# Patient Record
Sex: FEMALE | Race: WHITE | NOT HISPANIC OR LATINO | Employment: UNEMPLOYED | URBAN - NONMETROPOLITAN AREA
[De-identification: names, ages, dates, MRNs, and addresses within clinical notes are randomized per-mention and may not be internally consistent; named-entity substitution may affect disease eponyms.]

---

## 2017-02-26 ENCOUNTER — HOSPITAL ENCOUNTER (EMERGENCY)
Facility: HOSPITAL | Age: 37
Discharge: HOME | End: 2017-02-26
Attending: EMERGENCY MEDICINE | Admitting: EMERGENCY MEDICINE

## 2017-02-26 VITALS
HEART RATE: 76 BPM | HEIGHT: 62 IN | BODY MASS INDEX: 46.01 KG/M2 | SYSTOLIC BLOOD PRESSURE: 114 MMHG | RESPIRATION RATE: 18 BRPM | OXYGEN SATURATION: 98 % | WEIGHT: 250 LBS | DIASTOLIC BLOOD PRESSURE: 84 MMHG | TEMPERATURE: 98.2 F

## 2017-02-26 DIAGNOSIS — H10.9 BACTERIAL CONJUNCTIVITIS OF LEFT EYE: Primary | ICD-10-CM

## 2017-02-26 PROCEDURE — 99284 EMERGENCY DEPT VISIT MOD MDM: CPT

## 2017-02-26 RX ORDER — POLYMYXIN B SULFATE AND TRIMETHOPRIM 1; 10000 MG/ML; [USP'U]/ML
1 SOLUTION OPHTHALMIC ONCE
Status: DISCONTINUED | OUTPATIENT
Start: 2017-02-26 | End: 2017-02-26

## 2017-02-26 RX ORDER — PURIFIED WATER 986 MG/ML
SOLUTION OPHTHALMIC ONCE AS NEEDED
Status: DISCONTINUED | OUTPATIENT
Start: 2017-02-26 | End: 2017-02-26

## 2017-02-26 RX ORDER — TETRACAINE HYDROCHLORIDE 5 MG/ML
2 SOLUTION OPHTHALMIC ONCE
Status: DISCONTINUED | OUTPATIENT
Start: 2017-02-26 | End: 2017-02-26

## 2017-02-26 RX ORDER — POLYMYXIN B SULFATE AND TRIMETHOPRIM 1; 10000 MG/ML; [USP'U]/ML
1 SOLUTION OPHTHALMIC EVERY 4 HOURS
Qty: 10 ML | Refills: 0 | Status: SHIPPED | OUTPATIENT
Start: 2017-02-26 | End: 2017-03-30

## 2017-02-26 RX ORDER — POLYMYXIN B SULFATE AND TRIMETHOPRIM 1; 10000 MG/ML; [USP'U]/ML
1 SOLUTION OPHTHALMIC ONCE
Status: COMPLETED | OUTPATIENT
Start: 2017-02-26 | End: 2017-02-26

## 2017-02-26 RX ORDER — LISINOPRIL 2.5 MG/1
2.5 TABLET ORAL DAILY
Status: ON HOLD | COMMUNITY
End: 2018-10-04

## 2017-02-26 RX ADMIN — POLYMYXIN B SULFATE AND TRIMETHOPRIM 1 DROP: 1; 10000 SOLUTION OPHTHALMIC at 12:40

## 2017-03-07 DIAGNOSIS — Z30.2 STERILIZATION: Primary | ICD-10-CM

## 2017-03-07 DIAGNOSIS — L91.8 SKIN TAG: ICD-10-CM

## 2017-03-07 DIAGNOSIS — Z30.432 ENCOUNTER FOR IUD REMOVAL: ICD-10-CM

## 2017-03-07 RX ORDER — SODIUM CHLORIDE 0.9 % (FLUSH) 0.9 %
1-10 SYRINGE (ML) INJECTION AS NEEDED
Status: CANCELLED | OUTPATIENT
Start: 2017-03-07

## 2017-03-07 NOTE — H&P
Chief complaint Undesired Fertility, Skin Tags    History of Present Illness: Patient is a 36 y.o. female who presents for a Laparoscopic BTL, IUD removal, Skin Tag Removal        Past Medical History   Diagnosis Date   • Asthma    • COPD (chronic obstructive pulmonary disease)    • Diabetes mellitus    • Hepatitis C    • Hypertension    • Lupus    • Rheumatoid arthritis        Past Surgical History   Procedure Laterality Date   • Cholecystectomy         No family history on file.    Social History   Substance Use Topics   • Smoking status: Current Every Day Smoker     Packs/day: 1.00     Types: Cigarettes   • Smokeless tobacco: Never Used   • Alcohol use No         (Not in a hospital admission)    Allergies:  Review of patient's allergies indicates no known allergies.      Vital Signs   Height 62 in  Weight 266 lbs  BMI 48  /87  Pulse 84  Pulse Ox 94      Review of Systems    The following systems were reviewed and negative;  ENT, respiratory, cardiovascular, gastrointestinal, genitourinary, breast, endocrine and allergies / immunologic.      Physical Exam:      General Appearance:    Alert, cooperative, in no acute distress   Head:    Normocephalic, without obvious abnormality, atraumatic   Eyes:            Lids and lashes normal, conjunctivae and sclerae normal, no   icterus, no pallor, corneas clear, PERRLA   Ears:    Ears appear intact with no abnormalities noted   Throat:   No oral lesions, no thrush, oral mucosa moist   Neck:   No adenopathy, supple, trachea midline, no thyromegaly, no     carotid bruit, no JVD   Back:     No kyphosis present, no scoliosis present, no skin lesions,       erythema or scars, no tenderness to percussion or                   palpation,   range of motion normal   Lungs:     Clear to auscultation,respirations regular, even and                   unlabored    Heart:    Regular rhythm and normal rate, normal S1 and S2, no            murmur, no gallop, no rub, no click   Breast  Exam:    Deferred   Abdomen:     Normal bowel sounds, no masses, no organomegaly, soft        non-tender, non-distended, no guarding, no rebound                 tenderness   Genitalia:    Deferred   Extremities:   Moves all extremities well, no edema, no cyanosis, no              redness   Pulses:   Pulses palpable and equal bilaterally   Skin:   No bleeding, bruising or rash   Lymph nodes:   No palpable adenopathy   Neurologic:   Cranial nerves 2 - 12 grossly intact, sensation intact, DTR        present and equal bilaterally         Assessment: Patient is a 36 y.o. female who presents with undesired fertility, skin tags.    Plan of Care: Proceed with a Laparoscopic BTL, IUD removal, Skin Tag Removal.       Kee Crooks III, MD  03/07/17  12:41 PM

## 2017-03-30 ENCOUNTER — APPOINTMENT (OUTPATIENT)
Dept: PREADMISSION TESTING | Facility: HOSPITAL | Age: 37
End: 2017-03-30

## 2017-04-03 ENCOUNTER — APPOINTMENT (OUTPATIENT)
Dept: GENERAL RADIOLOGY | Facility: HOSPITAL | Age: 37
End: 2017-04-03
Attending: OBSTETRICS & GYNECOLOGY

## 2017-04-03 ENCOUNTER — ANESTHESIA (OUTPATIENT)
Dept: PERIOP | Facility: HOSPITAL | Age: 37
End: 2017-04-03

## 2017-04-03 ENCOUNTER — ANESTHESIA EVENT (OUTPATIENT)
Dept: PERIOP | Facility: HOSPITAL | Age: 37
End: 2017-04-03

## 2017-04-03 ENCOUNTER — HOSPITAL ENCOUNTER (OUTPATIENT)
Facility: HOSPITAL | Age: 37
Setting detail: HOSPITAL OUTPATIENT SURGERY
Discharge: HOME | End: 2017-04-03
Attending: OBSTETRICS & GYNECOLOGY | Admitting: OBSTETRICS & GYNECOLOGY

## 2017-04-03 VITALS
HEIGHT: 62 IN | TEMPERATURE: 97.9 F | WEIGHT: 265 LBS | DIASTOLIC BLOOD PRESSURE: 69 MMHG | SYSTOLIC BLOOD PRESSURE: 112 MMHG | RESPIRATION RATE: 18 BRPM | OXYGEN SATURATION: 98 % | HEART RATE: 69 BPM | BODY MASS INDEX: 48.76 KG/M2

## 2017-04-03 DIAGNOSIS — Z30.2 STERILIZATION: ICD-10-CM

## 2017-04-03 DIAGNOSIS — Z30.432 ENCOUNTER FOR IUD REMOVAL: ICD-10-CM

## 2017-04-03 DIAGNOSIS — N90.89 SKIN TAG OF LABIA: ICD-10-CM

## 2017-04-03 DIAGNOSIS — L91.8 SKIN TAG: ICD-10-CM

## 2017-04-03 LAB
B-HCG UR QL: NEGATIVE
GLUCOSE BLDC GLUCOMTR-MCNC: 98 MG/DL (ref 70–130)
INTERNAL NEGATIVE CONTROL: NEGATIVE
INTERNAL POSITIVE CONTROL: POSITIVE
Lab: NORMAL

## 2017-04-03 PROCEDURE — 25010000002 FENTANYL CITRATE (PF) 100 MCG/2ML SOLUTION: Performed by: NURSE ANESTHETIST, CERTIFIED REGISTERED

## 2017-04-03 PROCEDURE — 25010000002 KETOROLAC TROMETHAMINE PER 15 MG: Performed by: NURSE ANESTHETIST, CERTIFIED REGISTERED

## 2017-04-03 PROCEDURE — 25010000002 MIDAZOLAM PER 1 MG: Performed by: NURSE ANESTHETIST, CERTIFIED REGISTERED

## 2017-04-03 PROCEDURE — 25010000002 ONDANSETRON PER 1 MG: Performed by: NURSE ANESTHETIST, CERTIFIED REGISTERED

## 2017-04-03 PROCEDURE — 25010000002 NEOSTIGMINE 10 MG/10ML SOLUTION: Performed by: NURSE ANESTHETIST, CERTIFIED REGISTERED

## 2017-04-03 PROCEDURE — 94640 AIRWAY INHALATION TREATMENT: CPT

## 2017-04-03 PROCEDURE — 25010000002 SUCCINYLCHOLINE PER 20 MG: Performed by: NURSE ANESTHETIST, CERTIFIED REGISTERED

## 2017-04-03 PROCEDURE — 82962 GLUCOSE BLOOD TEST: CPT

## 2017-04-03 PROCEDURE — 25010000002 PROPOFOL 10 MG/ML EMULSION: Performed by: NURSE ANESTHETIST, CERTIFIED REGISTERED

## 2017-04-03 DEVICE — CLIP FALLOP FILSHIE TI PR STRL BX/20: Type: IMPLANTABLE DEVICE | Status: FUNCTIONAL

## 2017-04-03 RX ORDER — MIDAZOLAM HYDROCHLORIDE 1 MG/ML
INJECTION INTRAMUSCULAR; INTRAVENOUS AS NEEDED
Status: DISCONTINUED | OUTPATIENT
Start: 2017-04-03 | End: 2017-04-03 | Stop reason: SURG

## 2017-04-03 RX ORDER — SODIUM CHLORIDE 0.9 % (FLUSH) 0.9 %
1-10 SYRINGE (ML) INJECTION AS NEEDED
Status: DISCONTINUED | OUTPATIENT
Start: 2017-04-03 | End: 2017-04-03 | Stop reason: HOSPADM

## 2017-04-03 RX ORDER — MAGNESIUM HYDROXIDE 1200 MG/15ML
LIQUID ORAL AS NEEDED
Status: DISCONTINUED | OUTPATIENT
Start: 2017-04-03 | End: 2017-04-03 | Stop reason: HOSPADM

## 2017-04-03 RX ORDER — ONDANSETRON 2 MG/ML
4 INJECTION INTRAMUSCULAR; INTRAVENOUS ONCE AS NEEDED
Status: DISCONTINUED | OUTPATIENT
Start: 2017-04-03 | End: 2017-04-03 | Stop reason: HOSPADM

## 2017-04-03 RX ORDER — MEPERIDINE HYDROCHLORIDE 25 MG/ML
12.5 INJECTION INTRAMUSCULAR; INTRAVENOUS; SUBCUTANEOUS
Status: DISCONTINUED | OUTPATIENT
Start: 2017-04-03 | End: 2017-04-03 | Stop reason: HOSPADM

## 2017-04-03 RX ORDER — ONDANSETRON 2 MG/ML
INJECTION INTRAMUSCULAR; INTRAVENOUS AS NEEDED
Status: DISCONTINUED | OUTPATIENT
Start: 2017-04-03 | End: 2017-04-03 | Stop reason: SURG

## 2017-04-03 RX ORDER — KETOROLAC TROMETHAMINE 30 MG/ML
INJECTION, SOLUTION INTRAMUSCULAR; INTRAVENOUS AS NEEDED
Status: DISCONTINUED | OUTPATIENT
Start: 2017-04-03 | End: 2017-04-03 | Stop reason: SURG

## 2017-04-03 RX ORDER — PROPOFOL 10 MG/ML
VIAL (ML) INTRAVENOUS AS NEEDED
Status: DISCONTINUED | OUTPATIENT
Start: 2017-04-03 | End: 2017-04-03 | Stop reason: SURG

## 2017-04-03 RX ORDER — SUCCINYLCHOLINE CHLORIDE 20 MG/ML
INJECTION INTRAMUSCULAR; INTRAVENOUS AS NEEDED
Status: DISCONTINUED | OUTPATIENT
Start: 2017-04-03 | End: 2017-04-03 | Stop reason: SURG

## 2017-04-03 RX ORDER — ROCURONIUM BROMIDE 10 MG/ML
INJECTION, SOLUTION INTRAVENOUS AS NEEDED
Status: DISCONTINUED | OUTPATIENT
Start: 2017-04-03 | End: 2017-04-03 | Stop reason: SURG

## 2017-04-03 RX ORDER — SODIUM CHLORIDE, SODIUM LACTATE, POTASSIUM CHLORIDE, CALCIUM CHLORIDE 600; 310; 30; 20 MG/100ML; MG/100ML; MG/100ML; MG/100ML
125 INJECTION, SOLUTION INTRAVENOUS CONTINUOUS
Status: DISCONTINUED | OUTPATIENT
Start: 2017-04-03 | End: 2017-04-03 | Stop reason: HOSPADM

## 2017-04-03 RX ORDER — OXYCODONE HYDROCHLORIDE AND ACETAMINOPHEN 5; 325 MG/1; MG/1
1 TABLET ORAL ONCE AS NEEDED
Status: DISCONTINUED | OUTPATIENT
Start: 2017-04-03 | End: 2017-04-03 | Stop reason: HOSPADM

## 2017-04-03 RX ORDER — FENTANYL CITRATE 50 UG/ML
50 INJECTION, SOLUTION INTRAMUSCULAR; INTRAVENOUS
Status: DISCONTINUED | OUTPATIENT
Start: 2017-04-03 | End: 2017-04-03 | Stop reason: HOSPADM

## 2017-04-03 RX ORDER — IPRATROPIUM BROMIDE AND ALBUTEROL SULFATE 2.5; .5 MG/3ML; MG/3ML
3 SOLUTION RESPIRATORY (INHALATION) ONCE AS NEEDED
Status: DISCONTINUED | OUTPATIENT
Start: 2017-04-03 | End: 2017-04-03 | Stop reason: HOSPADM

## 2017-04-03 RX ORDER — IPRATROPIUM BROMIDE AND ALBUTEROL SULFATE 2.5; .5 MG/3ML; MG/3ML
3 SOLUTION RESPIRATORY (INHALATION) ONCE
Status: COMPLETED | OUTPATIENT
Start: 2017-04-03 | End: 2017-04-03

## 2017-04-03 RX ORDER — NEOSTIGMINE METHYLSULFATE 1 MG/ML
INJECTION, SOLUTION INTRAVENOUS AS NEEDED
Status: DISCONTINUED | OUTPATIENT
Start: 2017-04-03 | End: 2017-04-03 | Stop reason: SURG

## 2017-04-03 RX ORDER — LIDOCAINE HYDROCHLORIDE 20 MG/ML
INJECTION, SOLUTION INFILTRATION; PERINEURAL AS NEEDED
Status: DISCONTINUED | OUTPATIENT
Start: 2017-04-03 | End: 2017-04-03 | Stop reason: SURG

## 2017-04-03 RX ORDER — FENTANYL CITRATE 50 UG/ML
INJECTION, SOLUTION INTRAMUSCULAR; INTRAVENOUS AS NEEDED
Status: DISCONTINUED | OUTPATIENT
Start: 2017-04-03 | End: 2017-04-03 | Stop reason: SURG

## 2017-04-03 RX ORDER — FAMOTIDINE 10 MG/ML
INJECTION, SOLUTION INTRAVENOUS AS NEEDED
Status: DISCONTINUED | OUTPATIENT
Start: 2017-04-03 | End: 2017-04-03 | Stop reason: SURG

## 2017-04-03 RX ORDER — GLYCOPYRROLATE 0.2 MG/ML
INJECTION INTRAMUSCULAR; INTRAVENOUS AS NEEDED
Status: DISCONTINUED | OUTPATIENT
Start: 2017-04-03 | End: 2017-04-03 | Stop reason: SURG

## 2017-04-03 RX ADMIN — PROPOFOL 50 MG: 10 INJECTION, EMULSION INTRAVENOUS at 08:50

## 2017-04-03 RX ADMIN — KETOROLAC TROMETHAMINE 30 MG: 30 INJECTION, SOLUTION INTRAMUSCULAR; INTRAVENOUS at 08:53

## 2017-04-03 RX ADMIN — IPRATROPIUM BROMIDE AND ALBUTEROL SULFATE 3 ML: .5; 3 SOLUTION RESPIRATORY (INHALATION) at 08:08

## 2017-04-03 RX ADMIN — ROCURONIUM BROMIDE 10 MG: 10 INJECTION INTRAVENOUS at 08:48

## 2017-04-03 RX ADMIN — PROPOFOL 200 MG: 10 INJECTION, EMULSION INTRAVENOUS at 08:37

## 2017-04-03 RX ADMIN — GLYCOPYRROLATE 0.6 MG: 0.2 INJECTION INTRAMUSCULAR; INTRAVENOUS at 09:00

## 2017-04-03 RX ADMIN — ROCURONIUM BROMIDE 10 MG: 10 INJECTION INTRAVENOUS at 08:44

## 2017-04-03 RX ADMIN — FENTANYL CITRATE 50 MCG: 50 INJECTION INTRAMUSCULAR; INTRAVENOUS at 09:14

## 2017-04-03 RX ADMIN — FENTANYL CITRATE 50 MCG: 50 INJECTION INTRAMUSCULAR; INTRAVENOUS at 09:21

## 2017-04-03 RX ADMIN — SUCCINYLCHOLINE CHLORIDE 100 MG: 20 INJECTION, SOLUTION INTRAMUSCULAR; INTRAVENOUS at 08:37

## 2017-04-03 RX ADMIN — SODIUM CHLORIDE, POTASSIUM CHLORIDE, SODIUM LACTATE AND CALCIUM CHLORIDE 125 ML/HR: 600; 310; 30; 20 INJECTION, SOLUTION INTRAVENOUS at 08:27

## 2017-04-03 RX ADMIN — FENTANYL CITRATE 100 MCG: 50 INJECTION INTRAMUSCULAR; INTRAVENOUS at 08:37

## 2017-04-03 RX ADMIN — LIDOCAINE HYDROCHLORIDE 100 MG: 20 INJECTION, SOLUTION INFILTRATION; PERINEURAL at 08:37

## 2017-04-03 RX ADMIN — ONDANSETRON 4 MG: 2 INJECTION, SOLUTION INTRAMUSCULAR; INTRAVENOUS at 08:45

## 2017-04-03 RX ADMIN — FAMOTIDINE 20 MG: 10 INJECTION, SOLUTION INTRAVENOUS at 08:45

## 2017-04-03 RX ADMIN — OXYCODONE HYDROCHLORIDE AND ACETAMINOPHEN 1 TABLET: 5; 325 TABLET ORAL at 09:47

## 2017-04-03 RX ADMIN — NEOSTIGMINE METHYLSULFATE 3 MG: 1 INJECTION, SOLUTION INTRAVENOUS at 09:00

## 2017-04-03 RX ADMIN — MIDAZOLAM HYDROCHLORIDE 2 MG: 1 INJECTION, SOLUTION INTRAMUSCULAR; INTRAVENOUS at 08:33

## 2017-04-03 NOTE — DISCHARGE INSTRUCTIONS
You had your IUD removed and skin tags removed during your procedure today.   Your follow-up appointment with Dr. Crooks will be on April 17, 2017 at 11:10 am

## 2017-04-03 NOTE — ANESTHESIA PROCEDURE NOTES
Airway  Urgency: elective    Date/Time: 4/3/2017 8:38 AM  Airway not difficult    General Information and Staff    Patient location during procedure: OR  CRNA: MASHA GALLO    Indications and Patient Condition  Indications for airway management: airway protection    Preoxygenated: yes  MILS maintained throughout  Mask difficulty assessment: 0 - not attempted    Final Airway Details  Final airway type: endotracheal airway      Successful airway: ETT  Cuffed: yes   Successful intubation technique: direct laryngoscopy  Endotracheal tube insertion site: oral  Blade: Drake  Blade size: #2  ETT size: 7.5 mm  Cormack-Lehane Classification: grade I - full view of glottis  Placement verified by: chest auscultation and capnometry   Measured from: lips  ETT to lips (cm): 20  Number of attempts at approach: 1

## 2017-04-03 NOTE — PLAN OF CARE
Problem: Patient Care Overview (Adult)  Goal: Discharge Needs Assessment  Outcome: Ongoing (interventions implemented as appropriate)    04/03/17 0833   Discharge Needs Assessment   Concerns To Be Addressed no discharge needs identified   Readmission Within The Last 30 Days no previous admission in last 30 days   Equipment Needed After Discharge glucometer   Discharge Disposition home or self-care   Self-Care   Equipment Currently Used at Home glucometer   Living Environment   Transportation Available public transportation

## 2017-04-03 NOTE — ANESTHESIA PREPROCEDURE EVALUATION
Anesthesia Evaluation     Patient summary reviewed and Nursing notes reviewed   no history of anesthetic complications:     Airway   Mallampati: III  TM distance: >3 FB  Neck ROM: full  difficult intubation highly probable  Dental - normal exam     Pulmonary - normal exam   (+) COPD, asthma,   Cardiovascular - normal exam  Exercise tolerance: good (4-7 METS)    NYHA Classification: II    (+) hypertension,       Neuro/Psych- negative ROS  GI/Hepatic/Renal/Endo    (+)  hepatitis, liver disease, diabetes mellitus,     Musculoskeletal     Abdominal  - normal exam    Bowel sounds: normal.   Substance History - negative use     OB/GYN negative ob/gyn ROS         Other   (+) arthritis                                 Anesthesia Plan    ASA 3     general     intravenous induction   Anesthetic plan and risks discussed with patient.    Plan discussed with CRNA.

## 2017-04-03 NOTE — OP NOTE
Bilateral Tubal Ligation Operation Note    Mary Monaco  4/3/2017    Pre-op Diagnosis:   Z30.2  Skin Tag    Post-op Diagnosis:     Desires Sterilization  Skin Tag    Procedure(s):  BILATERAL TUBAL FALLOPE FILSHIE CLIPPING LAPAROSCOPIC,IUD REMOVAL  EXCISION OF VAGINAL SKIN TAG     Surgeon(s):  Kee Crooks III, MD    Anesthesia: Choice    Staff:   Circulator: Elsa Sunshine RN  Scrub Person: Jaky Valiente  Assistant: Maria Carrasco CSA    Estimated Blood Loss: * No values recorded between 4/3/2017  8:33 AM and 4/3/2017  9:07 AM *    Specimens:                  Order Name Source Comment Collection Info Order Time   PREGNANCY, URINE    4/3/2017  8:17 AM   TISSUE EXAM Perineum  Collected By: Kee Crooks III, MD 4/3/2017  8:50 AM         Procedure     The patient was prepped and draped in normal sterile fashion. IUD removed without difficulty. Skins Tag grasped with Micki clamp and excised. Umbillical incision was made with a knife and carried down to the underlying facia. The facia was nicked. A nonbladed trocar was placed under direct visualization. A suprapubic port was also placed. Two Filshie clips were placed on each fallopian tube. Both tubes were noted to be totally occluded. The fascia was closed with 0 Dexon. The skin was closed with 4-0 Monocryl. The patient tolerated the procedure and went to recovery room in stable condition.         Kee Crooks III, MD     Date: 4/3/2017  Time: 9:07 AM

## 2017-04-03 NOTE — PLAN OF CARE
Problem: Patient Care Overview (Adult)  Goal: Plan of Care Review  Outcome: Ongoing (interventions implemented as appropriate)    04/03/17 0834   Coping/Psychosocial Response Interventions   Plan Of Care Reviewed With patient   Patient Care Overview   Progress progress toward functional goals as expected

## 2017-04-03 NOTE — ANESTHESIA POSTPROCEDURE EVALUATION
Patient: Mary Monaco    Procedure Summary     Date Anesthesia Start Anesthesia Stop Room / Location    04/03/17 0834 0912  [facility] OR 04 / [facility]       Procedure Diagnosis Surgeon Provider    BILATERAL TUBAL FALLOPE FILSHIE CLIPPING LAPAROSCOPIC,IUD REMOVAL (Bilateral Vagina); EXCISION OF VAGINAL SKIN TAG (N/A Perineum) (Z30.2) Kee Crooks III, MD Quincy Blackwell, DO          Anesthesia Type: general  Last vitals  /80 (04/03/17 0937)    Temp 97.8 °F (36.6 °C) (04/03/17 0908)    Pulse 60 (04/03/17 0937)   Resp 18 (04/03/17 0937)    SpO2 100 % (04/03/17 0937)      Post Anesthesia Care and Evaluation    Patient location during evaluation: PHASE II  Patient participation: complete - patient participated  Level of consciousness: awake and alert  Pain score: 1  Pain management: adequate  Airway patency: patent  Anesthetic complications: No anesthetic complications  PONV Status: controlled  Cardiovascular status: acceptable  Respiratory status: acceptable  Hydration status: acceptable

## 2017-04-03 NOTE — PLAN OF CARE
Problem: Perioperative Period (Adult)  Goal: Signs and Symptoms of Listed Potential Problems Will be Absent or Manageable (Perioperative Period)  Outcome: Ongoing (interventions implemented as appropriate)    04/03/17 0833   Perioperative Period   Problems Assessed (Perioperative Period) pain   Problems Present (Perioperative Period) none

## 2017-04-05 LAB
LAB AP CASE REPORT: NORMAL
Lab: NORMAL
PATH REPORT.FINAL DX SPEC: NORMAL

## 2017-05-09 ENCOUNTER — APPOINTMENT (OUTPATIENT)
Dept: GENERAL RADIOLOGY | Facility: HOSPITAL | Age: 37
End: 2017-05-09

## 2017-05-09 ENCOUNTER — HOSPITAL ENCOUNTER (EMERGENCY)
Facility: HOSPITAL | Age: 37
Discharge: HOME | End: 2017-05-09
Attending: EMERGENCY MEDICINE | Admitting: EMERGENCY MEDICINE

## 2017-05-09 VITALS
HEART RATE: 73 BPM | TEMPERATURE: 97.9 F | BODY MASS INDEX: 44.16 KG/M2 | RESPIRATION RATE: 18 BRPM | SYSTOLIC BLOOD PRESSURE: 123 MMHG | OXYGEN SATURATION: 98 % | DIASTOLIC BLOOD PRESSURE: 86 MMHG | HEIGHT: 62 IN | WEIGHT: 240 LBS

## 2017-05-09 DIAGNOSIS — J44.1 COPD EXACERBATION (HCC): Primary | ICD-10-CM

## 2017-05-09 LAB
A-A DO2: 30.1 MMHG (ref 0–300)
ALBUMIN SERPL-MCNC: 4.3 G/DL (ref 3.5–5)
ALBUMIN/GLOB SERPL: 1.3 G/DL (ref 1.5–2.5)
ALP SERPL-CCNC: 76 U/L (ref 35–104)
ALT SERPL W P-5'-P-CCNC: 16 U/L (ref 10–36)
ANION GAP SERPL CALCULATED.3IONS-SCNC: 5.2 MMOL/L (ref 3.6–11.2)
ARTERIAL PATENCY WRIST A: POSITIVE
AST SERPL-CCNC: 22 U/L (ref 10–30)
ATMOSPHERIC PRESS: 726 MMHG
BASE EXCESS BLDA CALC-SCNC: -0.4 MMOL/L
BASOPHILS # BLD AUTO: 0.02 10*3/MM3 (ref 0–0.3)
BASOPHILS NFR BLD AUTO: 0.2 % (ref 0–2)
BDY SITE: ABNORMAL
BILIRUB SERPL-MCNC: 0.4 MG/DL (ref 0.2–1.8)
BILIRUB UR QL STRIP: NEGATIVE
BNP SERPL-MCNC: 78 PG/ML (ref 0–100)
BODY TEMPERATURE: 98.6 C
BUN BLD-MCNC: 8 MG/DL (ref 7–21)
BUN/CREAT SERPL: 11.4 (ref 7–25)
CALCIUM SPEC-SCNC: 9.7 MG/DL (ref 7.7–10)
CHLORIDE SERPL-SCNC: 105 MMOL/L (ref 99–112)
CLARITY UR: CLEAR
CO2 SERPL-SCNC: 30.8 MMOL/L (ref 24.3–31.9)
COHGB MFR BLD: 1.7 % (ref 0–5)
COLOR UR: YELLOW
CREAT BLD-MCNC: 0.7 MG/DL (ref 0.43–1.29)
DEPRECATED RDW RBC AUTO: 41.2 FL (ref 37–54)
EOSINOPHIL # BLD AUTO: 0.09 10*3/MM3 (ref 0–0.7)
EOSINOPHIL NFR BLD AUTO: 0.9 % (ref 0–5)
ERYTHROCYTE [DISTWIDTH] IN BLOOD BY AUTOMATED COUNT: 13.4 % (ref 11.5–14.5)
GFR SERPL CREATININE-BSD FRML MDRD: 95 ML/MIN/1.73
GLOBULIN UR ELPH-MCNC: 3.3 GM/DL
GLUCOSE BLD-MCNC: 101 MG/DL (ref 70–110)
GLUCOSE UR STRIP-MCNC: ABNORMAL MG/DL
HCO3 BLDA-SCNC: 24 MMOL/L (ref 22–26)
HCT VFR BLD AUTO: 41.2 % (ref 37–47)
HCT VFR BLD CALC: 41 % (ref 37–47)
HGB BLD-MCNC: 13.8 G/DL (ref 12–16)
HGB BLDA-MCNC: 13.9 G/DL (ref 12–16)
HGB UR QL STRIP.AUTO: NEGATIVE
HOROWITZ INDEX BLD+IHG-RTO: 21 %
IMM GRANULOCYTES # BLD: 0.01 10*3/MM3 (ref 0–0.03)
IMM GRANULOCYTES NFR BLD: 0.1 % (ref 0–0.5)
KETONES UR QL STRIP: NEGATIVE
LEUKOCYTE ESTERASE UR QL STRIP.AUTO: NEGATIVE
LYMPHOCYTES # BLD AUTO: 1.04 10*3/MM3 (ref 1–3)
LYMPHOCYTES NFR BLD AUTO: 10.1 % (ref 21–51)
MCH RBC QN AUTO: 28.4 PG (ref 27–33)
MCHC RBC AUTO-ENTMCNC: 33.5 G/DL (ref 33–37)
MCV RBC AUTO: 84.8 FL (ref 80–94)
METHGB BLD QL: 0.3 % (ref 0–3)
MODALITY: ABNORMAL
MONOCYTES # BLD AUTO: 0.49 10*3/MM3 (ref 0.1–0.9)
MONOCYTES NFR BLD AUTO: 4.7 % (ref 0–10)
NEUTROPHILS # BLD AUTO: 8.67 10*3/MM3 (ref 1.4–6.5)
NEUTROPHILS NFR BLD AUTO: 84 % (ref 30–70)
NITRITE UR QL STRIP: NEGATIVE
OSMOLALITY SERPL CALC.SUM OF ELEC: 279.7 MOSM/KG (ref 273–305)
OXYHGB MFR BLDV: 91.5 % (ref 85–100)
PCO2 BLDA: 38.3 MM HG (ref 35–45)
PH BLDA: 7.41 PH UNITS (ref 7.35–7.45)
PH UR STRIP.AUTO: 7 [PH] (ref 5–8)
PLATELET # BLD AUTO: 242 10*3/MM3 (ref 130–400)
PMV BLD AUTO: 10.2 FL (ref 6–10)
PO2 BLDA: 66.6 MM HG (ref 80–100)
POTASSIUM BLD-SCNC: 4.2 MMOL/L (ref 3.5–5.3)
PROT SERPL-MCNC: 7.6 G/DL (ref 6–8)
PROT UR QL STRIP: NEGATIVE
RBC # BLD AUTO: 4.86 10*6/MM3 (ref 4.2–5.4)
SAO2 % BLDCOA: 93.4 % (ref 90–100)
SODIUM BLD-SCNC: 141 MMOL/L (ref 135–153)
SP GR UR STRIP: 1.02 (ref 1–1.03)
TROPONIN I SERPL-MCNC: <0.006 NG/ML
UROBILINOGEN UR QL STRIP: ABNORMAL
WBC NRBC COR # BLD: 10.32 10*3/MM3 (ref 4.5–12.5)

## 2017-05-09 PROCEDURE — 25010000002 METHYLPREDNISOLONE PER 40 MG: Performed by: PHYSICIAN ASSISTANT

## 2017-05-09 PROCEDURE — 80053 COMPREHEN METABOLIC PANEL: CPT | Performed by: PHYSICIAN ASSISTANT

## 2017-05-09 PROCEDURE — 85025 COMPLETE CBC W/AUTO DIFF WBC: CPT | Performed by: PHYSICIAN ASSISTANT

## 2017-05-09 PROCEDURE — 94799 UNLISTED PULMONARY SVC/PX: CPT

## 2017-05-09 PROCEDURE — 84484 ASSAY OF TROPONIN QUANT: CPT | Performed by: PHYSICIAN ASSISTANT

## 2017-05-09 PROCEDURE — 82375 ASSAY CARBOXYHB QUANT: CPT | Performed by: PHYSICIAN ASSISTANT

## 2017-05-09 PROCEDURE — 36600 WITHDRAWAL OF ARTERIAL BLOOD: CPT | Performed by: PHYSICIAN ASSISTANT

## 2017-05-09 PROCEDURE — 36415 COLL VENOUS BLD VENIPUNCTURE: CPT

## 2017-05-09 PROCEDURE — 94640 AIRWAY INHALATION TREATMENT: CPT

## 2017-05-09 PROCEDURE — 93005 ELECTROCARDIOGRAM TRACING: CPT | Performed by: PHYSICIAN ASSISTANT

## 2017-05-09 PROCEDURE — 96375 TX/PRO/DX INJ NEW DRUG ADDON: CPT

## 2017-05-09 PROCEDURE — 93010 ELECTROCARDIOGRAM REPORT: CPT | Performed by: INTERNAL MEDICINE

## 2017-05-09 PROCEDURE — 71020 XR CHEST 2 VW: CPT | Performed by: RADIOLOGY

## 2017-05-09 PROCEDURE — 71020 HC CHEST PA AND LATERAL: CPT

## 2017-05-09 PROCEDURE — 82805 BLOOD GASES W/O2 SATURATION: CPT | Performed by: PHYSICIAN ASSISTANT

## 2017-05-09 PROCEDURE — 83880 ASSAY OF NATRIURETIC PEPTIDE: CPT | Performed by: PHYSICIAN ASSISTANT

## 2017-05-09 PROCEDURE — 81003 URINALYSIS AUTO W/O SCOPE: CPT | Performed by: PHYSICIAN ASSISTANT

## 2017-05-09 PROCEDURE — 99284 EMERGENCY DEPT VISIT MOD MDM: CPT

## 2017-05-09 PROCEDURE — 83050 HGB METHEMOGLOBIN QUAN: CPT | Performed by: PHYSICIAN ASSISTANT

## 2017-05-09 PROCEDURE — 25010000002 KETOROLAC TROMETHAMINE PER 15 MG: Performed by: PHYSICIAN ASSISTANT

## 2017-05-09 PROCEDURE — 96374 THER/PROPH/DIAG INJ IV PUSH: CPT

## 2017-05-09 RX ORDER — SODIUM CHLORIDE 0.9 % (FLUSH) 0.9 %
10 SYRINGE (ML) INJECTION AS NEEDED
Status: DISCONTINUED | OUTPATIENT
Start: 2017-05-09 | End: 2017-05-09 | Stop reason: HOSPADM

## 2017-05-09 RX ORDER — KETOROLAC TROMETHAMINE 30 MG/ML
30 INJECTION, SOLUTION INTRAMUSCULAR; INTRAVENOUS ONCE
Status: COMPLETED | OUTPATIENT
Start: 2017-05-09 | End: 2017-05-09

## 2017-05-09 RX ORDER — METHYLPREDNISOLONE 4 MG/1
TABLET ORAL
Qty: 21 TABLET | Refills: 0 | Status: ON HOLD | OUTPATIENT
Start: 2017-05-09 | End: 2018-10-04

## 2017-05-09 RX ORDER — IPRATROPIUM BROMIDE AND ALBUTEROL SULFATE 2.5; .5 MG/3ML; MG/3ML
3 SOLUTION RESPIRATORY (INHALATION) ONCE
Status: COMPLETED | OUTPATIENT
Start: 2017-05-09 | End: 2017-05-09

## 2017-05-09 RX ORDER — METHYLPREDNISOLONE SODIUM SUCCINATE 40 MG/ML
80 INJECTION, POWDER, LYOPHILIZED, FOR SOLUTION INTRAMUSCULAR; INTRAVENOUS ONCE
Status: COMPLETED | OUTPATIENT
Start: 2017-05-09 | End: 2017-05-09

## 2017-05-09 RX ORDER — DOXYCYCLINE 100 MG/1
100 CAPSULE ORAL 2 TIMES DAILY
Qty: 20 CAPSULE | Refills: 0 | Status: ON HOLD | OUTPATIENT
Start: 2017-05-09 | End: 2018-10-04

## 2017-05-09 RX ADMIN — METHYLPREDNISOLONE SODIUM SUCCINATE 80 MG: 40 INJECTION, POWDER, FOR SOLUTION INTRAMUSCULAR; INTRAVENOUS at 15:29

## 2017-05-09 RX ADMIN — IPRATROPIUM BROMIDE AND ALBUTEROL SULFATE 3 ML: .5; 3 SOLUTION RESPIRATORY (INHALATION) at 15:29

## 2017-05-09 RX ADMIN — KETOROLAC TROMETHAMINE 30 MG: 30 INJECTION, SOLUTION INTRAMUSCULAR; INTRAVENOUS at 16:40

## 2017-05-09 RX ADMIN — IPRATROPIUM BROMIDE AND ALBUTEROL SULFATE 3 ML: .5; 3 SOLUTION RESPIRATORY (INHALATION) at 16:29

## 2017-05-09 RX ADMIN — IPRATROPIUM BROMIDE AND ALBUTEROL SULFATE 3 ML: .5; 3 SOLUTION RESPIRATORY (INHALATION) at 16:42

## 2017-07-31 ENCOUNTER — HOSPITAL ENCOUNTER (EMERGENCY)
Facility: HOSPITAL | Age: 37
Discharge: HOME | End: 2017-07-31
Attending: EMERGENCY MEDICINE | Admitting: EMERGENCY MEDICINE

## 2017-07-31 ENCOUNTER — APPOINTMENT (OUTPATIENT)
Dept: GENERAL RADIOLOGY | Facility: HOSPITAL | Age: 37
End: 2017-07-31

## 2017-07-31 VITALS
TEMPERATURE: 98.4 F | SYSTOLIC BLOOD PRESSURE: 110 MMHG | BODY MASS INDEX: 47.84 KG/M2 | DIASTOLIC BLOOD PRESSURE: 70 MMHG | OXYGEN SATURATION: 100 % | HEIGHT: 62 IN | RESPIRATION RATE: 20 BRPM | HEART RATE: 88 BPM | WEIGHT: 260 LBS

## 2017-07-31 DIAGNOSIS — S90.01XA CONTUSION OF RIGHT ANKLE, INITIAL ENCOUNTER: Primary | ICD-10-CM

## 2017-07-31 PROCEDURE — 73610 X-RAY EXAM OF ANKLE: CPT | Performed by: RADIOLOGY

## 2017-07-31 PROCEDURE — 99283 EMERGENCY DEPT VISIT LOW MDM: CPT

## 2017-07-31 PROCEDURE — 73610 X-RAY EXAM OF ANKLE: CPT

## 2017-07-31 RX ORDER — KETOROLAC TROMETHAMINE 10 MG/1
10 TABLET, FILM COATED ORAL ONCE
Status: COMPLETED | OUTPATIENT
Start: 2017-07-31 | End: 2017-07-31

## 2017-07-31 RX ADMIN — KETOROLAC TROMETHAMINE 10 MG: 10 TABLET, FILM COATED ORAL at 06:38

## 2018-05-25 ENCOUNTER — HOSPITAL ENCOUNTER (EMERGENCY)
Facility: HOSPITAL | Age: 38
Discharge: HOME | End: 2018-05-25
Admitting: EMERGENCY MEDICINE

## 2018-05-25 ENCOUNTER — APPOINTMENT (OUTPATIENT)
Dept: GENERAL RADIOLOGY | Facility: HOSPITAL | Age: 38
End: 2018-05-25

## 2018-05-25 VITALS
BODY MASS INDEX: 40.48 KG/M2 | OXYGEN SATURATION: 95 % | HEIGHT: 62 IN | DIASTOLIC BLOOD PRESSURE: 90 MMHG | WEIGHT: 220 LBS | HEART RATE: 75 BPM | RESPIRATION RATE: 16 BRPM | TEMPERATURE: 98.2 F | SYSTOLIC BLOOD PRESSURE: 145 MMHG

## 2018-05-25 DIAGNOSIS — J20.9 ACUTE BRONCHITIS, UNSPECIFIED ORGANISM: Primary | ICD-10-CM

## 2018-05-25 LAB
6-ACETYL MORPHINE: NEGATIVE
A-A DO2: 30.6 MMHG (ref 0–300)
ALBUMIN SERPL-MCNC: 4.3 G/DL (ref 3.5–5)
ALBUMIN/GLOB SERPL: 1.5 G/DL (ref 1.5–2.5)
ALP SERPL-CCNC: 73 U/L (ref 35–104)
ALT SERPL W P-5'-P-CCNC: 17 U/L (ref 10–36)
AMPHET+METHAMPHET UR QL: NEGATIVE
ANION GAP SERPL CALCULATED.3IONS-SCNC: 4.1 MMOL/L (ref 3.6–11.2)
ARTERIAL PATENCY WRIST A: POSITIVE
AST SERPL-CCNC: 20 U/L (ref 10–30)
ATMOSPHERIC PRESS: 729 MMHG
BARBITURATES UR QL SCN: NEGATIVE
BASE EXCESS BLDA CALC-SCNC: 3.9 MMOL/L
BASOPHILS # BLD AUTO: 0.02 10*3/MM3 (ref 0–0.3)
BASOPHILS NFR BLD AUTO: 0.3 % (ref 0–2)
BDY SITE: ABNORMAL
BENZODIAZ UR QL SCN: NEGATIVE
BILIRUB SERPL-MCNC: 0.3 MG/DL (ref 0.2–1.8)
BILIRUB UR QL STRIP: NEGATIVE
BNP SERPL-MCNC: 78 PG/ML (ref 0–100)
BODY TEMPERATURE: 98.6 C
BUN BLD-MCNC: 10 MG/DL (ref 7–21)
BUN/CREAT SERPL: 13 (ref 7–25)
BUPRENORPHINE SERPL-MCNC: POSITIVE NG/ML
CALCIUM SPEC-SCNC: 9.1 MG/DL (ref 7.7–10)
CANNABINOIDS SERPL QL: NEGATIVE
CHLORIDE SERPL-SCNC: 103 MMOL/L (ref 99–112)
CLARITY UR: CLEAR
CO2 SERPL-SCNC: 32.9 MMOL/L (ref 24.3–31.9)
COCAINE UR QL: NEGATIVE
COHGB MFR BLD: 3.1 % (ref 0–5)
COLOR UR: YELLOW
CREAT BLD-MCNC: 0.77 MG/DL (ref 0.43–1.29)
DEPRECATED RDW RBC AUTO: 42.2 FL (ref 37–54)
EOSINOPHIL # BLD AUTO: 0.27 10*3/MM3 (ref 0–0.7)
EOSINOPHIL NFR BLD AUTO: 4 % (ref 0–5)
ERYTHROCYTE [DISTWIDTH] IN BLOOD BY AUTOMATED COUNT: 13.9 % (ref 11.5–14.5)
GFR SERPL CREATININE-BSD FRML MDRD: 85 ML/MIN/1.73
GLOBULIN UR ELPH-MCNC: 2.8 GM/DL
GLUCOSE BLD-MCNC: 100 MG/DL (ref 70–110)
GLUCOSE UR STRIP-MCNC: NEGATIVE MG/DL
HCO3 BLDA-SCNC: 29.4 MMOL/L (ref 22–26)
HCT VFR BLD AUTO: 38.3 % (ref 37–47)
HCT VFR BLD CALC: 39 % (ref 37–47)
HGB BLD-MCNC: 12.6 G/DL (ref 12–16)
HGB BLDA-MCNC: 13.3 G/DL (ref 12–16)
HGB UR QL STRIP.AUTO: NEGATIVE
HOROWITZ INDEX BLD+IHG-RTO: 21 %
IMM GRANULOCYTES # BLD: 0.01 10*3/MM3 (ref 0–0.03)
IMM GRANULOCYTES NFR BLD: 0.1 % (ref 0–0.5)
KETONES UR QL STRIP: NEGATIVE
LEUKOCYTE ESTERASE UR QL STRIP.AUTO: NEGATIVE
LYMPHOCYTES # BLD AUTO: 1.5 10*3/MM3 (ref 1–3)
LYMPHOCYTES NFR BLD AUTO: 22 % (ref 21–51)
MCH RBC QN AUTO: 27.9 PG (ref 27–33)
MCHC RBC AUTO-ENTMCNC: 32.9 G/DL (ref 33–37)
MCV RBC AUTO: 84.7 FL (ref 80–94)
METHADONE UR QL SCN: NEGATIVE
METHGB BLD QL: 0.3 % (ref 0–3)
MODALITY: ABNORMAL
MONOCYTES # BLD AUTO: 0.49 10*3/MM3 (ref 0.1–0.9)
MONOCYTES NFR BLD AUTO: 7.2 % (ref 0–10)
NEUTROPHILS # BLD AUTO: 4.53 10*3/MM3 (ref 1.4–6.5)
NEUTROPHILS NFR BLD AUTO: 66.4 % (ref 30–70)
NITRITE UR QL STRIP: NEGATIVE
OPIATES UR QL: NEGATIVE
OSMOLALITY SERPL CALC.SUM OF ELEC: 278.5 MOSM/KG (ref 273–305)
OXYCODONE UR QL SCN: NEGATIVE
OXYHGB MFR BLDV: 86.4 % (ref 85–100)
PCO2 BLDA: 47.5 MM HG (ref 35–45)
PCP UR QL SCN: NEGATIVE
PH BLDA: 7.41 PH UNITS (ref 7.35–7.45)
PH UR STRIP.AUTO: 8.5 [PH] (ref 5–8)
PLATELET # BLD AUTO: 277 10*3/MM3 (ref 130–400)
PMV BLD AUTO: 10.5 FL (ref 6–10)
PO2 BLDA: 55.7 MM HG (ref 80–100)
POTASSIUM BLD-SCNC: 3.8 MMOL/L (ref 3.5–5.3)
PROT SERPL-MCNC: 7.1 G/DL (ref 6–8)
PROT UR QL STRIP: NEGATIVE
RBC # BLD AUTO: 4.52 10*6/MM3 (ref 4.2–5.4)
SAO2 % BLDCOA: 89.4 % (ref 90–100)
SODIUM BLD-SCNC: 140 MMOL/L (ref 135–153)
SP GR UR STRIP: 1.01 (ref 1–1.03)
UROBILINOGEN UR QL STRIP: ABNORMAL
WBC NRBC COR # BLD: 6.82 10*3/MM3 (ref 4.5–12.5)

## 2018-05-25 PROCEDURE — 87086 URINE CULTURE/COLONY COUNT: CPT | Performed by: NURSE PRACTITIONER

## 2018-05-25 PROCEDURE — 82375 ASSAY CARBOXYHB QUANT: CPT | Performed by: NURSE PRACTITIONER

## 2018-05-25 PROCEDURE — 25010000002 METHYLPREDNISOLONE PER 125 MG: Performed by: NURSE PRACTITIONER

## 2018-05-25 PROCEDURE — 94799 UNLISTED PULMONARY SVC/PX: CPT

## 2018-05-25 PROCEDURE — 94640 AIRWAY INHALATION TREATMENT: CPT

## 2018-05-25 PROCEDURE — 96365 THER/PROPH/DIAG IV INF INIT: CPT

## 2018-05-25 PROCEDURE — 80307 DRUG TEST PRSMV CHEM ANLYZR: CPT | Performed by: NURSE PRACTITIONER

## 2018-05-25 PROCEDURE — 25010000002 CEFTRIAXONE: Performed by: NURSE PRACTITIONER

## 2018-05-25 PROCEDURE — 36415 COLL VENOUS BLD VENIPUNCTURE: CPT

## 2018-05-25 PROCEDURE — 82805 BLOOD GASES W/O2 SATURATION: CPT | Performed by: NURSE PRACTITIONER

## 2018-05-25 PROCEDURE — 87040 BLOOD CULTURE FOR BACTERIA: CPT | Performed by: NURSE PRACTITIONER

## 2018-05-25 PROCEDURE — 99284 EMERGENCY DEPT VISIT MOD MDM: CPT

## 2018-05-25 PROCEDURE — 85025 COMPLETE CBC W/AUTO DIFF WBC: CPT | Performed by: NURSE PRACTITIONER

## 2018-05-25 PROCEDURE — 83880 ASSAY OF NATRIURETIC PEPTIDE: CPT | Performed by: NURSE PRACTITIONER

## 2018-05-25 PROCEDURE — 80053 COMPREHEN METABOLIC PANEL: CPT | Performed by: NURSE PRACTITIONER

## 2018-05-25 PROCEDURE — 81003 URINALYSIS AUTO W/O SCOPE: CPT | Performed by: NURSE PRACTITIONER

## 2018-05-25 PROCEDURE — 96375 TX/PRO/DX INJ NEW DRUG ADDON: CPT

## 2018-05-25 PROCEDURE — 36600 WITHDRAWAL OF ARTERIAL BLOOD: CPT | Performed by: NURSE PRACTITIONER

## 2018-05-25 PROCEDURE — 71045 X-RAY EXAM CHEST 1 VIEW: CPT

## 2018-05-25 PROCEDURE — 83050 HGB METHEMOGLOBIN QUAN: CPT | Performed by: NURSE PRACTITIONER

## 2018-05-25 PROCEDURE — 71045 X-RAY EXAM CHEST 1 VIEW: CPT | Performed by: RADIOLOGY

## 2018-05-25 RX ORDER — BUPRENORPHINE HYDROCHLORIDE AND NALOXONE HYDROCHLORIDE DIHYDRATE 8; 2 MG/1; MG/1
1 TABLET SUBLINGUAL 2 TIMES DAILY
COMMUNITY

## 2018-05-25 RX ORDER — DOXYCYCLINE 100 MG/1
100 CAPSULE ORAL 2 TIMES DAILY
Qty: 20 CAPSULE | Refills: 0 | Status: ON HOLD | OUTPATIENT
Start: 2018-05-25 | End: 2018-10-04

## 2018-05-25 RX ORDER — IPRATROPIUM BROMIDE AND ALBUTEROL SULFATE 2.5; .5 MG/3ML; MG/3ML
3 SOLUTION RESPIRATORY (INHALATION) ONCE
Status: COMPLETED | OUTPATIENT
Start: 2018-05-25 | End: 2018-05-25

## 2018-05-25 RX ORDER — METHYLPREDNISOLONE SODIUM SUCCINATE 125 MG/2ML
125 INJECTION, POWDER, LYOPHILIZED, FOR SOLUTION INTRAMUSCULAR; INTRAVENOUS ONCE
Status: COMPLETED | OUTPATIENT
Start: 2018-05-25 | End: 2018-05-25

## 2018-05-25 RX ORDER — METHYLPREDNISOLONE 4 MG/1
TABLET ORAL
Qty: 1 EACH | Refills: 0 | Status: ON HOLD | OUTPATIENT
Start: 2018-05-25 | End: 2018-10-04

## 2018-05-25 RX ADMIN — IPRATROPIUM BROMIDE AND ALBUTEROL SULFATE 3 ML: .5; 3 SOLUTION RESPIRATORY (INHALATION) at 12:55

## 2018-05-25 RX ADMIN — METHYLPREDNISOLONE SODIUM SUCCINATE 125 MG: 125 INJECTION, POWDER, FOR SOLUTION INTRAMUSCULAR; INTRAVENOUS at 13:02

## 2018-05-25 RX ADMIN — CEFTRIAXONE 1 G: 1 INJECTION, POWDER, FOR SOLUTION INTRAMUSCULAR; INTRAVENOUS at 16:25

## 2018-05-28 LAB — BACTERIA SPEC AEROBE CULT: NORMAL

## 2018-05-30 LAB
BACTERIA SPEC AEROBE CULT: NORMAL
BACTERIA SPEC AEROBE CULT: NORMAL

## 2018-10-04 ENCOUNTER — APPOINTMENT (OUTPATIENT)
Dept: GENERAL RADIOLOGY | Facility: HOSPITAL | Age: 38
End: 2018-10-04

## 2018-10-04 ENCOUNTER — HOSPITAL ENCOUNTER (INPATIENT)
Facility: HOSPITAL | Age: 38
LOS: 5 days | Discharge: HOME | End: 2018-10-09
Attending: EMERGENCY MEDICINE | Admitting: HOSPITALIST

## 2018-10-04 DIAGNOSIS — J44.1 COPD EXACERBATION (HCC): Primary | ICD-10-CM

## 2018-10-04 LAB
6-ACETYL MORPHINE: NEGATIVE
A-A DO2: 40 MMHG (ref 0–300)
ALBUMIN SERPL-MCNC: 4.3 G/DL (ref 3.5–5)
ALBUMIN/GLOB SERPL: 1.4 G/DL (ref 1.5–2.5)
ALP SERPL-CCNC: 70 U/L (ref 35–104)
ALT SERPL W P-5'-P-CCNC: 14 U/L (ref 10–36)
AMPHET+METHAMPHET UR QL: NEGATIVE
ANION GAP SERPL CALCULATED.3IONS-SCNC: 9.8 MMOL/L (ref 3.6–11.2)
ARTERIAL PATENCY WRIST A: POSITIVE
AST SERPL-CCNC: 19 U/L (ref 10–30)
ATMOSPHERIC PRESS: 729 MMHG
B-HCG UR QL: NEGATIVE
BARBITURATES UR QL SCN: NEGATIVE
BASE EXCESS BLDA CALC-SCNC: 0.7 MMOL/L
BASOPHILS # BLD AUTO: 0.02 10*3/MM3 (ref 0–0.3)
BASOPHILS NFR BLD AUTO: 0.2 % (ref 0–2)
BDY SITE: ABNORMAL
BENZODIAZ UR QL SCN: NEGATIVE
BILIRUB SERPL-MCNC: 0.4 MG/DL (ref 0.2–1.8)
BILIRUB UR QL STRIP: NEGATIVE
BODY TEMPERATURE: 98.6 C
BUN BLD-MCNC: 8 MG/DL (ref 7–21)
BUN/CREAT SERPL: 11.6 (ref 7–25)
BUPRENORPHINE SERPL-MCNC: POSITIVE NG/ML
CALCIUM SPEC-SCNC: 8.9 MG/DL (ref 7.7–10)
CANNABINOIDS SERPL QL: NEGATIVE
CHLORIDE SERPL-SCNC: 105 MMOL/L (ref 99–112)
CLARITY UR: CLEAR
CO2 SERPL-SCNC: 24.2 MMOL/L (ref 24.3–31.9)
COCAINE UR QL: NEGATIVE
COHGB MFR BLD: 1.7 % (ref 0–5)
COLOR UR: YELLOW
CREAT BLD-MCNC: 0.69 MG/DL (ref 0.43–1.29)
DEPRECATED RDW RBC AUTO: 42.6 FL (ref 37–54)
EOSINOPHIL # BLD AUTO: 0.05 10*3/MM3 (ref 0–0.7)
EOSINOPHIL NFR BLD AUTO: 0.4 % (ref 0–5)
ERYTHROCYTE [DISTWIDTH] IN BLOOD BY AUTOMATED COUNT: 14.1 % (ref 11.5–14.5)
FLUAV AG NPH QL: NEGATIVE
FLUBV AG NPH QL IA: NEGATIVE
GFR SERPL CREATININE-BSD FRML MDRD: 96 ML/MIN/1.73
GLOBULIN UR ELPH-MCNC: 3.1 GM/DL
GLUCOSE BLD-MCNC: 113 MG/DL (ref 70–110)
GLUCOSE BLDC GLUCOMTR-MCNC: 168 MG/DL (ref 70–130)
GLUCOSE UR STRIP-MCNC: NEGATIVE MG/DL
HCO3 BLDA-SCNC: 24.8 MMOL/L (ref 22–26)
HCT VFR BLD AUTO: 40.2 % (ref 37–47)
HCT VFR BLD CALC: 40 % (ref 37–47)
HGB BLD-MCNC: 13.1 G/DL (ref 12–16)
HGB BLDA-MCNC: 13.6 G/DL (ref 12–16)
HGB UR QL STRIP.AUTO: NEGATIVE
HOROWITZ INDEX BLD+IHG-RTO: 21 %
IMM GRANULOCYTES # BLD: 0.03 10*3/MM3 (ref 0–0.03)
IMM GRANULOCYTES NFR BLD: 0.2 % (ref 0–0.5)
KETONES UR QL STRIP: NEGATIVE
LEUKOCYTE ESTERASE UR QL STRIP.AUTO: NEGATIVE
LYMPHOCYTES # BLD AUTO: 0.51 10*3/MM3 (ref 1–3)
LYMPHOCYTES NFR BLD AUTO: 4.2 % (ref 21–51)
MCH RBC QN AUTO: 27 PG (ref 27–33)
MCHC RBC AUTO-ENTMCNC: 32.6 G/DL (ref 33–37)
MCV RBC AUTO: 82.7 FL (ref 80–94)
METHADONE UR QL SCN: NEGATIVE
METHGB BLD QL: 0.4 % (ref 0–3)
MODALITY: ABNORMAL
MONOCYTES # BLD AUTO: 0.61 10*3/MM3 (ref 0.1–0.9)
MONOCYTES NFR BLD AUTO: 5 % (ref 0–10)
NEUTROPHILS # BLD AUTO: 11 10*3/MM3 (ref 1.4–6.5)
NEUTROPHILS NFR BLD AUTO: 90 % (ref 30–70)
NITRITE UR QL STRIP: NEGATIVE
OPIATES UR QL: NEGATIVE
OSMOLALITY SERPL CALC.SUM OF ELEC: 276.7 MOSM/KG (ref 273–305)
OXYCODONE UR QL SCN: NEGATIVE
OXYHGB MFR BLDV: 89.4 % (ref 85–100)
PCO2 BLDA: 38 MM HG (ref 35–45)
PCP UR QL SCN: NEGATIVE
PH BLDA: 7.43 PH UNITS (ref 7.35–7.45)
PH UR STRIP.AUTO: 7 [PH] (ref 5–8)
PLATELET # BLD AUTO: 208 10*3/MM3 (ref 130–400)
PMV BLD AUTO: 11.7 FL (ref 6–10)
PO2 BLDA: 57.7 MM HG (ref 80–100)
POTASSIUM BLD-SCNC: 3.9 MMOL/L (ref 3.5–5.3)
PROT SERPL-MCNC: 7.4 G/DL (ref 6–8)
PROT UR QL STRIP: NEGATIVE
RBC # BLD AUTO: 4.86 10*6/MM3 (ref 4.2–5.4)
S PYO AG THROAT QL: NEGATIVE
SAO2 % BLDCOA: 91.3 % (ref 90–100)
SODIUM BLD-SCNC: 139 MMOL/L (ref 135–153)
SP GR UR STRIP: 1.01 (ref 1–1.03)
TROPONIN I SERPL-MCNC: <0.006 NG/ML
UROBILINOGEN UR QL STRIP: NORMAL
WBC NRBC COR # BLD: 12.22 10*3/MM3 (ref 4.5–12.5)

## 2018-10-04 PROCEDURE — 82375 ASSAY CARBOXYHB QUANT: CPT | Performed by: PHYSICIAN ASSISTANT

## 2018-10-04 PROCEDURE — 83050 HGB METHEMOGLOBIN QUAN: CPT | Performed by: PHYSICIAN ASSISTANT

## 2018-10-04 PROCEDURE — 25010000002 METHYLPREDNISOLONE PER 125 MG: Performed by: NURSE PRACTITIONER

## 2018-10-04 PROCEDURE — 94799 UNLISTED PULMONARY SVC/PX: CPT

## 2018-10-04 PROCEDURE — 93005 ELECTROCARDIOGRAM TRACING: CPT | Performed by: PHYSICIAN ASSISTANT

## 2018-10-04 PROCEDURE — 80307 DRUG TEST PRSMV CHEM ANLYZR: CPT | Performed by: PHYSICIAN ASSISTANT

## 2018-10-04 PROCEDURE — 71046 X-RAY EXAM CHEST 2 VIEWS: CPT

## 2018-10-04 PROCEDURE — 36600 WITHDRAWAL OF ARTERIAL BLOOD: CPT | Performed by: PHYSICIAN ASSISTANT

## 2018-10-04 PROCEDURE — 82962 GLUCOSE BLOOD TEST: CPT

## 2018-10-04 PROCEDURE — 93010 ELECTROCARDIOGRAM REPORT: CPT | Performed by: INTERNAL MEDICINE

## 2018-10-04 PROCEDURE — 87804 INFLUENZA ASSAY W/OPTIC: CPT | Performed by: PHYSICIAN ASSISTANT

## 2018-10-04 PROCEDURE — 99285 EMERGENCY DEPT VISIT HI MDM: CPT

## 2018-10-04 PROCEDURE — 36415 COLL VENOUS BLD VENIPUNCTURE: CPT

## 2018-10-04 PROCEDURE — 81003 URINALYSIS AUTO W/O SCOPE: CPT | Performed by: PHYSICIAN ASSISTANT

## 2018-10-04 PROCEDURE — G0378 HOSPITAL OBSERVATION PER HR: HCPCS

## 2018-10-04 PROCEDURE — 85025 COMPLETE CBC W/AUTO DIFF WBC: CPT | Performed by: PHYSICIAN ASSISTANT

## 2018-10-04 PROCEDURE — 25010000002 ENOXAPARIN PER 10 MG: Performed by: PHYSICIAN ASSISTANT

## 2018-10-04 PROCEDURE — 71046 X-RAY EXAM CHEST 2 VIEWS: CPT | Performed by: RADIOLOGY

## 2018-10-04 PROCEDURE — 87040 BLOOD CULTURE FOR BACTERIA: CPT | Performed by: PHYSICIAN ASSISTANT

## 2018-10-04 PROCEDURE — 87880 STREP A ASSAY W/OPTIC: CPT | Performed by: PHYSICIAN ASSISTANT

## 2018-10-04 PROCEDURE — 81025 URINE PREGNANCY TEST: CPT | Performed by: PHYSICIAN ASSISTANT

## 2018-10-04 PROCEDURE — 82805 BLOOD GASES W/O2 SATURATION: CPT | Performed by: PHYSICIAN ASSISTANT

## 2018-10-04 PROCEDURE — 80053 COMPREHEN METABOLIC PANEL: CPT | Performed by: PHYSICIAN ASSISTANT

## 2018-10-04 PROCEDURE — 84484 ASSAY OF TROPONIN QUANT: CPT | Performed by: PHYSICIAN ASSISTANT

## 2018-10-04 PROCEDURE — 87081 CULTURE SCREEN ONLY: CPT | Performed by: PHYSICIAN ASSISTANT

## 2018-10-04 RX ORDER — METHYLPREDNISOLONE SODIUM SUCCINATE 125 MG/2ML
60 INJECTION, POWDER, LYOPHILIZED, FOR SOLUTION INTRAMUSCULAR; INTRAVENOUS EVERY 12 HOURS
Status: DISCONTINUED | OUTPATIENT
Start: 2018-10-04 | End: 2018-10-05

## 2018-10-04 RX ORDER — SODIUM CHLORIDE 0.9 % (FLUSH) 0.9 %
10 SYRINGE (ML) INJECTION AS NEEDED
Status: DISCONTINUED | OUTPATIENT
Start: 2018-10-04 | End: 2018-10-09 | Stop reason: HOSPADM

## 2018-10-04 RX ORDER — IPRATROPIUM BROMIDE AND ALBUTEROL SULFATE 2.5; .5 MG/3ML; MG/3ML
3 SOLUTION RESPIRATORY (INHALATION) ONCE
Status: COMPLETED | OUTPATIENT
Start: 2018-10-04 | End: 2018-10-04

## 2018-10-04 RX ORDER — DOXYCYCLINE 100 MG/1
100 CAPSULE ORAL EVERY 12 HOURS SCHEDULED
Status: DISCONTINUED | OUTPATIENT
Start: 2018-10-04 | End: 2018-10-05

## 2018-10-04 RX ORDER — HYDROCODONE BITARTRATE AND ACETAMINOPHEN 5; 325 MG/1; MG/1
1 TABLET ORAL EVERY 6 HOURS PRN
Status: DISCONTINUED | OUTPATIENT
Start: 2018-10-04 | End: 2018-10-04

## 2018-10-04 RX ORDER — ACETAMINOPHEN 325 MG/1
650 TABLET ORAL EVERY 4 HOURS PRN
Status: DISCONTINUED | OUTPATIENT
Start: 2018-10-04 | End: 2018-10-09 | Stop reason: HOSPADM

## 2018-10-04 RX ORDER — METHYLPREDNISOLONE SODIUM SUCCINATE 125 MG/2ML
125 INJECTION, POWDER, LYOPHILIZED, FOR SOLUTION INTRAMUSCULAR; INTRAVENOUS ONCE
Status: DISCONTINUED | OUTPATIENT
Start: 2018-10-04 | End: 2018-10-04

## 2018-10-04 RX ORDER — SODIUM CHLORIDE 0.9 % (FLUSH) 0.9 %
3 SYRINGE (ML) INJECTION EVERY 12 HOURS SCHEDULED
Status: DISCONTINUED | OUTPATIENT
Start: 2018-10-04 | End: 2018-10-09 | Stop reason: HOSPADM

## 2018-10-04 RX ORDER — IPRATROPIUM BROMIDE AND ALBUTEROL SULFATE 2.5; .5 MG/3ML; MG/3ML
3 SOLUTION RESPIRATORY (INHALATION)
Status: DISCONTINUED | OUTPATIENT
Start: 2018-10-04 | End: 2018-10-04

## 2018-10-04 RX ORDER — DEXTROSE AND SODIUM CHLORIDE 5; .45 G/100ML; G/100ML
100 INJECTION, SOLUTION INTRAVENOUS CONTINUOUS
Status: DISCONTINUED | OUTPATIENT
Start: 2018-10-04 | End: 2018-10-05

## 2018-10-04 RX ORDER — SODIUM CHLORIDE 0.9 % (FLUSH) 0.9 %
3-10 SYRINGE (ML) INJECTION AS NEEDED
Status: DISCONTINUED | OUTPATIENT
Start: 2018-10-04 | End: 2018-10-09 | Stop reason: HOSPADM

## 2018-10-04 RX ORDER — BUPRENORPHINE HYDROCHLORIDE AND NALOXONE HYDROCHLORIDE DIHYDRATE 8; 2 MG/1; MG/1
2 TABLET SUBLINGUAL DAILY
Status: DISCONTINUED | OUTPATIENT
Start: 2018-10-04 | End: 2018-10-09 | Stop reason: HOSPADM

## 2018-10-04 RX ORDER — PROMETHAZINE HYDROCHLORIDE AND CODEINE PHOSPHATE 6.25; 1 MG/5ML; MG/5ML
5 SYRUP ORAL ONCE
Status: COMPLETED | OUTPATIENT
Start: 2018-10-04 | End: 2018-10-04

## 2018-10-04 RX ORDER — NITROGLYCERIN 0.4 MG/1
0.4 TABLET SUBLINGUAL
Status: DISCONTINUED | OUTPATIENT
Start: 2018-10-04 | End: 2018-10-09 | Stop reason: HOSPADM

## 2018-10-04 RX ORDER — IPRATROPIUM BROMIDE AND ALBUTEROL SULFATE 2.5; .5 MG/3ML; MG/3ML
3 SOLUTION RESPIRATORY (INHALATION) EVERY 4 HOURS PRN
Status: DISCONTINUED | OUTPATIENT
Start: 2018-10-04 | End: 2018-10-09 | Stop reason: HOSPADM

## 2018-10-04 RX ORDER — IPRATROPIUM BROMIDE AND ALBUTEROL SULFATE 2.5; .5 MG/3ML; MG/3ML
3 SOLUTION RESPIRATORY (INHALATION)
Status: DISCONTINUED | OUTPATIENT
Start: 2018-10-04 | End: 2018-10-09 | Stop reason: HOSPADM

## 2018-10-04 RX ADMIN — GUAIFENESIN 200 MG: 100 SOLUTION ORAL at 12:49

## 2018-10-04 RX ADMIN — IPRATROPIUM BROMIDE AND ALBUTEROL SULFATE 3 ML: .5; 3 SOLUTION RESPIRATORY (INHALATION) at 22:03

## 2018-10-04 RX ADMIN — IPRATROPIUM BROMIDE AND ALBUTEROL SULFATE 3 ML: .5; 3 SOLUTION RESPIRATORY (INHALATION) at 19:18

## 2018-10-04 RX ADMIN — PROMETHAZINE HYDROCHLORIDE AND CODEINE PHOSPHATE 5 ML: 6.25; 1 SOLUTION ORAL at 06:00

## 2018-10-04 RX ADMIN — IPRATROPIUM BROMIDE AND ALBUTEROL SULFATE 3 ML: .5; 3 SOLUTION RESPIRATORY (INHALATION) at 17:17

## 2018-10-04 RX ADMIN — GUAIFENESIN 200 MG: 100 SOLUTION ORAL at 16:32

## 2018-10-04 RX ADMIN — ACETAMINOPHEN 650 MG: 325 TABLET ORAL at 10:08

## 2018-10-04 RX ADMIN — BUPRENORPHINE HYDROCHLORIDE AND NALOXONE HYDROCHLORIDE 2 TABLET: 8; 2 TABLET SUBLINGUAL at 12:49

## 2018-10-04 RX ADMIN — IPRATROPIUM BROMIDE AND ALBUTEROL SULFATE 3 ML: .5; 3 SOLUTION RESPIRATORY (INHALATION) at 06:54

## 2018-10-04 RX ADMIN — METHYLPREDNISOLONE SODIUM SUCCINATE 60 MG: 125 INJECTION, POWDER, FOR SOLUTION INTRAMUSCULAR; INTRAVENOUS at 18:08

## 2018-10-04 RX ADMIN — DOXYCYCLINE 100 MG: 100 INJECTION, POWDER, LYOPHILIZED, FOR SOLUTION INTRAVENOUS at 08:28

## 2018-10-04 RX ADMIN — DEXTROSE AND SODIUM CHLORIDE 100 ML/HR: 5; 450 INJECTION, SOLUTION INTRAVENOUS at 23:53

## 2018-10-04 RX ADMIN — ENOXAPARIN SODIUM 40 MG: 40 INJECTION SUBCUTANEOUS at 09:51

## 2018-10-04 RX ADMIN — IPRATROPIUM BROMIDE AND ALBUTEROL SULFATE 3 ML: .5; 3 SOLUTION RESPIRATORY (INHALATION) at 06:01

## 2018-10-04 RX ADMIN — DEXTROSE AND SODIUM CHLORIDE 100 ML/HR: 5; 450 INJECTION, SOLUTION INTRAVENOUS at 09:51

## 2018-10-04 RX ADMIN — DOXYCYCLINE 100 MG: 100 CAPSULE ORAL at 20:18

## 2018-10-04 NOTE — ED NOTES
EKG complete @ 07:02 and given to Nataliia Arizmendi. Dr Bragg was not available and Nataliia said she will show it to him.     Geri Hooper  10/04/18 0710

## 2018-10-04 NOTE — ED NOTES
Called the lab spoke with Annette. Double checked that the Troponin is added on to specimen that has already been drawn. She states that they have it and will do as a add on.      Symes, Heather  10/04/18 0727

## 2018-10-04 NOTE — ED NOTES
Pt alert and oriented, pt drowsy, skin pwd, respirations even and unlabored. Pt remains on 2lpm nc, ns noted. 20 gauge in left ac remains patent with doxycycline infusing at 100ml/hr and continued to floor. Pt to obs unit on stretcher with o2 by Shania Lopez, RN  10/04/18 7290

## 2018-10-04 NOTE — NURSING NOTE
Pt. C/O nonproductive cough, feeling hot, and in general, all over pain. I have checked her temperature, 97.8 F, have applied cool rags to the pt's head, provided a box fan, and contacted DENISE Pond. See MAR for new orders.

## 2018-10-04 NOTE — NURSING NOTE
Telemetry has contacted both myself and Lola, the PCA regarding the pt's telemetry monitor. The pt will not keep her leads on. I have spoken to her about the importance of keeping the telemetry leads on and she keeps taking them off. We have replaced the leads several times throughout the day.

## 2018-10-04 NOTE — ED PROVIDER NOTES
Subjective   Patient is an asthmatic, COPD 39 y/o female who presents to the ED by EMS for SOB, wheezing, and productive cough.  Patient stated that these sx started yesterday. Patient afebrile. Patient states that she takes Ventolin and Advair with no improvements.        History provided by:  Patient   used: No    Shortness of Breath   Severity:  Moderate  Onset quality:  Gradual  Duration:  2 days  Timing:  Constant  Progression:  Worsening  Chronicity:  New  Context: URI    Relieved by:  Nothing  Worsened by:  Nothing  Ineffective treatments:  None tried  Associated symptoms: cough, sputum production and wheezing        Review of Systems   Constitutional: Negative.    HENT: Negative.    Eyes: Negative.    Respiratory: Positive for cough, sputum production, shortness of breath and wheezing.    Cardiovascular: Negative.    Gastrointestinal: Negative.    Endocrine: Negative.    Genitourinary: Negative.    Musculoskeletal: Negative.    Skin: Negative.    Allergic/Immunologic: Negative.    Neurological: Negative.    Hematological: Negative.    Psychiatric/Behavioral: Negative.    All other systems reviewed and are negative.      Past Medical History:   Diagnosis Date   • Asthma    • COPD (chronic obstructive pulmonary disease) (CMS/HCC)    • Diabetes mellitus (CMS/HCC)    • Hepatitis C    • History of gallstones    • Hypertension    • Lupus    • Rheumatoid arthritis (CMS/HCC)        No Known Allergies    Past Surgical History:   Procedure Laterality Date   • ABDOMINAL SURGERY     • CHOLECYSTECTOMY     • PERINEAL LESION/CYST EXCISION N/A 4/3/2017    Procedure: EXCISION OF VAGINAL SKIN TAG;  Surgeon: Kee Crooks III, MD;  Location: [facility];  Service:    • TUBAL COAGULATION LAPAROSCOPIC Bilateral 4/3/2017    Procedure: BILATERAL TUBAL FALLOPE FILSHIE CLIPPING LAPAROSCOPIC,IUD REMOVAL;  Surgeon: Kee Crooks III, MD;  Location: [facility] OR;  Service:        History reviewed. No pertinent family  history.    Social History     Social History   • Marital status: Legally      Social History Main Topics   • Smoking status: Current Every Day Smoker     Packs/day: 1.00     Years: 15.00     Types: Cigarettes   • Smokeless tobacco: Never Used   • Alcohol use No   • Drug use: No   • Sexual activity: Yes     Partners: Male     Birth control/ protection: IUD     Other Topics Concern   • Not on file           Objective   Physical Exam   Constitutional: She is oriented to person, place, and time. She appears well-developed and well-nourished.   HENT:   Head: Normocephalic and atraumatic.   Right Ear: External ear normal.   Left Ear: External ear normal.   Nose: Nose normal.   Mouth/Throat: Oropharynx is clear and moist.   Eyes: Pupils are equal, round, and reactive to light. Conjunctivae and EOM are normal.   Neck: Normal range of motion. Neck supple.   Cardiovascular: Normal rate, regular rhythm, normal heart sounds and intact distal pulses.    Pulmonary/Chest: Effort normal. She has wheezes.   Productive cough   Abdominal: Soft. Bowel sounds are normal.   Musculoskeletal: Normal range of motion.   Neurological: She is alert and oriented to person, place, and time.   Skin: Skin is warm and dry.   Nursing note and vitals reviewed.      Procedures           ED Course  ED Course as of Oct 04 0741   Thu Oct 04, 2018   0530 Pt received Solumedrol 125mg IV in route   [ML]   0650 NAD per Yemi  XR Chest 2 View [ML]   0701 Pt oxygen dropping 89-90%. Patient placed on 2L NC  [ML]   0710 Spoke with Hermila Man PA-C she will accept the patient for Dr. Pride to obs unit for a COPD exacerbation pending Troponin.   [ML]   0718 0710- reviewed by Dr. Bragg, rate 77, NSR, no ischemia  ECG 12 Lead [ML]   0739 Contacted Hermila regarding troponin.  Patient will go to telemetry. Orders placed.   [ML]      ED Course User Index  [ML] Nataliia Arizmendi PA                  [facility]      Final diagnoses:   COPD  exacerbation (CMS/[facility])            Nataliia Arizmendi PA  10/04/18 0741

## 2018-10-04 NOTE — PLAN OF CARE
Problem: Chronic Obstructive Pulmonary Disease (Adult)  Intervention: Prevent/Manage DVT/VTE Risk  See MAR    Goal: Signs and Symptoms of Listed Potential Problems Will be Absent, Minimized or Managed (Chronic Obstructive Pulmonary Disease)  Outcome: Ongoing (interventions implemented as appropriate)      Problem: Fall Risk (Adult)  Intervention: Monitor/Assist with Self Care  Up with standby  Intervention: Review Medications/Identify Contributors to Fall Risk  D5 1/2 NS running @ 100ml/hr    Goal: Identify Related Risk Factors and Signs and Symptoms  Outcome: Ongoing (interventions implemented as appropriate)    Goal: Absence of Fall  Outcome: Ongoing (interventions implemented as appropriate)

## 2018-10-04 NOTE — H&P
HISTORY AND PHYSICAL    Patient Identification:  Name:  Mary Monaco  Age:  38 y.o.  Sex:  female  :  1980  MRN:  [id number]   Visit Number:  [id number]  Room number:  214/2S  Primary Care Physician:  Marciano Adams APRN     Chief complaint:    Chief Complaint   Patient presents with   • Cough   • Shortness of Breath       History of presenting illness:  38 y.o. female with history of asthma, COPD, DM, Hepatitis C, HTN, Lupus and rheumatoid arthritis presents to ED for shortness of breath, wheezing and productive cough. She states it has been worsening over the last few days. She takes Ventolin and Advair when needed at home however, she continued to get worse. She denies fever. She states that she is borderline diabetic and has not been to her PCP in several months. She is being prescribed Suboxone. Urine collected on  10/04/18 is unremarkable. HCG is negative. White count normal. Influenza is negative as well a strep culture. Blood culture in process bur are negative so far. CXR from 10/04/18 reports no evidence of cardiac or pulmonary disease. She is short of breath, 02 NC 2L.    ---------------------------------------------------------------------------------------------------------------------   Review Of Systems:    Constitutional: no fever, chills and night sweats. No appetite change or unexpected weight change. No fatigue.  Eyes: no eye drainage, itching or redness.  HEENT: no mouth sores, dysphagia or nose bleed.  Respiratory: no for shortness of breath, cough or production of sputum.  Cardiovascular: no chest pain, no palpitations, no orthopnea.  Gastrointestinal: no nausea, vomiting or diarrhea. No abdominal pain, hematemesis or rectal bleeding.  Genitourinary: no dysuria or polyuria.  Hematologic/lymphatic: no lymph node abnormalities, no easy bruising or easy bleeding.  Musculoskeletal: no muscle or joint pain.  Skin: No rash and no itching.  Neurological: no loss of  consciousness, no seizure, no headache.  Behavioral/Psych: no depression or suicidal ideation.  Endocrine: no hot flashes.  Immunologic: negative.    ---------------------------------------------------------------------------------------------------------------------   Past Medical History:   Diagnosis Date   • Asthma    • COPD (chronic obstructive pulmonary disease) (CMS/HCC)    • Diabetes mellitus (CMS/HCC)    • Hepatitis C    • History of gallstones    • Hypertension    • Lupus    • Rheumatoid arthritis (CMS/HCC)      Past Surgical History:   Procedure Laterality Date   • ABDOMINAL SURGERY     • CHOLECYSTECTOMY     • PERINEAL LESION/CYST EXCISION N/A 4/3/2017    Procedure: EXCISION OF VAGINAL SKIN TAG;  Surgeon: Kee Crooks III, MD;  Location: [facility];  Service:    • TUBAL COAGULATION LAPAROSCOPIC Bilateral 4/3/2017    Procedure: BILATERAL TUBAL FALLOPE FILSHIE CLIPPING LAPAROSCOPIC,IUD REMOVAL;  Surgeon: Kee Crooks III, MD;  Location: [facility];  Service:      History reviewed. No pertinent family history.  Social History     Social History   • Marital status: Legally      Social History Main Topics   • Smoking status: Current Every Day Smoker     Packs/day: 1.00     Years: 15.00     Types: Cigarettes   • Smokeless tobacco: Never Used   • Alcohol use No   • Drug use: No   • Sexual activity: Yes     Partners: Male     Birth control/ protection: IUD     Other Topics Concern   • Not on file     ---------------------------------------------------------------------------------------------------------------------   Allergies:  Patient has no known allergies.  ---------------------------------------------------------------------------------------------------------------------   Prior to Admission Medications     Prescriptions Last Dose Informant Patient Reported? Taking?    buprenorphine-naloxone (SUBOXONE) 8-2 MG per SL tablet 10/3/2018 WALDO Yes Yes    Place 2 tablets under the tongue Daily.         ---------------------------------------------------------------------------------------------------------------------   Vital Signs:  Temp:  [97.8 °F (36.6 °C)-99.5 °F (37.5 °C)] 99.5 °F (37.5 °C)  Heart Rate:  [60-87] 75  Resp:  [17-20] 20  BP: (110-139)/(59-74) 139/69    Mean Arterial Pressure (Non-Invasive) for the past 24 hrs (Last 3 readings):   Noninvasive MAP (mmHg)   10/04/18 1200 95   10/04/18 0852 78   10/04/18 0811 81     SpO2:  [87 %-99 %] 93 %  on  Flow (L/min):  [2] 2;   Device (Oxygen Therapy): nasal cannula  Body mass index is 40.24 kg/m².    Wt Readings from Last 3 Encounters:   10/04/18 99.8 kg (220 lb)   05/25/18 99.8 kg (220 lb)   07/31/17 118 kg (260 lb)               ---------------------------------------------------------------------------------------------------------------------   Physical Exam:  Constitutional:  Well-developed and well-nourished.  Short of breath.      HENT:  Head: Normocephalic and atraumatic.  Mouth:  Moist mucous membranes.    Eyes:  Conjunctivae and EOM are normal.  Pupils are equal, round, and reactive to light.  No scleral icterus.  Neck:  Neck supple.  No JVD present.    Cardiovascular:  Normal rate, regular rhythm and normal heart sounds with no murmur.  Pulmonary/Chest:  Short of breath, expiratory wheezing  Abdominal:  Soft.  Bowel sounds are normal.  No distension and no tenderness.   Musculoskeletal:  No edema, no tenderness, and no deformity.  No red or swollen joints anywhere.    Neurological:  Alert and oriented to person, place, and time.  No cranial nerve deficit.  No tongue deviation.  No facial droop.  No slurred speech.   Skin:  Skin is warm and dry.  No rash noted.  No pallor.   Peripheral vascular:  No edema and strong pulses on all 4 extremities.  Genitourinary: no problems  ---------------------------------------------------------------------------------------------------------------------      I have personally looked at both the EKG and the  telemetry strips.  --------------------------------------------------------------------------------------------------------------------  Results from last 7 days  Lab Units 10/04/18  0557   TROPONIN I ng/mL <0.006     Results from last 7 days  Lab Units 10/04/18  0557   WBC 10*3/mm3 12.22   HEMOGLOBIN g/dL 13.1   HEMATOCRIT % 40.2   MCV fL 82.7   MCHC g/dL 32.6*   PLATELETS 10*3/mm3 208     Results from last 7 days  Lab Units 10/04/18  0557   SODIUM mmol/L 139   POTASSIUM mmol/L 3.9   CHLORIDE mmol/L 105   CO2 mmol/L 24.2*   BUN mg/dL 8   CREATININE mg/dL 0.69   EGFR IF NONAFRICN AM mL/min/1.73 96   CALCIUM mg/dL 8.9   GLUCOSE mg/dL 113*   ALBUMIN g/dL 4.30   BILIRUBIN mg/dL 0.4   ALK PHOS U/L 70   AST (SGOT) U/L 19   ALT (SGPT) U/L 14   Estimated Creatinine Clearance: 123.4 mL/min (by C-G formula based on SCr of 0.69 mg/dL).    No results found for: HGBA1C, POCGLU  No results found for: AMMONIA      Results from last 7 days  Lab Units 10/04/18  0717   NITRITE UA  Negative             pH pH, Arterial   Date Value Ref Range Status   10/04/2018 7.433 7.350 - 7.450 pH units Final      pO2 pO2, Arterial   Date Value Ref Range Status   10/04/2018 57.7 (L) 80.0 - 100.0 mm Hg Final      pCO2 pCO2, Arterial   Date Value Ref Range Status   10/04/2018 38.0 35.0 - 45.0 mm Hg Final      HCO3 HCO3, Arterial   Date Value Ref Range Status   10/04/2018 24.8 22.0 - 26.0 mmol/L Final        I have personally looked at the labs and they are summarized above.  ----------------------------------------------------------------------------------------------------------------------  Imaging Results (last 24 hours)     Procedure Component Value Units Date/Time    XR Chest 2 View [[id number]] Collected:  10/04/18 0715     Updated:  10/04/18 0718    Narrative:       EXAMINATION: XR CHEST 2 VW-      CLINICAL INDICATION: cough, SOB          COMPARISON: 5/25/2018      TECHNIQUE: XR CHEST 2 VW-      FINDINGS:   Lungs are adequately aerated.   Heart  and mediastinal contours are unremarkable.   No pneumothorax.   Bony and soft tissue structures are unremarkable.            Impression:       No radiographic evidence of acute cardiac or pulmonary disease.     This report was finalized on 10/4/2018 7:16 AM by Dr. Endy Valladares MD.           I have personally reviewed the radiology images and read the final radiology report.  ----------------------------------------------------------------------------------------------------------------------  Assessment:    1. COPD exaerbation     Plan:     38 y.o. female with history of asthma, COPD, DM, Hepatitis C, HTN, Lupus and rheumatoid arthritis presents to ED for shortness of breath, wheezing and productive cough. She states it has been worsening over the last few days. She takes Ventolin and Advair when needed at home however, she continued to get worse. She denies fever. She states that she is borderline diabetic and has not been to her PCP in several months. She is being prescribed Suboxone. Urine collected on  10/04/18 is unremarkable. HCG is negative. White count normal. Influenza is negative as well a strep culture. Blood culture in process bur are negative so far. CXR from 10/04/18 reports no evidence of cardiac or pulmonary disease. She is short of breath, 02 NC 2L. She received Doxycyline 100 mg IV times one dose.     Doxycycline 100 mg IV was changed to oral regimen 100 mg one tab twice daily.    Solumedrol 60 mg IV every 12 hrs.    Duoneb respiratory treatments q 6 hrs.    CBC, CRP, BMP in the a.m.         Dejah Barajas, APRN  10/04/18  2:24 PM

## 2018-10-05 ENCOUNTER — APPOINTMENT (OUTPATIENT)
Dept: CARDIOLOGY | Facility: HOSPITAL | Age: 38
End: 2018-10-05
Attending: INTERNAL MEDICINE

## 2018-10-05 ENCOUNTER — APPOINTMENT (OUTPATIENT)
Dept: CT IMAGING | Facility: HOSPITAL | Age: 38
End: 2018-10-05

## 2018-10-05 LAB
ANION GAP SERPL CALCULATED.3IONS-SCNC: 6.2 MMOL/L (ref 3.6–11.2)
BACTERIA SPEC AEROBE CULT: NORMAL
BASOPHILS # BLD AUTO: 0 10*3/MM3 (ref 0–0.3)
BASOPHILS NFR BLD AUTO: 0 % (ref 0–2)
BH CV ECHO MEAS - % IVS THICK: 36.6 %
BH CV ECHO MEAS - % LVPW THICK: 69.7 %
BH CV ECHO MEAS - ACS: 2.1 CM
BH CV ECHO MEAS - AO ROOT AREA (BSA CORRECTED): 1.5
BH CV ECHO MEAS - AO ROOT AREA: 7 CM^2
BH CV ECHO MEAS - AO ROOT DIAM: 3 CM
BH CV ECHO MEAS - BSA(HAYCOCK): 2.1 M^2
BH CV ECHO MEAS - BSA: 2 M^2
BH CV ECHO MEAS - BZI_BMI: 40.2 KILOGRAMS/M^2
BH CV ECHO MEAS - BZI_METRIC_HEIGHT: 157.5 CM
BH CV ECHO MEAS - BZI_METRIC_WEIGHT: 99.8 KG
BH CV ECHO MEAS - EDV(CUBED): 117.3 ML
BH CV ECHO MEAS - EDV(MOD-SP4): 86 ML
BH CV ECHO MEAS - EDV(TEICH): 112.6 ML
BH CV ECHO MEAS - EF(CUBED): 69.4 %
BH CV ECHO MEAS - EF(MOD-BP): 78 %
BH CV ECHO MEAS - EF(MOD-SP4): 77.9 %
BH CV ECHO MEAS - EF(TEICH): 60.8 %
BH CV ECHO MEAS - ESV(CUBED): 35.9 ML
BH CV ECHO MEAS - ESV(MOD-SP4): 19 ML
BH CV ECHO MEAS - ESV(TEICH): 44.1 ML
BH CV ECHO MEAS - FS: 32.6 %
BH CV ECHO MEAS - IVS/LVPW: 1
BH CV ECHO MEAS - IVSD: 1.1 CM
BH CV ECHO MEAS - IVSS: 1.5 CM
BH CV ECHO MEAS - LA DIMENSION: 4.2 CM
BH CV ECHO MEAS - LA/AO: 1.4
BH CV ECHO MEAS - LV DIASTOLIC VOL/BSA (35-75): 43.2 ML/M^2
BH CV ECHO MEAS - LV MASS(C)D: 194.5 GRAMS
BH CV ECHO MEAS - LV MASS(C)DI: 97.7 GRAMS/M^2
BH CV ECHO MEAS - LV MASS(C)S: 209.8 GRAMS
BH CV ECHO MEAS - LV MASS(C)SI: 105.4 GRAMS/M^2
BH CV ECHO MEAS - LV SYSTOLIC VOL/BSA (12-30): 9.5 ML/M^2
BH CV ECHO MEAS - LVIDD: 4.9 CM
BH CV ECHO MEAS - LVIDS: 3.3 CM
BH CV ECHO MEAS - LVLD AP4: 7.9 CM
BH CV ECHO MEAS - LVLS AP4: 6.8 CM
BH CV ECHO MEAS - LVOT AREA (M): 2.3 CM^2
BH CV ECHO MEAS - LVOT AREA: 2.3 CM^2
BH CV ECHO MEAS - LVOT DIAM: 1.7 CM
BH CV ECHO MEAS - LVPWD: 1.1 CM
BH CV ECHO MEAS - LVPWS: 1.8 CM
BH CV ECHO MEAS - MV A MAX VEL: 65.4 CM/SEC
BH CV ECHO MEAS - MV E MAX VEL: 114.1 CM/SEC
BH CV ECHO MEAS - MV E/A: 1.7
BH CV ECHO MEAS - PA ACC SLOPE: 1203 CM/SEC^2
BH CV ECHO MEAS - PA ACC TIME: 0.12 SEC
BH CV ECHO MEAS - PA PR(ACCEL): 26.7 MMHG
BH CV ECHO MEAS - RVDD: 1.5 CM
BH CV ECHO MEAS - SI(CUBED): 40.9 ML/M^2
BH CV ECHO MEAS - SI(MOD-SP4): 33.7 ML/M^2
BH CV ECHO MEAS - SI(TEICH): 34.4 ML/M^2
BH CV ECHO MEAS - SV(CUBED): 81.4 ML
BH CV ECHO MEAS - SV(MOD-SP4): 67 ML
BH CV ECHO MEAS - SV(TEICH): 68.4 ML
BUN BLD-MCNC: 15 MG/DL (ref 7–21)
BUN/CREAT SERPL: 20.5 (ref 7–25)
CALCIUM SPEC-SCNC: 8.5 MG/DL (ref 7.7–10)
CHLORIDE SERPL-SCNC: 109 MMOL/L (ref 99–112)
CO2 SERPL-SCNC: 23.8 MMOL/L (ref 24.3–31.9)
CREAT BLD-MCNC: 0.73 MG/DL (ref 0.43–1.29)
CRP SERPL-MCNC: 4.8 MG/DL (ref 0–0.99)
D DIMER PPP FEU-MCNC: 0.34 MCGFEU/ML (ref 0–0.5)
DEPRECATED RDW RBC AUTO: 44 FL (ref 37–54)
EOSINOPHIL # BLD AUTO: 0 10*3/MM3 (ref 0–0.7)
EOSINOPHIL NFR BLD AUTO: 0 % (ref 0–5)
ERYTHROCYTE [DISTWIDTH] IN BLOOD BY AUTOMATED COUNT: 14.5 % (ref 11.5–14.5)
GFR SERPL CREATININE-BSD FRML MDRD: 90 ML/MIN/1.73
GLUCOSE BLD-MCNC: 133 MG/DL (ref 70–110)
GLUCOSE BLDC GLUCOMTR-MCNC: 166 MG/DL (ref 70–130)
HCT VFR BLD AUTO: 38.6 % (ref 37–47)
HGB BLD-MCNC: 12.7 G/DL (ref 12–16)
IMM GRANULOCYTES # BLD: 0.04 10*3/MM3 (ref 0–0.03)
IMM GRANULOCYTES NFR BLD: 0.3 % (ref 0–0.5)
LV EF 2D ECHO EST: 75 %
LYMPHOCYTES # BLD AUTO: 0.65 10*3/MM3 (ref 1–3)
LYMPHOCYTES NFR BLD AUTO: 4.2 % (ref 21–51)
M PNEUMO IGM SER QL: NEGATIVE
MAXIMAL PREDICTED HEART RATE: 183 BPM
MCH RBC QN AUTO: 27.9 PG (ref 27–33)
MCHC RBC AUTO-ENTMCNC: 32.9 G/DL (ref 33–37)
MCV RBC AUTO: 84.6 FL (ref 80–94)
MONOCYTES # BLD AUTO: 0.72 10*3/MM3 (ref 0.1–0.9)
MONOCYTES NFR BLD AUTO: 4.7 % (ref 0–10)
NEUTROPHILS # BLD AUTO: 14.07 10*3/MM3 (ref 1.4–6.5)
NEUTROPHILS NFR BLD AUTO: 90.8 % (ref 30–70)
OSMOLALITY SERPL CALC.SUM OF ELEC: 280.3 MOSM/KG (ref 273–305)
PLATELET # BLD AUTO: 235 10*3/MM3 (ref 130–400)
PMV BLD AUTO: 11 FL (ref 6–10)
POTASSIUM BLD-SCNC: 4.4 MMOL/L (ref 3.5–5.3)
RBC # BLD AUTO: 4.56 10*6/MM3 (ref 4.2–5.4)
SODIUM BLD-SCNC: 139 MMOL/L (ref 135–153)
STRESS TARGET HR: 156 BPM
TROPONIN I SERPL-MCNC: <0.006 NG/ML
WBC NRBC COR # BLD: 15.48 10*3/MM3 (ref 4.5–12.5)

## 2018-10-05 PROCEDURE — 25010000002 CEFTRIAXONE: Performed by: INTERNAL MEDICINE

## 2018-10-05 PROCEDURE — 85025 COMPLETE CBC W/AUTO DIFF WBC: CPT | Performed by: NURSE PRACTITIONER

## 2018-10-05 PROCEDURE — 93306 TTE W/DOPPLER COMPLETE: CPT | Performed by: INTERNAL MEDICINE

## 2018-10-05 PROCEDURE — 94799 UNLISTED PULMONARY SVC/PX: CPT

## 2018-10-05 PROCEDURE — 25010000002 METHYLPREDNISOLONE PER 125 MG: Performed by: NURSE PRACTITIONER

## 2018-10-05 PROCEDURE — 86140 C-REACTIVE PROTEIN: CPT | Performed by: NURSE PRACTITIONER

## 2018-10-05 PROCEDURE — 82962 GLUCOSE BLOOD TEST: CPT

## 2018-10-05 PROCEDURE — 71250 CT THORAX DX C-: CPT | Performed by: RADIOLOGY

## 2018-10-05 PROCEDURE — 84484 ASSAY OF TROPONIN QUANT: CPT | Performed by: INTERNAL MEDICINE

## 2018-10-05 PROCEDURE — 93306 TTE W/DOPPLER COMPLETE: CPT

## 2018-10-05 PROCEDURE — 99233 SBSQ HOSP IP/OBS HIGH 50: CPT | Performed by: INTERNAL MEDICINE

## 2018-10-05 PROCEDURE — 80048 BASIC METABOLIC PNL TOTAL CA: CPT | Performed by: NURSE PRACTITIONER

## 2018-10-05 PROCEDURE — 71250 CT THORAX DX C-: CPT

## 2018-10-05 PROCEDURE — 85379 FIBRIN DEGRADATION QUANT: CPT | Performed by: INTERNAL MEDICINE

## 2018-10-05 PROCEDURE — 25010000002 METHYLPREDNISOLONE PER 125 MG

## 2018-10-05 PROCEDURE — 86738 MYCOPLASMA ANTIBODY: CPT | Performed by: INTERNAL MEDICINE

## 2018-10-05 PROCEDURE — 25010000002 ENOXAPARIN PER 10 MG: Performed by: PHYSICIAN ASSISTANT

## 2018-10-05 PROCEDURE — 25010000002 ONDANSETRON PER 1 MG: Performed by: NURSE PRACTITIONER

## 2018-10-05 RX ORDER — BUDESONIDE 0.5 MG/2ML
0.5 INHALANT ORAL
Status: DISCONTINUED | OUTPATIENT
Start: 2018-10-05 | End: 2018-10-08

## 2018-10-05 RX ORDER — BENZONATATE 100 MG/1
200 CAPSULE ORAL 3 TIMES DAILY PRN
Status: DISCONTINUED | OUTPATIENT
Start: 2018-10-05 | End: 2018-10-09 | Stop reason: HOSPADM

## 2018-10-05 RX ORDER — METHYLPREDNISOLONE SODIUM SUCCINATE 40 MG/ML
40 INJECTION, POWDER, LYOPHILIZED, FOR SOLUTION INTRAMUSCULAR; INTRAVENOUS EVERY 12 HOURS
Status: DISCONTINUED | OUTPATIENT
Start: 2018-10-05 | End: 2018-10-09

## 2018-10-05 RX ORDER — METHYLPREDNISOLONE SODIUM SUCCINATE 125 MG/2ML
INJECTION, POWDER, LYOPHILIZED, FOR SOLUTION INTRAMUSCULAR; INTRAVENOUS
Status: COMPLETED
Start: 2018-10-05 | End: 2018-10-05

## 2018-10-05 RX ORDER — IBUPROFEN 600 MG/1
600 TABLET ORAL EVERY 6 HOURS PRN
Status: DISCONTINUED | OUTPATIENT
Start: 2018-10-05 | End: 2018-10-09 | Stop reason: HOSPADM

## 2018-10-05 RX ORDER — SODIUM CHLORIDE 9 MG/ML
75 INJECTION, SOLUTION INTRAVENOUS CONTINUOUS
Status: DISCONTINUED | OUTPATIENT
Start: 2018-10-05 | End: 2018-10-07

## 2018-10-05 RX ORDER — ONDANSETRON 2 MG/ML
4 INJECTION INTRAMUSCULAR; INTRAVENOUS ONCE
Status: COMPLETED | OUTPATIENT
Start: 2018-10-05 | End: 2018-10-05

## 2018-10-05 RX ADMIN — ACETAMINOPHEN 650 MG: 325 TABLET ORAL at 23:53

## 2018-10-05 RX ADMIN — ONDANSETRON 4 MG: 2 INJECTION INTRAMUSCULAR; INTRAVENOUS at 18:23

## 2018-10-05 RX ADMIN — METHYLPREDNISOLONE SODIUM SUCCINATE 40 MG: 40 INJECTION, POWDER, FOR SOLUTION INTRAMUSCULAR; INTRAVENOUS at 17:10

## 2018-10-05 RX ADMIN — BENZONATATE 200 MG: 100 CAPSULE ORAL at 10:34

## 2018-10-05 RX ADMIN — METHYLPREDNISOLONE SODIUM SUCCINATE 60 MG: 125 INJECTION, POWDER, FOR SOLUTION INTRAMUSCULAR; INTRAVENOUS at 05:14

## 2018-10-05 RX ADMIN — GUAIFENESIN 200 MG: 100 SOLUTION ORAL at 01:17

## 2018-10-05 RX ADMIN — BUDESONIDE 0.5 MG: 0.5 SUSPENSION RESPIRATORY (INHALATION) at 10:13

## 2018-10-05 RX ADMIN — DOXYCYCLINE 100 MG: 100 INJECTION, POWDER, LYOPHILIZED, FOR SOLUTION INTRAVENOUS at 11:33

## 2018-10-05 RX ADMIN — ACETAMINOPHEN 650 MG: 325 TABLET ORAL at 18:32

## 2018-10-05 RX ADMIN — ENOXAPARIN SODIUM 40 MG: 40 INJECTION SUBCUTANEOUS at 11:33

## 2018-10-05 RX ADMIN — IPRATROPIUM BROMIDE AND ALBUTEROL SULFATE 3 ML: .5; 3 SOLUTION RESPIRATORY (INHALATION) at 14:24

## 2018-10-05 RX ADMIN — DOXYCYCLINE 100 MG: 100 INJECTION, POWDER, LYOPHILIZED, FOR SOLUTION INTRAVENOUS at 19:58

## 2018-10-05 RX ADMIN — CEFTRIAXONE 1 G: 1 INJECTION, POWDER, FOR SOLUTION INTRAMUSCULAR; INTRAVENOUS at 10:29

## 2018-10-05 RX ADMIN — SODIUM CHLORIDE 75 ML/HR: 9 INJECTION, SOLUTION INTRAVENOUS at 23:53

## 2018-10-05 RX ADMIN — BUPRENORPHINE HYDROCHLORIDE AND NALOXONE HYDROCHLORIDE 2 TABLET: 8; 2 TABLET SUBLINGUAL at 08:56

## 2018-10-05 RX ADMIN — IPRATROPIUM BROMIDE AND ALBUTEROL SULFATE 3 ML: .5; 3 SOLUTION RESPIRATORY (INHALATION) at 22:06

## 2018-10-05 RX ADMIN — IBUPROFEN 600 MG: 600 TABLET ORAL at 10:34

## 2018-10-05 RX ADMIN — IPRATROPIUM BROMIDE AND ALBUTEROL SULFATE 3 ML: .5; 3 SOLUTION RESPIRATORY (INHALATION) at 02:05

## 2018-10-05 RX ADMIN — SODIUM CHLORIDE 75 ML/HR: 9 INJECTION, SOLUTION INTRAVENOUS at 08:56

## 2018-10-05 RX ADMIN — METHYLPREDNISOLONE SODIUM SUCCINATE 40 MG: 125 INJECTION, POWDER, FOR SOLUTION INTRAMUSCULAR; INTRAVENOUS at 17:10

## 2018-10-05 RX ADMIN — IPRATROPIUM BROMIDE AND ALBUTEROL SULFATE 3 ML: .5; 3 SOLUTION RESPIRATORY (INHALATION) at 07:33

## 2018-10-05 RX ADMIN — IPRATROPIUM BROMIDE AND ALBUTEROL SULFATE 3 ML: .5; 3 SOLUTION RESPIRATORY (INHALATION) at 18:45

## 2018-10-05 RX ADMIN — ACETAMINOPHEN 650 MG: 325 TABLET ORAL at 08:12

## 2018-10-05 RX ADMIN — Medication 3 ML: at 08:56

## 2018-10-05 RX ADMIN — IPRATROPIUM BROMIDE AND ALBUTEROL SULFATE 3 ML: .5; 3 SOLUTION RESPIRATORY (INHALATION) at 10:14

## 2018-10-05 NOTE — PROGRESS NOTES
"  I have personally seen and examined the patient today and discussed overnight interval progress and pertinent issues with nursing staff.    Subjective           Review of Systems    Constitutional: no fever, chills and night sweats. No appetite change or unexpected weight change. No fatigue.  Eyes: no eye drainage, itching or redness.  HEENT: no mouth sores, dysphagia or nose bleed.  Respiratory: no for shortness of breath, cough or production of sputum.  Cardiovascular: no chest pain, no palpitations, no orthopnea.  Gastrointestinal: no nausea, vomiting or diarrhea. No abdominal pain, hematemesis or rectal bleeding.  Genitourinary: no dysuria or polyuria.  Hematologic/lymphatic: no lymph node abnormalities, no easy bruising or easy bleeding.  Musculoskeletal: no muscle or joint pain.  Skin: No rash and no itching.  Neurological: no loss of consciousness, no seizure, no headache.  Behavioral/Psych: no depression or suicidal ideation.  Endocrine: no hot flashes.  Immunologic: negative.      History taken from: patient chart RN      Objective       Vital Signs    /48 (BP Location: Right arm, Patient Position: Sitting)   Pulse 60   Temp 98 °F (36.7 °C) (Oral)   Resp 18   Ht 157.5 cm (62\")   Wt 99.8 kg (220 lb)   SpO2 95%   BMI 40.24 kg/m²     Temp:  [97.8 °F (36.6 °C)-99.5 °F (37.5 °C)] 98 °F (36.7 °C)      Intake/Output Summary (Last 24 hours) at 10/05/18 0605  Last data filed at 10/04/18 1200   Gross per 24 hour   Intake              680 ml   Output                0 ml   Net              680 ml     Intake & Output (last 3 days)       10/02 0701 - 10/03 0700 10/03 0701 - 10/04 0700 10/04 0701 - 10/05 0700    P.O.   680    Total Intake(mL/kg)   680 (6.8)    Net     +680           Unmeasured Urine Occurrence   5 x          Objective    General Appearance:    Alert, cooperative, in no acute distress   Head:    Normocephalic, without obvious abnormality, atraumatic   Eyes:            Lids and lashes normal, " conjunctivae and sclerae normal, no   icterus, no pallor, corneas clear, PERRLA   Ears:    Ears appear intact with no abnormalities noted   Throat:   No oral lesions, no thrush, oral mucosa moist   Neck:   No adenopathy, supple, trachea midline, no thyromegaly, no   carotid bruit, no JVD   Back:     No tenderness to percussion or palpation, range of motion   normal   Lungs:     Clear to auscultation,respirations regular, even and unlabored. No wheezing, no ronchi and no crackles.    Heart:    Regular rhythm and normal rate, normal S1 and S2, no            murmur, no gallop, no rub, no click   Chest Wall:    No abnormalities observed   Abdomen:     Normal bowel sounds, no masses, no organomegaly, soft        non-tender, non-distended, no guarding, no rebound                tenderness   Rectal:     Deferred   Extremities:   Moves all extremities well, no edema, no cyanosis, no             redness   Pulses:   Pulses palpable and equal bilaterally   Skin:   No bleeding, bruising or rash   Lymph nodes:   No palpable adenopathy   Neurologic:   Awake, alert and oriented x 3. Following commands.           Results:      Results from last 7 days  Lab Units 10/05/18  0426 10/04/18  0557   WBC 10*3/mm3 15.48* 12.22     Lab Results   Component Value Date    NEUTROABS 14.07 (H) 10/05/2018         Results from last 7 days  Lab Units 10/05/18  0426   CREATININE mg/dL 0.73         Results from last 7 days  Lab Units 10/05/18  0426   CRP mg/dL 4.80*       Imaging Results (last 24 hours)     Procedure Component Value Units Date/Time    XR Chest 2 View [[id number]] Collected:  10/04/18 0715     Updated:  10/04/18 0718    Narrative:       EXAMINATION: XR CHEST 2 VW-      CLINICAL INDICATION: cough, SOB          COMPARISON: 5/25/2018      TECHNIQUE: XR CHEST 2 VW-      FINDINGS:   Lungs are adequately aerated.   Heart and mediastinal contours are unremarkable.   No pneumothorax.   Bony and soft tissue structures are unremarkable.             Impression:       No radiographic evidence of acute cardiac or pulmonary disease.     This report was finalized on 10/4/2018 7:16 AM by Dr. Endy Valladares MD.               Results Review:    I have personally reviewed laboratory data, culture results, radiology studies and antimicrobial therapy.    Hospital Medications (active)       Dose Frequency Start End    acetaminophen (TYLENOL) tablet 650 mg 650 mg Every 4 Hours PRN 10/4/2018     Sig - Route: Take 2 tablets by mouth Every 4 (Four) Hours As Needed for Mild Pain . - Oral    Cosign for Ordering: Accepted by Hardik Pride MD on 10/4/2018  6:26 PM    buprenorphine-naloxone (SUBOXONE) 8-2 MG per SL tablet 2 tablet 2 tablet Daily 10/4/2018     Sig - Route: Place 2 tablets under the tongue Daily. - Sublingual    dextrose 5 % and sodium chloride 0.45 % infusion 100 mL/hr Continuous 10/4/2018     Sig - Route: Infuse 100 mL/hr into a venous catheter Continuous. - Intravenous    Cosign for Ordering: Accepted by Hardik Pride MD on 10/4/2018  6:26 PM    doxycycline (MONODOX) capsule 100 mg 100 mg Every 12 Hours Scheduled 10/4/2018 10/6/2018    Sig - Route: Take 1 capsule by mouth Every 12 (Twelve) Hours. - Oral    doxycycline (VIBRAMYCIN) 100 mg/100 mL 0.9% NS  mg Once 10/4/2018 10/4/2018    Sig - Route: Infuse 100 mL into a venous catheter 1 (One) Time. - Intravenous    Cosign for Ordering: Accepted by Vazquez Bragg MD on 10/4/2018  7:20 AM    enoxaparin (LOVENOX) syringe 40 mg 40 mg Every 24 Hours 10/4/2018     Sig - Route: Inject 0.4 mL under the skin into the appropriate area as directed Daily. - Subcutaneous    Cosign for Ordering: Accepted by Hardik Pride MD on 10/4/2018  6:26 PM    guaiFENesin (ROBITUSSIN) 100 MG/5ML oral solution 200 mg 200 mg Every 4 Hours PRN 10/4/2018     Sig - Route: Take 10 mL by mouth Every 4 (Four) Hours As Needed for Cough. - Oral    ipratropium-albuterol (DUO-NEB) nebulizer solution 3 mL 3 mL Once  "10/4/2018 10/4/2018    Sig - Route: Take 3 mL by nebulization 1 (One) Time. - Nebulization    Cosign for Ordering: Accepted by Vazquez Bragg MD on 10/4/2018  7:20 AM    ipratropium-albuterol (DUO-NEB) nebulizer solution 3 mL 3 mL Every 4 Hours PRN 10/4/2018     Sig - Route: Take 3 mL by nebulization Every 4 (Four) Hours As Needed for Shortness of Air. - Nebulization    ipratropium-albuterol (DUO-NEB) nebulizer solution 3 mL 3 mL Every 4 Hours - RT 10/4/2018     Sig - Route: Take 3 mL by nebulization Every 4 (Four) Hours. - Nebulization    methylPREDNISolone sodium succinate (SOLU-Medrol) injection 60 mg 60 mg Every 12 Hours 10/4/2018 10/6/2018    Sig - Route: Infuse 0.96 mL into a venous catheter Every 12 (Twelve) Hours. - Intravenous    nitroglycerin (NITROSTAT) SL tablet 0.4 mg 0.4 mg Every 5 Minutes PRN 10/4/2018     Sig - Route: Place 1 tablet under the tongue Every 5 (Five) Minutes As Needed for Chest Pain (Chest Pain With Systolic Blood Pressure Greater Than 100). - Sublingual    Cosign for Ordering: Accepted by Hardik Pride MD on 10/4/2018  6:26 PM    sodium chloride 0.9 % flush 10 mL 10 mL As Needed 10/4/2018     Sig - Route: Infuse 10 mL into a venous catheter As Needed for Line Care. - Intravenous    Cosign for Ordering: Accepted by Vazquez Bragg MD on 10/4/2018  5:31 AM    Linked Group 1:  \"And\" Linked Group Details        sodium chloride 0.9 % flush 3 mL 3 mL Every 12 Hours Scheduled 10/4/2018     Sig - Route: Infuse 3 mL into a venous catheter Every 12 (Twelve) Hours. - Intravenous    Cosign for Ordering: Accepted by Hardik Pride MD on 10/4/2018  6:26 PM    sodium chloride 0.9 % flush 3-10 mL 3-10 mL As Needed 10/4/2018     Sig - Route: Infuse 3-10 mL into a venous catheter As Needed for Line Care. - Intravenous    Cosign for Ordering: Accepted by Hardik Pride MD on 10/4/2018  6:26 PM    HYDROcodone-acetaminophen (NORCO) 5-325 MG per tablet 1 tablet " (Discontinued) 1 tablet Every 6 Hours PRN 10/4/2018 10/4/2018    Sig - Route: Take 1 tablet by mouth Every 6 (Six) Hours As Needed for Moderate Pain . - Oral    ipratropium-albuterol (DUO-NEB) nebulizer solution 3 mL (Discontinued) 3 mL Every 6 Hours While Awake - RT 10/4/2018 10/4/2018    Sig - Route: Take 3 mL by nebulization Every 6 (Six) Hours While Awake. - Nebulization            Cultures:    Blood Culture   Date Value Ref Range Status   10/04/2018 No growth at less than 24 hours  Preliminary   10/04/2018 No growth at less than 24 hours  Preliminary         ASSESSMENT/PLAN           Assessment/Plan     ASSESSMENT:    1. COPD exacerbation    PLAN:    38 y.o. female with history of asthma, COPD, DM, Hepatitis C, HTN, Lupus and rheumatoid arthritis presents to ED for shortness of breath, wheezing and productive cough. She states it has been worsening over the last few days. She takes Ventolin and Advair when needed at home however, she continued to get worse. She denies fever. She states that she is borderline diabetic and has not been to her PCP in several months. She is being prescribed Suboxone. Urine collected on  10/04/18 is unremarkable. HCG is negative. White count normal. Influenza is negative as well a strep culture. Blood culture in process bur are negative so far. CXR from 10/04/18 reports no evidence of cardiac or pulmonary disease. She is short of breath, 02 NC 2L. She received Doxycyline 100 mg IV times one dose.      Doxycycline 100 mg IV was changed to oral regimen 100 mg one tab twice daily.     Solumedrol 60 mg IV every 12 hrs. CXR with no evidence of pneumonia.    EKG with abnormal T waves but similar to prior.     Duoneb respiratory treatments changed to q 4 hrs.    I do not believe patient is ready for discharge today as she continues with significant wheezing and asthmatic dry cough. Unfortunately patient continues to smoke and has smoked for 20 years and quit a week ago.    Dr. Dooley  called and kindly accepted the patient. Orders placed. Would highly consider pulmonary consult if ok with hospitalist team.    I advised Mary of the risks of continuing to use tobacco, and I provided her with tobacco cessation educational materials in the After Visit Summary.     DVT prophylaxis with Lovenox 40 mg sc q24h.    During this visit, I spent 5 minutes counseling the patient regarding tobacco cessation.           Patient's findings and recommendations were discussed with patient, nursing staff and primary care team    Code Status:   Code Status and Medical Interventions:   Ordered at: 10/04/18 0739     Code Status:    CPR     Medical Interventions (Level of Support Prior to Arrest):    Full       Hardik Pride MD  10/05/18  6:05 AM

## 2018-10-05 NOTE — PLAN OF CARE
Problem: Chronic Obstructive Pulmonary Disease (Adult)  Goal: Signs and Symptoms of Listed Potential Problems Will be Absent, Minimized or Managed (Chronic Obstructive Pulmonary Disease)  Outcome: Ongoing (interventions implemented as appropriate)      Problem: Fall Risk (Adult)  Goal: Identify Related Risk Factors and Signs and Symptoms  Outcome: Ongoing (interventions implemented as appropriate)    Goal: Absence of Fall  Outcome: Ongoing (interventions implemented as appropriate)      Problem: Patient Care Overview  Goal: Plan of Care Review  Outcome: Ongoing (interventions implemented as appropriate)    Goal: Individualization and Mutuality  Outcome: Ongoing (interventions implemented as appropriate)    Goal: Discharge Needs Assessment  Outcome: Ongoing (interventions implemented as appropriate)    Goal: Interprofessional Rounds/Family Conf  Outcome: Ongoing (interventions implemented as appropriate)

## 2018-10-05 NOTE — PROGRESS NOTES
Discharge Planning Assessment   [facility]     Patient Name: Mary Monaco  MRN: [id number]  Today's Date: 10/5/2018    Admit Date: 10/4/2018       Discharge Plan     Row Name 10/05/18 1553       Plan    Plan Pt admitted on 10/4/18.  SS received consult per Case Management for discharge planning.  SS spoke with pt on this date.  Pt lives at home with significant other and plans to return home at discharge.  Pt currently does not utilize home health or DME..  Pt's PCP is Dr. Adams in [place].  Pt utilizes [facility].  Pt transports via [facility].  SS will follow and assist with discharge needs.        Renetta Gilbert

## 2018-10-05 NOTE — PROGRESS NOTES
"  Assisted By: Serene ANDUJAR    CC: Shortness of breath    Interview History/HPI: Patient is a 38-year-old female who was admitted to the observation unit for \"COPD exacerbation\".  By the attending physician report there it was obvious the patient was not going to be ready to go home with an observation time therefore patient was transferred to our service.  Patient states that she has been ill for 2-3 days.  Main complaint is chest and back pain.  This worsens when she coughs.  She also is having some overall body aches.  This is a sharp pain, she has had no hemoptysis she has had a congested cough no objective fever.  Minimal phlegm production.  She does smoke 1 pack of cigarettes a day.  When out in the home has been ill.  She states the chest and back discomfort has been going on about the same amount of time as the illness.  Initial troponin was negative, patient was hypoxic on admission with a PO2 of 57 on admission.  Initial chest x-ray was negative.  Patient does have a reported history of lupus however she is not seeing a rheumatologist and prolonged period of time.  She has had no ankle edema that she knows of no nausea no vomiting.       Vitals:    10/05/18 0733   BP:    Pulse: 86   Resp: 19   Temp:    SpO2: 93%       Intake/Output Summary (Last 24 hours) at 10/05/18 0836  Last data filed at 10/04/18 1200   Gross per 24 hour   Intake              680 ml   Output                0 ml   Net              680 ml       EXAM: Well-developed acutely ill-appearing female initial saturation 92% but with conversation this dropped to about 87% and she was placed on oxygen.  Pupils are equal round reactive, sclerae nonicteric oral mucosa slightly dry.  Voice is good.  Neck is supple lungs have bilateral breath sounds with diffuse prolonged expiratory phase with coarse wheezing.  No definite crackles heard heart is a regular rate and rhythm without murmur rub or gallop.  Abdomen is soft benign bowel sounds are active " "chest wall is mildly tender at the sternoclavicular junction no back tenderness is elicited.  No CVA tenderness.  Extremities are without edema she has diminished but adequate pulses good capillary refill she has some ground in dirt discoloration of the soles of her feet.  No joint effusions currently although she feels like her hands are \"swollen\".  Although she appears ill mood is good and she is appreciative of care.    EKG: EKG on admission was overall normal, reviewed    Tele: Sinus rhythm at the current time, reviewed    LABS:   Lab Results (last 48 hours)     Procedure Component Value Units Date/Time    D-dimer, Quantitative [[id number]]  (Normal) Collected:  10/05/18 0752    Specimen:  Blood Updated:  10/05/18 0834     D-Dimer, Quantitative 0.34 MCGFEU/mL      Comment: Note new Reference Range       Narrative:       d-Dimer assay result is to be used in conjunction with a non-high clinical pretest probability (PTP) assessment tool to exclude PE and aid in diagnosis of VTE with cutoff of 0.5 MCGFEU/mL.    Mycoplasma Pneumoniae Antibody, IgM [[id number]] Collected:  10/05/18 0752    Specimen:  Blood Updated:  10/05/18 0819    Beta Strep Culture, Throat - Swab, Throat [[id number]]  (Normal) Collected:  10/04/18 0620    Specimen:  Swab from Throat Updated:  10/05/18 0751     Throat Culture, Beta Strep No Group A Streptococcus Isolated    POC Glucose Once [[id number]]  (Abnormal) Collected:  10/05/18 0742    Specimen:  Blood Updated:  10/05/18 0749     Glucose 166 (H) mg/dL     Narrative:       Meter: [id number] : [id number] deandra sanchez    Osmolality, Calculated [[id number]]  (Normal) Collected:  10/05/18 0426    Specimen:  Blood Updated:  10/05/18 0538     Osmolality Calc 280.3 mOsm/kg     Basic Metabolic Panel [[id number]]  (Abnormal) Collected:  10/05/18 0426    Specimen:  Blood Updated:  10/05/18 0518     Glucose 133 (H) mg/dL      BUN 15 mg/dL      Creatinine 0.73 mg/dL      Sodium 139 mmol/L      " Potassium 4.4 mmol/L      Chloride 109 mmol/L      CO2 23.8 (L) mmol/L      Calcium 8.5 mg/dL      eGFR Non African Amer 90 mL/min/1.73      BUN/Creatinine Ratio 20.5     Anion Gap 6.2 mmol/L     Narrative:       GFR Normal >60  Chronic Kidney Disease <60  Kidney Failure <15    C-reactive Protein [[id number]]  (Abnormal) Collected:  10/05/18 0426    Specimen:  Blood Updated:  10/05/18 0510     C-Reactive Protein 4.80 (H) mg/dL     CBC & Differential [[id number]] Collected:  10/05/18 0426    Specimen:  Blood Updated:  10/05/18 0449    Narrative:       The following orders were created for panel order CBC & Differential.  Procedure                               Abnormality         Status                     ---------                               -----------         ------                     CBC Auto Differential[[id number]]        Abnormal            Final result                 Please view results for these tests on the individual orders.    CBC Auto Differential [[id number]]  (Abnormal) Collected:  10/05/18 0426    Specimen:  Blood Updated:  10/05/18 0449     WBC 15.48 (H) 10*3/mm3      RBC 4.56 10*6/mm3      Hemoglobin 12.7 g/dL      Hematocrit 38.6 %      MCV 84.6 fL      MCH 27.9 pg      MCHC 32.9 (L) g/dL      RDW 14.5 %      RDW-SD 44.0 fl      MPV 11.0 (H) fL      Platelets 235 10*3/mm3      Neutrophil % 90.8 (H) %      Lymphocyte % 4.2 (L) %      Monocyte % 4.7 %      Eosinophil % 0.0 %      Basophil % 0.0 %      Immature Grans % 0.3 %      Neutrophils, Absolute 14.07 (H) 10*3/mm3      Lymphocytes, Absolute 0.65 (L) 10*3/mm3      Monocytes, Absolute 0.72 10*3/mm3      Eosinophils, Absolute 0.00 10*3/mm3      Basophils, Absolute 0.00 10*3/mm3      Immature Grans, Absolute 0.04 (H) 10*3/mm3     Blood Culture - Blood, [[id number]]  (Normal) Collected:  10/04/18 0815    Specimen:  Blood from Arm, Right Updated:  10/04/18 2231     Blood Culture No growth at less than 24 hours    Blood Culture - Blood, [[id number]]   (Normal) Collected:  10/04/18 0823    Specimen:  Blood from Arm, Left Updated:  10/04/18 2231     Blood Culture No growth at less than 24 hours    POC Glucose Once [[id number]]  (Abnormal) Collected:  10/04/18 1942    Specimen:  Blood Updated:  10/04/18 1948     Glucose 168 (H) mg/dL     Narrative:       Meter: [id number] : [id number] belinda malone    Urine Drug Screen - Urine, Clean Catch [[id number]]  (Abnormal) Collected:  10/04/18 0717    Specimen:  Urine from Urine, Clean Catch Updated:  10/04/18 0739     Amphetamine Screen, Urine Negative     Barbiturates Screen, Urine Negative     Benzodiazepine Screen, Urine Negative     Cocaine Screen, Urine Negative     Methadone Screen, Urine Negative     Opiate Screen Negative     Phencyclidine (PCP), Urine Negative     THC, Screen, Urine Negative     6-ACETYL MORPHINE Negative     Buprenorphine, Screen, Urine Positive (A)     Oxycodone Screen, Urine Negative    Narrative:       Negative Thresholds For Drugs Screened:                  Amphetamines              1000 ng/ml               Barbiturates               200 ng/ml               Benzodiazepines            200 ng/ml              Cocaine                    300 ng/ml              Methadone                  300 ng/ml              Opiates                    300 ng/ml               Phencyclidine               25 ng/ml               THC                         50 ng/ml              6-Acetyl Morphine           10 ng/ml              Buprenorphine                5 ng/ml              Oxycodone                  300 ng/ml    The reference range for all drugs tested is negative. This report includes final unconfirmed qualitative results to be used for medical treatment purposes only. Unconfirmed results must not be used for non-medical purposes such as employment or legal testing. Clinical consideration should be applied to any drug of abuse test, especially when unconfirmed quantitative results are used.      Troponin  [[id number]]  (Normal) Collected:  10/04/18 0557    Specimen:  Blood Updated:  10/04/18 0734     Troponin I <0.006 ng/mL     Narrative:       Ultra Troponin I Reference Range:         <=0.039 ng/mL: Negative    0.04-0.779 ng/mL: Indeterminate Range. Suspicious of MI.  Clinical correlation required.       >=0.78  ng/mL: Consistent with myocardial injury.  Clinical correlation required.    Urinalysis With Culture If Indicated - Urine, Clean Catch [[id number]]  (Normal) Collected:  10/04/18 0717    Specimen:  Urine from Urine, Clean Catch Updated:  10/04/18 0726     Color, UA Yellow     Appearance, UA Clear     pH, UA 7.0     Specific Gravity, UA 1.007     Glucose, UA Negative     Ketones, UA Negative     Bilirubin, UA Negative     Blood, UA Negative     Protein, UA Negative     Leuk Esterase, UA Negative     Nitrite, UA Negative     Urobilinogen, UA 0.2 E.U./dL    Narrative:       Urine microscopic not indicated.    Pregnancy, Urine - Urine, Clean Catch [[id number]]  (Normal) Collected:  10/04/18 0717    Specimen:  Urine from Urine, Clean Catch Updated:  10/04/18 0725     HCG, Urine QL Negative    Narrative:       Diluted specimens may cause false negative results.    Influenza Antigen, Rapid - Swab, Nasopharynx [[id number]]  (Normal) Collected:  10/04/18 0620    Specimen:  Swab from Nasopharynx Updated:  10/04/18 0641     Influenza A Ag, EIA Negative     Influenza B Ag, EIA Negative    Rapid Strep A Screen - Swab, Throat [[id number]]  (Normal) Collected:  10/04/18 0620    Specimen:  Swab from Throat Updated:  10/04/18 0637     Strep A Ag Negative    Comprehensive Metabolic Panel [[id number]]  (Abnormal) Collected:  10/04/18 0557    Specimen:  Blood Updated:  10/04/18 0631     Glucose 113 (H) mg/dL      BUN 8 mg/dL      Creatinine 0.69 mg/dL      Sodium 139 mmol/L      Potassium 3.9 mmol/L      Chloride 105 mmol/L      CO2 24.2 (L) mmol/L      Calcium 8.9 mg/dL      Total Protein 7.4 g/dL      Albumin 4.30 g/dL      ALT  (SGPT) 14 U/L      AST (SGOT) 19 U/L      Alkaline Phosphatase 70 U/L      Comment: Note New Reference Ranges        Total Bilirubin 0.4 mg/dL      eGFR Non African Amer 96 mL/min/1.73      Globulin 3.1 gm/dL      A/G Ratio 1.4 (L) g/dL      BUN/Creatinine Ratio 11.6     Anion Gap 9.8 mmol/L     Osmolality, Calculated [[id number]]  (Normal) Collected:  10/04/18 0557    Specimen:  Blood Updated:  10/04/18 0631     Osmolality Calc 276.7 mOsm/kg     CBC & Differential [[id number]] Collected:  10/04/18 0557    Specimen:  Blood Updated:  10/04/18 0623    Narrative:       The following orders were created for panel order CBC & Differential.  Procedure                               Abnormality         Status                     ---------                               -----------         ------                     CBC Auto Differential[[id number]]        Abnormal            Final result                 Please view results for these tests on the individual orders.    CBC Auto Differential [[id number]]  (Abnormal) Collected:  10/04/18 0557    Specimen:  Blood Updated:  10/04/18 0623     WBC 12.22 10*3/mm3      RBC 4.86 10*6/mm3      Hemoglobin 13.1 g/dL      Hematocrit 40.2 %      MCV 82.7 fL      MCH 27.0 pg      MCHC 32.6 (L) g/dL      RDW 14.1 %      RDW-SD 42.6 fl      MPV 11.7 (H) fL      Platelets 208 10*3/mm3      Neutrophil % 90.0 (H) %      Lymphocyte % 4.2 (L) %      Monocyte % 5.0 %      Eosinophil % 0.4 %      Basophil % 0.2 %      Immature Grans % 0.2 %      Neutrophils, Absolute 11.00 (H) 10*3/mm3      Lymphocytes, Absolute 0.51 (L) 10*3/mm3      Monocytes, Absolute 0.61 10*3/mm3      Eosinophils, Absolute 0.05 10*3/mm3      Basophils, Absolute 0.02 10*3/mm3      Immature Grans, Absolute 0.03 10*3/mm3     Blood Gas, Arterial [[id number]]  (Abnormal) Collected:  10/04/18 0543    Specimen:  Arterial Blood Updated:  10/04/18 0610     Site Arterial: left radial     Zbigniew's Test Positive     pH, Arterial 7.433 pH units       pCO2, Arterial 38.0 mm Hg      pO2, Arterial 57.7 (L) mm Hg      HCO3, Arterial 24.8 mmol/L      Base Excess, Arterial 0.7 mmol/L      O2 Saturation, Arterial 91.3 %      Hemoglobin, Blood Gas 13.6 g/dL      Hematocrit, Blood Gas 40.0 %      Oxyhemoglobin 89.4 %      Methemoglobin 0.40 %      Carboxyhemoglobin 1.7 %      A-a Gradiant 40.0 mmHg      Temperature 98.6 C      Barometric Pressure for Blood Gas 729 mmHg      Modality Room Air     FIO2 21 %           Radiology:  Imaging Results (last 72 hours)     Procedure Component Value Units Date/Time    XR Chest 2 View [[id number]] Collected:  10/04/18 0715     Updated:  10/04/18 0718    Narrative:       EXAMINATION: XR CHEST 2 VW-      CLINICAL INDICATION: cough, SOB          COMPARISON: 5/25/2018      TECHNIQUE: XR CHEST 2 VW-      FINDINGS:   Lungs are adequately aerated.   Heart and mediastinal contours are unremarkable.   No pneumothorax.   Bony and soft tissue structures are unremarkable.            Impression:       No radiographic evidence of acute cardiac or pulmonary disease.     This report was finalized on 10/4/2018 7:16 AM by Dr. Endy Valladares MD.           Echo has been ordered    Assessment/Plan:   Acute hypoxic respiratory failure secondary to asthma exacerbation most likely related to upper respiratory infection.  Her d-dimer is negative.  I am going to check a noncontrasted CT looking for occult infiltrates.  We'll continue the patient on systemic steroids, I've added topical steroids as well as continuing inhalants.  I broaden her coverage from doxycycline to doxycycline and Rocephin.  I've encouraged her to quit smoking.  Patient will need outpatient PFTs to document her pulmonary function returns to normal after this illness.  If not she may have an element of COPD/chronic persistent asthma.  She did not retain CO2 on admission.    Chest pain/back pain, pleuritic in nature, d-dimer negative, most likely related to musculoskeletal pain, will use  Motrin when necessary, she is already on Suboxone.  Will track serial enzymes, check echo.    Mild dehydration, oral cavity is dry, I have added IV fluids.    Reported history of lupus, this can be further followed as an outpatient, if there is any flare certainly steroid should help this as well.    DVT prophylaxis, subcutaneous Lovenox    Leukocytosis probably related to steroids    SIRS/ sepsis related to possible URI, CT being done as above.  She had an elevated CRP and tachypnea.

## 2018-10-05 NOTE — PAYOR COMM NOTE
"  [facility]  NPI: [id number]    Utilization Review   Contact:Cleo Pantoja MSN, APRN, NP-C  Phone: [phone]  Fax: [phone]    Lizzie mcaid/Attn: nurse review  Inpatient Auth Req  REF: [id number]  DX: J44.1   converted to inpatient 10-5-18, Observation on 10-4-18  Mary Abreu (38 y.o. Female)     Date of Birth Social Security Number Address Home Phone MRN    1980  [address]  [facility] [phone] [id number]    Protestant Marital Status          None Legally        Admission Date Admission Type Admitting Provider Attending Provider Department, Room/Bed    10/4/18 Emergency Gustavo Dooley MD  NPI [id number] Mikey Rich MD [address], [address]11/[address]S    Discharge Date Discharge Disposition Discharge Destination                       Attending Provider:  Mikey Rich MD    Allergies:  No Known Allergies    Isolation:  None   Infection:  None   Code Status:  CPR    Ht:  157.5 cm (62\")   Wt:  99.8 kg (220 lb)    Admission Cmt:  None   Principal Problem:  None                Active Insurance as of 10/4/2018     Primary Coverage     Payor Plan Insurance Group Employer/Plan Group    ANTHEM MEDICAID ANTH MEDICAID [id number]     Payor Plan Address Payor Plan Phone Number Effective From Effective To    [address] [address] [phone] 2017     [facility] [id number]       Subscriber Name Subscriber Birth Date Member ID       MARY ABREU 1980 [id number]                 Emergency Contacts      (Rel.) Home Phone Work Phone Mobile Phone    Moris Johnson (Significant Other) [phone] -- [phone]    Monica Barajas (Mother) [phone] -- --               History & Physical      Hardik Pride MD at 10/4/2018  2:24 PM               HISTORY AND PHYSICAL    Patient Identification:  Name:  Mary Abreu  Age:  38 y.o.  Sex:  female  :  1980  MRN:  [id number]   Visit Number:  [id number]  Room number:  " 214/2S  Primary Care Physician:  Marciano Adams, APRN     Chief complaint:    Chief Complaint   Patient presents with   • Cough   • Shortness of Breath       History of presenting illness:  38 y.o. female with history of asthma, COPD, DM, Hepatitis C, HTN, Lupus and rheumatoid arthritis presents to ED for shortness of breath, wheezing and productive cough. She states it has been worsening over the last few days. She takes Ventolin and Advair when needed at home however, she continued to get worse. She denies fever. She states that she is borderline diabetic and has not been to her PCP in several months. She is being prescribed Suboxone. Urine collected on  10/04/18 is unremarkable. HCG is negative. White count normal. Influenza is negative as well a strep culture. Blood culture in process bur are negative so far. CXR from 10/04/18 reports no evidence of cardiac or pulmonary disease. She is short of breath, 02 NC 2L.    ---------------------------------------------------------------------------------------------------------------------   Review Of Systems:    Constitutional: no fever, chills and night sweats. No appetite change or unexpected weight change. No fatigue.  Eyes: no eye drainage, itching or redness.  HEENT: no mouth sores, dysphagia or nose bleed.  Respiratory: no for shortness of breath, cough or production of sputum.  Cardiovascular: no chest pain, no palpitations, no orthopnea.  Gastrointestinal: no nausea, vomiting or diarrhea. No abdominal pain, hematemesis or rectal bleeding.  Genitourinary: no dysuria or polyuria.  Hematologic/lymphatic: no lymph node abnormalities, no easy bruising or easy bleeding.  Musculoskeletal: no muscle or joint pain.  Skin: No rash and no itching.  Neurological: no loss of consciousness, no seizure, no headache.  Behavioral/Psych: no depression or suicidal ideation.  Endocrine: no hot flashes.  Immunologic:  negative.    ---------------------------------------------------------------------------------------------------------------------   Past Medical History:   Diagnosis Date   • Asthma    • COPD (chronic obstructive pulmonary disease) (CMS/[facility])    • Diabetes mellitus (CMS/[facility])    • Hepatitis C    • History of gallstones    • Hypertension    • Lupus    • Rheumatoid arthritis (CMS/[facility])      Past Surgical History:   Procedure Laterality Date   • ABDOMINAL SURGERY     • CHOLECYSTECTOMY     • PERINEAL LESION/CYST EXCISION N/A 4/3/2017    Procedure: EXCISION OF VAGINAL SKIN TAG;  Surgeon: Kee Crooks III, MD;  Location: [facility];  Service:    • TUBAL COAGULATION LAPAROSCOPIC Bilateral 4/3/2017    Procedure: BILATERAL TUBAL FALLOPE FILSHIE CLIPPING LAPAROSCOPIC,IUD REMOVAL;  Surgeon: Kee Crooks III, MD;  Location: [facility] OR;  Service:      History reviewed. No pertinent family history.  Social History     Social History   • Marital status: Legally      Social History Main Topics   • Smoking status: Current Every Day Smoker     Packs/day: 1.00     Years: 15.00     Types: Cigarettes   • Smokeless tobacco: Never Used   • Alcohol use No   • Drug use: No   • Sexual activity: Yes     Partners: Male     Birth control/ protection: IUD     Other Topics Concern   • Not on file     ---------------------------------------------------------------------------------------------------------------------   Allergies:  Patient has no known allergies.  ---------------------------------------------------------------------------------------------------------------------   Prior to Admission Medications     Prescriptions Last Dose Informant Patient Reported? Taking?    buprenorphine-naloxone (SUBOXONE) 8-2 MG per SL tablet 10/3/2018 WALDO Yes Yes    Place 2 tablets under the tongue Daily.        ---------------------------------------------------------------------------------------------------------------------   Vital  Signs:  Temp:  [97.8 °F (36.6 °C)-99.5 °F (37.5 °C)] 99.5 °F (37.5 °C)  Heart Rate:  [60-87] 75  Resp:  [17-20] 20  BP: (110-139)/(59-74) 139/69    Mean Arterial Pressure (Non-Invasive) for the past 24 hrs (Last 3 readings):   Noninvasive MAP (mmHg)   10/04/18 1200 95   10/04/18 0852 78   10/04/18 0811 81     SpO2:  [87 %-99 %] 93 %  on  Flow (L/min):  [2] 2;   Device (Oxygen Therapy): nasal cannula  Body mass index is 40.24 kg/m².    Wt Readings from Last 3 Encounters:   10/04/18 99.8 kg (220 lb)   05/25/18 99.8 kg (220 lb)   07/31/17 118 kg (260 lb)               ---------------------------------------------------------------------------------------------------------------------   Physical Exam:  Constitutional:  Well-developed and well-nourished.  Short of breath.      HENT:  Head: Normocephalic and atraumatic.  Mouth:  Moist mucous membranes.    Eyes:  Conjunctivae and EOM are normal.  Pupils are equal, round, and reactive to light.  No scleral icterus.  Neck:  Neck supple.  No JVD present.    Cardiovascular:  Normal rate, regular rhythm and normal heart sounds with no murmur.  Pulmonary/Chest:  Short of breath, expiratory wheezing  Abdominal:  Soft.  Bowel sounds are normal.  No distension and no tenderness.   Musculoskeletal:  No edema, no tenderness, and no deformity.  No red or swollen joints anywhere.    Neurological:  Alert and oriented to person, place, and time.  No cranial nerve deficit.  No tongue deviation.  No facial droop.  No slurred speech.   Skin:  Skin is warm and dry.  No rash noted.  No pallor.   Peripheral vascular:  No edema and strong pulses on all 4 extremities.  Genitourinary: no problems  ---------------------------------------------------------------------------------------------------------------------      I have personally looked at both the EKG and the telemetry  strips.  --------------------------------------------------------------------------------------------------------------------  Results from last 7 days  Lab Units 10/04/18  0557   TROPONIN I ng/mL <0.006     Results from last 7 days  Lab Units 10/04/18  0557   WBC 10*3/mm3 12.22   HEMOGLOBIN g/dL 13.1   HEMATOCRIT % 40.2   MCV fL 82.7   MCHC g/dL 32.6*   PLATELETS 10*3/mm3 208     Results from last 7 days  Lab Units 10/04/18  0557   SODIUM mmol/L 139   POTASSIUM mmol/L 3.9   CHLORIDE mmol/L 105   CO2 mmol/L 24.2*   BUN mg/dL 8   CREATININE mg/dL 0.69   EGFR IF NONAFRICN AM mL/min/1.73 96   CALCIUM mg/dL 8.9   GLUCOSE mg/dL 113*   ALBUMIN g/dL 4.30   BILIRUBIN mg/dL 0.4   ALK PHOS U/L 70   AST (SGOT) U/L 19   ALT (SGPT) U/L 14   Estimated Creatinine Clearance: 123.4 mL/min (by C-G formula based on SCr of 0.69 mg/dL).    No results found for: HGBA1C, POCGLU  No results found for: AMMONIA      Results from last 7 days  Lab Units 10/04/18  0717   NITRITE UA  Negative             pH pH, Arterial   Date Value Ref Range Status   10/04/2018 7.433 7.350 - 7.450 pH units Final      pO2 pO2, Arterial   Date Value Ref Range Status   10/04/2018 57.7 (L) 80.0 - 100.0 mm Hg Final      pCO2 pCO2, Arterial   Date Value Ref Range Status   10/04/2018 38.0 35.0 - 45.0 mm Hg Final      HCO3 HCO3, Arterial   Date Value Ref Range Status   10/04/2018 24.8 22.0 - 26.0 mmol/L Final        I have personally looked at the labs and they are summarized above.  ----------------------------------------------------------------------------------------------------------------------  Imaging Results (last 24 hours)     Procedure Component Value Units Date/Time    XR Chest 2 View [[id number]] Collected:  10/04/18 0715     Updated:  10/04/18 0718    Narrative:       EXAMINATION: XR CHEST 2 VW-      CLINICAL INDICATION: cough, SOB          COMPARISON: 5/25/2018      TECHNIQUE: XR CHEST 2 VW-      FINDINGS:   Lungs are adequately aerated.   Heart and  mediastinal contours are unremarkable.   No pneumothorax.   Bony and soft tissue structures are unremarkable.            Impression:       No radiographic evidence of acute cardiac or pulmonary disease.     This report was finalized on 10/4/2018 7:16 AM by Dr. Endy Valladares MD.           I have personally reviewed the radiology images and read the final radiology report.  ----------------------------------------------------------------------------------------------------------------------  Assessment:    1. COPD exaerbation     Plan:     38 y.o. female with history of asthma, COPD, DM, Hepatitis C, HTN, Lupus and rheumatoid arthritis presents to ED for shortness of breath, wheezing and productive cough. She states it has been worsening over the last few days. She takes Ventolin and Advair when needed at home however, she continued to get worse. She denies fever. She states that she is borderline diabetic and has not been to her PCP in several months. She is being prescribed Suboxone. Urine collected on  10/04/18 is unremarkable. HCG is negative. White count normal. Influenza is negative as well a strep culture. Blood culture in process bur are negative so far. CXR from 10/04/18 reports no evidence of cardiac or pulmonary disease. She is short of breath, 02 NC 2L. She received Doxycyline 100 mg IV times one dose.     Doxycycline 100 mg IV was changed to oral regimen 100 mg one tab twice daily.    Solumedrol 60 mg IV every 12 hrs.    Duoneb respiratory treatments q 6 hrs.    CBC, CRP, BMP in the a.m.         DENISE Jacobo  10/04/18  2:24 PM      Electronically signed by Hardik Pride MD at 10/4/2018  6:23 PM          Emergency Department Notes      Symes, Heather at 10/4/2018  6:30 AM        Patient has urine cup. Advised of sample needed.      Symes, Heather  10/04/18 0646      Electronically signed by Symes, Heather at 10/4/2018  6:46 AM     Mika Cherry RN at 10/4/2018  7:05 AM        Pt  attempting to urinate at this time.     Mika Cherry RN  10/04/18 0708      Electronically signed by Mika Cherry RN at 10/4/2018  7:08 AM     Symes, Heather at 10/4/2018  7:08 AM        Called the lab spoke with Anntete. Double checked that the Troponin is added on to specimen that has already been drawn. She states that they have it and will do as a add on.      Symes, Heather  10/04/18 0710      Electronically signed by Symes, Heather at 10/4/2018  7:10 AM     Geri Hooper at 10/4/2018  7:09 AM        EKG complete @ 07:02 and given to Nataliia Arizmendi. Dr Bragg was not available and Nataliia said she will show it to him.     Geri Hooper  10/04/18 0710      Electronically signed by Geri Hooper at 10/4/2018  7:10 AM     Nataliia Arizmendi PA at 10/4/2018  7:15 AM     Attestation signed by Vazquez Bragg MD at 10/5/2018  5:46 AM          For this patient encounter, I reviewed the NP or PA documentation, treatment plan, and medical decision making. Vazquez Bragg MD 10/5/2018 5:46 AM                  Subjective   Patient is an asthmatic, COPD 37 y/o female who presents to the ED by EMS for SOB, wheezing, and productive cough.  Patient stated that these sx started yesterday. Patient afebrile. Patient states that she takes Ventolin and Advair with no improvements.        History provided by:  Patient   used: No    Shortness of Breath   Severity:  Moderate  Onset quality:  Gradual  Duration:  2 days  Timing:  Constant  Progression:  Worsening  Chronicity:  New  Context: URI    Relieved by:  Nothing  Worsened by:  Nothing  Ineffective treatments:  None tried  Associated symptoms: cough, sputum production and wheezing        Review of Systems   Constitutional: Negative.    HENT: Negative.    Eyes: Negative.    Respiratory: Positive for cough, sputum production, shortness of breath and wheezing.    Cardiovascular: Negative.    Gastrointestinal: Negative.    Endocrine:  Negative.    Genitourinary: Negative.    Musculoskeletal: Negative.    Skin: Negative.    Allergic/Immunologic: Negative.    Neurological: Negative.    Hematological: Negative.    Psychiatric/Behavioral: Negative.    All other systems reviewed and are negative.      Past Medical History:   Diagnosis Date   • Asthma    • COPD (chronic obstructive pulmonary disease) (CMS/[facility])    • Diabetes mellitus (CMS/[facility])    • Hepatitis C    • History of gallstones    • Hypertension    • Lupus    • Rheumatoid arthritis (CMS/[facility])        No Known Allergies    Past Surgical History:   Procedure Laterality Date   • ABDOMINAL SURGERY     • CHOLECYSTECTOMY     • PERINEAL LESION/CYST EXCISION N/A 4/3/2017    Procedure: EXCISION OF VAGINAL SKIN TAG;  Surgeon: Kee Crooks III, MD;  Location: [facility];  Service:    • TUBAL COAGULATION LAPAROSCOPIC Bilateral 4/3/2017    Procedure: BILATERAL TUBAL FALLOPE FILSHIE CLIPPING LAPAROSCOPIC,IUD REMOVAL;  Surgeon: Kee Crooks III, MD;  Location: [facility];  Service:        History reviewed. No pertinent family history.    Social History     Social History   • Marital status: Legally      Social History Main Topics   • Smoking status: Current Every Day Smoker     Packs/day: 1.00     Years: 15.00     Types: Cigarettes   • Smokeless tobacco: Never Used   • Alcohol use No   • Drug use: No   • Sexual activity: Yes     Partners: Male     Birth control/ protection: IUD     Other Topics Concern   • Not on file           Objective   Physical Exam   Constitutional: She is oriented to person, place, and time. She appears well-developed and well-nourished.   HENT:   Head: Normocephalic and atraumatic.   Right Ear: External ear normal.   Left Ear: External ear normal.   Nose: Nose normal.   Mouth/Throat: Oropharynx is clear and moist.   Eyes: Pupils are equal, round, and reactive to light. Conjunctivae and EOM are normal.   Neck: Normal range of motion. Neck supple.   Cardiovascular: Normal rate,  regular rhythm, normal heart sounds and intact distal pulses.    Pulmonary/Chest: Effort normal. She has wheezes.   Productive cough   Abdominal: Soft. Bowel sounds are normal.   Musculoskeletal: Normal range of motion.   Neurological: She is alert and oriented to person, place, and time.   Skin: Skin is warm and dry.   Nursing note and vitals reviewed.      Procedures          ED Course  ED Course as of Oct 04 0741   Thu Oct 04, 2018   0530 Pt received Solumedrol 125mg IV in route   [ML]   0650 NAD per Yemi  XR Chest 2 View [ML]   0701 Pt oxygen dropping 89-90%. Patient placed on 2L NC  [ML]   0710 Spoke with Hermila Man PA-C she will accept the patient for Dr. Pride to obs unit for a COPD exacerbation pending Troponin.   [ML]   0718 0710- reviewed by Dr. Bragg, rate 77, NSR, no ischemia  ECG 12 Lead [ML]   0739 Contacted Hermila regarding troponin.  Patient will go to telemetry. Orders placed.   [ML]      ED Course User Index  [ML] Nataliia Arizmendi PA                  [facility]      Final diagnoses:   COPD exacerbation (CMS/[facility])            Nataliia Arizmendi PA  10/04/18 0741      Electronically signed by Vazquez Bragg MD at 10/5/2018  5:46 AM     Shania Goldman, RN at 10/4/2018  8:30 AM        Pt alert and oriented, pt drowsy, skin pwd, respirations even and unlabored. Pt remains on 2lpm nc, ns noted. 20 gauge in left ac remains patent with doxycycline infusing at 100ml/hr and continued to floor. Pt to obs unit on stretcher with o2 by Shania Lopez, PRATIMA  10/04/18 0843      Electronically signed by Shania Goldman RN at 10/4/2018  8:43 AM             ICU Vital Signs     Row Name 10/05/18 0733 10/05/18 0709 10/05/18 0208 10/05/18 0205 10/05/18 0015       Vitals    Temp  -- 98.4 °F (36.9 °C)  --  --  --    Temp src  -- Oral  --  --  --    Pulse 86 66 60 60 61    Heart Rate Source  -- Monitor Monitor Monitor Monitor    Resp 19 18 18 18 18    Resp Rate  Source  -- Visual Visual Visual Visual    BP  -- 115/56  --  -- 121/48    Noninvasive MAP (mmHg)  --  --  --  -- 69    BP Location  -- Right arm  --  -- Right arm    BP Method  -- Automatic  --  -- Automatic    Patient Position  -- Lying  --  -- Sitting       Oxygen Therapy    SpO2 93 % 91 % 95 % 93 % 91 %    Device (Oxygen Therapy) room air room air nasal cannula nasal cannula nasal cannula    Flow (L/min) 2  -- 2 2 2    Row Name 10/04/18 2207 10/04/18 2203 10/04/18 2000 10/04/18 1923 10/04/18 1918       Vitals    Pulse 70 70  -- 75 70    Heart Rate Source Monitor Monitor  -- Monitor Monitor    Resp 16 16  -- 20 20    Resp Rate Source Visual Visual  -- Visual  --       Oxygen Therapy    SpO2 96 % 96 %  -- 96 % 95 %    Device (Oxygen Therapy) nasal cannula nasal cannula nasal cannula nasal cannula nasal cannula    Flow (L/min) 2 2 2 2 2    Row Name 10/04/18 1722 10/04/18 1717 10/04/18 1500 10/04/18 1200 10/04/18 1008       Vitals    Temp  --  -- 98 °F (36.7 °C) 99.5 °F (37.5 °C) 97.8 °F (36.6 °C)    Temp src  --  -- Oral Oral Oral    Pulse 71 68 56 75  --    Heart Rate Source  --  -- Monitor Monitor  --    Resp 24 24 20 20  --    Resp Rate Source  --  -- Visual Visual  --    BP  --  -- 111/48 139/69  --    Noninvasive MAP (mmHg)  --  -- 73 95  --    BP Location  --  -- Right arm Right arm  --    BP Method  --  -- Automatic Automatic  --    Patient Position  --  -- Lying Sitting  --       Oxygen Therapy    SpO2 94 % 94 % 92 % 93 %  --    Device (Oxygen Therapy)  -- nasal cannula  --  --  --    Flow (L/min)  -- 2  --  --  --    Row Name 10/04/18 0852                   Vitals    Temp 99.1 °F (37.3 °C)        Temp src Oral        Pulse 72        Heart Rate Source Monitor        Resp 17        Resp Rate Source Visual        /59        Noninvasive MAP (mmHg) 78           Oxygen Therapy    SpO2 91 %        Device (Oxygen Therapy) nasal cannula        Flow (L/min) 2                    Physician Progress Notes (last 24  "hours) (Notes from 10/4/2018  8:50 AM through 10/5/2018  8:50 AM)      Mikey Rich MD at 10/5/2018  8:36 AM            Assisted By: Serene ANDUJAR    CC: Shortness of breath    Interview History/HPI: Patient is a 38-year-old female who was admitted to the observation unit for \"COPD exacerbation\".  By the attending physician report there it was obvious the patient was not going to be ready to go home with an observation time therefore patient was transferred to our service.  Patient states that she has been ill for 2-3 days.  Main complaint is chest and back pain.  This worsens when she coughs.  She also is having some overall body aches.  This is a sharp pain, she has had no hemoptysis she has had a congested cough no objective fever.  Minimal phlegm production.  She does smoke 1 pack of cigarettes a day.  When out in the home has been ill.  She states the chest and back discomfort has been going on about the same amount of time as the illness.  Initial troponin was negative, patient was hypoxic on admission with a PO2 of 57 on admission.  Initial chest x-ray was negative.  Patient does have a reported history of lupus however she is not seeing a rheumatologist and prolonged period of time.  She has had no ankle edema that she knows of no nausea no vomiting.       Vitals:    10/05/18 0733   BP:    Pulse: 86   Resp: 19   Temp:    SpO2: 93%       Intake/Output Summary (Last 24 hours) at 10/05/18 0836  Last data filed at 10/04/18 1200   Gross per 24 hour   Intake              680 ml   Output                0 ml   Net              680 ml       EXAM: Well-developed acutely ill-appearing female initial saturation 92% but with conversation this dropped to about 87% and she was placed on oxygen.  Pupils are equal round reactive, sclerae nonicteric oral mucosa slightly dry.  Voice is good.  Neck is supple lungs have bilateral breath sounds with diffuse prolonged expiratory phase with coarse wheezing.  No definite crackles " "heard heart is a regular rate and rhythm without murmur rub or gallop.  Abdomen is soft benign bowel sounds are active chest wall is mildly tender at the sternoclavicular junction no back tenderness is elicited.  No CVA tenderness.  Extremities are without edema she has diminished but adequate pulses good capillary refill she has some ground in dirt discoloration of the soles of her feet.  No joint effusions currently although she feels like her hands are \"swollen\".  Although she appears ill mood is good and she is appreciative of care.    EKG: EKG on admission was overall normal, reviewed    Tele: Sinus rhythm at the current time, reviewed    LABS:   Lab Results (last 48 hours)     Procedure Component Value Units Date/Time    D-dimer, Quantitative [[id number]]  (Normal) Collected:  10/05/18 0752    Specimen:  Blood Updated:  10/05/18 0834     D-Dimer, Quantitative 0.34 MCGFEU/mL      Comment: Note new Reference Range       Narrative:       d-Dimer assay result is to be used in conjunction with a non-high clinical pretest probability (PTP) assessment tool to exclude PE and aid in diagnosis of VTE with cutoff of 0.5 MCGFEU/mL.    Mycoplasma Pneumoniae Antibody, IgM [[id number]] Collected:  10/05/18 0752    Specimen:  Blood Updated:  10/05/18 0819    Beta Strep Culture, Throat - Swab, Throat [[id number]]  (Normal) Collected:  10/04/18 0620    Specimen:  Swab from Throat Updated:  10/05/18 0751     Throat Culture, Beta Strep No Group A Streptococcus Isolated    POC Glucose Once [[id number]]  (Abnormal) Collected:  10/05/18 0742    Specimen:  Blood Updated:  10/05/18 0749     Glucose 166 (H) mg/dL     Narrative:       Meter: [id number] : [id number] deandra bradford    Osmolality, Calculated [[id number]]  (Normal) Collected:  10/05/18 0426    Specimen:  Blood Updated:  10/05/18 0538     Osmolality Calc 280.3 mOsm/kg     Basic Metabolic Panel [[id number]]  (Abnormal) Collected:  10/05/18 0426    Specimen:  Blood Updated:  " 10/05/18 0518     Glucose 133 (H) mg/dL      BUN 15 mg/dL      Creatinine 0.73 mg/dL      Sodium 139 mmol/L      Potassium 4.4 mmol/L      Chloride 109 mmol/L      CO2 23.8 (L) mmol/L      Calcium 8.5 mg/dL      eGFR Non African Amer 90 mL/min/1.73      BUN/Creatinine Ratio 20.5     Anion Gap 6.2 mmol/L     Narrative:       GFR Normal >60  Chronic Kidney Disease <60  Kidney Failure <15    C-reactive Protein [[id number]]  (Abnormal) Collected:  10/05/18 0426    Specimen:  Blood Updated:  10/05/18 0510     C-Reactive Protein 4.80 (H) mg/dL     CBC & Differential [[id number]] Collected:  10/05/18 0426    Specimen:  Blood Updated:  10/05/18 0449    Narrative:       The following orders were created for panel order CBC & Differential.  Procedure                               Abnormality         Status                     ---------                               -----------         ------                     CBC Auto Differential[[id number]]        Abnormal            Final result                 Please view results for these tests on the individual orders.    CBC Auto Differential [[id number]]  (Abnormal) Collected:  10/05/18 0426    Specimen:  Blood Updated:  10/05/18 0449     WBC 15.48 (H) 10*3/mm3      RBC 4.56 10*6/mm3      Hemoglobin 12.7 g/dL      Hematocrit 38.6 %      MCV 84.6 fL      MCH 27.9 pg      MCHC 32.9 (L) g/dL      RDW 14.5 %      RDW-SD 44.0 fl      MPV 11.0 (H) fL      Platelets 235 10*3/mm3      Neutrophil % 90.8 (H) %      Lymphocyte % 4.2 (L) %      Monocyte % 4.7 %      Eosinophil % 0.0 %      Basophil % 0.0 %      Immature Grans % 0.3 %      Neutrophils, Absolute 14.07 (H) 10*3/mm3      Lymphocytes, Absolute 0.65 (L) 10*3/mm3      Monocytes, Absolute 0.72 10*3/mm3      Eosinophils, Absolute 0.00 10*3/mm3      Basophils, Absolute 0.00 10*3/mm3      Immature Grans, Absolute 0.04 (H) 10*3/mm3     Blood Culture - Blood, [[id number]]  (Normal) Collected:  10/04/18 0815    Specimen:  Blood from Arm, Right  Updated:  10/04/18 2231     Blood Culture No growth at less than 24 hours    Blood Culture - Blood, [[id number]]  (Normal) Collected:  10/04/18 0823    Specimen:  Blood from Arm, Left Updated:  10/04/18 2231     Blood Culture No growth at less than 24 hours    POC Glucose Once [[id number]]  (Abnormal) Collected:  10/04/18 1942    Specimen:  Blood Updated:  10/04/18 1948     Glucose 168 (H) mg/dL     Narrative:       Meter: [id number] : [id number] belinda malone    Urine Drug Screen - Urine, Clean Catch [[id number]]  (Abnormal) Collected:  10/04/18 0717    Specimen:  Urine from Urine, Clean Catch Updated:  10/04/18 0739     Amphetamine Screen, Urine Negative     Barbiturates Screen, Urine Negative     Benzodiazepine Screen, Urine Negative     Cocaine Screen, Urine Negative     Methadone Screen, Urine Negative     Opiate Screen Negative     Phencyclidine (PCP), Urine Negative     THC, Screen, Urine Negative     6-ACETYL MORPHINE Negative     Buprenorphine, Screen, Urine Positive (A)     Oxycodone Screen, Urine Negative    Narrative:       Negative Thresholds For Drugs Screened:                  Amphetamines              1000 ng/ml               Barbiturates               200 ng/ml               Benzodiazepines            200 ng/ml              Cocaine                    300 ng/ml              Methadone                  300 ng/ml              Opiates                    300 ng/ml               Phencyclidine               25 ng/ml               THC                         50 ng/ml              6-Acetyl Morphine           10 ng/ml              Buprenorphine                5 ng/ml              Oxycodone                  300 ng/ml    The reference range for all drugs tested is negative. This report includes final unconfirmed qualitative results to be used for medical treatment purposes only. Unconfirmed results must not be used for non-medical purposes such as employment or legal testing. Clinical consideration should be  applied to any drug of abuse test, especially when unconfirmed quantitative results are used.      Troponin [[id number]]  (Normal) Collected:  10/04/18 0557    Specimen:  Blood Updated:  10/04/18 0734     Troponin I <0.006 ng/mL     Narrative:       Ultra Troponin I Reference Range:         <=0.039 ng/mL: Negative    0.04-0.779 ng/mL: Indeterminate Range. Suspicious of MI.  Clinical correlation required.       >=0.78  ng/mL: Consistent with myocardial injury.  Clinical correlation required.    Urinalysis With Culture If Indicated - Urine, Clean Catch [[id number]]  (Normal) Collected:  10/04/18 0717    Specimen:  Urine from Urine, Clean Catch Updated:  10/04/18 0726     Color, UA Yellow     Appearance, UA Clear     pH, UA 7.0     Specific Gravity, UA 1.007     Glucose, UA Negative     Ketones, UA Negative     Bilirubin, UA Negative     Blood, UA Negative     Protein, UA Negative     Leuk Esterase, UA Negative     Nitrite, UA Negative     Urobilinogen, UA 0.2 E.U./dL    Narrative:       Urine microscopic not indicated.    Pregnancy, Urine - Urine, Clean Catch [[id number]]  (Normal) Collected:  10/04/18 0717    Specimen:  Urine from Urine, Clean Catch Updated:  10/04/18 0725     HCG, Urine QL Negative    Narrative:       Diluted specimens may cause false negative results.    Influenza Antigen, Rapid - Swab, Nasopharynx [[id number]]  (Normal) Collected:  10/04/18 0620    Specimen:  Swab from Nasopharynx Updated:  10/04/18 0641     Influenza A Ag, EIA Negative     Influenza B Ag, EIA Negative    Rapid Strep A Screen - Swab, Throat [[id number]]  (Normal) Collected:  10/04/18 0620    Specimen:  Swab from Throat Updated:  10/04/18 0637     Strep A Ag Negative    Comprehensive Metabolic Panel [[id number]]  (Abnormal) Collected:  10/04/18 0557    Specimen:  Blood Updated:  10/04/18 0631     Glucose 113 (H) mg/dL      BUN 8 mg/dL      Creatinine 0.69 mg/dL      Sodium 139 mmol/L      Potassium 3.9 mmol/L      Chloride 105 mmol/L       CO2 24.2 (L) mmol/L      Calcium 8.9 mg/dL      Total Protein 7.4 g/dL      Albumin 4.30 g/dL      ALT (SGPT) 14 U/L      AST (SGOT) 19 U/L      Alkaline Phosphatase 70 U/L      Comment: Note New Reference Ranges        Total Bilirubin 0.4 mg/dL      eGFR Non African Amer 96 mL/min/1.73      Globulin 3.1 gm/dL      A/G Ratio 1.4 (L) g/dL      BUN/Creatinine Ratio 11.6     Anion Gap 9.8 mmol/L     Osmolality, Calculated [[id number]]  (Normal) Collected:  10/04/18 0557    Specimen:  Blood Updated:  10/04/18 0631     Osmolality Calc 276.7 mOsm/kg     CBC & Differential [[id number]] Collected:  10/04/18 0557    Specimen:  Blood Updated:  10/04/18 0623    Narrative:       The following orders were created for panel order CBC & Differential.  Procedure                               Abnormality         Status                     ---------                               -----------         ------                     CBC Auto Differential[[id number]]        Abnormal            Final result                 Please view results for these tests on the individual orders.    CBC Auto Differential [[id number]]  (Abnormal) Collected:  10/04/18 0557    Specimen:  Blood Updated:  10/04/18 0623     WBC 12.22 10*3/mm3      RBC 4.86 10*6/mm3      Hemoglobin 13.1 g/dL      Hematocrit 40.2 %      MCV 82.7 fL      MCH 27.0 pg      MCHC 32.6 (L) g/dL      RDW 14.1 %      RDW-SD 42.6 fl      MPV 11.7 (H) fL      Platelets 208 10*3/mm3      Neutrophil % 90.0 (H) %      Lymphocyte % 4.2 (L) %      Monocyte % 5.0 %      Eosinophil % 0.4 %      Basophil % 0.2 %      Immature Grans % 0.2 %      Neutrophils, Absolute 11.00 (H) 10*3/mm3      Lymphocytes, Absolute 0.51 (L) 10*3/mm3      Monocytes, Absolute 0.61 10*3/mm3      Eosinophils, Absolute 0.05 10*3/mm3      Basophils, Absolute 0.02 10*3/mm3      Immature Grans, Absolute 0.03 10*3/mm3     Blood Gas, Arterial [[id number]]  (Abnormal) Collected:  10/04/18 0543    Specimen:  Arterial Blood Updated:   10/04/18 0610     Site Arterial: left radial     Zbigniew's Test Positive     pH, Arterial 7.433 pH units      pCO2, Arterial 38.0 mm Hg      pO2, Arterial 57.7 (L) mm Hg      HCO3, Arterial 24.8 mmol/L      Base Excess, Arterial 0.7 mmol/L      O2 Saturation, Arterial 91.3 %      Hemoglobin, Blood Gas 13.6 g/dL      Hematocrit, Blood Gas 40.0 %      Oxyhemoglobin 89.4 %      Methemoglobin 0.40 %      Carboxyhemoglobin 1.7 %      A-a Gradiant 40.0 mmHg      Temperature 98.6 C      Barometric Pressure for Blood Gas 729 mmHg      Modality Room Air     FIO2 21 %           Radiology:  Imaging Results (last 72 hours)     Procedure Component Value Units Date/Time    XR Chest 2 View [[id number]] Collected:  10/04/18 0715     Updated:  10/04/18 0718    Narrative:       EXAMINATION: XR CHEST 2 VW-      CLINICAL INDICATION: cough, SOB          COMPARISON: 5/25/2018      TECHNIQUE: XR CHEST 2 VW-      FINDINGS:   Lungs are adequately aerated.   Heart and mediastinal contours are unremarkable.   No pneumothorax.   Bony and soft tissue structures are unremarkable.            Impression:       No radiographic evidence of acute cardiac or pulmonary disease.     This report was finalized on 10/4/2018 7:16 AM by Dr. Endy Valladares MD.           Echo has been ordered    Assessment/Plan:   Acute hypoxic respiratory failure secondary to asthma exacerbation most likely related to upper respiratory infection.  Her d-dimer is negative.  I am going to check a noncontrasted CT looking for occult infiltrates.  We'll continue the patient on systemic steroids, I've added topical steroids as well as continuing inhalants.  I broaden her coverage from doxycycline to doxycycline and Rocephin.  I've encouraged her to quit smoking.  Patient will need outpatient PFTs to document her pulmonary function returns to normal after this illness.  If not she may have an element of COPD/chronic persistent asthma.  She did not retain CO2 on admission.    Chest  pain/back pain, pleuritic in nature, d-dimer negative, most likely related to musculoskeletal pain, will use Motrin when necessary, she is already on Suboxone.  Will track serial enzymes, check echo.    Mild dehydration, oral cavity is dry, I have added IV fluids.    Reported history of lupus, this can be further followed as an outpatient, if there is any flare certainly steroid should help this as well.    DVT prophylaxis, subcutaneous Lovenox    Leukocytosis probably related to steroids    SIRS/ sepsis related to possible URI, CT being done as above.  She had an elevated CRP and tachypnea.            Electronically signed by Mikey Rich MD at 10/5/2018  8:48 AM     Hardik Pride MD at 10/5/2018  6:04 AM            I have personally seen and examined the patient today and discussed overnight interval progress and pertinent issues with nursing staff.    Subjective           Review of Systems    Constitutional: no fever, chills and night sweats. No appetite change or unexpected weight change. No fatigue.  Eyes: no eye drainage, itching or redness.  HEENT: no mouth sores, dysphagia or nose bleed.  Respiratory: no for shortness of breath, cough or production of sputum.  Cardiovascular: no chest pain, no palpitations, no orthopnea.  Gastrointestinal: no nausea, vomiting or diarrhea. No abdominal pain, hematemesis or rectal bleeding.  Genitourinary: no dysuria or polyuria.  Hematologic/lymphatic: no lymph node abnormalities, no easy bruising or easy bleeding.  Musculoskeletal: no muscle or joint pain.  Skin: No rash and no itching.  Neurological: no loss of consciousness, no seizure, no headache.  Behavioral/Psych: no depression or suicidal ideation.  Endocrine: no hot flashes.  Immunologic: negative.      History taken from: patient chart RN      Objective       Vital Signs    /48 (BP Location: Right arm, Patient Position: Sitting)   Pulse 60   Temp 98 °F (36.7 °C) (Oral)   Resp 18   Ht 157.5 cm  "(62\")   Wt 99.8 kg (220 lb)   SpO2 95%   BMI 40.24 kg/m²      Temp:  [97.8 °F (36.6 °C)-99.5 °F (37.5 °C)] 98 °F (36.7 °C)      Intake/Output Summary (Last 24 hours) at 10/05/18 0605  Last data filed at 10/04/18 1200   Gross per 24 hour   Intake              680 ml   Output                0 ml   Net              680 ml     Intake & Output (last 3 days)       10/02 0701 - 10/03 0700 10/03 0701 - 10/04 0700 10/04 0701 - 10/05 0700    P.O.   680    Total Intake(mL/kg)   680 (6.8)    Net     +680           Unmeasured Urine Occurrence   5 x          Objective    General Appearance:    Alert, cooperative, in no acute distress   Head:    Normocephalic, without obvious abnormality, atraumatic   Eyes:            Lids and lashes normal, conjunctivae and sclerae normal, no   icterus, no pallor, corneas clear, PERRLA   Ears:    Ears appear intact with no abnormalities noted   Throat:   No oral lesions, no thrush, oral mucosa moist   Neck:   No adenopathy, supple, trachea midline, no thyromegaly, no   carotid bruit, no JVD   Back:     No tenderness to percussion or palpation, range of motion   normal   Lungs:     Clear to auscultation,respirations regular, even and unlabored. No wheezing, no ronchi and no crackles.    Heart:    Regular rhythm and normal rate, normal S1 and S2, no            murmur, no gallop, no rub, no click   Chest Wall:    No abnormalities observed   Abdomen:     Normal bowel sounds, no masses, no organomegaly, soft        non-tender, non-distended, no guarding, no rebound                tenderness   Rectal:     Deferred   Extremities:   Moves all extremities well, no edema, no cyanosis, no             redness   Pulses:   Pulses palpable and equal bilaterally   Skin:   No bleeding, bruising or rash   Lymph nodes:   No palpable adenopathy   Neurologic:   Awake, alert and oriented x 3. Following commands.           Results:      Results from last 7 days  Lab Units 10/05/18  0426 10/04/18  0557   WBC 10*3/mm3 " 15.48* 12.22     Lab Results   Component Value Date    NEUTROABS 14.07 (H) 10/05/2018         Results from last 7 days  Lab Units 10/05/18  0426   CREATININE mg/dL 0.73         Results from last 7 days  Lab Units 10/05/18  0426   CRP mg/dL 4.80*       Imaging Results (last 24 hours)     Procedure Component Value Units Date/Time    XR Chest 2 View [[id number]] Collected:  10/04/18 0715     Updated:  10/04/18 0718    Narrative:       EXAMINATION: XR CHEST 2 VW-      CLINICAL INDICATION: cough, SOB          COMPARISON: 5/25/2018      TECHNIQUE: XR CHEST 2 VW-      FINDINGS:   Lungs are adequately aerated.   Heart and mediastinal contours are unremarkable.   No pneumothorax.   Bony and soft tissue structures are unremarkable.            Impression:       No radiographic evidence of acute cardiac or pulmonary disease.     This report was finalized on 10/4/2018 7:16 AM by Dr. Endy Valladares MD.               Results Review:    I have personally reviewed laboratory data, culture results, radiology studies and antimicrobial therapy.    Hospital Medications (active)       Dose Frequency Start End    acetaminophen (TYLENOL) tablet 650 mg 650 mg Every 4 Hours PRN 10/4/2018     Sig - Route: Take 2 tablets by mouth Every 4 (Four) Hours As Needed for Mild Pain . - Oral    Cosign for Ordering: Accepted by Hardik Pride MD on 10/4/2018  6:26 PM    buprenorphine-naloxone (SUBOXONE) 8-2 MG per SL tablet 2 tablet 2 tablet Daily 10/4/2018     Sig - Route: Place 2 tablets under the tongue Daily. - Sublingual    dextrose 5 % and sodium chloride 0.45 % infusion 100 mL/hr Continuous 10/4/2018     Sig - Route: Infuse 100 mL/hr into a venous catheter Continuous. - Intravenous    Cosign for Ordering: Accepted by Hardik Pride MD on 10/4/2018  6:26 PM    doxycycline (MONODOX) capsule 100 mg 100 mg Every 12 Hours Scheduled 10/4/2018 10/6/2018    Sig - Route: Take 1 capsule by mouth Every 12 (Twelve) Hours. - Oral    doxycycline  (VIBRAMYCIN) 100 mg/100 mL 0.9% NS  mg Once 10/4/2018 10/4/2018    Sig - Route: Infuse 100 mL into a venous catheter 1 (One) Time. - Intravenous    Cosign for Ordering: Accepted by Vazquez Brgag MD on 10/4/2018  7:20 AM    enoxaparin (LOVENOX) syringe 40 mg 40 mg Every 24 Hours 10/4/2018     Sig - Route: Inject 0.4 mL under the skin into the appropriate area as directed Daily. - Subcutaneous    Cosign for Ordering: Accepted by Hardik Pride MD on 10/4/2018  6:26 PM    guaiFENesin (ROBITUSSIN) 100 MG/5ML oral solution 200 mg 200 mg Every 4 Hours PRN 10/4/2018     Sig - Route: Take 10 mL by mouth Every 4 (Four) Hours As Needed for Cough. - Oral    ipratropium-albuterol (DUO-NEB) nebulizer solution 3 mL 3 mL Once 10/4/2018 10/4/2018    Sig - Route: Take 3 mL by nebulization 1 (One) Time. - Nebulization    Cosign for Ordering: Accepted by Vazquez Bragg MD on 10/4/2018  7:20 AM    ipratropium-albuterol (DUO-NEB) nebulizer solution 3 mL 3 mL Every 4 Hours PRN 10/4/2018     Sig - Route: Take 3 mL by nebulization Every 4 (Four) Hours As Needed for Shortness of Air. - Nebulization    ipratropium-albuterol (DUO-NEB) nebulizer solution 3 mL 3 mL Every 4 Hours - RT 10/4/2018     Sig - Route: Take 3 mL by nebulization Every 4 (Four) Hours. - Nebulization    methylPREDNISolone sodium succinate (SOLU-Medrol) injection 60 mg 60 mg Every 12 Hours 10/4/2018 10/6/2018    Sig - Route: Infuse 0.96 mL into a venous catheter Every 12 (Twelve) Hours. - Intravenous    nitroglycerin (NITROSTAT) SL tablet 0.4 mg 0.4 mg Every 5 Minutes PRN 10/4/2018     Sig - Route: Place 1 tablet under the tongue Every 5 (Five) Minutes As Needed for Chest Pain (Chest Pain With Systolic Blood Pressure Greater Than 100). - Sublingual    Cosign for Ordering: Accepted by Hardik Pride MD on 10/4/2018  6:26 PM    sodium chloride 0.9 % flush 10 mL 10 mL As Needed 10/4/2018     Sig - Route: Infuse 10 mL into a venous catheter  "As Needed for Line Care. - Intravenous    Cosign for Ordering: Accepted by Vazquez Bragg MD on 10/4/2018  5:31 AM    Linked Group 1:  \"And\" Linked Group Details        sodium chloride 0.9 % flush 3 mL 3 mL Every 12 Hours Scheduled 10/4/2018     Sig - Route: Infuse 3 mL into a venous catheter Every 12 (Twelve) Hours. - Intravenous    Cosign for Ordering: Accepted by Hardik Pride MD on 10/4/2018  6:26 PM    sodium chloride 0.9 % flush 3-10 mL 3-10 mL As Needed 10/4/2018     Sig - Route: Infuse 3-10 mL into a venous catheter As Needed for Line Care. - Intravenous    Cosign for Ordering: Accepted by Hardik Pride MD on 10/4/2018  6:26 PM    HYDROcodone-acetaminophen (NORCO) 5-325 MG per tablet 1 tablet (Discontinued) 1 tablet Every 6 Hours PRN 10/4/2018 10/4/2018    Sig - Route: Take 1 tablet by mouth Every 6 (Six) Hours As Needed for Moderate Pain . - Oral    ipratropium-albuterol (DUO-NEB) nebulizer solution 3 mL (Discontinued) 3 mL Every 6 Hours While Awake - RT 10/4/2018 10/4/2018    Sig - Route: Take 3 mL by nebulization Every 6 (Six) Hours While Awake. - Nebulization            Cultures:    Blood Culture   Date Value Ref Range Status   10/04/2018 No growth at less than 24 hours  Preliminary   10/04/2018 No growth at less than 24 hours  Preliminary         ASSESSMENT/PLAN           Assessment/Plan     ASSESSMENT:    1. COPD exacerbation    PLAN:    38 y.o. female with history of asthma, COPD, DM, Hepatitis C, HTN, Lupus and rheumatoid arthritis presents to ED for shortness of breath, wheezing and productive cough. She states it has been worsening over the last few days. She takes Ventolin and Advair when needed at home however, she continued to get worse. She denies fever. She states that she is borderline diabetic and has not been to her PCP in several months. She is being prescribed Suboxone. Urine collected on  10/04/18 is unremarkable. HCG is negative. White count normal. Influenza is " negative as well a strep culture. Blood culture in process bur are negative so far. CXR from 10/04/18 reports no evidence of cardiac or pulmonary disease. She is short of breath, 02 NC 2L. She received Doxycyline 100 mg IV times one dose.      Doxycycline 100 mg IV was changed to oral regimen 100 mg one tab twice daily.     Solumedrol 60 mg IV every 12 hrs. CXR with no evidence of pneumonia.    EKG with abnormal T waves but similar to prior.     Duoneb respiratory treatments changed to q 4 hrs.    I do not believe patient is ready for discharge today as she continues with significant wheezing and asthmatic dry cough. Unfortunately patient continues to smoke and has smoked for 20 years and quit a week ago.    Dr. Dooley called and kindly accepted the patient. Orders placed. Would highly consider pulmonary consult if ok with hospitalist team.    I advised Mary of the risks of continuing to use tobacco, and I provided her with tobacco cessation educational materials in the After Visit Summary.     DVT prophylaxis with Lovenox 40 mg sc q24h.    During this visit, I spent 5 minutes counseling the patient regarding tobacco cessation.           Patient's findings and recommendations were discussed with patient, nursing staff and primary care team    Code Status:   Code Status and Medical Interventions:   Ordered at: 10/04/18 0739     Code Status:    CPR     Medical Interventions (Level of Support Prior to Arrest):    Full       Hardik Pride MD  10/05/18  6:05 AM    Electronically signed by Hardik Pride MD at 10/5/2018  6:26 AM       Nataliia Arizmendi PA Physician Assistant Attested Emergency Medicine  ED Provider Notes Date of Service: 10/4/2018  7:15 AM      Attestation signed by Vazquez Bragg MD at 10/5/2018 5:46 AM            For this patient encounter, I reviewed the NP or PA documentation, treatment plan, and medical decision making. Vazquez Bragg MD 10/5/2018 5:46 AM       Expand All Collapse All    ManualTemplate   Copied     Subjective      Patient is an asthmatic, COPD 37 y/o female who presents to the ED by EMS for SOB, wheezing, and productive cough.  Patient stated that these sx started yesterday. Patient afebrile. Patient states that she takes Ventolin and Advair with no improvements.     History provided by:  Patient   used: No    Shortness of Breath   Severity:  Moderate  Onset quality:  Gradual  Duration:  2 days  Timing:  Constant  Progression:  Worsening  Chronicity:  New  Context: URI    Relieved by:  Nothing  Worsened by:  Nothing  Ineffective treatments:  None tried  Associated symptoms: cough, sputum production and wheezing          Review of Systems   Constitutional: Negative.    HENT: Negative.    Eyes: Negative.    Respiratory: Positive for cough, sputum production, shortness of breath and wheezing.    Cardiovascular: Negative.    Gastrointestinal: Negative.    Endocrine: Negative.    Genitourinary: Negative.    Musculoskeletal: Negative.    Skin: Negative.    Allergic/Immunologic: Negative.    Neurological: Negative.    Hematological: Negative.    Psychiatric/Behavioral: Negative.    All other systems reviewed and are negative.             Past Medical History:   Diagnosis Date   • Asthma     • COPD (chronic obstructive pulmonary disease) (CMS/HCC)     • Diabetes mellitus (CMS/HCC)     • Hepatitis C     • History of gallstones     • Hypertension     • Lupus     • Rheumatoid arthritis (CMS/HCC)           No Known Allergies           Past Surgical History:   Procedure Laterality Date   • ABDOMINAL SURGERY       • CHOLECYSTECTOMY       • PERINEAL LESION/CYST EXCISION N/A 4/3/2017     Procedure: EXCISION OF VAGINAL SKIN TAG;  Surgeon: Kee Crooks III, MD;  Location: [facility];  Service:    • TUBAL COAGULATION LAPAROSCOPIC Bilateral 4/3/2017     Procedure: BILATERAL TUBAL FALLOPE FILSHIE CLIPPING LAPAROSCOPIC,IUD REMOVAL;  Surgeon: Kee Crooks  III, MD;  Location: [facility] OR;  Service:          History reviewed. No pertinent family history.     Social History           Social History   • Marital status: Legally             Social History Main Topics   • Smoking status: Current Every Day Smoker       Packs/day: 1.00       Years: 15.00       Types: Cigarettes   • Smokeless tobacco: Never Used   • Alcohol use No   • Drug use: No   • Sexual activity: Yes       Partners: Male       Birth control/ protection: IUD           Other Topics Concern   • Not on file                  Objective      Physical Exam   Constitutional: She is oriented to person, place, and time. She appears well-developed and well-nourished.   HENT:   Head: Normocephalic and atraumatic.   Right Ear: External ear normal.   Left Ear: External ear normal.   Nose: Nose normal.   Mouth/Throat: Oropharynx is clear and moist.   Eyes: Pupils are equal, round, and reactive to light. Conjunctivae and EOM are normal.   Neck: Normal range of motion. Neck supple.   Cardiovascular: Normal rate, regular rhythm, normal heart sounds and intact distal pulses.    Pulmonary/Chest: Effort normal. She has wheezes.   Productive cough   Abdominal: Soft. Bowel sounds are normal.   Musculoskeletal: Normal range of motion.   Neurological: She is alert and oriented to person, place, and time.   Skin: Skin is warm and dry.   Nursing note and vitals reviewed.        Procedures              ED Course      ED Course as of Oct 04 0741   Thu Oct 04, 2018   0530 Pt received Solumedrol 125mg IV in route   [ML]   0650 NAD per Yemi  XR Chest 2 View [ML]   0701 Pt oxygen dropping 89-90%. Patient placed on 2L NC  [ML]   0710 Spoke with Hermila Man PA-C she will accept the patient for Dr. Pride to obs unit for a COPD exacerbation pending Troponin.   [ML]   0718 0710- reviewed by Dr. Bragg, rate 77, NSR, no ischemia  ECG 12 Lead [ML]   0739 Contacted Hermila regarding troponin.  Patient will go to  telemetry. Orders placed.   [ML]       ED Course User Index  [ML] Nataliia Arizmendi PA                      [facility]        Final diagnoses:   COPD exacerbation (CMS/[facility])               Nataliia Arizmendi PA  10/04/18 0741         Cosigned by: Vazquez Bragg MD at 10/5/2018  5:46 AM   Scheduled Meds Sorted by Name   for Mary Monaco as of 10/3/18 through 10/5/18     1 Day 3 Days 7 Days 10 Days < Today >    Legend:                           Inactive     Active     Other Encounter    Linked               Medications 10/03/18 10/04/18 10/05/18   budesonide (PULMICORT) nebulizer solution 0.5 mg  Dose: 0.5 mg  Freq: Daily - RT Route: NEBULIZATION  Start: 10/05/18 1030    Admin Instructions:   Do not shake.  Protect from light.      1030              buprenorphine-naloxone (SUBOXONE) 8-2 MG per SL tablet 2 tablet  Dose: 2 tablet  Freq: Daily Route: SL  Indications of Use: Opioid Use Disorder (Continuation of Therapy)  Start: 10/04/18 1130    Admin Instructions:   If given for pain, use the following pain scale:  Mild Pain = Pain Score of 1-3, CPOT 1-2  Moderate Pain = Pain Score of 4-6, CPOT 3-4  Severe Pain = Pain Score of 7-10, CPOT 5-8     1249            0900              cefTRIAXone (ROCEPHIN) 1 g/100 mL 0.9% NS (MBP)  Dose: 1 g  Freq: Every 24 Hours Scheduled Route: IV  Indications of Use: UPPER RESPIRATORY TRACT INFECTION  Start: 10/05/18 1000 End: 10/12/18 0859    Admin Instructions:   Caution: Look alike/sound alike drug alert. Activate vial before using.      1000              doxycycline (MONODOX) capsule 100 mg  Dose: 100 mg  Freq: Every 12 Hours Scheduled Route: PO  Indications Comment: COPD excerbation  Start: 10/04/18 2100 End: 10/05/18 0742    Admin Instructions:   Take with food if GI upset occurs. Avoid taking antacids, iron, or dairy products within 2 hours of dose.     2018 0742-D/C'd          doxycycline (VIBRAMYCIN) 100 mg/100 mL 0.9% NS MBP  Dose: 100 mg  Freq: Every 12  Hours Route: IV  Indications of Use: UPPER RESPIRATORY TRACT INFECTION  Start: 10/05/18 0900 End: 10/12/18 0859    Admin Instructions:   Protect from light      0900     2100            doxycycline (VIBRAMYCIN) 100 mg/100 mL 0.9% NS MBP  Dose: 100 mg  Freq: Once Route: IV  Indications Comment: COPD  Last Dose: Stopped (10/04/18 0928)  Start: 10/04/18 0715 End: 10/04/18 0928    Admin Instructions:   Protect from light     0828 0928             enoxaparin (LOVENOX) syringe 40 mg  Dose: 40 mg  Freq: Every 24 Hours Route: SC  Indications of Use: PROPHYLAXIS OF VENOUS THROMBOEMBOLISM  Start: 10/04/18 1000    Admin Instructions:   Give subcutaneous in abdomen only. Do not massage site after injection. Do not change dose time until after 48 hrs.     0951            1000              ipratropium-albuterol (DUO-NEB) nebulizer solution 3 mL  Dose: 3 mL  Freq: Every 4 Hours - RT Route: NEBULIZATION  Start: 10/04/18 2330     (2301) [C]            0205     0733     1100       1500     1900     2300          ipratropium-albuterol (DUO-NEB) nebulizer solution 3 mL  Dose: 3 mL  Freq: Every 6 Hours While Awake - RT Route: NEBULIZATION  Start: 10/04/18 1900 End: 10/04/18 1926     1918     1926-D/C'd         ipratropium-albuterol (DUO-NEB) nebulizer solution 3 mL  Dose: 3 mL  Freq: Once Route: NEBULIZATION  Start: 10/04/18 0648 End: 10/04/18 0654     0654               ipratropium-albuterol (DUO-NEB) nebulizer solution 3 mL  Dose: 3 mL  Freq: Once Route: NEBULIZATION  Start: 10/04/18 0525 End: 10/04/18 0601     0601               methylPREDNISolone sodium succinate (SOLU-Medrol) injection 125 mg  Dose: 125 mg  Freq: Once Route: IV  Start: 10/04/18 0525 End: 10/04/18 0533    Admin Instructions:   Caution: Look alike/sound alike drug alert     0533-D/C'd  (1110) [C]               methylPREDNISolone sodium succinate (SOLU-Medrol) injection 40 mg  Dose: 40 mg  Freq: Every 12 Hours Route: IV  Start: 10/05/18 1800 End: 10/09/18 1756     Admin Instructions:   Caution: Look alike/sound alike drug alert      1800              methylPREDNISolone sodium succinate (SOLU-Medrol) injection 60 mg  Dose: 60 mg  Freq: Every 12 Hours Route: IV  Start: 10/04/18 1800 End: 10/05/18 0741    Admin Instructions:   Caution: Look alike/sound alike drug alert     1808            0514     0741-D/C'd        promethazine-codeine (PHENERGAN with CODEINE) 6.25-10 MG/5ML syrup 5 mL  Dose: 5 mL  Freq: Once Route: PO  Start: 10/04/18 0553 End: 10/04/18 0600    Admin Instructions:        0600               sodium chloride 0.9 % flush 3 mL  Dose: 3 mL  Freq: Every 12 Hours Scheduled Route: IV  Start: 10/04/18 0945     1051 [C]     (2019) [C]          0900     2100            Medications 10/03/18 10/04/18 10/05/18       Continuous Meds Sorted by Name   for Monaco Mary Daxa as of 10/3/18 through 10/5/18    Legend:         Inactive     Active     Other Encounter    Linked               Medications 10/03/18 10/04/18 10/05/18   dextrose 5 % and sodium chloride 0.45 % infusion  Rate: 100 mL/hr Dose: 100 mL/hr  Freq: Continuous Route: IV  Last Dose: 100 mL/hr (10/05/18 0508)  Start: 10/04/18 0945 End: 10/05/18 0742     0951     1202     1539       1831     2020     2230       2353            0508     0742-D/C'd        sodium chloride 0.9 % infusion  Rate: 75 mL/hr Dose: 75 mL/hr  Freq: Continuous Route: IV  Start: 10/05/18 0930      0842     0930                PRN Meds Sorted by Name   for Mary Monacon as of 10/3/18 through 10/5/18    Legend:                           Inactive     Active     Other Encounter    Linked               Medications 10/03/18 10/04/18 10/05/18   acetaminophen (TYLENOL) tablet 650 mg  Dose: 650 mg  Freq: Every 4 Hours PRN Route: PO  PRN Reason: Mild Pain   Start: 10/04/18 0845    Admin Instructions:   Do not exceed 4 grams of acetaminophen in a 24 hr period.    If given for pain, use the following pain scale:   Mild Pain = Pain Score of 1-3, CPOT  1-2  Moderate Pain = Pain Score of 4-6, CPOT 3-4  Severe Pain = Pain Score of 7-10, CPOT 5-8     1008            0812              benzonatate (TESSALON) capsule 200 mg  Dose: 200 mg  Freq: 3 Times Daily PRN Route: PO  PRN Reason: Cough  Start: 10/05/18 0835    Admin Instructions:   Swallow whole. Do not crush, chew, or open capsule.         guaiFENesin (ROBITUSSIN) 100 MG/5ML oral solution 200 mg  Dose: 200 mg  Freq: Every 4 Hours PRN Route: PO  PRN Reason: Cough  Start: 10/04/18 1031    Admin Instructions:    Caution: Look alike/sound alike drug alert     1249     4908          0117              HYDROcodone-acetaminophen (NORCO) 5-325 MG per tablet 1 tablet  Dose: 1 tablet  Freq: Every 6 Hours PRN Route: PO  PRN Reason: Moderate Pain   Start: 10/04/18 1032 End: 10/04/18 1042    Admin Instructions:       Do not exceed 4 grams of acetaminophen in a 24 hr period.    If given for pain, use the following pain scale:   Mild Pain = Pain Score of 1-3, CPOT 1-2  Moderate Pain = Pain Score of 4-6, CPOT 3-4  Severe Pain = Pain Score of 7-10, CPOT 5-8     1042-D/C'd           ibuprofen (ADVIL,MOTRIN) tablet 600 mg  Dose: 600 mg  Freq: Every 6 Hours PRN Route: PO  PRN Reason: Moderate Pain   Start: 10/05/18 0834    Admin Instructions:   If given for pain, use the following pain scale:  Mild Pain = Pain Score of 1-3, CPOT 1-2  Moderate Pain = Pain Score of 4-6, CPOT 3-4  Severe Pain = Pain Score of 7-10, CPOT 5-8         ipratropium-albuterol (DUO-NEB) nebulizer solution 3 mL  Dose: 3 mL  Freq: Every 4 Hours PRN Route: NEBULIZATION  PRN Reason: Shortness of Air  Start: 10/04/18 8588 3861     2207             nitroglycerin (NITROSTAT) SL tablet 0.4 mg  Dose: 0.4 mg  Freq: Every 5 Minutes PRN Route: SL  PRN Reason: Chest Pain  PRN Comment: Chest Pain With Systolic Blood Pressure Greater Than 100  Start: 10/04/18 0845    Admin Instructions:   If Pain Unrelieved After 3 Doses, Notify Provider         sodium chloride 0.9 % flush  10 mL  Dose: 10 mL  Freq: As Needed Route: IV  PRN Reason: Line Care  Start: 10/04/18 0523         sodium chloride 0.9 % flush 3-10 mL  Dose: 3-10 mL  Freq: As Needed Route: IV  PRN Reason: Line Care  Start: 10/04/18 0845         Medications 10/03/18 10/04/18 10/05/18

## 2018-10-06 LAB
ALBUMIN SERPL-MCNC: 3.5 G/DL (ref 3.5–5)
ALBUMIN/GLOB SERPL: 1.2 G/DL (ref 1.5–2.5)
ALP SERPL-CCNC: 58 U/L (ref 35–104)
ALT SERPL W P-5'-P-CCNC: 8 U/L (ref 10–36)
ANION GAP SERPL CALCULATED.3IONS-SCNC: 9.2 MMOL/L (ref 3.6–11.2)
AST SERPL-CCNC: 12 U/L (ref 10–30)
BASOPHILS # BLD AUTO: 0 10*3/MM3 (ref 0–0.3)
BASOPHILS NFR BLD AUTO: 0 % (ref 0–2)
BILIRUB SERPL-MCNC: 0.1 MG/DL (ref 0.2–1.8)
BUN BLD-MCNC: 13 MG/DL (ref 7–21)
BUN/CREAT SERPL: 18.1 (ref 7–25)
CALCIUM SPEC-SCNC: 8.5 MG/DL (ref 7.7–10)
CHLORIDE SERPL-SCNC: 109 MMOL/L (ref 99–112)
CO2 SERPL-SCNC: 20.8 MMOL/L (ref 24.3–31.9)
CREAT BLD-MCNC: 0.72 MG/DL (ref 0.43–1.29)
CRP SERPL-MCNC: 1.57 MG/DL (ref 0–0.99)
DEPRECATED RDW RBC AUTO: 47.4 FL (ref 37–54)
EOSINOPHIL # BLD AUTO: 0 10*3/MM3 (ref 0–0.7)
EOSINOPHIL NFR BLD AUTO: 0 % (ref 0–5)
ERYTHROCYTE [DISTWIDTH] IN BLOOD BY AUTOMATED COUNT: 15.1 % (ref 11.5–14.5)
GFR SERPL CREATININE-BSD FRML MDRD: 91 ML/MIN/1.73
GLOBULIN UR ELPH-MCNC: 2.9 GM/DL
GLUCOSE BLD-MCNC: 131 MG/DL (ref 70–110)
GLUCOSE BLDC GLUCOMTR-MCNC: 131 MG/DL (ref 70–130)
HCT VFR BLD AUTO: 38.4 % (ref 37–47)
HGB BLD-MCNC: 12.3 G/DL (ref 12–16)
IMM GRANULOCYTES # BLD: 0.05 10*3/MM3 (ref 0–0.03)
IMM GRANULOCYTES NFR BLD: 0.4 % (ref 0–0.5)
LYMPHOCYTES # BLD AUTO: 0.82 10*3/MM3 (ref 1–3)
LYMPHOCYTES NFR BLD AUTO: 6.6 % (ref 21–51)
MCH RBC QN AUTO: 27.8 PG (ref 27–33)
MCHC RBC AUTO-ENTMCNC: 32 G/DL (ref 33–37)
MCV RBC AUTO: 86.9 FL (ref 80–94)
MONOCYTES # BLD AUTO: 0.58 10*3/MM3 (ref 0.1–0.9)
MONOCYTES NFR BLD AUTO: 4.7 % (ref 0–10)
NEUTROPHILS # BLD AUTO: 10.99 10*3/MM3 (ref 1.4–6.5)
NEUTROPHILS NFR BLD AUTO: 88.3 % (ref 30–70)
OSMOLALITY SERPL CALC.SUM OF ELEC: 279.5 MOSM/KG (ref 273–305)
PLATELET # BLD AUTO: 228 10*3/MM3 (ref 130–400)
PMV BLD AUTO: 11.4 FL (ref 6–10)
POTASSIUM BLD-SCNC: 4.3 MMOL/L (ref 3.5–5.3)
PROT SERPL-MCNC: 6.4 G/DL (ref 6–8)
RBC # BLD AUTO: 4.42 10*6/MM3 (ref 4.2–5.4)
SODIUM BLD-SCNC: 139 MMOL/L (ref 135–153)
WBC NRBC COR # BLD: 12.44 10*3/MM3 (ref 4.5–12.5)

## 2018-10-06 PROCEDURE — 25010000002 CEFTRIAXONE: Performed by: INTERNAL MEDICINE

## 2018-10-06 PROCEDURE — 85025 COMPLETE CBC W/AUTO DIFF WBC: CPT | Performed by: INTERNAL MEDICINE

## 2018-10-06 PROCEDURE — 25010000002 ONDANSETRON PER 1 MG: Performed by: HOSPITALIST

## 2018-10-06 PROCEDURE — 82962 GLUCOSE BLOOD TEST: CPT

## 2018-10-06 PROCEDURE — 25010000002 METHYLPREDNISOLONE PER 40 MG: Performed by: INTERNAL MEDICINE

## 2018-10-06 PROCEDURE — 94799 UNLISTED PULMONARY SVC/PX: CPT

## 2018-10-06 PROCEDURE — 99232 SBSQ HOSP IP/OBS MODERATE 35: CPT | Performed by: NURSE PRACTITIONER

## 2018-10-06 PROCEDURE — 86140 C-REACTIVE PROTEIN: CPT | Performed by: INTERNAL MEDICINE

## 2018-10-06 PROCEDURE — 80053 COMPREHEN METABOLIC PANEL: CPT | Performed by: INTERNAL MEDICINE

## 2018-10-06 RX ORDER — HYDROXYZINE HYDROCHLORIDE 25 MG/1
25 TABLET, FILM COATED ORAL ONCE
Status: COMPLETED | OUTPATIENT
Start: 2018-10-06 | End: 2018-10-06

## 2018-10-06 RX ORDER — MONTELUKAST SODIUM 10 MG/1
10 TABLET ORAL NIGHTLY
Status: DISCONTINUED | OUTPATIENT
Start: 2018-10-06 | End: 2018-10-09 | Stop reason: HOSPADM

## 2018-10-06 RX ORDER — GUAIFENESIN 600 MG/1
600 TABLET, EXTENDED RELEASE ORAL EVERY 12 HOURS SCHEDULED
Status: DISCONTINUED | OUTPATIENT
Start: 2018-10-06 | End: 2018-10-09 | Stop reason: HOSPADM

## 2018-10-06 RX ORDER — GABAPENTIN 100 MG/1
100 CAPSULE ORAL NIGHTLY
Status: DISCONTINUED | OUTPATIENT
Start: 2018-10-06 | End: 2018-10-09 | Stop reason: HOSPADM

## 2018-10-06 RX ORDER — ONDANSETRON 2 MG/ML
4 INJECTION INTRAMUSCULAR; INTRAVENOUS EVERY 6 HOURS PRN
Status: DISCONTINUED | OUTPATIENT
Start: 2018-10-06 | End: 2018-10-09 | Stop reason: HOSPADM

## 2018-10-06 RX ADMIN — IPRATROPIUM BROMIDE AND ALBUTEROL SULFATE 3 ML: .5; 3 SOLUTION RESPIRATORY (INHALATION) at 02:41

## 2018-10-06 RX ADMIN — BUPRENORPHINE HYDROCHLORIDE AND NALOXONE HYDROCHLORIDE 2 TABLET: 8; 2 TABLET SUBLINGUAL at 08:08

## 2018-10-06 RX ADMIN — ACETAMINOPHEN 650 MG: 325 TABLET ORAL at 16:46

## 2018-10-06 RX ADMIN — IPRATROPIUM BROMIDE AND ALBUTEROL SULFATE 3 ML: .5; 3 SOLUTION RESPIRATORY (INHALATION) at 22:30

## 2018-10-06 RX ADMIN — BENZONATATE 200 MG: 100 CAPSULE ORAL at 16:46

## 2018-10-06 RX ADMIN — BUDESONIDE 0.5 MG: 0.5 SUSPENSION RESPIRATORY (INHALATION) at 06:38

## 2018-10-06 RX ADMIN — METHYLPREDNISOLONE SODIUM SUCCINATE 40 MG: 40 INJECTION, POWDER, FOR SOLUTION INTRAMUSCULAR; INTRAVENOUS at 05:15

## 2018-10-06 RX ADMIN — HYDROXYZINE 25 MG: 25 TABLET, FILM COATED ORAL at 02:15

## 2018-10-06 RX ADMIN — GUAIFENESIN 600 MG: 600 TABLET, EXTENDED RELEASE ORAL at 20:33

## 2018-10-06 RX ADMIN — METHYLPREDNISOLONE SODIUM SUCCINATE 40 MG: 40 INJECTION, POWDER, FOR SOLUTION INTRAMUSCULAR; INTRAVENOUS at 16:46

## 2018-10-06 RX ADMIN — DOXYCYCLINE 100 MG: 100 INJECTION, POWDER, LYOPHILIZED, FOR SOLUTION INTRAVENOUS at 08:08

## 2018-10-06 RX ADMIN — IPRATROPIUM BROMIDE AND ALBUTEROL SULFATE 3 ML: .5; 3 SOLUTION RESPIRATORY (INHALATION) at 14:52

## 2018-10-06 RX ADMIN — IPRATROPIUM BROMIDE AND ALBUTEROL SULFATE 3 ML: .5; 3 SOLUTION RESPIRATORY (INHALATION) at 18:20

## 2018-10-06 RX ADMIN — IBUPROFEN 600 MG: 600 TABLET ORAL at 05:29

## 2018-10-06 RX ADMIN — SODIUM CHLORIDE 75 ML/HR: 9 INJECTION, SOLUTION INTRAVENOUS at 16:46

## 2018-10-06 RX ADMIN — GABAPENTIN 100 MG: 100 CAPSULE ORAL at 20:33

## 2018-10-06 RX ADMIN — DOXYCYCLINE 100 MG: 100 INJECTION, POWDER, LYOPHILIZED, FOR SOLUTION INTRAVENOUS at 20:32

## 2018-10-06 RX ADMIN — ACETAMINOPHEN 650 MG: 325 TABLET ORAL at 08:08

## 2018-10-06 RX ADMIN — Medication 3 ML: at 20:33

## 2018-10-06 RX ADMIN — BENZONATATE 200 MG: 100 CAPSULE ORAL at 08:08

## 2018-10-06 RX ADMIN — GUAIFENESIN 600 MG: 600 TABLET, EXTENDED RELEASE ORAL at 16:46

## 2018-10-06 RX ADMIN — CEFTRIAXONE 1 G: 1 INJECTION, POWDER, FOR SOLUTION INTRAMUSCULAR; INTRAVENOUS at 08:08

## 2018-10-06 RX ADMIN — ONDANSETRON 4 MG: 2 INJECTION INTRAMUSCULAR; INTRAVENOUS at 20:41

## 2018-10-06 RX ADMIN — IPRATROPIUM BROMIDE AND ALBUTEROL SULFATE 3 ML: .5; 3 SOLUTION RESPIRATORY (INHALATION) at 06:35

## 2018-10-06 RX ADMIN — ONDANSETRON 4 MG: 2 INJECTION INTRAMUSCULAR; INTRAVENOUS at 03:25

## 2018-10-06 RX ADMIN — IPRATROPIUM BROMIDE AND ALBUTEROL SULFATE 3 ML: .5; 3 SOLUTION RESPIRATORY (INHALATION) at 10:53

## 2018-10-06 NOTE — PLAN OF CARE
Problem: Fall Risk (Adult)  Goal: Absence of Fall  Outcome: Ongoing (interventions implemented as appropriate)      Problem: Patient Care Overview  Goal: Plan of Care Review  Outcome: Ongoing (interventions implemented as appropriate)    Goal: Individualization and Mutuality  Outcome: Ongoing (interventions implemented as appropriate)    Goal: Discharge Needs Assessment  Outcome: Ongoing (interventions implemented as appropriate)    Goal: Interprofessional Rounds/Family Conf  Outcome: Ongoing (interventions implemented as appropriate)

## 2018-10-06 NOTE — PROGRESS NOTES
Patient Identification:  Name:  Mary Monaco  Age:  38 y.o.  Sex:  female  :  1980  MRN:  [id number]  Visit Number:  [id number]  Primary Care Provider:  Marciano Adams APRN    Length of stay:  1    Chief Complaint: shortness of breath     Subjective:      Mrs. Monaco is a 38 year old female who was admitted to the observation unit on 10/4/18. [facility] 10[facility]5/18. She was admitted for her shortness of breath which has been felt to be a COPD exacerbation vs. URI. She is lying in bed, asleep on my arrival to the room. She continues to have shortness of breath, she refuses to wear telemetry, continues to have a dry cough. She states she is still having some chest pain with activity when she gets more short of breath but none at rest. Discussed with PRATIMA Cast.    ----------------------------------------------------------------------------------------------------------------------  [facility] Meds:    budesonide 0.5 mg Nebulization Daily - RT   buprenorphine-naloxone 2 tablet Sublingual Daily   ceftriaxone 1 g Intravenous Q24H   doxycycline 100 mg Intravenous Q12H   enoxaparin 40 mg Subcutaneous Q24H   influenza vaccine 0.5 mL Intramuscular Once   ipratropium-albuterol 3 mL Nebulization Q4H - RT   methylPREDNISolone sodium succinate 40 mg Intravenous Q12H   sodium chloride 3 mL Intravenous Q12H       sodium chloride 75 mL/hr Last Rate: 75 mL/hr (10/05/18 0743)     ----------------------------------------------------------------------------------------------------------------------  Vital Signs:  Temp:  [97.6 °F (36.4 °C)-98.7 °F (37.1 °C)] 97.9 °F (36.6 °C)  Heart Rate:  [55-88] 65  Resp:  [18] 18  BP: (106-139)/(47-70) 139/68  1    10/04/18  0521 10/06/18  6534   Weight: 99.8 kg (220 lb) 99 kg (218 lb 4.1 oz)     Body mass index is 39.92 kg/m².    Intake/Output Summary (Last 24 hours) at 10/06/18 1015  Last data filed at 10/06/18 0434   Gross per 24 hour    Intake              360 ml   Output              800 ml   Net             -440 ml     No intake/output data recorded.  Diet Regular  ----------------------------------------------------------------------------------------------------------------------  Physical exam:  Constitutional:  Well-developed and well-nourished.  No respiratory distress.      HENT:  Head:  Normocephalic and atraumatic.  Mouth:  Moist mucous membranes.    Eyes:  Conjunctivae and EOM are normal.  Pupils are equal, round, and reactive to light.  No scleral icterus.    Neck:  Neck supple.  No JVD present.    Cardiovascular:  Normal rate, regular rhythm and normal heart sounds with no murmur.  Pulmonary/Chest:  Coarse breath sounds throughout   Abdominal:  Soft.  Bowel sounds are normal.  No distension and no tenderness.   Musculoskeletal:  No edema, no tenderness, and no deformity.  No red or swollen joints anywhere.    Neurological:  Alert and oriented to person, place, and time.  No cranial nerve deficit.  No tongue deviation.  No facial droop.  No slurred speech.   Skin:  Skin is warm and dry. No rash noted. No pallor.   ----------------------------------------------------------------------------------------------------------------------  Tele:        ----------------------------------------------------------------------------------------------------------------------    Results from last 7 days  Lab Units 10/05/18  1952 10/05/18  1520 10/05/18  0913   TROPONIN I ng/mL <0.006 <0.006 <0.006       Results from last 7 days  Lab Units 10/06/18  0514 10/06/18  0513 10/05/18  0426 10/04/18  0557   CRP mg/dL 1.57*  --  4.80*  --    WBC 10*3/mm3  --  12.44 15.48* 12.22   HEMOGLOBIN g/dL  --  12.3 12.7 13.1   HEMATOCRIT %  --  38.4 38.6 40.2   MCV fL  --  86.9 84.6 82.7   MCHC g/dL  --  32.0* 32.9* 32.6*   PLATELETS 10*3/mm3  --  228 235 208       Results from last 7 days  Lab Units 10/04/18  0543   PH, ARTERIAL pH units 7.433   PO2 ART mm Hg 57.7*    PCO2, ARTERIAL mm Hg 38.0   HCO3 ART mmol/L 24.8       Results from last 7 days  Lab Units 10/06/18  0513 10/05/18  0426 10/04/18  0557   SODIUM mmol/L 139 139 139   POTASSIUM mmol/L 4.3 4.4 3.9   CHLORIDE mmol/L 109 109 105   CO2 mmol/L 20.8* 23.8* 24.2*   BUN mg/dL 13 15 8   CREATININE mg/dL 0.72 0.73 0.69   EGFR IF NONAFRICN AM mL/min/1.73 91 90 96   CALCIUM mg/dL 8.5 8.5 8.9   GLUCOSE mg/dL 131* 133* 113*   ALBUMIN g/dL 3.50  --  4.30   BILIRUBIN mg/dL 0.1*  --  0.4   ALK PHOS U/L 58  --  70   AST (SGOT) U/L 12  --  19   ALT (SGPT) U/L 8*  --  14   Estimated Creatinine Clearance: 117.7 mL/min (by C-G formula based on SCr of 0.72 mg/dL).  No results found for: AMMONIA      Blood Culture   Date Value Ref Range Status   10/04/2018 No growth at 24 hours  Preliminary   10/04/2018 No growth at 24 hours  Preliminary         ----------------------------------------------------------------------------------------------------------------------  Imaging Results (last 24 hours)     ** No results found for the last 24 hours. **        ----------------------------------------------------------------------------------------------------------------------  Assessment and Plan:    Sepsis likely r/t URI (elevated CRP, leukocytosis, hypoxia)   Acute Hypoxic Respiratory Failure r/t Asthma exacerbation r/t URI   Pulmonary nodule, x2, 3mm, 2mm, new  Chest pain/Back pain  Dehydration  Hx of Lupus   Leukocytosis   Hyperglycemia   Obesity   Tobacco use    Sepsis likely r/t URI vs. COPD exacerbation: WBC trended down to 12.44 today, CRP trended down to 1.57, was 4.80 yesterday. No fevers, no hypoxia, no tachycardia or tachypnea. Blood cultures have no growth. Chest xray did not appreciate any pneumonia on 10/4/18. Would recommend further outpatient testing with PFTs once acute illness has resolved, for further diagnosis of COPD/Asthma. CT of the chest does not appreciate any pneumonia. Mucinex ordered for dry cough. Continue medrol 40mg  IV BID, will wean as tolerated.     Pulmonary Nodule, 2mm, 3mm, new: not present on CT of the chest from 2015. Would recommend repeating CT of the chest in 6 months.     Acute Hypoxic respiratory failure r/t asthma exacerbation r/t URI: continue supplemental oxygen, she refuses to wear telemetry and sp02 continuous monitoring. No hypoxia noted. Will continue to monitor closely.     Chest pain/back pain: echo done yesterday and shows EF of >70%, left atrial cavity is mildly dilated, normal diastolic function. Troponin's remained in the grey zone. She refuses to wear telemetry. Having some chest pain with activity when she gets more short of breath, none at rest.     Dehydration: improving, continue IV fluids.     Hyperglycemia: likely r/t Medrol IV, glucose checks ordered ac/hs and prn. Will start sliding scale if glucoses trend above 150.     Hx of Lupus: not following with rheumatology, would recommend further work up and continued monitoring outpatient.     Tobacco use: patient educated on the dangers of tobacco use and urged to quit, nurse states she is not going out to smoke.     DVT prophylaxis: Lovenox sq.       Dianne Castro, DENISE  10/06/18  10:15 AM

## 2018-10-07 LAB
ALBUMIN SERPL-MCNC: 3.3 G/DL (ref 3.5–5)
ALBUMIN/GLOB SERPL: 1.1 G/DL (ref 1.5–2.5)
ALP SERPL-CCNC: 52 U/L (ref 35–104)
ALT SERPL W P-5'-P-CCNC: 10 U/L (ref 10–36)
ANION GAP SERPL CALCULATED.3IONS-SCNC: 5.8 MMOL/L (ref 3.6–11.2)
AST SERPL-CCNC: 12 U/L (ref 10–30)
BASOPHILS # BLD AUTO: 0.01 10*3/MM3 (ref 0–0.3)
BASOPHILS NFR BLD AUTO: 0.1 % (ref 0–2)
BILIRUB SERPL-MCNC: 0.1 MG/DL (ref 0.2–1.8)
BUN BLD-MCNC: 13 MG/DL (ref 7–21)
BUN/CREAT SERPL: 16.5 (ref 7–25)
CALCIUM SPEC-SCNC: 8.4 MG/DL (ref 7.7–10)
CHLORIDE SERPL-SCNC: 109 MMOL/L (ref 99–112)
CO2 SERPL-SCNC: 24.2 MMOL/L (ref 24.3–31.9)
CREAT BLD-MCNC: 0.79 MG/DL (ref 0.43–1.29)
CRP SERPL-MCNC: 0.59 MG/DL (ref 0–0.99)
DEPRECATED RDW RBC AUTO: 47.6 FL (ref 37–54)
EOSINOPHIL # BLD AUTO: 0 10*3/MM3 (ref 0–0.7)
EOSINOPHIL NFR BLD AUTO: 0 % (ref 0–5)
ERYTHROCYTE [DISTWIDTH] IN BLOOD BY AUTOMATED COUNT: 14.9 % (ref 11.5–14.5)
GFR SERPL CREATININE-BSD FRML MDRD: 82 ML/MIN/1.73
GLOBULIN UR ELPH-MCNC: 2.9 GM/DL
GLUCOSE BLD-MCNC: 103 MG/DL (ref 70–110)
GLUCOSE BLDC GLUCOMTR-MCNC: 110 MG/DL (ref 70–130)
GLUCOSE BLDC GLUCOMTR-MCNC: 110 MG/DL (ref 70–130)
GLUCOSE BLDC GLUCOMTR-MCNC: 129 MG/DL (ref 70–130)
GLUCOSE BLDC GLUCOMTR-MCNC: 200 MG/DL (ref 70–130)
HCT VFR BLD AUTO: 39.1 % (ref 37–47)
HGB BLD-MCNC: 12.3 G/DL (ref 12–16)
IMM GRANULOCYTES # BLD: 0.05 10*3/MM3 (ref 0–0.03)
IMM GRANULOCYTES NFR BLD: 0.5 % (ref 0–0.5)
LYMPHOCYTES # BLD AUTO: 1.32 10*3/MM3 (ref 1–3)
LYMPHOCYTES NFR BLD AUTO: 12.2 % (ref 21–51)
MCH RBC QN AUTO: 27.5 PG (ref 27–33)
MCHC RBC AUTO-ENTMCNC: 31.5 G/DL (ref 33–37)
MCV RBC AUTO: 87.3 FL (ref 80–94)
MONOCYTES # BLD AUTO: 0.73 10*3/MM3 (ref 0.1–0.9)
MONOCYTES NFR BLD AUTO: 6.8 % (ref 0–10)
NEUTROPHILS # BLD AUTO: 8.69 10*3/MM3 (ref 1.4–6.5)
NEUTROPHILS NFR BLD AUTO: 80.4 % (ref 30–70)
OSMOLALITY SERPL CALC.SUM OF ELEC: 277.9 MOSM/KG (ref 273–305)
PLATELET # BLD AUTO: 201 10*3/MM3 (ref 130–400)
PMV BLD AUTO: 10.8 FL (ref 6–10)
POTASSIUM BLD-SCNC: 4.2 MMOL/L (ref 3.5–5.3)
PROT SERPL-MCNC: 6.2 G/DL (ref 6–8)
RBC # BLD AUTO: 4.48 10*6/MM3 (ref 4.2–5.4)
SODIUM BLD-SCNC: 139 MMOL/L (ref 135–153)
WBC NRBC COR # BLD: 10.8 10*3/MM3 (ref 4.5–12.5)

## 2018-10-07 PROCEDURE — 25010000002 ENOXAPARIN PER 10 MG: Performed by: PHYSICIAN ASSISTANT

## 2018-10-07 PROCEDURE — 99233 SBSQ HOSP IP/OBS HIGH 50: CPT | Performed by: INTERNAL MEDICINE

## 2018-10-07 PROCEDURE — 25010000002 METHYLPREDNISOLONE PER 40 MG: Performed by: INTERNAL MEDICINE

## 2018-10-07 PROCEDURE — 94799 UNLISTED PULMONARY SVC/PX: CPT

## 2018-10-07 PROCEDURE — 80053 COMPREHEN METABOLIC PANEL: CPT | Performed by: INTERNAL MEDICINE

## 2018-10-07 PROCEDURE — 86140 C-REACTIVE PROTEIN: CPT | Performed by: INTERNAL MEDICINE

## 2018-10-07 PROCEDURE — 25010000002 ONDANSETRON PER 1 MG: Performed by: HOSPITALIST

## 2018-10-07 PROCEDURE — 85025 COMPLETE CBC W/AUTO DIFF WBC: CPT | Performed by: INTERNAL MEDICINE

## 2018-10-07 PROCEDURE — 82962 GLUCOSE BLOOD TEST: CPT

## 2018-10-07 PROCEDURE — 25010000002 CEFTRIAXONE: Performed by: INTERNAL MEDICINE

## 2018-10-07 RX ADMIN — METHYLPREDNISOLONE SODIUM SUCCINATE 40 MG: 40 INJECTION, POWDER, FOR SOLUTION INTRAMUSCULAR; INTRAVENOUS at 16:39

## 2018-10-07 RX ADMIN — BUDESONIDE 0.5 MG: 0.5 SUSPENSION RESPIRATORY (INHALATION) at 06:51

## 2018-10-07 RX ADMIN — DOXYCYCLINE 100 MG: 100 INJECTION, POWDER, LYOPHILIZED, FOR SOLUTION INTRAVENOUS at 22:19

## 2018-10-07 RX ADMIN — IPRATROPIUM BROMIDE AND ALBUTEROL SULFATE 3 ML: .5; 3 SOLUTION RESPIRATORY (INHALATION) at 14:20

## 2018-10-07 RX ADMIN — GUAIFENESIN 600 MG: 600 TABLET, EXTENDED RELEASE ORAL at 09:38

## 2018-10-07 RX ADMIN — METHYLPREDNISOLONE SODIUM SUCCINATE 40 MG: 40 INJECTION, POWDER, FOR SOLUTION INTRAMUSCULAR; INTRAVENOUS at 05:04

## 2018-10-07 RX ADMIN — GUAIFENESIN 200 MG: 100 SOLUTION ORAL at 05:09

## 2018-10-07 RX ADMIN — SODIUM CHLORIDE 75 ML/HR: 9 INJECTION, SOLUTION INTRAVENOUS at 05:04

## 2018-10-07 RX ADMIN — IPRATROPIUM BROMIDE AND ALBUTEROL SULFATE 3 ML: .5; 3 SOLUTION RESPIRATORY (INHALATION) at 22:14

## 2018-10-07 RX ADMIN — DOXYCYCLINE 100 MG: 100 INJECTION, POWDER, LYOPHILIZED, FOR SOLUTION INTRAVENOUS at 09:38

## 2018-10-07 RX ADMIN — IPRATROPIUM BROMIDE AND ALBUTEROL SULFATE 3 ML: .5; 3 SOLUTION RESPIRATORY (INHALATION) at 03:33

## 2018-10-07 RX ADMIN — GUAIFENESIN 600 MG: 600 TABLET, EXTENDED RELEASE ORAL at 21:47

## 2018-10-07 RX ADMIN — CEFTRIAXONE 1 G: 1 INJECTION, POWDER, FOR SOLUTION INTRAMUSCULAR; INTRAVENOUS at 09:39

## 2018-10-07 RX ADMIN — ENOXAPARIN SODIUM 40 MG: 40 INJECTION SUBCUTANEOUS at 09:39

## 2018-10-07 RX ADMIN — ONDANSETRON 4 MG: 2 INJECTION INTRAMUSCULAR; INTRAVENOUS at 16:39

## 2018-10-07 RX ADMIN — IPRATROPIUM BROMIDE AND ALBUTEROL SULFATE 3 ML: .5; 3 SOLUTION RESPIRATORY (INHALATION) at 06:52

## 2018-10-07 RX ADMIN — IPRATROPIUM BROMIDE AND ALBUTEROL SULFATE 3 ML: .5; 3 SOLUTION RESPIRATORY (INHALATION) at 19:02

## 2018-10-07 RX ADMIN — ACETAMINOPHEN 650 MG: 325 TABLET ORAL at 05:04

## 2018-10-07 RX ADMIN — IPRATROPIUM BROMIDE AND ALBUTEROL SULFATE 3 ML: .5; 3 SOLUTION RESPIRATORY (INHALATION) at 10:12

## 2018-10-07 RX ADMIN — BUPRENORPHINE HYDROCHLORIDE AND NALOXONE HYDROCHLORIDE 2 TABLET: 8; 2 TABLET SUBLINGUAL at 09:38

## 2018-10-07 RX ADMIN — GABAPENTIN 100 MG: 100 CAPSULE ORAL at 21:47

## 2018-10-07 RX ADMIN — MONTELUKAST SODIUM 10 MG: 10 TABLET, COATED ORAL at 21:47

## 2018-10-07 NOTE — PROGRESS NOTES
"  Assisted By: Jeaneth ANDUJAR    CC: Follow-up on COPD/asthma exacerbation    Interview History/HPI: Patient was resting on my arrival but she continues to feel short of breath, she thinks her cough is starting to \"break up\".  Minimal phlegm production, her only activities been getting to the bedside commode and back to bed.  Nursing reports she may have been off the floor once yesterday.  She denies any chest pain with exception of some pleuritic pain when coughing.  She is tolerating her diet she is not having edema and in fact her neuropathic pain she thinks is improving on the gabapentin.      Vitals:    10/07/18 1020   BP:    Pulse:    Resp:    Temp:    SpO2: 96%       Intake/Output Summary (Last 24 hours) at 10/07/18 1111  Last data filed at 10/07/18 0938   Gross per 24 hour   Intake             1870 ml   Output                0 ml   Net             1870 ml       EXAM: Well-developed morbidly obese female by BMI in no distress with no accessory muscle use saturations on 2 L 96% temperature is 98.2 respiratory rate 16 pulse 63 blood pressure 121/55.  Lungs have bilateral breath sounds still with coarse expiratory sounds throughout not with significant improvement.  Heart regular rate and rhythm without murmur rub or gallop somewhat distant sounds secondary to coarse breath sounds.  Face symmetric normocephalic pupils equal round square nonicteric speech is normal hearing is intact.  Abdomen is soft benign bowel sounds are active.  Extremities without edema with excellent palpable distal pulses without clubbing or cyanosis no skin rashes noted.  Mood is good affect is normal.  She is alert and nonfocal.    Tele: Sinus, reviewed    LABS:   Lab Results (last 48 hours)     Procedure Component Value Units Date/Time    Blood Culture - Blood, [[id number]]  (Normal) Collected:  10/04/18 0823    Specimen:  Blood from Arm, Left Updated:  10/07/18 1030     Blood Culture No growth at 3 days    Blood Culture - Blood, [[id number]]  " (Normal) Collected:  10/04/18 0815    Specimen:  Blood from Arm, Right Updated:  10/07/18 1030     Blood Culture No growth at 3 days    POC Glucose Once [[id number]]  (Normal) Collected:  10/07/18 0737    Specimen:  Blood Updated:  10/07/18 0745     Glucose 110 mg/dL     Narrative:       Meter: [id number] : [id number] Abdon Eric    C-reactive Protein [[id number]]  (Normal) Collected:  10/07/18 0522    Specimen:  Blood Updated:  10/07/18 0611     C-Reactive Protein 0.59 mg/dL     Osmolality, Calculated [[id number]]  (Normal) Collected:  10/07/18 0522    Specimen:  Blood Updated:  10/07/18 0610     Osmolality Calc 277.9 mOsm/kg     Comprehensive Metabolic Panel [[id number]]  (Abnormal) Collected:  10/07/18 0522    Specimen:  Blood Updated:  10/07/18 0610     Glucose 103 mg/dL      BUN 13 mg/dL      Creatinine 0.79 mg/dL      Sodium 139 mmol/L      Potassium 4.2 mmol/L      Comment: 1+ Hemolysis         Chloride 109 mmol/L      CO2 24.2 (L) mmol/L      Calcium 8.4 mg/dL      Total Protein 6.2 g/dL      Albumin 3.30 (L) g/dL      ALT (SGPT) 10 U/L      AST (SGOT) 12 U/L      Alkaline Phosphatase 52 U/L      Comment: Note New Reference Ranges        Total Bilirubin 0.1 (L) mg/dL      eGFR Non African Amer 82 mL/min/1.73      Globulin 2.9 gm/dL      A/G Ratio 1.1 (L) g/dL      BUN/Creatinine Ratio 16.5     Anion Gap 5.8 mmol/L     CBC & Differential [[id number]] Collected:  10/07/18 0522    Specimen:  Blood Updated:  10/07/18 0542    Narrative:       The following orders were created for panel order CBC & Differential.  Procedure                               Abnormality         Status                     ---------                               -----------         ------                     CBC Auto Differential[[id number]]        Abnormal            Final result                 Please view results for these tests on the individual orders.    CBC Auto Differential [[id number]]  (Abnormal) Collected:  10/07/18 0522     Specimen:  Blood Updated:  10/07/18 0542     WBC 10.80 10*3/mm3      RBC 4.48 10*6/mm3      Hemoglobin 12.3 g/dL      Hematocrit 39.1 %      MCV 87.3 fL      MCH 27.5 pg      MCHC 31.5 (L) g/dL      RDW 14.9 (H) %      RDW-SD 47.6 fl      MPV 10.8 (H) fL      Platelets 201 10*3/mm3      Neutrophil % 80.4 (H) %      Lymphocyte % 12.2 (L) %      Monocyte % 6.8 %      Eosinophil % 0.0 %      Basophil % 0.1 %      Immature Grans % 0.5 %      Neutrophils, Absolute 8.69 (H) 10*3/mm3      Lymphocytes, Absolute 1.32 10*3/mm3      Monocytes, Absolute 0.73 10*3/mm3      Eosinophils, Absolute 0.00 10*3/mm3      Basophils, Absolute 0.01 10*3/mm3      Immature Grans, Absolute 0.05 (H) 10*3/mm3     POC Glucose Once [[id number]]  (Abnormal) Collected:  10/06/18 0957    Specimen:  Blood Updated:  10/06/18 1004     Glucose 131 (H) mg/dL     Narrative:       Meter: [id number] : [id number] [facility]    Comprehensive Metabolic Panel [[id number]]  (Abnormal) Collected:  10/06/18 0513    Specimen:  Blood Updated:  10/06/18 0639     Glucose 131 (H) mg/dL      BUN 13 mg/dL      Creatinine 0.72 mg/dL      Sodium 139 mmol/L      Potassium 4.3 mmol/L      Chloride 109 mmol/L      CO2 20.8 (L) mmol/L      Calcium 8.5 mg/dL      Total Protein 6.4 g/dL      Albumin 3.50 g/dL      ALT (SGPT) 8 (L) U/L      AST (SGOT) 12 U/L      Alkaline Phosphatase 58 U/L      Comment: Note New Reference Ranges        Total Bilirubin 0.1 (L) mg/dL      eGFR Non African Amer 91 mL/min/1.73      Globulin 2.9 gm/dL      A/G Ratio 1.2 (L) g/dL      BUN/Creatinine Ratio 18.1     Anion Gap 9.2 mmol/L     Osmolality, Calculated [[id number]]  (Normal) Collected:  10/06/18 0513    Specimen:  Blood Updated:  10/06/18 0639     Osmolality Calc 279.5 mOsm/kg     C-reactive Protein [[id number]]  (Abnormal) Collected:  10/06/18 0514    Specimen:  Blood Updated:  10/06/18 0631     C-Reactive Protein 1.57 (H) mg/dL     CBC & Differential [[id number]] Collected:   10/06/18 0513    Specimen:  Blood Updated:  10/06/18 0614    Narrative:       The following orders were created for panel order CBC & Differential.  Procedure                               Abnormality         Status                     ---------                               -----------         ------                     CBC Auto Differential[[id number]]        Abnormal            Final result                 Please view results for these tests on the individual orders.    CBC Auto Differential [[id number]]  (Abnormal) Collected:  10/06/18 0513    Specimen:  Blood Updated:  10/06/18 0614     WBC 12.44 10*3/mm3      RBC 4.42 10*6/mm3      Hemoglobin 12.3 g/dL      Hematocrit 38.4 %      MCV 86.9 fL      MCH 27.8 pg      MCHC 32.0 (L) g/dL      RDW 15.1 (H) %      RDW-SD 47.4 fl      MPV 11.4 (H) fL      Platelets 228 10*3/mm3      Neutrophil % 88.3 (H) %      Lymphocyte % 6.6 (L) %      Monocyte % 4.7 %      Eosinophil % 0.0 %      Basophil % 0.0 %      Immature Grans % 0.4 %      Neutrophils, Absolute 10.99 (H) 10*3/mm3      Lymphocytes, Absolute 0.82 (L) 10*3/mm3      Monocytes, Absolute 0.58 10*3/mm3      Eosinophils, Absolute 0.00 10*3/mm3      Basophils, Absolute 0.00 10*3/mm3      Immature Grans, Absolute 0.05 (H) 10*3/mm3     Troponin [[id number]]  (Normal) Collected:  10/05/18 1952    Specimen:  Blood Updated:  10/05/18 2036     Troponin I <0.006 ng/mL     Narrative:       Ultra Troponin I Reference Range:         <=0.039 ng/mL: Negative    0.04-0.779 ng/mL: Indeterminate Range. Suspicious of MI.  Clinical correlation required.       >=0.78  ng/mL: Consistent with myocardial injury.  Clinical correlation required.    Troponin [[id number]]  (Normal) Collected:  10/05/18 1520    Specimen:  Blood Updated:  10/05/18 1622     Troponin I <0.006 ng/mL     Narrative:       Ultra Troponin I Reference Range:         <=0.039 ng/mL: Negative    0.04-0.779 ng/mL: Indeterminate Range. Suspicious of MI.  Clinical correlation  required.       >=0.78  ng/mL: Consistent with myocardial injury.  Clinical correlation required.          Radiology:  Imaging Results (last 72 hours)     Procedure Component Value Units Date/Time    CT Chest Without Contrast [[id number]] Collected:  10/05/18 0957     Updated:  10/05/18 1000    Narrative:       EXAMINATION: CT CHEST WO CONTRAST-      CLINICAL INDICATION: eval hypoxia; J44.1-Chronic obstructive pulmonary  disease with (acute) exacerbation          DOSE: 765.4 mGy.cm  COMPARISON: June 23, 2015     Radiation dose reduction techniques were utilized per ALARA protocol.  Automated exposure control was initiated through either or [employer] or  Pingboard software packages by  protocol.           PROCEDURE: Axial images were acquired from the thoracic inlet through  the upper abdomen without any IV contrast. Reformatted images were  created.     FINDINGS: Today's study shows a 2 mm nodule in the right lung on image  47 of the axial series.     3 mm nodule in the right lung on image 41 of the axial series     No pericardial or pleural effusions     No mediastinal or hilar lymph node enlargement       Impression:       Tiny nodules in the right lung. Lung RADS category 2 benign  nodules  No definitive source for the patient's hypoxia      This report was finalized on 10/5/2018 9:58 AM by Dr. Endy Valladares MD.               Results for orders placed during the hospital encounter of 10/04/18   Adult Transthoracic Echo Complete W/ Cont if Necessary Per Protocol    Narrative · Left ventricular systolic function is hyperdynamic (EF > 70).  · Left atrial cavity size is mildly dilated.  · Nornal diastolic function and RVSP        Cultures negative including flu swab,    Assessment/Plan:   Exacerbation of either asthma or COPD.  Patient will need outpatient evaluation once improved to confirm or negate whether she returns to normal pulmonary function in between episodes.  She continues to have marked coarse  expiratory sounds.  She is on steroids inhalants antibiotics Singulair and topical steroids as well.  Saturations are adequate on 2 L, encouraged her on incentive spirometer.  D-dimer was normal, EF was normal as well.  CRP is low.  Mycoplasma screen negative.    Peripheral neuropathy, improved on gabapentin, will continue    Pulmonary nodules, she will need repeat CT scan in 6-12 months.    Tobacco abuse, again counseled to quit    Chronic pain syndrome on Subutex    Weakness, will have therapy ambulate her with oxygen tomorrow.    DVT prophylaxis, subcutaneous Lovenox

## 2018-10-07 NOTE — PLAN OF CARE
Problem: Chronic Obstructive Pulmonary Disease (Adult)  Goal: Signs and Symptoms of Listed Potential Problems Will be Absent, Minimized or Managed (Chronic Obstructive Pulmonary Disease)  Outcome: Ongoing (interventions implemented as appropriate)      Problem: Fall Risk (Adult)  Goal: Identify Related Risk Factors and Signs and Symptoms  Outcome: Outcome(s) achieved Date Met: 10/07/18    Goal: Absence of Fall  Outcome: Ongoing (interventions implemented as appropriate)      Problem: Patient Care Overview  Goal: Plan of Care Review  Outcome: Ongoing (interventions implemented as appropriate)    Goal: Individualization and Mutuality  Outcome: Ongoing (interventions implemented as appropriate)    Goal: Discharge Needs Assessment  Outcome: Ongoing (interventions implemented as appropriate)    Goal: Interprofessional Rounds/Family Conf  Outcome: Ongoing (interventions implemented as appropriate)

## 2018-10-07 NOTE — PLAN OF CARE
Problem: Chronic Obstructive Pulmonary Disease (Adult)  Goal: Signs and Symptoms of Listed Potential Problems Will be Absent, Minimized or Managed (Chronic Obstructive Pulmonary Disease)  Outcome: Ongoing (interventions implemented as appropriate)      Problem: Fall Risk (Adult)  Goal: Absence of Fall  Outcome: Ongoing (interventions implemented as appropriate)      Problem: Patient Care Overview  Goal: Plan of Care Review  Outcome: Ongoing (interventions implemented as appropriate)

## 2018-10-08 ENCOUNTER — APPOINTMENT (OUTPATIENT)
Dept: GENERAL RADIOLOGY | Facility: HOSPITAL | Age: 38
End: 2018-10-08

## 2018-10-08 LAB
A-A DO2: 113.8 MMHG (ref 0–300)
ALBUMIN SERPL-MCNC: 3.8 G/DL (ref 3.5–5)
ALBUMIN/GLOB SERPL: 1.4 G/DL (ref 1.5–2.5)
ALP SERPL-CCNC: 58 U/L (ref 35–104)
ALT SERPL W P-5'-P-CCNC: 35 U/L (ref 10–36)
ANION GAP SERPL CALCULATED.3IONS-SCNC: 3.7 MMOL/L (ref 3.6–11.2)
ARTERIAL PATENCY WRIST A: POSITIVE
AST SERPL-CCNC: 19 U/L (ref 10–30)
ATMOSPHERIC PRESS: 732 MMHG
BASE EXCESS BLDA CALC-SCNC: 2.4 MMOL/L
BASOPHILS # BLD AUTO: 0.01 10*3/MM3 (ref 0–0.3)
BASOPHILS NFR BLD AUTO: 0.1 % (ref 0–2)
BDY SITE: ABNORMAL
BILIRUB SERPL-MCNC: 0.2 MG/DL (ref 0.2–1.8)
BNP SERPL-MCNC: 114 PG/ML (ref 0–100)
BODY TEMPERATURE: 98.6 C
BUN BLD-MCNC: 17 MG/DL (ref 7–21)
BUN/CREAT SERPL: 20.5 (ref 7–25)
CALCIUM SPEC-SCNC: 8.7 MG/DL (ref 7.7–10)
CHLORIDE SERPL-SCNC: 104 MMOL/L (ref 99–112)
CK MB SERPL-CCNC: 0.6 NG/ML (ref 0–5)
CK MB SERPL-RTO: 1.9 % (ref 0–3)
CK SERPL-CCNC: 31 U/L (ref 24–173)
CO2 SERPL-SCNC: 30.3 MMOL/L (ref 24.3–31.9)
COHGB MFR BLD: 1.5 % (ref 0–5)
CREAT BLD-MCNC: 0.83 MG/DL (ref 0.43–1.29)
D DIMER PPP FEU-MCNC: 0.44 MCGFEU/ML (ref 0–0.5)
DEPRECATED RDW RBC AUTO: 46.1 FL (ref 37–54)
EOSINOPHIL # BLD AUTO: 0 10*3/MM3 (ref 0–0.7)
EOSINOPHIL NFR BLD AUTO: 0 % (ref 0–5)
ERYTHROCYTE [DISTWIDTH] IN BLOOD BY AUTOMATED COUNT: 14.7 % (ref 11.5–14.5)
GFR SERPL CREATININE-BSD FRML MDRD: 77 ML/MIN/1.73
GLOBULIN UR ELPH-MCNC: 2.8 GM/DL
GLUCOSE BLD-MCNC: 90 MG/DL (ref 70–110)
GLUCOSE BLDC GLUCOMTR-MCNC: 116 MG/DL (ref 70–130)
GLUCOSE BLDC GLUCOMTR-MCNC: 119 MG/DL (ref 70–130)
GLUCOSE BLDC GLUCOMTR-MCNC: 121 MG/DL (ref 70–130)
GLUCOSE BLDC GLUCOMTR-MCNC: 124 MG/DL (ref 70–130)
HCO3 BLDA-SCNC: 27.3 MMOL/L (ref 22–26)
HCT VFR BLD AUTO: 39.8 % (ref 37–47)
HCT VFR BLD CALC: 41 % (ref 37–47)
HGB BLD-MCNC: 12.6 G/DL (ref 12–16)
HGB BLDA-MCNC: 14 G/DL (ref 12–16)
HOROWITZ INDEX BLD+IHG-RTO: 32 %
IMM GRANULOCYTES # BLD: 0.08 10*3/MM3 (ref 0–0.03)
IMM GRANULOCYTES NFR BLD: 0.9 % (ref 0–0.5)
LYMPHOCYTES # BLD AUTO: 1.35 10*3/MM3 (ref 1–3)
LYMPHOCYTES NFR BLD AUTO: 14.3 % (ref 21–51)
MCH RBC QN AUTO: 27.5 PG (ref 27–33)
MCHC RBC AUTO-ENTMCNC: 31.7 G/DL (ref 33–37)
MCV RBC AUTO: 86.7 FL (ref 80–94)
METHGB BLD QL: 0.2 % (ref 0–3)
MODALITY: ABNORMAL
MONOCYTES # BLD AUTO: 0.73 10*3/MM3 (ref 0.1–0.9)
MONOCYTES NFR BLD AUTO: 7.8 % (ref 0–10)
NEUTROPHILS # BLD AUTO: 7.24 10*3/MM3 (ref 1.4–6.5)
NEUTROPHILS NFR BLD AUTO: 76.9 % (ref 30–70)
OSMOLALITY SERPL CALC.SUM OF ELEC: 276.8 MOSM/KG (ref 273–305)
OXYHGB MFR BLDV: 87.1 % (ref 85–100)
PCO2 BLDA: 43.4 MM HG (ref 35–45)
PH BLDA: 7.42 PH UNITS (ref 7.35–7.45)
PLATELET # BLD AUTO: 187 10*3/MM3 (ref 130–400)
PMV BLD AUTO: 11.2 FL (ref 6–10)
PO2 BLDA: 54.6 MM HG (ref 80–100)
POTASSIUM BLD-SCNC: 4.1 MMOL/L (ref 3.5–5.3)
PROT SERPL-MCNC: 6.6 G/DL (ref 6–8)
RBC # BLD AUTO: 4.59 10*6/MM3 (ref 4.2–5.4)
SAO2 % BLDCOA: 88.6 % (ref 90–100)
SODIUM BLD-SCNC: 138 MMOL/L (ref 135–153)
TROPONIN I SERPL-MCNC: <0.006 NG/ML
WBC NRBC COR # BLD: 9.41 10*3/MM3 (ref 4.5–12.5)

## 2018-10-08 PROCEDURE — 71045 X-RAY EXAM CHEST 1 VIEW: CPT | Performed by: RADIOLOGY

## 2018-10-08 PROCEDURE — 82805 BLOOD GASES W/O2 SATURATION: CPT | Performed by: INTERNAL MEDICINE

## 2018-10-08 PROCEDURE — 97162 PT EVAL MOD COMPLEX 30 MIN: CPT

## 2018-10-08 PROCEDURE — G8980 MOBILITY D/C STATUS: HCPCS

## 2018-10-08 PROCEDURE — 85025 COMPLETE CBC W/AUTO DIFF WBC: CPT | Performed by: INTERNAL MEDICINE

## 2018-10-08 PROCEDURE — G8978 MOBILITY CURRENT STATUS: HCPCS

## 2018-10-08 PROCEDURE — 85379 FIBRIN DEGRADATION QUANT: CPT | Performed by: INTERNAL MEDICINE

## 2018-10-08 PROCEDURE — 94660 CPAP INITIATION&MGMT: CPT

## 2018-10-08 PROCEDURE — 25010000002 CEFTRIAXONE: Performed by: INTERNAL MEDICINE

## 2018-10-08 PROCEDURE — 94799 UNLISTED PULMONARY SVC/PX: CPT

## 2018-10-08 PROCEDURE — 83050 HGB METHEMOGLOBIN QUAN: CPT | Performed by: INTERNAL MEDICINE

## 2018-10-08 PROCEDURE — 36600 WITHDRAWAL OF ARTERIAL BLOOD: CPT | Performed by: INTERNAL MEDICINE

## 2018-10-08 PROCEDURE — 82375 ASSAY CARBOXYHB QUANT: CPT | Performed by: INTERNAL MEDICINE

## 2018-10-08 PROCEDURE — 82550 ASSAY OF CK (CPK): CPT | Performed by: INTERNAL MEDICINE

## 2018-10-08 PROCEDURE — 94664 DEMO&/EVAL PT USE INHALER: CPT | Performed by: INTERNAL MEDICINE

## 2018-10-08 PROCEDURE — 84484 ASSAY OF TROPONIN QUANT: CPT | Performed by: INTERNAL MEDICINE

## 2018-10-08 PROCEDURE — 82962 GLUCOSE BLOOD TEST: CPT

## 2018-10-08 PROCEDURE — 99407 BEHAV CHNG SMOKING > 10 MIN: CPT | Performed by: INTERNAL MEDICINE

## 2018-10-08 PROCEDURE — 25010000002 ENOXAPARIN PER 10 MG: Performed by: PHYSICIAN ASSISTANT

## 2018-10-08 PROCEDURE — 83880 ASSAY OF NATRIURETIC PEPTIDE: CPT | Performed by: INTERNAL MEDICINE

## 2018-10-08 PROCEDURE — 80053 COMPREHEN METABOLIC PANEL: CPT | Performed by: INTERNAL MEDICINE

## 2018-10-08 PROCEDURE — 82553 CREATINE MB FRACTION: CPT | Performed by: INTERNAL MEDICINE

## 2018-10-08 PROCEDURE — 99255 IP/OBS CONSLTJ NEW/EST HI 80: CPT | Performed by: INTERNAL MEDICINE

## 2018-10-08 PROCEDURE — 71045 X-RAY EXAM CHEST 1 VIEW: CPT

## 2018-10-08 PROCEDURE — 25010000002 METHYLPREDNISOLONE PER 40 MG: Performed by: INTERNAL MEDICINE

## 2018-10-08 PROCEDURE — 99233 SBSQ HOSP IP/OBS HIGH 50: CPT | Performed by: INTERNAL MEDICINE

## 2018-10-08 PROCEDURE — G8979 MOBILITY GOAL STATUS: HCPCS

## 2018-10-08 RX ORDER — PANTOPRAZOLE SODIUM 40 MG/1
40 TABLET, DELAYED RELEASE ORAL
Status: DISCONTINUED | OUTPATIENT
Start: 2018-10-09 | End: 2018-10-09 | Stop reason: HOSPADM

## 2018-10-08 RX ORDER — L.ACID,PARA/B.BIFIDUM/S.THERM 8B CELL
1 CAPSULE ORAL DAILY
Status: DISCONTINUED | OUTPATIENT
Start: 2018-10-08 | End: 2018-10-09 | Stop reason: HOSPADM

## 2018-10-08 RX ORDER — CETIRIZINE HYDROCHLORIDE 10 MG/1
10 TABLET ORAL DAILY
Status: DISCONTINUED | OUTPATIENT
Start: 2018-10-08 | End: 2018-10-09 | Stop reason: HOSPADM

## 2018-10-08 RX ORDER — ACETYLCYSTEINE 200 MG/ML
3 SOLUTION ORAL; RESPIRATORY (INHALATION)
Status: DISCONTINUED | OUTPATIENT
Start: 2018-10-08 | End: 2018-10-09 | Stop reason: HOSPADM

## 2018-10-08 RX ORDER — FLUTICASONE PROPIONATE 50 MCG
2 SPRAY, SUSPENSION (ML) NASAL DAILY
Status: DISCONTINUED | OUTPATIENT
Start: 2018-10-08 | End: 2018-10-09 | Stop reason: HOSPADM

## 2018-10-08 RX ORDER — BUDESONIDE 0.5 MG/2ML
0.5 INHALANT ORAL
Status: DISCONTINUED | OUTPATIENT
Start: 2018-10-08 | End: 2018-10-09 | Stop reason: HOSPADM

## 2018-10-08 RX ADMIN — BUDESONIDE 0.5 MG: 0.5 SUSPENSION RESPIRATORY (INHALATION) at 18:48

## 2018-10-08 RX ADMIN — IPRATROPIUM BROMIDE AND ALBUTEROL SULFATE 3 ML: .5; 3 SOLUTION RESPIRATORY (INHALATION) at 18:47

## 2018-10-08 RX ADMIN — FLUTICASONE PROPIONATE 2 SPRAY: 50 SPRAY, METERED NASAL at 20:56

## 2018-10-08 RX ADMIN — METHYLPREDNISOLONE SODIUM SUCCINATE 40 MG: 40 INJECTION, POWDER, FOR SOLUTION INTRAMUSCULAR; INTRAVENOUS at 05:37

## 2018-10-08 RX ADMIN — Medication 3 ML: at 20:54

## 2018-10-08 RX ADMIN — Medication 3 ML: at 09:14

## 2018-10-08 RX ADMIN — IPRATROPIUM BROMIDE AND ALBUTEROL SULFATE 3 ML: .5; 3 SOLUTION RESPIRATORY (INHALATION) at 22:08

## 2018-10-08 RX ADMIN — ENOXAPARIN SODIUM 40 MG: 40 INJECTION SUBCUTANEOUS at 09:14

## 2018-10-08 RX ADMIN — GABAPENTIN 100 MG: 100 CAPSULE ORAL at 20:55

## 2018-10-08 RX ADMIN — CETIRIZINE HYDROCHLORIDE 10 MG: 10 TABLET, FILM COATED ORAL at 20:56

## 2018-10-08 RX ADMIN — IPRATROPIUM BROMIDE AND ALBUTEROL SULFATE 3 ML: .5; 3 SOLUTION RESPIRATORY (INHALATION) at 10:39

## 2018-10-08 RX ADMIN — GUAIFENESIN 600 MG: 600 TABLET, EXTENDED RELEASE ORAL at 09:14

## 2018-10-08 RX ADMIN — DOXYCYCLINE 100 MG: 100 INJECTION, POWDER, LYOPHILIZED, FOR SOLUTION INTRAVENOUS at 20:54

## 2018-10-08 RX ADMIN — DOXYCYCLINE 100 MG: 100 INJECTION, POWDER, LYOPHILIZED, FOR SOLUTION INTRAVENOUS at 09:14

## 2018-10-08 RX ADMIN — MONTELUKAST SODIUM 10 MG: 10 TABLET, COATED ORAL at 20:55

## 2018-10-08 RX ADMIN — BUPRENORPHINE HYDROCHLORIDE AND NALOXONE HYDROCHLORIDE 2 TABLET: 8; 2 TABLET SUBLINGUAL at 09:14

## 2018-10-08 RX ADMIN — IPRATROPIUM BROMIDE AND ALBUTEROL SULFATE 3 ML: .5; 3 SOLUTION RESPIRATORY (INHALATION) at 02:27

## 2018-10-08 RX ADMIN — CEFTRIAXONE 1 G: 1 INJECTION, POWDER, FOR SOLUTION INTRAMUSCULAR; INTRAVENOUS at 09:14

## 2018-10-08 RX ADMIN — IPRATROPIUM BROMIDE AND ALBUTEROL SULFATE 3 ML: .5; 3 SOLUTION RESPIRATORY (INHALATION) at 14:12

## 2018-10-08 RX ADMIN — METHYLPREDNISOLONE SODIUM SUCCINATE 40 MG: 40 INJECTION, POWDER, FOR SOLUTION INTRAMUSCULAR; INTRAVENOUS at 16:25

## 2018-10-08 RX ADMIN — GUAIFENESIN 600 MG: 600 TABLET, EXTENDED RELEASE ORAL at 20:55

## 2018-10-08 RX ADMIN — BUDESONIDE 0.5 MG: 0.5 SUSPENSION RESPIRATORY (INHALATION) at 06:26

## 2018-10-08 RX ADMIN — Medication 1 CAPSULE: at 16:25

## 2018-10-08 RX ADMIN — IPRATROPIUM BROMIDE AND ALBUTEROL SULFATE 3 ML: .5; 3 SOLUTION RESPIRATORY (INHALATION) at 06:26

## 2018-10-08 NOTE — PROGRESS NOTES
Discharge Planning Assessment  JOSE M Tucker     Patient Name: Mary Monaco  MRN: [id number]  Today's Date: 10/8/2018    Admit Date: 10/4/2018      Discharge Plan     Row Name 10/08/18 1401       Plan    Plan Pt lives at home with significant other and plans to return home at discharge. Pt does not have home health or DME at this time. SS will follow and assist as needed.     Patient/Family in Agreement with Plan yes            Monica Cheng

## 2018-10-08 NOTE — SIGNIFICANT NOTE
Post abg results, per Dr. Robertson I brought bipap to patients room. Pt went outside to smoke per patient. I had explained  Wants her to wear. Patient stated she will try it later, but does not think she will be able to do it.   X-ray personal also came pt continued to go off the unit. X-ray was not done at this time as well

## 2018-10-08 NOTE — NURSING NOTE
O2 saturation 91% at rest on room air. Placed pt on 2L via NC and placed pulse ox probe on finger. Discussed the importance of leaving o2 and pulse ox sensor in place. Pt verbalized understanding.

## 2018-10-08 NOTE — PROGRESS NOTES
[facility] HOSPITALIST PROGRESS NOTE     Patient Identification:  Name:  Mary Monaco  Age:  38 y.o.  Sex:  female  :  1980  MRN:  [id number]  Visit Number:  [id number]  ROOM: [address] [place] [address]     Primary Care Provider:  Marciano Adams APRN    Length of stay:  3    Subjective     Chief Complaint   Patient presents with   • Cough   • Shortness of Breath       History of presenting illness:  39 yo female who was placed on the observation unit on 10/4/2018 with an acute COPD versus acute asthma exacerbation that caused acute hypoxic respiratory failure.  She then was moved to the hospitalist service on 10/5/2018 when she did not improve with doxcycline, solumedrol, and Duonebs every 6 hours.  Today, she continues to feel bad and has not been ambulating much due to shortness of air.  Compared to admission, she is feeling better but not compared to yesterday.  She has some centralized chest pain that is sharp, radiates in a straight lint to her back, and is worse with deep inspiration.  She is coughing with some production now.  She denies nausea and denies emesis as well as diarrhea and abdominal pain.  She continues to be on oxygen as her sats today were 87% on room air; she does not utilize home oxygen.    Objective     Current Hospital Meds:  budesonide 0.5 mg Nebulization Daily - RT   buprenorphine-naloxone 2 tablet Sublingual Daily   ceftriaxone 1 g Intravenous Q24H   doxycycline 100 mg Intravenous Q12H   enoxaparin 40 mg Subcutaneous Q24H   gabapentin 100 mg Oral Nightly   guaiFENesin 600 mg Oral Q12H   influenza vaccine 0.5 mL Intramuscular Once   ipratropium-albuterol 3 mL Nebulization Q4H - RT   methylPREDNISolone sodium succinate 40 mg Intravenous Q12H   montelukast 10 mg Oral Nightly   sodium chloride 3 mL Intravenous Q12H      ----------------------------------------------------------------------------------------------------------------------  Vital Signs:  Temp:  [98.2 °F (36.8  °C)-98.6 °F (37 °C)] 98.2 °F (36.8 °C)  Heart Rate:  [49-67] 62  Resp:  [16-20] 18  BP: (100-154)/(58-80) 154/77  SpO2:  [87 %-96 %] 87 %  on  Flow (L/min):  [2] 2;   Device (Oxygen Therapy): room air;nasal cannula  Body mass index is 40.17 kg/m².    Wt Readings from Last 3 Encounters:   10/07/18 99.6 kg (219 lb 9.6 oz)   05/25/18 99.8 kg (220 lb)   07/31/17 118 kg (260 lb)         Diet Regular  ----------------------------------------------------------------------------------------------------------------------  Physical exam:  Constitutional:  Well-developed and well-nourished.  Moderate respiratory distress.      HENT:  Head:  Normocephalic and atraumatic.  Mouth:  Moist mucous membranes.    Eyes:  Conjunctivae and EOM are normal.  Pupils are equal, round, and reactive to light.  No scleral icterus.    Neck:  Neck supple.  No JVD present.    Cardiovascular:  Normal rate, regular rhythm and normal heart sounds with no murmur.  Pulmonary/Chest:  Moderate respiratory distress, end expiratory wheezes and very end inspiratory phase, no crackles, with normal breath sounds and fair air movement and a prolonged expiratory phase.  Abdominal:  Soft.  Bowel sounds are normal.  No distension and no tenderness.   Musculoskeletal:  No edema, no tenderness, and no deformity.  No red or swollen joints anywhere.    Neurological:  Alert and oriented to person, place, and time.  No cranial nerve deficit.  No tongue deviation.  No facial droop.  No slurred speech.   Skin:  Skin is warm and dry. No rash noted. No pallor.   Peripheral vascular:  Strong pulses in all 4 extremities with no clubbing, no cyanosis, no edema.  Genitourinary:  No ruiz catheter in place.  ----------------------------------------------------------------------------------------------------------------------  Tele:  NS in the 50-60's.  I have personally reviewed/looked at the telemetry  strips.  ----------------------------------------------------------------------------------------------------------------------  Results from last 7 days  Lab Units 10/08/18  0454 10/07/18  0522 10/06/18  0514 10/06/18  0513 10/05/18  0426   CRP mg/dL  --  0.59 1.57*  --  4.80*   WBC 10*3/mm3 9.41 10.80  --  12.44 15.48*   HEMOGLOBIN g/dL 12.6 12.3  --  12.3 12.7   HEMATOCRIT % 39.8 39.1  --  38.4 38.6   MCV fL 86.7 87.3  --  86.9 84.6   MCHC g/dL 31.7* 31.5*  --  32.0* 32.9*   PLATELETS 10*3/mm3 187 201  --  228 235     Results from last 7 days  Lab Units 10/08/18  0454 10/07/18  0522 10/06/18  0513   SODIUM mmol/L 138 139 139   POTASSIUM mmol/L 4.1 4.2 4.3   CHLORIDE mmol/L 104 109 109   CO2 mmol/L 30.3 24.2* 20.8*   BUN mg/dL 17 13 13   CREATININE mg/dL 0.83 0.79 0.72   EGFR IF NONAFRICN AM mL/min/1.73 77 82 91   CALCIUM mg/dL 8.7 8.4 8.5   GLUCOSE mg/dL 90 103 131*   ALBUMIN g/dL 3.80 3.30* 3.50   BILIRUBIN mg/dL 0.2 0.1* 0.1*   ALK PHOS U/L 58 52 58   AST (SGOT) U/L 19 12 12   ALT (SGPT) U/L 35 10 8*   Estimated Creatinine Clearance: 102.4 mL/min (by C-G formula based on SCr of 0.83 mg/dL).    Results from last 7 days  Lab Units 10/05/18  1952 10/05/18  1520 10/05/18  0913   TROPONIN I ng/mL <0.006 <0.006 <0.006     Glucose   Date/Time Value Ref Range Status   10/08/2018 0748 121 70 - 130 mg/dL Final   10/07/2018 2052 129 70 - 130 mg/dL Final   10/07/2018 1637 110 70 - 130 mg/dL Final   10/07/2018 1135 200 (H) 70 - 130 mg/dL Final   10/07/2018 0737 110 70 - 130 mg/dL Final   10/06/2018 0957 131 (H) 70 - 130 mg/dL Final     Results from last 7 days  Lab Units 10/04/18  0717   NITRITE UA  Negative     Blood Culture   Date Value Ref Range Status   10/04/2018 No growth at 4 days  Preliminary   10/04/2018 No growth at 4 days  Preliminary          I have personally looked at the labs and they are summarized  above.  ----------------------------------------------------------------------------------------------------------------------  Imaging Results (last 24 hours)               No mention of right sided pulmonary nodules on CT scan with PE protocol from 10/23/2015.     I have personally reviewed the radiology images and read the final radiology report.    Assessment & Plan      -Acute COPD versus acute asthma exacerbation causing acute hypoxic respiratory failure  -Morbid obestiy, BMI 40.17 kg/m²  -2 mm and 3 mm right lung pulmonary nodules  -Possible hepatitis C, newly diagnosed with not enough Hep C virus RNA to do genotyping  -Peripheral neuropathy  -Tobacco smoking addiction  -Type 2 diabetes mellitus  -Essential hypertension  -Lupus  -Rheumatoid arthritis    I will consult pulmonology as the patient is not getting better as expected for more ideas about how to expedite her recovery.  She will likely need outpatient PFTs to see if she gets back to baseline and to see which diagnosis the patient has at baseline.  She will also need repeat CT scan in 6-12 months or per what pulmonology suggests.  Will continue the 2 liters of oxygen and try to wean her off of it if her sats are 90% or above.  Await the pulmonology consultation.  Will repeat labs in the morning.  The hepatitis C can be followed in the outpatient setting.    The patient is high risk due to the following diagnoses/reasons:  Acute hypoxic respiratory failure    Tayla Gutierrez MD  Hospital Medicine Team  10/08/18  11:17 AM  /.

## 2018-10-08 NOTE — CONSULTS
Referring Provider: Tayla Gutierrez MD  Reason for Consultation:  Acute COPD versus acute asthma exacerbation causing acute hypoxic respirator yfailure, tobacco use      Chief complaint  Shortness of breath with wheezing       History of present illness:      Ms. Monaco is a 38 year old female with history of asthma, COPD, diabetes mellitus, hepatitis C, previous cholelithiasis, hypertension, lupus, and rheumatoid arthritis.  She presented to the ED on 10/04/2018 by EMS due to shortness of breath, wheezing, and productive cough.  She stated that her symptoms started yesterday.  She tried using Ventolin and Advair with no improvement.  She was initially moved to the observation unit on 10/04/2018 for acute COPD versus acute asthma exacerbation with acute hypoxic respiratory failure.  He was later moved to the hospitalist service on 10/05/2008 after no improvement following doxycycline, Solu-Medrol, and duo nebs every 6 hours.    Upon assessment, she was sitting up in bed and tolerating nasal cannula.  No signs of acute distress noted.  She admits to continued shortness of breath with chest tightness, wheezing, and coughing.  States that her shortness of breath has been ongoing for a few years and had gradually worsened over the last few days.  She states that it is typically worse at night.  She states that the shortness of breath and is typically worse with movement and improves with rest.  She states that it occurs as flareups that typically occur for weeks at a time and then subside for several months.  She said that the shortness of breath exacerbations are also weather dependent.  She states that his attacks are typically worse during the nighttime.  She states that when she feels these episodes, they're typically accompanied by chest tightness.   She admits to cough that is wet sounding but nonproductive.  When she is able to produce sputum, and she states it is typically yellow in coloration.  She admits to  hemoptysis a few weeks ago, but states it was a very small amount and for only occurred one episode.  She also admits to wheezing that happens throughout the day but is typically worse at night.  He states it is also exacerbated by heat and dust.  Improves with use of her inhalers.  She denies any history of sharp chest pain.   She denies any seasonal allergies, runny nose, or nasal stuffiness.  She denies using any allergy medications.  She denies a history of snoring.  She admits to occasional morning headaches and unrefreshed sleep.  She admits to heartburn and states that she occasionally takes Prilosec, but not as recommended.  She is positive for smoking history of one pack per day for 20 years.  She states that she has not smoked in 8-9 days currently.  Her vocal quality is very soft, she states is her baseline.  She was previously diagnosed with COPD and asthma, but has no other known lung issues.   She has not followed up with pulmonologist in quite a while.  She admits to a family history positive for asthma in her father, and unknown type of cancer in her brother.  She states that her occupational history is negative, as she is disabled.  She denies any previous positive TB test.   She is not accompanied by any family during the visit.        Review Of Systems:    Review of Systems - History obtained from chart review and the patient  General ROS: negative for - chills, fatigue or fever  Psychological ROS: negative for - anxiety or depression  ENT ROS: positive for - vocal changes (chronic). negative for - nasal congestion, nasal discharge  Allergy and Immunology ROS: negative for - postnasal drip or seasonal allergies  Endocrine ROS: negative for - polydipsia/polyuria  Respiratory ROS: positive for - cough, pleuritic pain, sputum changes and wheezing  Cardiovascular ROS: negative for - chest pain or edema  Gastrointestinal ROS: negative for - abdominal pain or change in bowel habits  Musculoskeletal ROS:  negative for - joint pain or joint swelling  Neurological ROS: no TIA or stroke symptoms        History  I have personally reviewed the past medical history, past surgical history, social history, family history and smoking history  Past Medical History:   Diagnosis Date   • Asthma    • COPD (chronic obstructive pulmonary disease) (CMS/HCC)    • Diabetes mellitus (CMS/HCC)    • Hepatitis C    • History of gallstones    • Hypertension    • Lupus    • Rheumatoid arthritis (CMS/HCC)    , Past Surgical History:   Procedure Laterality Date   • ABDOMINAL SURGERY     • CHOLECYSTECTOMY     • PERINEAL LESION/CYST EXCISION N/A 4/3/2017    Procedure: EXCISION OF VAGINAL SKIN TAG;  Surgeon: Kee Crooks III, MD;  Location: [facility];  Service:    • TUBAL COAGULATION LAPAROSCOPIC Bilateral 4/3/2017    Procedure: BILATERAL TUBAL FALLOPE FILSHIE CLIPPING LAPAROSCOPIC,IUD REMOVAL;  Surgeon: Kee Crooks III, MD;  Location: [facility];  Service:    , History reviewed. No pertinent family history., Social History   Substance Use Topics   • Smoking status: Current Every Day Smoker     Packs/day: 1.00     Years: 15.00     Types: Cigarettes   • Smokeless tobacco: Never Used   • Alcohol use No   , Prescriptions Prior to Admission   Medication Sig Dispense Refill Last Dose   • buprenorphine-naloxone (SUBOXONE) 8-2 MG per SL tablet Place 2 tablets under the tongue Daily.   10/3/2018 at Unknown time   , Scheduled Meds:    acetylcysteine 3 mL Inhalation BID - RT   budesonide 0.5 mg Nebulization BID - RT   buprenorphine-naloxone 2 tablet Sublingual Daily   ceftriaxone 1 g Intravenous Q24H   cetirizine 10 mg Oral Daily   doxycycline 100 mg Intravenous Q12H   enoxaparin 40 mg Subcutaneous Q24H   fluticasone 2 spray Each Nare Daily   gabapentin 100 mg Oral Nightly   guaiFENesin 600 mg Oral Q12H   influenza vaccine 0.5 mL Intramuscular Once   ipratropium-albuterol 3 mL Nebulization Q4H - RT   lactobacillus acidophilus 1 capsule Oral Daily    methylPREDNISolone sodium succinate 40 mg Intravenous Q12H   montelukast 10 mg Oral Nightly   [START ON 10/9/2018] pantoprazole 40 mg Oral Q AM   sodium chloride 3 mL Intravenous Q12H   , Continuous Infusions:    and Allergies:  Patient has no known allergies.    Objective     Vital Signs   Temp:  [98.2 °F (36.8 °C)-98.6 °F (37 °C)] 98.2 °F (36.8 °C)  Heart Rate:  [49-64] 61  Resp:  [17-20] 18  BP: (100-154)/(62-80) 123/75  FiO2 (%):  [35 %] 35 %    Physical Exam:               GENERAL APPEARANCE: Well developed, well nourished, alert and cooperative, and appears to be in no acute distress.    HEAD: normocephalic.  Atraumatic    EYES: PERRL, EOMI. Fundi normal, vision is grossly intact.    THROAT: Oral cavity and pharynx normal. No inflammation, swelling, exudate, or lesions.     NECK: Neck supple.  No thyromegaly    CARDIAC: Normal S1 and S2. No S3, S4 or murmurs. Rhythm is regular. There is no peripheral edema, cyanosis or pallor. Extremities are warm and well perfused. Capillary refill is less than 2 seconds. No carotid bruits.    RESPIRATORY: Clear to auscultation without rales, rhonchi, wheezing or diminished breath sounds.    GI: Positive bowel sounds. Soft, nondistended, nontender.     MUSCULOSKELETAL: No significant deformity or joint abnormality. No edema. Peripheral pulses intact. No varicosities.    NEUROLOGICAL: Strength and sensation symmetric and intact throughout.     PSYCHIATRIC: The mental examination revealed the patient was oriented to person, place, and time.                Results Review:    LABS:    Lab Results   Component Value Date    GLUCOSE 90 10/08/2018    BUN 17 10/08/2018    CREATININE 0.83 10/08/2018    EGFRIFNONA 77 10/08/2018    BCR 20.5 10/08/2018    CO2 30.3 10/08/2018    CALCIUM 8.7 10/08/2018    ALBUMIN 3.80 10/08/2018    LABIL2 1.4 (L) 03/21/2016    AST 19 10/08/2018    ALT 35 10/08/2018    WBC 9.41 10/08/2018    HGB 12.6 10/08/2018    HCT 39.8 10/08/2018    MCV 86.7 10/08/2018      10/08/2018     10/08/2018    K 4.1 10/08/2018     10/08/2018    ANIONGAP 3.7 10/08/2018       Lab Results   Component Value Date    INR <0.90 12/22/2014    PROTIME 9.0 (L) 12/22/2014                           I reviewed the patient's new clinical results.  I reviewed the patient's new imaging results and agree with the interpretation.      Assessment/Plan    Currently she is on Pulmicort 0.5 mg daily, ceftriaxone, doxycycline and, Lovenox for DVT prophylaxis, ipratropium/albuterol nebs every 4 hours, 40 mg IV methylprednisolone every 12 hours, montelukast 10 mg by mouth daily.      She is a current smoker.  She smokes one pack per day for last 20 years.      I have personally reviewed the labs, chest imaging, CT scan of the chest, EKG and the last echo.  In summary,  CMP showed normal bicarbonate, no anion gap, normal creatinine, normal liver function test  CBC showed no leukocytosis.  Stable hemoglobin.  Platelet count stable.  Blood cultures obtained on 10/4/2018 were negative to date  CT scan performed on 10/5/2018 showed no pneumothorax, no pleural effusion, no parenchymal abnormality.   EKG done on 10/4/2018 showed normal sinus rhythm.  Echo performed on 10/5/2018 showed hyperdynamic LV.  Left atrial cavity size is mildly dilated.  Normal diastolic function and right ventricular systolic pressure.      Assessment:  - Acute hypoxia   - Acute exacerbation of COPD/asthma overlap syndrome   - Morbid obesity   - ARSEN [place] sleepiness score: 11 STOPBANG score: 5  - OHS   - GERD   - Allergic rhinitis   - Current smoker         Plan:  - ABG- ordered and reviewed  - BNP-ordered  - chest x-ray- ordered  - Cardiac enzymes every 6 hours for 24 hours- ordered  - D-dimer- ordered  - Ipratropium/albuterol nebs every 3 hours scheduled and then when necessary  - Increase Pulmicort from 0.5 mg daily inhalation to 0.5 mg twice a day   - Continue with IV methylprednisolone 40 mg every 12 hours.  Total  steroid duration is 10 days.  No taper required.  In upcoming days we will switch IV methylprednisolone to by mouth prednisone.  - Started her on by mouth Protonix while on high-dose steroids for GI prophylaxis  - Continue with Singulair 10 mg tablet daily  - Started Flonase nasal steroid spray  - Added cetirizine 10 mg tablet daily  - Thyroid function panel ordered  - Strongly recommend to avoid alcohol, sedatives and narcotics   - Strongly recommend weight loss program   - Started on BIPAP 15/8/50% to keep SpO2 ~90 mm Hg      Smoking cessation counseling  I extensively discussed consequences of smoking namely cardiovascular/metabolic, lung cancer, mouth cancer, pulmonary disorder including COPD and reduced quality of life.  At the end of the conversation, patient voices understanding of the risk involved in smoking.  Patient also understands the benefits of quitting.  I provided the patient smoking cessation material. Patient displayed understanding and stated that she will quit smoking.  During this visit I spent  more than 10 minutes counseling the patient regarding the smoking cessation.     Discussed/demonstrated inhaler technique.   Steps were discussed: Remove the cap from the inhaler and shake well.  Breathe out all the way.  Place the mouthpiece of the inhaler between your teeth and seal your lips tightly around it.  As you start to bleed in slowly, press down on the canister one time.  Keep breathing in as slowly and deeply as you can.  It should take about 5 seconds for you to completely breathe in.  Hold your breath for 10 seconds(count to 10 slowly) to allow the medication to reach the airways of the lung.  Repeat the above steps for each puff.  Weight about 1 minute between the puffs.  Replaced the cap on the inhaler when finished. Patient was advised to rinse the mouth after steroid inhalation to prevent fungal mucositis.  During this visit I spent more than 10 minutes demonstrating and discussing  inhaler technique.     ARSEN counseling:   The patient was extensively educated on the consequences of untreated obstructive sleep apnea namely cardiovascular/metabolic disorder, neurocognitive deficit, daytime sleepiness, motor vehicle accidents, depression, mood disorders and reduced quality of life.  At the end of conversation, the patient voices understanding of the disease process and treatment modality.  Patient also understands the risk of untreated obstructive sleep apnea and benefit benefits of the treatment. Counseling time was greater than 10 minutes.          Findings and my recommendations were discussed with patient, nursing staff and consulting provider  I have discussed the clinical plan with Dr. Gutierrez.    Juanis Rebollar PA-C  10/08/18  5:34 PM      Attestation: Scribed for Dr. Cote by Juanis Rebollar PA-C    IMarcelo M.D. attest that the above note accurately reflects the work and decisions made by me.  Patient was seen and evaluated by me, including history of present illness, physical exam, assessment, and treatment plan.  The above note was reviewed and edited by me.    Thank you for involving us in the care of the patient.    Marcelo Cote M.D  Pulmonary and Critical Care Medicine         Dragon disclaimer:  Much of this encounter note is an electronic transcription/translation of spoken language to printed text. The electronic translation of spoken language may permit erroneous, or at times, nonsensical words or phrases to be inadvertently transcribed; Although I have reviewed the note for such errors, some may still exist.

## 2018-10-08 NOTE — PLAN OF CARE
Problem: Chronic Obstructive Pulmonary Disease (Adult)  Goal: Signs and Symptoms of Listed Potential Problems Will be Absent, Minimized or Managed (Chronic Obstructive Pulmonary Disease)  Outcome: Ongoing (interventions implemented as appropriate)      Problem: Fall Risk (Adult)  Goal: Absence of Fall  Outcome: Ongoing (interventions implemented as appropriate)      Problem: Patient Care Overview  Goal: Plan of Care Review  Outcome: Ongoing (interventions implemented as appropriate)    Goal: Individualization and Mutuality  Outcome: Ongoing (interventions implemented as appropriate)    Goal: Discharge Needs Assessment  Outcome: Ongoing (interventions implemented as appropriate)    Goal: Interprofessional Rounds/Family Conf  Outcome: Ongoing (interventions implemented as appropriate)

## 2018-10-09 VITALS
OXYGEN SATURATION: 94 % | SYSTOLIC BLOOD PRESSURE: 149 MMHG | DIASTOLIC BLOOD PRESSURE: 67 MMHG | HEIGHT: 62 IN | BODY MASS INDEX: 40.56 KG/M2 | TEMPERATURE: 98.5 F | RESPIRATION RATE: 20 BRPM | WEIGHT: 220.4 LBS | HEART RATE: 85 BPM

## 2018-10-09 LAB
A-A DO2: 24.5 MMHG (ref 0–300)
ALBUMIN SERPL-MCNC: 3.6 G/DL (ref 3.5–5)
ALBUMIN/GLOB SERPL: 1.4 G/DL (ref 1.5–2.5)
ALP SERPL-CCNC: 60 U/L (ref 35–104)
ALT SERPL W P-5'-P-CCNC: 21 U/L (ref 10–36)
ANION GAP SERPL CALCULATED.3IONS-SCNC: 7.1 MMOL/L (ref 3.6–11.2)
ARTERIAL PATENCY WRIST A: POSITIVE
AST SERPL-CCNC: 10 U/L (ref 10–30)
ATMOSPHERIC PRESS: 731 MMHG
BACTERIA SPEC AEROBE CULT: NORMAL
BACTERIA SPEC AEROBE CULT: NORMAL
BASE EXCESS BLDA CALC-SCNC: 3.9 MMOL/L
BASOPHILS # BLD AUTO: 0.01 10*3/MM3 (ref 0–0.3)
BASOPHILS NFR BLD AUTO: 0.1 % (ref 0–2)
BDY SITE: ABNORMAL
BILIRUB SERPL-MCNC: 0.1 MG/DL (ref 0.2–1.8)
BODY TEMPERATURE: 98.6 C
BUN BLD-MCNC: 21 MG/DL (ref 7–21)
BUN/CREAT SERPL: 23.6 (ref 7–25)
CALCIUM SPEC-SCNC: 8.4 MG/DL (ref 7.7–10)
CHLORIDE SERPL-SCNC: 103 MMOL/L (ref 99–112)
CK MB SERPL-CCNC: 0.7 NG/ML (ref 0–5)
CK MB SERPL-CCNC: 0.78 NG/ML (ref 0–5)
CK MB SERPL-CCNC: 1.08 NG/ML (ref 0–5)
CK MB SERPL-RTO: 2 % (ref 0–3)
CK MB SERPL-RTO: 2.3 % (ref 0–3)
CK MB SERPL-RTO: 2.8 % (ref 0–3)
CK SERPL-CCNC: 30 U/L (ref 24–173)
CK SERPL-CCNC: 38 U/L (ref 24–173)
CK SERPL-CCNC: 39 U/L (ref 24–173)
CO2 SERPL-SCNC: 27.9 MMOL/L (ref 24.3–31.9)
COHGB MFR BLD: 2.3 % (ref 0–5)
CREAT BLD-MCNC: 0.89 MG/DL (ref 0.43–1.29)
DEPRECATED RDW RBC AUTO: 43.2 FL (ref 37–54)
EOSINOPHIL # BLD AUTO: 0 10*3/MM3 (ref 0–0.7)
EOSINOPHIL NFR BLD AUTO: 0 % (ref 0–5)
ERYTHROCYTE [DISTWIDTH] IN BLOOD BY AUTOMATED COUNT: 14.2 % (ref 11.5–14.5)
GFR SERPL CREATININE-BSD FRML MDRD: 71 ML/MIN/1.73
GLOBULIN UR ELPH-MCNC: 2.5 GM/DL
GLUCOSE BLD-MCNC: 113 MG/DL (ref 70–110)
GLUCOSE BLDC GLUCOMTR-MCNC: 112 MG/DL (ref 70–130)
HCO3 BLDA-SCNC: 28.5 MMOL/L (ref 22–26)
HCT VFR BLD AUTO: 38.3 % (ref 37–47)
HCT VFR BLD CALC: 44 % (ref 37–47)
HGB BLD-MCNC: 12.4 G/DL (ref 12–16)
HGB BLDA-MCNC: 14.8 G/DL (ref 12–16)
HOROWITZ INDEX BLD+IHG-RTO: 21 %
IMM GRANULOCYTES # BLD: 0.13 10*3/MM3 (ref 0–0.03)
IMM GRANULOCYTES NFR BLD: 1.2 % (ref 0–0.5)
LYMPHOCYTES # BLD AUTO: 1.27 10*3/MM3 (ref 1–3)
LYMPHOCYTES NFR BLD AUTO: 11.5 % (ref 21–51)
MAGNESIUM SERPL-MCNC: 1.8 MG/DL (ref 1.7–2.6)
MCH RBC QN AUTO: 27.4 PG (ref 27–33)
MCHC RBC AUTO-ENTMCNC: 32.4 G/DL (ref 33–37)
MCV RBC AUTO: 84.7 FL (ref 80–94)
METHGB BLD QL: 0.2 % (ref 0–3)
MODALITY: ABNORMAL
MONOCYTES # BLD AUTO: 0.88 10*3/MM3 (ref 0.1–0.9)
MONOCYTES NFR BLD AUTO: 7.9 % (ref 0–10)
NEUTROPHILS # BLD AUTO: 8.78 10*3/MM3 (ref 1.4–6.5)
NEUTROPHILS NFR BLD AUTO: 79.3 % (ref 30–70)
OSMOLALITY SERPL CALC.SUM OF ELEC: 279.5 MOSM/KG (ref 273–305)
OXYHGB MFR BLDV: 91.5 % (ref 85–100)
PCO2 BLDA: 42.6 MM HG (ref 35–45)
PH BLDA: 7.44 PH UNITS (ref 7.35–7.45)
PHOSPHATE SERPL-MCNC: 3.5 MG/DL (ref 2.7–4.5)
PLATELET # BLD AUTO: 185 10*3/MM3 (ref 130–400)
PMV BLD AUTO: 11.5 FL (ref 6–10)
PO2 BLDA: 68.1 MM HG (ref 80–100)
POTASSIUM BLD-SCNC: 3.8 MMOL/L (ref 3.5–5.3)
PROT SERPL-MCNC: 6.1 G/DL (ref 6–8)
RBC # BLD AUTO: 4.52 10*6/MM3 (ref 4.2–5.4)
SAO2 % BLDCOA: 93.8 % (ref 90–100)
SODIUM BLD-SCNC: 138 MMOL/L (ref 135–153)
TROPONIN I SERPL-MCNC: <0.006 NG/ML
TSH SERPL DL<=0.05 MIU/L-ACNC: 1.13 MIU/ML (ref 0.55–4.78)
WBC NRBC COR # BLD: 11.07 10*3/MM3 (ref 4.5–12.5)

## 2018-10-09 PROCEDURE — 84443 ASSAY THYROID STIM HORMONE: CPT | Performed by: INTERNAL MEDICINE

## 2018-10-09 PROCEDURE — 99239 HOSP IP/OBS DSCHRG MGMT >30: CPT | Performed by: INTERNAL MEDICINE

## 2018-10-09 PROCEDURE — 84100 ASSAY OF PHOSPHORUS: CPT | Performed by: INTERNAL MEDICINE

## 2018-10-09 PROCEDURE — 82550 ASSAY OF CK (CPK): CPT | Performed by: INTERNAL MEDICINE

## 2018-10-09 PROCEDURE — 85025 COMPLETE CBC W/AUTO DIFF WBC: CPT | Performed by: INTERNAL MEDICINE

## 2018-10-09 PROCEDURE — 94799 UNLISTED PULMONARY SVC/PX: CPT

## 2018-10-09 PROCEDURE — 63710000001 PREDNISONE PER 1 MG: Performed by: INTERNAL MEDICINE

## 2018-10-09 PROCEDURE — 82375 ASSAY CARBOXYHB QUANT: CPT | Performed by: INTERNAL MEDICINE

## 2018-10-09 PROCEDURE — 99232 SBSQ HOSP IP/OBS MODERATE 35: CPT | Performed by: INTERNAL MEDICINE

## 2018-10-09 PROCEDURE — 82553 CREATINE MB FRACTION: CPT | Performed by: INTERNAL MEDICINE

## 2018-10-09 PROCEDURE — 80053 COMPREHEN METABOLIC PANEL: CPT | Performed by: INTERNAL MEDICINE

## 2018-10-09 PROCEDURE — 94660 CPAP INITIATION&MGMT: CPT

## 2018-10-09 PROCEDURE — 84484 ASSAY OF TROPONIN QUANT: CPT | Performed by: INTERNAL MEDICINE

## 2018-10-09 PROCEDURE — 82962 GLUCOSE BLOOD TEST: CPT

## 2018-10-09 PROCEDURE — 25010000002 FUROSEMIDE PER 20 MG: Performed by: INTERNAL MEDICINE

## 2018-10-09 PROCEDURE — 82805 BLOOD GASES W/O2 SATURATION: CPT | Performed by: INTERNAL MEDICINE

## 2018-10-09 PROCEDURE — 83050 HGB METHEMOGLOBIN QUAN: CPT | Performed by: INTERNAL MEDICINE

## 2018-10-09 PROCEDURE — 83735 ASSAY OF MAGNESIUM: CPT | Performed by: INTERNAL MEDICINE

## 2018-10-09 PROCEDURE — 36600 WITHDRAWAL OF ARTERIAL BLOOD: CPT | Performed by: INTERNAL MEDICINE

## 2018-10-09 PROCEDURE — 25010000002 METHYLPREDNISOLONE PER 40 MG: Performed by: INTERNAL MEDICINE

## 2018-10-09 RX ORDER — CEFDINIR 300 MG/1
300 CAPSULE ORAL EVERY 12 HOURS SCHEDULED
Qty: 6 CAPSULE | Refills: 0 | Status: SHIPPED | OUTPATIENT
Start: 2018-10-09 | End: 2018-10-12

## 2018-10-09 RX ORDER — DOXYCYCLINE 100 MG/1
100 CAPSULE ORAL EVERY 12 HOURS SCHEDULED
Qty: 6 CAPSULE | Refills: 0 | Status: SHIPPED | OUTPATIENT
Start: 2018-10-09 | End: 2018-10-12

## 2018-10-09 RX ORDER — FUROSEMIDE 10 MG/ML
40 INJECTION INTRAMUSCULAR; INTRAVENOUS ONCE
Status: COMPLETED | OUTPATIENT
Start: 2018-10-09 | End: 2018-10-09

## 2018-10-09 RX ORDER — BENZONATATE 200 MG/1
200 CAPSULE ORAL 3 TIMES DAILY PRN
Qty: 20 CAPSULE | Refills: 0 | Status: SHIPPED | OUTPATIENT
Start: 2018-10-09 | End: 2020-02-23

## 2018-10-09 RX ORDER — PREDNISONE 20 MG/1
40 TABLET ORAL 2 TIMES DAILY WITH MEALS
Qty: 16 TABLET | Refills: 0 | Status: SHIPPED | OUTPATIENT
Start: 2018-10-09 | End: 2018-10-13

## 2018-10-09 RX ORDER — AMOXICILLIN AND CLAVULANATE POTASSIUM 875; 125 MG/1; MG/1
1 TABLET, FILM COATED ORAL EVERY 12 HOURS
Qty: 14 TABLET | Refills: 0 | Status: SHIPPED | OUTPATIENT
Start: 2018-10-09 | End: 2018-10-16

## 2018-10-09 RX ORDER — FLUTICASONE PROPIONATE 50 MCG
2 SPRAY, SUSPENSION (ML) NASAL DAILY
Qty: 16 G | Refills: 0 | Status: SHIPPED | OUTPATIENT
Start: 2018-10-09

## 2018-10-09 RX ORDER — PREDNISONE 20 MG/1
40 TABLET ORAL 2 TIMES DAILY WITH MEALS
Status: DISCONTINUED | OUTPATIENT
Start: 2018-10-09 | End: 2018-10-09 | Stop reason: HOSPADM

## 2018-10-09 RX ORDER — BUDESONIDE 0.5 MG/2ML
0.5 INHALANT ORAL
Qty: 60 EACH | Refills: 0 | Status: ON HOLD | OUTPATIENT
Start: 2018-10-09 | End: 2020-09-08

## 2018-10-09 RX ORDER — LORATADINE 10 MG/1
10 TABLET ORAL DAILY
Qty: 30 TABLET | Refills: 0 | Status: ON HOLD | OUTPATIENT
Start: 2018-10-09 | End: 2020-09-08

## 2018-10-09 RX ORDER — IPRATROPIUM BROMIDE AND ALBUTEROL SULFATE 2.5; .5 MG/3ML; MG/3ML
3 SOLUTION RESPIRATORY (INHALATION) EVERY 4 HOURS PRN
Qty: 360 ML | Refills: 0 | Status: ON HOLD | OUTPATIENT
Start: 2018-10-09 | End: 2020-09-08

## 2018-10-09 RX ORDER — DOXYCYCLINE 100 MG/1
100 CAPSULE ORAL EVERY 12 HOURS SCHEDULED
Status: DISCONTINUED | OUTPATIENT
Start: 2018-10-09 | End: 2018-10-09 | Stop reason: HOSPADM

## 2018-10-09 RX ORDER — PREDNISONE 20 MG/1
40 TABLET ORAL DAILY
Qty: 10 TABLET | Refills: 0 | Status: SHIPPED | OUTPATIENT
Start: 2018-10-09 | End: 2018-10-14

## 2018-10-09 RX ORDER — IPRATROPIUM BROMIDE AND ALBUTEROL SULFATE 2.5; .5 MG/3ML; MG/3ML
3 SOLUTION RESPIRATORY (INHALATION)
Qty: 18 ML | Refills: 0 | Status: SHIPPED | OUTPATIENT
Start: 2018-10-09 | End: 2020-02-23

## 2018-10-09 RX ORDER — MONTELUKAST SODIUM 10 MG/1
10 TABLET ORAL NIGHTLY
Qty: 30 TABLET | Refills: 0 | Status: SHIPPED | OUTPATIENT
Start: 2018-10-09 | End: 2020-02-23

## 2018-10-09 RX ORDER — CEFDINIR 300 MG/1
300 CAPSULE ORAL EVERY 12 HOURS SCHEDULED
Status: DISCONTINUED | OUTPATIENT
Start: 2018-10-09 | End: 2018-10-09 | Stop reason: HOSPADM

## 2018-10-09 RX ADMIN — DOXYCYCLINE 100 MG: 100 CAPSULE ORAL at 09:23

## 2018-10-09 RX ADMIN — IBUPROFEN 600 MG: 600 TABLET ORAL at 00:15

## 2018-10-09 RX ADMIN — METHYLPREDNISOLONE SODIUM SUCCINATE 40 MG: 40 INJECTION, POWDER, FOR SOLUTION INTRAMUSCULAR; INTRAVENOUS at 05:34

## 2018-10-09 RX ADMIN — GUAIFENESIN 600 MG: 600 TABLET, EXTENDED RELEASE ORAL at 08:50

## 2018-10-09 RX ADMIN — FLUTICASONE PROPIONATE 2 SPRAY: 50 SPRAY, METERED NASAL at 08:51

## 2018-10-09 RX ADMIN — CETIRIZINE HYDROCHLORIDE 10 MG: 10 TABLET, FILM COATED ORAL at 08:50

## 2018-10-09 RX ADMIN — CEFDINIR 300 MG: 300 CAPSULE ORAL at 09:23

## 2018-10-09 RX ADMIN — PREDNISONE 40 MG: 20 TABLET ORAL at 09:23

## 2018-10-09 RX ADMIN — IPRATROPIUM BROMIDE AND ALBUTEROL SULFATE 3 ML: .5; 3 SOLUTION RESPIRATORY (INHALATION) at 02:15

## 2018-10-09 RX ADMIN — BUPRENORPHINE HYDROCHLORIDE AND NALOXONE HYDROCHLORIDE 2 TABLET: 8; 2 TABLET SUBLINGUAL at 08:50

## 2018-10-09 RX ADMIN — IPRATROPIUM BROMIDE AND ALBUTEROL SULFATE 3 ML: .5; 3 SOLUTION RESPIRATORY (INHALATION) at 06:41

## 2018-10-09 RX ADMIN — IPRATROPIUM BROMIDE AND ALBUTEROL SULFATE 3 ML: .5; 3 SOLUTION RESPIRATORY (INHALATION) at 10:55

## 2018-10-09 RX ADMIN — ACETYLCYSTEINE 3 ML: 200 INHALANT RESPIRATORY (INHALATION) at 06:41

## 2018-10-09 RX ADMIN — Medication 3 ML: at 08:51

## 2018-10-09 RX ADMIN — BUDESONIDE 0.5 MG: 0.5 SUSPENSION RESPIRATORY (INHALATION) at 06:42

## 2018-10-09 RX ADMIN — PANTOPRAZOLE SODIUM 40 MG: 40 TABLET, DELAYED RELEASE ORAL at 05:34

## 2018-10-09 RX ADMIN — FUROSEMIDE 40 MG: 10 INJECTION, SOLUTION INTRAMUSCULAR; INTRAVENOUS at 09:23

## 2018-10-09 NOTE — PROGRESS NOTES
Discharge Planning Assessment   [facility]     Patient Name: Mary Monaco  MRN: [id number]  Today's Date: 10/9/2018    Admit Date: 10/4/2018       Discharge Plan     Row Name 10/09/18 1154       Plan    Final Discharge Disposition Code 01 - home or self-care    Final Note Pt to be discharged home on this date.  [facility] to transport pt home.      Row Name 10/09/18 0927       Plan    Final Discharge Disposition Code 01 - home or self-care    Final Note Pt is being discharged home on this date. Pt has no needs at this time.       Renetta Gilbert

## 2018-10-09 NOTE — DISCHARGE SUMMARY
[facility] HOSPITALISTS DISCHARGE SUMMARY    Patient Identification:  Name:  Mary Monaco  Age:  38 y.o.  Sex:  female  :  1980  MRN:  [id number]  Visit Number:  [id number]    Date of Admission: 10/4/2018  Date of Discharge:  10/9/2018     PCP: Marciano Adams, APRN    DISCHARGE DIAGNOSIS  -Acute COPD exacerbation versus acute asthma exacerbation causing acute hypoxic respiratory failure  -Morbid obestiy, BMI 40.17 kg/m²  -2 mm and 3 mm right lung pulmonary nodules  -Possible hepatitis C, newly diagnosed with not enough Hep C virus RNA to do genotyping  -Peripheral neuropathy  -Tobacco smoking addiction  -Type 2 diabetes mellitus  -Essential hypertension  -Lupus  -Rheumatoid arthritis    CONSULTS   Dr. Cote with pulmonology    PROCEDURES PERFORMED  None    HOSPITAL COURSE  Patient is a 38 y.o. female presented to [facility] complaining of cough and shortness of air.  She was placed in the observation unit for an acute COPD exacerbation; she was then moved to the hospitalist service when she failed observation care.  Please see the admitting history and physical for further details.  When she was in the observation unit, she was treated with doxycycline, solumedrol, and Duonebs.  CXR was negative for infiltrates.  Noncontrast CT scan of the chest was ordered to look for occult infiltrates but none were found; what was found was small nodules in the right lung.  Inhaled steroids were added as well as rocephin.  However, she did not improve quickly and thus pulmonology was consulted.  Dr. Cote added cetrizine, Flonase, and increased the Pulmicort.  With this, she did improve.  I think some of the lack of improvement was due to the patient going outside to smoke, though she would never admit to this activity.    She will need to follow up with pulmonology for outpatient PFTs to see if they normalize between flares and to follow the pulmonary nodules incidentally found on  the CT scan.    On the day of discharge, the patient was able to go outside to smoke, walking unaided.  She was able to tolerate her normal diet and was able to all of her activities of daily living.  She was able to walk without chest pain and without shortness of air.  She did not qualify for home oxygen on the day of discharge.    VITAL SIGNS:  Temp:  [98.2 °F (36.8 °C)-98.5 °F (36.9 °C)] 98.5 °F (36.9 °C)  Heart Rate:  [51-82] 55  Resp:  [17-20] 20  BP: (123-154)/(59-77) 149/67  FiO2 (%):  [35 %] 35 %  SpO2:  [87 %-96 %] 94 %  on  Flow (L/min):  [2-3] 3;   Device (Oxygen Therapy): room air    Body mass index is 40.31 kg/m².  Wt Readings from Last 3 Encounters:   10/09/18 100 kg (220 lb 6.4 oz)   05/25/18 99.8 kg (220 lb)   07/31/17 118 kg (260 lb)       PHYSICAL EXAM:  Constitutional:  Well-developed and well-nourished.  Moderate respiratory distress.      HENT:  Head:  Normocephalic and atraumatic.  Mouth:  Moist mucous membranes.    Eyes:  Conjunctivae and EOM are normal.  Pupils are equal, round, and reactive to light.  No scleral icterus.    Neck:  Neck supple.  No JVD present.    Cardiovascular:  Normal rate, regular rhythm and normal heart sounds with no murmur.  Pulmonary/Chest:  Moderate respiratory distress, end expiratory wheezes and very end inspiratory phase, no crackles, with normal breath sounds and fair air movement and a prolonged expiratory phase.  Abdominal:  Soft.  Bowel sounds are normal.  No distension and no tenderness.   Musculoskeletal:  No edema, no tenderness, and no deformity.  No red or swollen joints anywhere.    Neurological:  Alert and oriented to person, place, and time.  No cranial nerve deficit.  No tongue deviation.  No facial droop.  No slurred speech.   Skin:  Skin is warm and dry. No rash noted. No pallor.   Peripheral vascular:  Strong pulses in all 4 extremities with no clubbing, no cyanosis, no edema.  Genitourinary:  No ruiz catheter in place.      DISCHARGE DISPOSITION    Stable    DISCHARGE MEDICATIONS:  New Medications      Instructions Start Date   amoxicillin-clavulanate 875-125 MG per tablet  Commonly known as:  AUGMENTIN   Take 1 tablet by mouth every 12 hours for 7 days as part of COPD Rescue Kit. (Only Start if in YELLOW ZONE.)      benzonatate 200 MG capsule  Commonly known as:  TESSALON   200 mg, Oral, 3 Times Daily PRN      budesonide 0.5 MG/2ML nebulizer solution  Commonly known as:  PULMICORT   0.5 mg, Nebulization, 2 Times Daily - RT      cefdinir 300 MG capsule  Commonly known as:  OMNICEF   300 mg, Oral, Every 12 Hours Scheduled      doxycycline 100 MG capsule  Commonly known as:  MONODOX   100 mg, Oral, Every 12 Hours Scheduled      fluticasone 50 MCG/ACT nasal spray  Commonly known as:  FLONASE   2 sprays, Nasal, Daily      guaiFENesin 100 MG/5ML solution oral solution  Commonly known as:  ROBITUSSIN   200 mg, Oral, Every 4 Hours PRN      ipratropium-albuterol 0.5-2.5 mg/3 ml nebulizer  Commonly known as:  DUO-NEB   3 mL, Nebulization, Every 4 Hours PRN      ipratropium-albuterol 0.5-2.5 mg/3 ml nebulizer  Commonly known as:  DUO-NEB   Take 3 mL by neb every 30 minutes as needed for shortness of air for up to 6 doses. Part of COPD Rescue Kit. (Only Start if in YELLOW ZONE.)      loratadine 10 MG tablet  Commonly known as:  CLARITIN   10 mg, Oral, Daily      montelukast 10 MG tablet  Commonly known as:  SINGULAIR   10 mg, Oral, Nightly      predniSONE 20 MG tablet  Commonly known as:  DELTASONE   40 mg, Oral, 2 Times Daily With Meals      predniSONE 20 MG tablet  Commonly known as:  DELTASONE   Take 2 tablets daily for 5 days as part of COPD Rescue Kit. (Only Start if in YELLOW ZONE.)         Continue These Medications      Instructions Start Date   buprenorphine-naloxone 8-2 MG per SL tablet  Commonly known as:  SUBOXONE   2 tablets, Sublingual, Daily             Diet Instructions     Diet: Regular       Discharge Diet:  Regular        Activity Instructions      Activity as Tolerated           Follow-up Information     Marciano Adams APRN .    Specialty:  Internal Medicine  Contact information:  [address]  [facility] [address] [phone]                    TEST  RESULTS PENDING AT DISCHARGE:   Order Current Status    Blood Culture - Blood, Preliminary result    Blood Culture - Blood, Preliminary result           CODE STATUS  Code Status and Medical Interventions:   Ordered at: 10/04/18 0739     Code Status:    CPR     Medical Interventions (Level of Support Prior to Arrest):    Full       Tayla Gutierrez MD  10/09/18  8:55 AM    Please note that this discharge summary required more than 30 minutes to complete.    Please send a copy of this dictation to the following providers:  Marciano Adams APRN

## 2018-10-09 NOTE — PROGRESS NOTES
Discharge Planning Assessment  JOSE M Tucker     Patient Name: Mary Monaco  MRN: [id number]  Today's Date: 10/9/2018    Admit Date: 10/4/2018      Discharge Plan     Row Name 10/09/18 0927       Plan    Final Discharge Disposition Code 01 - home or self-care    Final Note Pt is being discharged home on this date. Pt has no needs at this time.         Expected Discharge Date and Time     Expected Discharge Date Expected Discharge Time    Oct 9, 2018             Monica Cheng

## 2018-10-09 NOTE — PROGRESS NOTES
LOS: 4 days     Chief Complaint:  Pulmonology is following for Acute COPD versus acute asthma exacerbation causing acute hypoxic respiratory failure, tobacco use    Subjective     Interval History:     Ms. Monaco was sitting up at bedside this morning upon assessment and had recently been up to use the restroom.  She is intermittently using the nasal cannula.  She stated that her nasal discharge has improved after starting Flonase and cetirizine.  She states that she's feeling a little better this morning.   She feels that her wheezing has improved.   ABG today on room air shows 7.44/42.6/68.1. No anion gap or elevated CO2.   WBC and absolute neutrophils have trended upward.  No fever reported overnight.   No family present this morning at bedside.       History taken from: patient chart RN    Review of Systems:     Review of Systems - History obtained from chart review and the patient  General ROS: negative for - chills, fatigue or fever  Psychological ROS: negative for - anxiety or depression  ENT ROS: positive for - vocal changes (chronic). negative for - nasal discharge or visual changes  Allergy and Immunology ROS: negative for - postnasal drip or seasonal allergies  Endocrine ROS: negative for - polydipsia/polyuria  Respiratory ROS: Positive for wheezing negative for - cough or shortness of breath  Cardiovascular ROS: negative for - chest pain, dyspnea on exertion or edema  Gastrointestinal ROS: negative for - abdominal pain or change in bowel habits  Musculoskeletal ROS: negative for - joint pain or joint swelling  Neurological ROS: no TIA or stroke symptoms                    Objective     Vital Signs  Temp:  [98.2 °F (36.8 °C)-98.5 °F (36.9 °C)] 98.5 °F (36.9 °C)  Heart Rate:  [51-85] 85  Resp:  [17-20] 20  BP: (123-149)/(59-75) 149/67  FiO2 (%):  [35 %] 35 %  Body mass index is 40.31 kg/m².    Intake/Output Summary (Last 24 hours) at 10/09/18 1113  Last data filed at 10/09/18 0811   Gross per 24 hour    Intake              600 ml   Output                0 ml   Net              600 ml     I/O this shift:  In: 360 [P.O.:360]  Out: -     Physical Exam:  GENERAL APPEARANCE: Well developed, well nourished, alert and cooperative, and appears to be in no acute distress.  Tolerating room air.    HEAD: normocephalic.  Atraumatic    EYES: PERRL. EOMI.  Vision grossly intact.    NECK: Neck supple.  No thyromegaly    CARDIAC: Normal S1 and S2. No S3, S4 or murmurs. Rhythm is regular. There is no peripheral edema, cyanosis or pallor. Extremities are warm and well perfused. Capillary refill is less than 2 seconds. No carotid bruits.    RESPIRATORY: Bilateral air entry positive.  Diminished breath sounds with bibasilar crackles noted at lung bases.  Diffuse wheezing upon auscultation     GI: Positive bowel sounds. Soft, nondistended, nontender.     MUSCULOSKELTAL: No significant deformity or joint abnormality. No edema. Peripheral pulses intact.     NEUROLOGICAL: Strength and sensation symmetric and intact throughout.     PSYCHIATRIC: The mental examination revealed the patient was oriented to person, place, and time.                   Results Review:                I reviewed the patient's new clinical results.  I reviewed the patient's new imaging results and agree with the interpretation.    Results from last 7 days  Lab Units 10/09/18  0434 10/08/18  0454 10/07/18  0522   WBC 10*3/mm3 11.07 9.41 10.80   HEMOGLOBIN g/dL 12.4 12.6 12.3   PLATELETS 10*3/mm3 185 187 201       Results from last 7 days  Lab Units 10/09/18  0434 10/08/18  0454 10/07/18  0522   SODIUM mmol/L 138 138 139   POTASSIUM mmol/L 3.8 4.1 4.2   CHLORIDE mmol/L 103 104 109   CO2 mmol/L 27.9 30.3 24.2*   BUN mg/dL 21 17 13   CREATININE mg/dL 0.89 0.83 0.79   CALCIUM mg/dL 8.4 8.7 8.4   GLUCOSE mg/dL 113* 90 103   MAGNESIUM mg/dL 1.8  --   --      Lab Results   Component Value Date    INR <0.90 12/22/2014    PROTIME 9.0 (L) 12/22/2014       Results from last 7  days  Lab Units 10/09/18  0434 10/08/18  0454 10/07/18  0522   ALK PHOS U/L 60 58 52   BILIRUBIN mg/dL 0.1* 0.2 0.1*   ALT (SGPT) U/L 21 35 10   AST (SGOT) U/L 10 19 12       Results from last 7 days  Lab Units 10/09/18  1009   PH, ARTERIAL pH units 7.443   PO2 ART mm Hg 68.1*   PCO2, ARTERIAL mm Hg 42.6   HCO3 ART mmol/L 28.5*     Imaging Results (last 24 hours)     Procedure Component Value Units Date/Time    XR Chest 1 View [[id number]] Collected:  10/09/18 0826     Updated:  10/09/18 0829    Narrative:       EXAMINATION: XR CHEST 1 VW-      CLINICAL INDICATION:     Shortness of breath; J44.1-Chronic obstructive  pulmonary disease with (acute) exacerbation     TECHNIQUE:  XR CHEST 1 VW-      COMPARISON: 10/4/2018      FINDINGS:        Left basilar opacities may represent atelectasis or pneumonia.   Heart size, mediastinum, and pulmonary vascularity are stable from  previous.   No pneumothorax.   No effusions.   Stable appearance of the bony structures.            Impression:       Development of left basilar opacities which may represent  atelectasis or pneumonia.     This report was finalized on 10/9/2018 8:27 AM by Dr. Phu Arauz MD.                Medication Review:   Scheduled Medications:    acetylcysteine 3 mL Inhalation BID - RT   budesonide 0.5 mg Nebulization BID - RT   buprenorphine-naloxone 2 tablet Sublingual Daily   cefdinir 300 mg Oral Q12H   cetirizine 10 mg Oral Daily   doxycycline 100 mg Oral Q12H   enoxaparin 40 mg Subcutaneous Q24H   fluticasone 2 spray Each Nare Daily   gabapentin 100 mg Oral Nightly   guaiFENesin 600 mg Oral Q12H   influenza vaccine 0.5 mL Intramuscular Once   ipratropium-albuterol 3 mL Nebulization Q4H - RT   lactobacillus acidophilus 1 capsule Oral Daily   montelukast 10 mg Oral Nightly   pantoprazole 40 mg Oral Q AM   predniSONE 40 mg Oral BID With Meals   sodium chloride 3 mL Intravenous Q12H     Continuous infusions:       Assessment/Plan     Patient Active Problem  List   Diagnosis Code   • COPD exacerbation (CMS/[facility]) J44.1         Juanis Rebollar PA-C  10/09/18  11:13 AM    I have seen and examined the patient personally and performed a face-to-face diagnostic evaluation. The plan of care was reviewed and developed with APRN and nursing staff. I have addended and/or modified the above history of present illness, physical examination, and assessment/plan to reflect my findings and impressions. Essential elements of the care plan was discussed with APRN above.  I agree with the findings and assessment/plan as documented below.    Assessment:  - Acute hypoxia currently on  32% FiO2  - Acute exacerbation of COPD/asthma overlap syndrome   - Morbid obesity   - ARSEN [place] sleepiness score: 11 STOPBANG score: 5  - OHS   - GERD   - Allergic rhinitis   - Current smoker         Plan:  - 1 dose of IV Lasix 40 mg ordered.  Depending upon the urine output in next 6 hours, we may repeat another dose of IV Lasix.  - ABG- ordered.  And reviewed  - BNP  reviewed.  It is raised from the baseline.  - Cardiac enzymes every 6 hours for 24 hours- reviewed  - [id number]  - Ipratropium/albuterol nebs every 3 hours scheduled and then when necessary  - Increase Pulmicort from 0.5 mg daily inhalation to 0.5 mg twice a day   - Continue with IV methylprednisolone 40 mg every 12 hours.  Total steroid duration is 10 days.  No taper required.  In upcoming days we will switch IV methylprednisolone to by mouth prednisone.  - Protonix while on high-dose steroids for GI prophylaxis  - Continue with Singulair 10 mg tablet daily  - Flonase nasal steroid spray  - cetirizine 10 mg tablet daily  - Thyroid function panel reviewed  - Strongly recommend to avoid alcohol, sedatives and narcotics   - Strongly recommend weight loss program   - BIPAP 15/8/50% to keep SpO2 ~90 mm Hg     I have discussed the clinical plan and the test results with Dr. Gutierrez.   Nurse and respiratory therapist was updated about the  clinical plan.  Thank you for involving us in the care of the patient.    Marcelo Cote M.D  Pulmonary and Critical Care Medicine         Dragon disclaimer:  Much of this encounter note is an electronic transcription/translation of spoken language to printed text. The electronic translation of spoken language may permit erroneous, or at times, nonsensical words or phrases to be inadvertently transcribed; Although I have reviewed the note for such errors, some may still exist.

## 2018-10-09 NOTE — PLAN OF CARE
Problem: Chronic Obstructive Pulmonary Disease (Adult)  Goal: Signs and Symptoms of Listed Potential Problems Will be Absent, Minimized or Managed (Chronic Obstructive Pulmonary Disease)  Outcome: Ongoing (interventions implemented as appropriate)   10/07/18 0802 10/07/18 2328   Goal/Outcome Evaluation   Problems Assessed (Chronic Obstructive Pulmonary Disease (COPD)) --  all   Problems Present (COPD, Bronch/Emphy) none --        Problem: Fall Risk (Adult)  Goal: Absence of Fall  Outcome: Ongoing (interventions implemented as appropriate)   10/09/18 0943   Fall Risk (Adult)   Absence of Fall making progress toward outcome       Problem: Patient Care Overview  Goal: Plan of Care Review  Outcome: Ongoing (interventions implemented as appropriate)   10/07/18 2328 10/08/18 2050   Coping/Psychosocial   Plan of Care Reviewed With --  patient   Plan of Care Review   Progress improving --       10/07/18 2328 10/08/18 2050   Coping/Psychosocial   Plan of Care Reviewed With --  patient   Plan of Care Review   Progress improving --      Goal: Discharge Needs Assessment  Outcome: Ongoing (interventions implemented as appropriate)   10/04/18 0800 10/04/18 2008   Discharge Needs Assessment   Concerns to be Addressed --  denies needs/concerns at this time   Patient/Family Anticipates Transition to --  home   Patient/Family Anticipated Services at Transition --  none   Transportation Concerns --  car, none   Transportation Anticipated --  public transportation   Disability   Equipment Currently Used at Home none --

## 2018-10-10 ENCOUNTER — READMISSION MANAGEMENT (OUTPATIENT)
Dept: CALL CENTER | Facility: HOSPITAL | Age: 38
End: 2018-10-10

## 2018-10-10 NOTE — PAYOR COMM NOTE
"[facility]  NPI:[id number]    Utilization Review  Contact: Katia Hamilton RN  Phone: [phone]  Fax:[phone]    DISCHARGE NOTIFICATION  DISCHARGE TO HOME ON 10/09/2018  AUTH # [id number]    Mary Abreu (38 y.o. Female)     Date of Birth Social Security Number Address Home Phone MRN    1980  [address] [facility] [address] [phone] [id number]    [place] Marital Status          None Legally        Admission Date Admission Type Admitting Provider Attending Provider Department, Room/Bed    10/4/18 Emergency Gutsavo Dooley MD  [address], 3314/1S    Discharge Date Discharge Disposition Discharge Destination        10/9/2018 Home or Self Care              Attending Provider:  (none)   Allergies:  No Known Allergies    Isolation:  None   Infection:  None   Code Status:  Prior    Ht:  157.5 cm (62\")   Wt:  100 kg (220 lb 6.4 oz)    Admission Cmt:  None   Principal Problem:  COPD exacerbation (CMS/[facility]) [J44.1]                 Active Insurance as of 10/4/2018     Primary Coverage     Payor Plan Insurance Group Employer/Plan Group    ANTHEM MEDICAID ANTHEM MEDICAID [id number]     Payor Plan Address Payor Plan Phone Number Effective From Effective To    [address] [address] [phone] 2017     [facility] [id number]       Subscriber Name Subscriber Birth Date Member ID       MARY ABREU 1980 [id number]                 Emergency Contacts      (Rel.) Home Phone Work Phone Mobile Phone    Moris Johnson (Significant Other) [phone] -- [phone]    Monica Barajas (Mother) [phone] -- --               Discharge Summary      Tayla Gutierrez MD at 10/9/2018  8:55 AM              [facility] HOSPITALISTS DISCHARGE SUMMARY    Patient Identification:  Name:  Mary Abreu  Age:  38 y.o.  Sex:  female  :  1980  MRN:  [id number]  Visit Number:  [id number]    Date of Admission: 10/4/2018  Date of " Discharge:  10/9/2018     PCP: Marciano Adams, APRN    DISCHARGE DIAGNOSIS  -Acute COPD exacerbation versus acute asthma exacerbation causing acute hypoxic respiratory failure  -Morbid obestiy, BMI 40.17 kg/m²  -2 mm and 3 mm right lung pulmonary nodules  -Possible hepatitis C, newly diagnosed with not enough Hep C virus RNA to do genotyping  -Peripheral neuropathy  -Tobacco smoking addiction  -Type 2 diabetes mellitus  -Essential hypertension  -Lupus  -Rheumatoid arthritis    CONSULTS   Dr. Cote with pulmonology    PROCEDURES PERFORMED  None    HOSPITAL COURSE  Patient is a 38 y.o. female presented to [facility] complaining of cough and shortness of air.  She was placed in the observation unit for an acute COPD exacerbation; she was then moved to the hospitalist service when she failed observation care.  Please see the admitting history and physical for further details.  When she was in the observation unit, she was treated with doxycycline, solumedrol, and Duonebs.  CXR was negative for infiltrates.  Noncontrast CT scan of the chest was ordered to look for occult infiltrates but none were found; what was found was small nodules in the right lung.  Inhaled steroids were added as well as rocephin.  However, she did not improve quickly and thus pulmonology was consulted.  Dr. Cote added cetrizine, Flonase, and increased the Pulmicort.  With this, she did improve.  I think some of the lack of improvement was due to the patient going outside to smoke, though she would never admit to this activity.    She will need to follow up with pulmonology for outpatient PFTs to see if they normalize between flares and to follow the pulmonary nodules incidentally found on the CT scan.    On the day of discharge, the patient was able to go outside to smoke, walking unaided.  She was able to tolerate her normal diet and was able to all of her activities of daily living.  She was able to walk without chest pain  and without shortness of air.  She did not qualify for home oxygen on the day of discharge.    VITAL SIGNS:  Temp:  [98.2 °F (36.8 °C)-98.5 °F (36.9 °C)] 98.5 °F (36.9 °C)  Heart Rate:  [51-82] 55  Resp:  [17-20] 20  BP: (123-154)/(59-77) 149/67  FiO2 (%):  [35 %] 35 %  SpO2:  [87 %-96 %] 94 %  on  Flow (L/min):  [2-3] 3;   Device (Oxygen Therapy): room air    Body mass index is 40.31 kg/m².  Wt Readings from Last 3 Encounters:   10/09/18 100 kg (220 lb 6.4 oz)   05/25/18 99.8 kg (220 lb)   07/31/17 118 kg (260 lb)       PHYSICAL EXAM:  Constitutional:  Well-developed and well-nourished.  Moderate respiratory distress.      HENT:  Head:  Normocephalic and atraumatic.  Mouth:  Moist mucous membranes.    Eyes:  Conjunctivae and EOM are normal.  Pupils are equal, round, and reactive to light.  No scleral icterus.    Neck:  Neck supple.  No JVD present.    Cardiovascular:  Normal rate, regular rhythm and normal heart sounds with no murmur.  Pulmonary/Chest:  Moderate respiratory distress, end expiratory wheezes and very end inspiratory phase, no crackles, with normal breath sounds and fair air movement and a prolonged expiratory phase.  Abdominal:  Soft.  Bowel sounds are normal.  No distension and no tenderness.   Musculoskeletal:  No edema, no tenderness, and no deformity.  No red or swollen joints anywhere.    Neurological:  Alert and oriented to person, place, and time.  No cranial nerve deficit.  No tongue deviation.  No facial droop.  No slurred speech.   Skin:  Skin is warm and dry. No rash noted. No pallor.   Peripheral vascular:  Strong pulses in all 4 extremities with no clubbing, no cyanosis, no edema.  Genitourinary:  No ruiz catheter in place.      DISCHARGE DISPOSITION   Stable    DISCHARGE MEDICATIONS:  New Medications      Instructions Start Date   amoxicillin-clavulanate 875-125 MG per tablet  Commonly known as:  AUGMENTIN   Take 1 tablet by mouth every 12 hours for 7 days as part of COPD Rescue Kit.  (Only Start if in YELLOW ZONE.)      benzonatate 200 MG capsule  Commonly known as:  TESSALON   200 mg, Oral, 3 Times Daily PRN      budesonide 0.5 MG/2ML nebulizer solution  Commonly known as:  PULMICORT   0.5 mg, Nebulization, 2 Times Daily - RT      cefdinir 300 MG capsule  Commonly known as:  OMNICEF   300 mg, Oral, Every 12 Hours Scheduled      doxycycline 100 MG capsule  Commonly known as:  MONODOX   100 mg, Oral, Every 12 Hours Scheduled      fluticasone 50 MCG/ACT nasal spray  Commonly known as:  FLONASE   2 sprays, Nasal, Daily      guaiFENesin 100 MG/5ML solution oral solution  Commonly known as:  ROBITUSSIN   200 mg, Oral, Every 4 Hours PRN      ipratropium-albuterol 0.5-2.5 mg/3 ml nebulizer  Commonly known as:  DUO-NEB   3 mL, Nebulization, Every 4 Hours PRN      ipratropium-albuterol 0.5-2.5 mg/3 ml nebulizer  Commonly known as:  DUO-NEB   Take 3 mL by neb every 30 minutes as needed for shortness of air for up to 6 doses. Part of COPD Rescue Kit. (Only Start if in YELLOW ZONE.)      loratadine 10 MG tablet  Commonly known as:  CLARITIN   10 mg, Oral, Daily      montelukast 10 MG tablet  Commonly known as:  SINGULAIR   10 mg, Oral, Nightly      predniSONE 20 MG tablet  Commonly known as:  DELTASONE   40 mg, Oral, 2 Times Daily With Meals      predniSONE 20 MG tablet  Commonly known as:  DELTASONE   Take 2 tablets daily for 5 days as part of COPD Rescue Kit. (Only Start if in YELLOW ZONE.)         Continue These Medications      Instructions Start Date   buprenorphine-naloxone 8-2 MG per SL tablet  Commonly known as:  SUBOXONE   2 tablets, Sublingual, Daily             Diet Instructions     Diet: Regular       Discharge Diet:  Regular        Activity Instructions     Activity as Tolerated           Follow-up Information     Marciano Adams, APRN .    Specialty:  Internal Medicine  Contact information:  [address]  [facility] [address] [phone]                    TEST  RESULTS PENDING AT  DISCHARGE:   Order Current Status    Blood Culture - Blood, Preliminary result    Blood Culture - Blood, Preliminary result           CODE STATUS  Code Status and Medical Interventions:   Ordered at: 10/04/18 0739     Code Status:    CPR     Medical Interventions (Level of Support Prior to Arrest):    Full       Tayla Gutierrez MD  10/09/18  8:55 AM    Please note that this discharge summary required more than 30 minutes to complete.    Please send a copy of this dictation to the following providers:  Marciano Adams APRN      Electronically signed by Tayla Gutierrez MD at 10/10/2018  2:38 PM

## 2018-10-10 NOTE — OUTREACH NOTE
Prep Survey      Responses   Facility patient discharged from?  [facility]   Is patient eligible?  Yes   Discharge diagnosis  COPD Exac., hx. of asthma,COPD, DM, Hep. C, HTN, lupus and rheumatoid arthritis   Does the patient have one of the following disease processes/diagnoses(primary or secondary)?  COPD/Pneumonia   Does the patient have Home health ordered?  No   Is there a DME ordered?  No   Comments regarding appointments  Pt. to schedule follow up appointment with Marciano WALKER in [place]   Prep survey completed?  Yes          Monica Pulliam RN

## 2018-10-12 ENCOUNTER — READMISSION MANAGEMENT (OUTPATIENT)
Dept: CALL CENTER | Facility: HOSPITAL | Age: 38
End: 2018-10-12

## 2018-10-12 NOTE — OUTREACH NOTE
COPD/PN Week 1 Survey      Responses   Facility patient discharged from?  [facility]   Does the patient have one of the following disease processes/diagnoses(primary or secondary)?  COPD/Pneumonia   Is there a successful TCM telephone encounter documented?  No   Was the primary reason for admission:  COPD exacerbation   Week 1 attempt successful?  No   Unsuccessful attempts  Attempt 1          Brittnee Carrera, RN

## 2018-10-15 ENCOUNTER — READMISSION MANAGEMENT (OUTPATIENT)
Dept: CALL CENTER | Facility: HOSPITAL | Age: 38
End: 2018-10-15

## 2018-10-15 NOTE — OUTREACH NOTE
COPD/PN Week 1 Survey      Responses   Facility patient discharged from?  [facility]   Does the patient have one of the following disease processes/diagnoses(primary or secondary)?  COPD/Pneumonia   Is there a successful TCM telephone encounter documented?  No   Was the primary reason for admission:  COPD exacerbation   Week 1 attempt successful?  No   Unsuccessful attempts  Attempt 2          Marciano Ramesh RN

## 2018-10-19 ENCOUNTER — READMISSION MANAGEMENT (OUTPATIENT)
Dept: CALL CENTER | Facility: HOSPITAL | Age: 38
End: 2018-10-19

## 2018-10-19 NOTE — OUTREACH NOTE
COPD/PN Week 2 Survey      Responses   Facility patient discharged from?  [facility]   Does the patient have one of the following disease processes/diagnoses(primary or secondary)?  COPD/Pneumonia   Was the primary reason for admission:  COPD exacerbation   Week 2 attempt successful?  No   Unsuccessful attempts  Attempt 1          Marilyn Whipple RN

## 2018-10-22 ENCOUNTER — READMISSION MANAGEMENT (OUTPATIENT)
Dept: CALL CENTER | Facility: HOSPITAL | Age: 38
End: 2018-10-22

## 2018-10-22 NOTE — OUTREACH NOTE
COPD/PN Week 2 Survey      Responses   Facility patient discharged from?  [facility]   Does the patient have one of the following disease processes/diagnoses(primary or secondary)?  COPD/Pneumonia   Was the primary reason for admission:  COPD exacerbation   Week 2 attempt successful?  No   Unsuccessful attempts  Attempt 2          Marciano Ramesh RN

## 2018-12-17 ENCOUNTER — TRANSCRIBE ORDERS (OUTPATIENT)
Dept: ADMINISTRATIVE | Facility: HOSPITAL | Age: 38
End: 2018-12-17

## 2018-12-17 ENCOUNTER — LAB (OUTPATIENT)
Dept: LAB | Facility: HOSPITAL | Age: 38
End: 2018-12-17

## 2018-12-17 DIAGNOSIS — F11.93 OPIOID WITHDRAWAL (HCC): Primary | ICD-10-CM

## 2018-12-17 DIAGNOSIS — F11.93 OPIOID WITHDRAWAL (HCC): ICD-10-CM

## 2018-12-17 LAB
ALBUMIN SERPL-MCNC: 4.5 G/DL (ref 3.5–5)
ALBUMIN/GLOB SERPL: 1.7 G/DL (ref 1.5–2.5)
ALP SERPL-CCNC: 84 U/L (ref 35–104)
ALT SERPL W P-5'-P-CCNC: 41 U/L (ref 10–36)
ANION GAP SERPL CALCULATED.3IONS-SCNC: 5.8 MMOL/L (ref 3.6–11.2)
AST SERPL-CCNC: 42 U/L (ref 10–30)
BILIRUB SERPL-MCNC: 0.2 MG/DL (ref 0.2–1.8)
BUN BLD-MCNC: 9 MG/DL (ref 7–21)
BUN/CREAT SERPL: 11.8 (ref 7–25)
CALCIUM SPEC-SCNC: 8.6 MG/DL (ref 7.7–10)
CHLORIDE SERPL-SCNC: 105 MMOL/L (ref 99–112)
CO2 SERPL-SCNC: 27.2 MMOL/L (ref 24.3–31.9)
CREAT BLD-MCNC: 0.76 MG/DL (ref 0.43–1.29)
DEPRECATED RDW RBC AUTO: 46.8 FL (ref 37–54)
ERYTHROCYTE [DISTWIDTH] IN BLOOD BY AUTOMATED COUNT: 15 % (ref 11.5–14.5)
ERYTHROCYTE [SEDIMENTATION RATE] IN BLOOD: 19 MM/HR (ref 0–20)
GFR SERPL CREATININE-BSD FRML MDRD: 86 ML/MIN/1.73
GLOBULIN UR ELPH-MCNC: 2.7 GM/DL
GLUCOSE BLD-MCNC: 85 MG/DL (ref 70–110)
HAV IGM SERPL QL IA: ABNORMAL
HBV CORE IGM SERPL QL IA: ABNORMAL
HBV SURFACE AG SERPL QL IA: ABNORMAL
HCT VFR BLD AUTO: 42.3 % (ref 37–47)
HCV AB SER DONR QL: REACTIVE
HGB BLD-MCNC: 14 G/DL (ref 12–16)
HIV1+2 AB SER QL: NORMAL
MCH RBC QN AUTO: 28.3 PG (ref 27–33)
MCHC RBC AUTO-ENTMCNC: 33.1 G/DL (ref 33–37)
MCV RBC AUTO: 85.5 FL (ref 80–94)
OSMOLALITY SERPL CALC.SUM OF ELEC: 273.6 MOSM/KG (ref 273–305)
PLATELET # BLD AUTO: 220 10*3/MM3 (ref 130–400)
PMV BLD AUTO: 11.2 FL (ref 6–10)
POTASSIUM BLD-SCNC: 4 MMOL/L (ref 3.5–5.3)
PROT SERPL-MCNC: 7.2 G/DL (ref 6–8)
RBC # BLD AUTO: 4.95 10*6/MM3 (ref 4.2–5.4)
SODIUM BLD-SCNC: 138 MMOL/L (ref 135–153)
WBC NRBC COR # BLD: 6.29 10*3/MM3 (ref 4.5–12.5)

## 2018-12-17 PROCEDURE — 85652 RBC SED RATE AUTOMATED: CPT

## 2018-12-17 PROCEDURE — 85027 COMPLETE CBC AUTOMATED: CPT

## 2018-12-17 PROCEDURE — 36415 COLL VENOUS BLD VENIPUNCTURE: CPT

## 2018-12-17 PROCEDURE — 80074 ACUTE HEPATITIS PANEL: CPT

## 2018-12-17 PROCEDURE — 80053 COMPREHEN METABOLIC PANEL: CPT

## 2018-12-17 PROCEDURE — G0432 EIA HIV-1/HIV-2 SCREEN: HCPCS

## 2019-02-12 ENCOUNTER — APPOINTMENT (OUTPATIENT)
Dept: GENERAL RADIOLOGY | Facility: HOSPITAL | Age: 39
End: 2019-02-12

## 2019-02-12 ENCOUNTER — HOSPITAL ENCOUNTER (EMERGENCY)
Facility: HOSPITAL | Age: 39
Discharge: HOME | End: 2019-02-12
Attending: FAMILY MEDICINE | Admitting: FAMILY MEDICINE

## 2019-02-12 VITALS
HEIGHT: 67 IN | TEMPERATURE: 98.2 F | DIASTOLIC BLOOD PRESSURE: 59 MMHG | OXYGEN SATURATION: 93 % | HEART RATE: 60 BPM | RESPIRATION RATE: 20 BRPM | BODY MASS INDEX: 40.81 KG/M2 | SYSTOLIC BLOOD PRESSURE: 123 MMHG | WEIGHT: 260 LBS

## 2019-02-12 DIAGNOSIS — J44.1 COPD EXACERBATION (HCC): Primary | ICD-10-CM

## 2019-02-12 LAB
ALBUMIN SERPL-MCNC: 4.1 G/DL (ref 3.5–5)
ALBUMIN/GLOB SERPL: 1.2 G/DL (ref 1.5–2.5)
ALP SERPL-CCNC: 64 U/L (ref 35–104)
ALT SERPL W P-5'-P-CCNC: 12 U/L (ref 10–36)
ANION GAP SERPL CALCULATED.3IONS-SCNC: 3.7 MMOL/L (ref 3.6–11.2)
AST SERPL-CCNC: 28 U/L (ref 10–30)
BASOPHILS # BLD AUTO: 0.02 10*3/MM3 (ref 0–0.3)
BASOPHILS NFR BLD AUTO: 0.3 % (ref 0–2)
BILIRUB SERPL-MCNC: 0.3 MG/DL (ref 0.2–1.8)
BUN BLD-MCNC: 9 MG/DL (ref 7–21)
BUN/CREAT SERPL: 12 (ref 7–25)
CALCIUM SPEC-SCNC: 8.7 MG/DL (ref 7.7–10)
CHLORIDE SERPL-SCNC: 109 MMOL/L (ref 99–112)
CO2 SERPL-SCNC: 23.3 MMOL/L (ref 24.3–31.9)
CREAT BLD-MCNC: 0.75 MG/DL (ref 0.43–1.29)
DEPRECATED RDW RBC AUTO: 43.6 FL (ref 37–54)
EOSINOPHIL # BLD AUTO: 0.18 10*3/MM3 (ref 0–0.7)
EOSINOPHIL NFR BLD AUTO: 2.7 % (ref 0–5)
ERYTHROCYTE [DISTWIDTH] IN BLOOD BY AUTOMATED COUNT: 14.4 % (ref 11.5–14.5)
FLUAV AG NPH QL: NEGATIVE
FLUBV AG NPH QL IA: NEGATIVE
GFR SERPL CREATININE-BSD FRML MDRD: 87 ML/MIN/1.73
GLOBULIN UR ELPH-MCNC: 3.3 GM/DL
GLUCOSE BLD-MCNC: 83 MG/DL (ref 70–110)
HCT VFR BLD AUTO: 43.3 % (ref 37–47)
HGB BLD-MCNC: 14 G/DL (ref 12–16)
HOLD SPECIMEN: NORMAL
HOLD SPECIMEN: NORMAL
IMM GRANULOCYTES # BLD AUTO: 0 10*3/MM3 (ref 0–0.03)
IMM GRANULOCYTES NFR BLD AUTO: 0 % (ref 0–0.5)
LYMPHOCYTES # BLD AUTO: 1.25 10*3/MM3 (ref 1–3)
LYMPHOCYTES NFR BLD AUTO: 19.1 % (ref 21–51)
MCH RBC QN AUTO: 27.2 PG (ref 27–33)
MCHC RBC AUTO-ENTMCNC: 32.3 G/DL (ref 33–37)
MCV RBC AUTO: 84.1 FL (ref 80–94)
MONOCYTES # BLD AUTO: 0.61 10*3/MM3 (ref 0.1–0.9)
MONOCYTES NFR BLD AUTO: 9.3 % (ref 0–10)
NEUTROPHILS # BLD AUTO: 4.49 10*3/MM3 (ref 1.4–6.5)
NEUTROPHILS NFR BLD AUTO: 68.6 % (ref 30–70)
OSMOLALITY SERPL CALC.SUM OF ELEC: 269.8 MOSM/KG (ref 273–305)
PLATELET # BLD AUTO: 250 10*3/MM3 (ref 130–400)
PMV BLD AUTO: 10.1 FL (ref 6–10)
POTASSIUM BLD-SCNC: 5.4 MMOL/L (ref 3.5–5.3)
PROT SERPL-MCNC: 7.4 G/DL (ref 6–8)
RBC # BLD AUTO: 5.15 10*6/MM3 (ref 4.2–5.4)
S PYO AG THROAT QL: NEGATIVE
SODIUM BLD-SCNC: 136 MMOL/L (ref 135–153)
WBC NRBC COR # BLD: 6.55 10*3/MM3 (ref 4.5–12.5)
WHOLE BLOOD HOLD SPECIMEN: NORMAL
WHOLE BLOOD HOLD SPECIMEN: NORMAL

## 2019-02-12 PROCEDURE — 71046 X-RAY EXAM CHEST 2 VIEWS: CPT

## 2019-02-12 PROCEDURE — 87081 CULTURE SCREEN ONLY: CPT | Performed by: FAMILY MEDICINE

## 2019-02-12 PROCEDURE — 36415 COLL VENOUS BLD VENIPUNCTURE: CPT

## 2019-02-12 PROCEDURE — 99284 EMERGENCY DEPT VISIT MOD MDM: CPT

## 2019-02-12 PROCEDURE — 87880 STREP A ASSAY W/OPTIC: CPT | Performed by: FAMILY MEDICINE

## 2019-02-12 PROCEDURE — 94640 AIRWAY INHALATION TREATMENT: CPT

## 2019-02-12 PROCEDURE — 85025 COMPLETE CBC W/AUTO DIFF WBC: CPT | Performed by: PHYSICIAN ASSISTANT

## 2019-02-12 PROCEDURE — 80053 COMPREHEN METABOLIC PANEL: CPT | Performed by: PHYSICIAN ASSISTANT

## 2019-02-12 PROCEDURE — 96374 THER/PROPH/DIAG INJ IV PUSH: CPT

## 2019-02-12 PROCEDURE — 25010000002 METHYLPREDNISOLONE PER 125 MG: Performed by: PHYSICIAN ASSISTANT

## 2019-02-12 PROCEDURE — 87804 INFLUENZA ASSAY W/OPTIC: CPT | Performed by: FAMILY MEDICINE

## 2019-02-12 PROCEDURE — 71046 X-RAY EXAM CHEST 2 VIEWS: CPT | Performed by: RADIOLOGY

## 2019-02-12 PROCEDURE — 94799 UNLISTED PULMONARY SVC/PX: CPT

## 2019-02-12 RX ORDER — SODIUM CHLORIDE 0.9 % (FLUSH) 0.9 %
10 SYRINGE (ML) INJECTION AS NEEDED
Status: DISCONTINUED | OUTPATIENT
Start: 2019-02-12 | End: 2019-02-13 | Stop reason: HOSPADM

## 2019-02-12 RX ORDER — METHYLPREDNISOLONE SODIUM SUCCINATE 125 MG/2ML
80 INJECTION, POWDER, LYOPHILIZED, FOR SOLUTION INTRAMUSCULAR; INTRAVENOUS ONCE
Status: COMPLETED | OUTPATIENT
Start: 2019-02-12 | End: 2019-02-12

## 2019-02-12 RX ORDER — METHYLPREDNISOLONE 4 MG/1
TABLET ORAL
Qty: 21 TABLET | Refills: 0 | Status: SHIPPED | OUTPATIENT
Start: 2019-02-12 | End: 2020-02-23

## 2019-02-12 RX ORDER — LEVOFLOXACIN 500 MG/1
500 TABLET, FILM COATED ORAL DAILY
Qty: 7 TABLET | Refills: 0 | Status: SHIPPED | OUTPATIENT
Start: 2019-02-12 | End: 2020-02-23

## 2019-02-12 RX ORDER — ALBUTEROL SULFATE 2.5 MG/3ML
2.5 SOLUTION RESPIRATORY (INHALATION) ONCE
Status: COMPLETED | OUTPATIENT
Start: 2019-02-12 | End: 2019-02-12

## 2019-02-12 RX ADMIN — ALBUTEROL SULFATE 2.5 MG: 2.5 SOLUTION RESPIRATORY (INHALATION) at 20:48

## 2019-02-12 RX ADMIN — METHYLPREDNISOLONE SODIUM SUCCINATE 80 MG: 125 INJECTION, POWDER, FOR SOLUTION INTRAMUSCULAR; INTRAVENOUS at 21:00

## 2019-02-13 NOTE — ED PROVIDER NOTES
Subjective     History provided by:  Patient  URI   Presenting symptoms: congestion and cough    Presenting symptoms: no fever    Severity:  Moderate  Onset quality:  Gradual  Duration:  2 weeks  Timing:  Constant  Progression:  Worsening  Chronicity:  Recurrent  Relieved by:  Nothing  Ineffective treatments: amoxicillin.  Associated symptoms: sinus pain    Risk factors comment:  Smoker, hx of copd      Review of Systems   Constitutional: Negative.  Negative for fever.   HENT: Positive for congestion and sinus pain.    Respiratory: Positive for cough.    Cardiovascular: Negative.  Negative for chest pain.   Gastrointestinal: Negative.  Negative for abdominal pain.   Endocrine: Negative.    Genitourinary: Negative.  Negative for dysuria.   Skin: Negative.    Neurological: Negative.    Psychiatric/Behavioral: Negative.    All other systems reviewed and are negative.      Past Medical History:   Diagnosis Date   • Asthma    • COPD (chronic obstructive pulmonary disease) (CMS/HCC)    • Diabetes mellitus (CMS/HCC)    • Hepatitis C    • History of gallstones    • Hypertension    • Lupus    • Rheumatoid arthritis (CMS/HCC)        No Known Allergies    Past Surgical History:   Procedure Laterality Date   • ABDOMINAL SURGERY     • CHOLECYSTECTOMY     • PERINEAL LESION/CYST EXCISION N/A 4/3/2017    Procedure: EXCISION OF VAGINAL SKIN TAG;  Surgeon: Kee Crooks III, MD;  Location: [facility];  Service:    • TUBAL COAGULATION LAPAROSCOPIC Bilateral 4/3/2017    Procedure: BILATERAL TUBAL FALLOPE FILSHIE CLIPPING LAPAROSCOPIC,IUD REMOVAL;  Surgeon: Kee Crooks III, MD;  Location: [facility];  Service:        History reviewed. No pertinent family history.    Social History     Socioeconomic History   • Marital status: Legally      Spouse name: Not on file   • Number of children: Not on file   • Years of education: Not on file   • Highest education level: Not on file   Tobacco Use   • Smoking status: Current Every Day  Smoker     Packs/day: 1.00     Years: 15.00     Pack years: 15.00     Types: Cigarettes   • Smokeless tobacco: Never Used   Substance and Sexual Activity   • Alcohol use: No   • Drug use: No   • Sexual activity: Yes     Partners: Male     Birth control/protection: IUD           Objective   Physical Exam   Constitutional: She is oriented to person, place, and time. She appears well-developed and well-nourished. No distress.   HENT:   Head: Normocephalic and atraumatic.   Right Ear: External ear normal.   Left Ear: External ear normal.   Nose: Nose normal.   Eyes: Conjunctivae are normal.   Neck: Normal range of motion. Neck supple. No JVD present. No tracheal deviation present.   Cardiovascular: Normal rate, regular rhythm and normal heart sounds.   No murmur heard.  Pulmonary/Chest: Effort normal. No respiratory distress. She has wheezes.   Decreased in bases.    Abdominal: Soft. Bowel sounds are normal. There is no tenderness.   Musculoskeletal: Normal range of motion. She exhibits no edema or deformity.   Neurological: She is alert and oriented to person, place, and time. No cranial nerve deficit.   Skin: Skin is warm and dry. No rash noted. She is not diaphoretic. No erythema. No pallor.   Psychiatric: She has a normal mood and affect. Her behavior is normal. Thought content normal.   Nursing note and vitals reviewed.      Procedures           ED Course  ED Course as of Feb 12 2208   Tue Feb 12, 2019   2156 Chest x-ray interpreted by Dr. Diaz: No apparent acute cardiopulmonary disease.  [RB]      ED Course User Index  [RB] Rene Carrera PA                  MDM  Number of Diagnoses or Management Options  COPD exacerbation (CMS/[facility]): established and worsening     Amount and/or Complexity of Data Reviewed  Clinical lab tests: ordered and reviewed  Tests in the radiology section of CPT®: ordered and reviewed  Discuss the patient with other providers: yes    Risk of Complications, Morbidity, and/or  Mortality  Presenting problems: moderate  Diagnostic procedures: moderate  Management options: low    Patient Progress  Patient progress: stable        Final diagnoses:   COPD exacerbation (CMS/[facility])            Rene Carrera PA  02/12/19 5046

## 2019-02-14 LAB — BACTERIA SPEC AEROBE CULT: NORMAL

## 2019-02-18 ENCOUNTER — HOSPITAL ENCOUNTER (OUTPATIENT)
Dept: GENERAL RADIOLOGY | Facility: HOSPITAL | Age: 39
Discharge: HOME | End: 2019-02-18
Admitting: NURSE PRACTITIONER

## 2019-02-18 ENCOUNTER — TRANSCRIBE ORDERS (OUTPATIENT)
Dept: ADMINISTRATIVE | Facility: HOSPITAL | Age: 39
End: 2019-02-18

## 2019-02-18 DIAGNOSIS — R06.02 SHORTNESS OF BREATH: Primary | ICD-10-CM

## 2019-02-18 DIAGNOSIS — R05.9 COUGH: ICD-10-CM

## 2019-02-18 PROCEDURE — 71046 X-RAY EXAM CHEST 2 VIEWS: CPT

## 2019-02-18 PROCEDURE — 71046 X-RAY EXAM CHEST 2 VIEWS: CPT | Performed by: RADIOLOGY

## 2019-10-18 ENCOUNTER — HOSPITAL ENCOUNTER (EMERGENCY)
Facility: HOSPITAL | Age: 39
Discharge: HOME | End: 2019-10-19
Attending: EMERGENCY MEDICINE | Admitting: EMERGENCY MEDICINE

## 2019-10-18 DIAGNOSIS — J20.9 ACUTE BRONCHITIS DUE TO INFECTION: Primary | ICD-10-CM

## 2019-10-18 PROCEDURE — 96374 THER/PROPH/DIAG INJ IV PUSH: CPT

## 2019-10-18 PROCEDURE — 93005 ELECTROCARDIOGRAM TRACING: CPT | Performed by: EMERGENCY MEDICINE

## 2019-10-18 PROCEDURE — 99284 EMERGENCY DEPT VISIT MOD MDM: CPT

## 2019-10-18 RX ORDER — IPRATROPIUM BROMIDE AND ALBUTEROL SULFATE 2.5; .5 MG/3ML; MG/3ML
3 SOLUTION RESPIRATORY (INHALATION) ONCE
Status: COMPLETED | OUTPATIENT
Start: 2019-10-18 | End: 2019-10-19

## 2019-10-18 RX ORDER — METHYLPREDNISOLONE SODIUM SUCCINATE 125 MG/2ML
125 INJECTION, POWDER, LYOPHILIZED, FOR SOLUTION INTRAMUSCULAR; INTRAVENOUS ONCE
Status: COMPLETED | OUTPATIENT
Start: 2019-10-18 | End: 2019-10-19

## 2019-10-18 RX ORDER — SODIUM CHLORIDE 0.9 % (FLUSH) 0.9 %
10 SYRINGE (ML) INJECTION AS NEEDED
Status: DISCONTINUED | OUTPATIENT
Start: 2019-10-18 | End: 2019-10-19 | Stop reason: HOSPADM

## 2019-10-19 ENCOUNTER — APPOINTMENT (OUTPATIENT)
Dept: GENERAL RADIOLOGY | Facility: HOSPITAL | Age: 39
End: 2019-10-19

## 2019-10-19 VITALS
DIASTOLIC BLOOD PRESSURE: 74 MMHG | HEIGHT: 62 IN | SYSTOLIC BLOOD PRESSURE: 132 MMHG | TEMPERATURE: 98.5 F | HEART RATE: 65 BPM | BODY MASS INDEX: 42.33 KG/M2 | RESPIRATION RATE: 20 BRPM | WEIGHT: 230 LBS | OXYGEN SATURATION: 99 %

## 2019-10-19 LAB
ALBUMIN SERPL-MCNC: 3.85 G/DL (ref 3.5–5.2)
ALBUMIN/GLOB SERPL: 1.2 G/DL
ALP SERPL-CCNC: 74 U/L (ref 39–117)
ALT SERPL W P-5'-P-CCNC: 10 U/L (ref 1–33)
ANION GAP SERPL CALCULATED.3IONS-SCNC: 13 MMOL/L (ref 5–15)
AST SERPL-CCNC: 12 U/L (ref 1–32)
BASOPHILS # BLD AUTO: 0.04 10*3/MM3 (ref 0–0.2)
BASOPHILS NFR BLD AUTO: 0.5 % (ref 0–1.5)
BILIRUB SERPL-MCNC: <0.2 MG/DL (ref 0.2–1.2)
BUN BLD-MCNC: 12 MG/DL (ref 6–20)
BUN/CREAT SERPL: 16.9 (ref 7–25)
CALCIUM SPEC-SCNC: 8.9 MG/DL (ref 8.6–10.5)
CHLORIDE SERPL-SCNC: 101 MMOL/L (ref 98–107)
CO2 SERPL-SCNC: 27 MMOL/L (ref 22–29)
CREAT BLD-MCNC: 0.71 MG/DL (ref 0.57–1)
DEPRECATED RDW RBC AUTO: 41.5 FL (ref 37–54)
EOSINOPHIL # BLD AUTO: 0.25 10*3/MM3 (ref 0–0.4)
EOSINOPHIL NFR BLD AUTO: 3 % (ref 0.3–6.2)
ERYTHROCYTE [DISTWIDTH] IN BLOOD BY AUTOMATED COUNT: 13.3 % (ref 12.3–15.4)
GFR SERPL CREATININE-BSD FRML MDRD: 92 ML/MIN/1.73
GLOBULIN UR ELPH-MCNC: 3.2 GM/DL
GLUCOSE BLD-MCNC: 93 MG/DL (ref 65–99)
HCT VFR BLD AUTO: 42 % (ref 34–46.6)
HGB BLD-MCNC: 13.8 G/DL (ref 12–15.9)
IMM GRANULOCYTES # BLD AUTO: 0.02 10*3/MM3 (ref 0–0.05)
IMM GRANULOCYTES NFR BLD AUTO: 0.2 % (ref 0–0.5)
LYMPHOCYTES # BLD AUTO: 2.66 10*3/MM3 (ref 0.7–3.1)
LYMPHOCYTES NFR BLD AUTO: 32 % (ref 19.6–45.3)
MCH RBC QN AUTO: 28.6 PG (ref 26.6–33)
MCHC RBC AUTO-ENTMCNC: 32.9 G/DL (ref 31.5–35.7)
MCV RBC AUTO: 87 FL (ref 79–97)
MONOCYTES # BLD AUTO: 0.65 10*3/MM3 (ref 0.1–0.9)
MONOCYTES NFR BLD AUTO: 7.8 % (ref 5–12)
NEUTROPHILS # BLD AUTO: 4.7 10*3/MM3 (ref 1.7–7)
NEUTROPHILS NFR BLD AUTO: 56.5 % (ref 42.7–76)
NT-PROBNP SERPL-MCNC: 166.1 PG/ML (ref 5–450)
PLATELET # BLD AUTO: 270 10*3/MM3 (ref 140–450)
PMV BLD AUTO: 9.8 FL (ref 6–12)
POTASSIUM BLD-SCNC: 4.1 MMOL/L (ref 3.5–5.2)
PROT SERPL-MCNC: 7 G/DL (ref 6–8.5)
RBC # BLD AUTO: 4.83 10*6/MM3 (ref 3.77–5.28)
SODIUM BLD-SCNC: 141 MMOL/L (ref 136–145)
TROPONIN T SERPL-MCNC: <0.01 NG/ML (ref 0–0.03)
WBC NRBC COR # BLD: 8.32 10*3/MM3 (ref 3.4–10.8)

## 2019-10-19 PROCEDURE — 84484 ASSAY OF TROPONIN QUANT: CPT | Performed by: EMERGENCY MEDICINE

## 2019-10-19 PROCEDURE — 83880 ASSAY OF NATRIURETIC PEPTIDE: CPT | Performed by: EMERGENCY MEDICINE

## 2019-10-19 PROCEDURE — 93010 ELECTROCARDIOGRAM REPORT: CPT | Performed by: INTERNAL MEDICINE

## 2019-10-19 PROCEDURE — 25010000002 METHYLPREDNISOLONE PER 125 MG: Performed by: EMERGENCY MEDICINE

## 2019-10-19 PROCEDURE — 94799 UNLISTED PULMONARY SVC/PX: CPT

## 2019-10-19 PROCEDURE — 94640 AIRWAY INHALATION TREATMENT: CPT

## 2019-10-19 PROCEDURE — 85025 COMPLETE CBC W/AUTO DIFF WBC: CPT | Performed by: EMERGENCY MEDICINE

## 2019-10-19 PROCEDURE — 80053 COMPREHEN METABOLIC PANEL: CPT | Performed by: EMERGENCY MEDICINE

## 2019-10-19 PROCEDURE — 93005 ELECTROCARDIOGRAM TRACING: CPT | Performed by: EMERGENCY MEDICINE

## 2019-10-19 PROCEDURE — 71045 X-RAY EXAM CHEST 1 VIEW: CPT

## 2019-10-19 RX ORDER — AMOXICILLIN AND CLAVULANATE POTASSIUM 875; 125 MG/1; MG/1
1 TABLET, FILM COATED ORAL 2 TIMES DAILY
Qty: 20 TABLET | Refills: 0 | Status: SHIPPED | OUTPATIENT
Start: 2019-10-19 | End: 2020-02-23

## 2019-10-19 RX ORDER — PREDNISONE 20 MG/1
20 TABLET ORAL
Qty: 15 TABLET | Refills: 0 | Status: SHIPPED | OUTPATIENT
Start: 2019-10-19 | End: 2020-02-23

## 2019-10-19 RX ADMIN — METHYLPREDNISOLONE SODIUM SUCCINATE 125 MG: 125 INJECTION, POWDER, FOR SOLUTION INTRAMUSCULAR; INTRAVENOUS at 00:10

## 2019-10-19 RX ADMIN — IPRATROPIUM BROMIDE AND ALBUTEROL SULFATE 3 ML: .5; 3 SOLUTION RESPIRATORY (INHALATION) at 00:07

## 2019-10-19 NOTE — ED PROVIDER NOTES
Subjective   39-year-old female in the emergency department complaining of a cough in color, and increased shortness of breath with any kind of exertion.  Patient has oxygen at home that she wears as needed for diagnosis of COPD and is a heavy cigarette smoker.  Patient not requiring any oxygen to maintain saturations greater than 98%.  Denies fever, chills, nausea, vomiting, or diarrhea.  Has significant past medical history of asthma, COPD, diabetes mellitus, hep C, history of gallstones, hypertension, lupus, and rheumatoid arthritis.        History provided by:  Patient   used: No    Shortness of Breath   Severity:  Mild  Onset quality:  Gradual  Timing:  Constant  Progression:  Worsening  Chronicity:  New  Context: not activity, not animal exposure, not emotional upset, not fumes, not known allergens, not occupational exposure, not pollens, not smoke exposure, not strong odors, not URI and not weather changes    Relieved by:  Nothing  Worsened by:  Activity, coughing, deep breathing, exertion and movement  Ineffective treatments:  None tried  Associated symptoms: cough and wheezing    Associated symptoms: no abdominal pain, no chest pain, no claudication, no diaphoresis, no ear pain, no fever, no headaches, no hemoptysis, no neck pain, no PND, no rash, no sore throat, no sputum production, no syncope, no swollen glands and no vomiting    Cough:     Cough characteristics:  Productive    Sputum characteristics:  Green and yellow    Severity:  Moderate    Onset quality:  Gradual    Timing:  Constant    Progression:  Worsening    Chronicity:  New  Wheezing:     Severity:  Mild    Onset quality:  Gradual    Timing:  Constant    Progression:  Worsening    Chronicity:  New  Risk factors: obesity and tobacco use    Risk factors: no recent alcohol use, no family hx of DVT, no hx of cancer, no hx of PE/DVT, no oral contraceptive use, no prolonged immobilization and no recent surgery        Review of  Systems   Constitutional: Negative for diaphoresis and fever.   HENT: Negative for ear pain and sore throat.    Respiratory: Positive for cough, shortness of breath and wheezing. Negative for hemoptysis and sputum production.    Cardiovascular: Negative for chest pain, claudication, syncope and PND.   Gastrointestinal: Negative for abdominal pain and vomiting.   Musculoskeletal: Negative for neck pain.   Skin: Negative for rash.   Neurological: Negative for headaches.   All other systems reviewed and are negative.      Past Medical History:   Diagnosis Date   • Asthma    • COPD (chronic obstructive pulmonary disease) (CMS/HCC)    • Diabetes mellitus (CMS/HCC)    • Hepatitis C    • History of gallstones    • Hypertension    • Lupus (CMS/HCC)    • Rheumatoid arthritis (CMS/HCC)        No Known Allergies    Past Surgical History:   Procedure Laterality Date   • ABDOMINAL SURGERY     • CHOLECYSTECTOMY     • PERINEAL LESION/CYST EXCISION N/A 4/3/2017    Procedure: EXCISION OF VAGINAL SKIN TAG;  Surgeon: Kee Crooks III, MD;  Location: [facility];  Service:    • TUBAL COAGULATION LAPAROSCOPIC Bilateral 4/3/2017    Procedure: BILATERAL TUBAL FALLOPE FILSHIE CLIPPING LAPAROSCOPIC,IUD REMOVAL;  Surgeon: Kee Crooks III, MD;  Location: [facility];  Service:        Family History   Problem Relation Age of Onset   • No Known Problems Mother    • No Known Problems Father    • No Known Problems Sister    • No Known Problems Brother    • No Known Problems Son    • No Known Problems Daughter    • No Known Problems Maternal Grandmother    • No Known Problems Maternal Grandfather    • No Known Problems Paternal Grandmother    • No Known Problems Paternal Grandfather    • No Known Problems Cousin    • No Known Problems Other    • Rheum arthritis Neg Hx    • Osteoarthritis Neg Hx    • Asthma Neg Hx    • Diabetes Neg Hx    • Heart failure Neg Hx    • Hyperlipidemia Neg Hx    • Hypertension Neg Hx    • Migraines Neg Hx    • Rashes /  Skin problems Neg Hx    • Seizures Neg Hx    • Stroke Neg Hx    • Thyroid disease Neg Hx        Social History     Socioeconomic History   • Marital status: Legally      Spouse name: Not on file   • Number of children: Not on file   • Years of education: Not on file   • Highest education level: Not on file   Tobacco Use   • Smoking status: Current Every Day Smoker     Packs/day: 1.00     Years: 15.00     Pack years: 15.00     Types: Cigarettes   • Smokeless tobacco: Never Used   Substance and Sexual Activity   • Alcohol use: No   • Drug use: No   • Sexual activity: Yes     Partners: Male     Birth control/protection: IUD           Objective   Physical Exam   Constitutional: She is oriented to person, place, and time. She appears well-developed and well-nourished.  Non-toxic appearance. No distress.   HENT:   Head: Normocephalic and atraumatic.   Right Ear: External ear normal.   Left Ear: External ear normal.   Nose: Nose normal.   Mouth/Throat: Oropharynx is clear and moist and mucous membranes are normal. No oropharyngeal exudate. No tonsillar exudate.   Eyes: Conjunctivae, EOM and lids are normal. Pupils are equal, round, and reactive to light.   Neck: Normal range of motion and full passive range of motion without pain. Neck supple. No thyromegaly present.   Cardiovascular: Normal rate, regular rhythm, S1 normal, S2 normal, normal heart sounds, intact distal pulses and normal pulses.   Pulmonary/Chest: Effort normal. No tachypnea. No respiratory distress. She has decreased breath sounds. She has wheezes in the right upper field, the right middle field, the right lower field, the left upper field, the left middle field and the left lower field. She has no rales. She exhibits no tenderness.   Abdominal: Soft. Normal appearance and bowel sounds are normal. She exhibits no distension. There is no tenderness. There is no rebound and no guarding.   Musculoskeletal: Normal range of motion. She exhibits no  edema, tenderness or deformity.   Lymphadenopathy:     She has no cervical adenopathy.   Neurological: She is alert and oriented to person, place, and time. She has normal strength. No cranial nerve deficit or sensory deficit. GCS eye subscore is 4. GCS verbal subscore is 5. GCS motor subscore is 6.   Skin: Skin is warm, dry and intact. No rash noted. She is not diaphoretic. No erythema. No pallor.   Psychiatric: She has a normal mood and affect. Her speech is normal and behavior is normal. Judgment and thought content normal. Cognition and memory are normal.   Nursing note and vitals reviewed.      Procedures           ED Course  ED Course as of Oct 19 0131   Sat Oct 19, 2019   0124 Impression    No acute cardiopulmonary findings.     XR Chest 1 View [ES]   0125 Normal Sinus Rhythm  No Acute ST Elevation or Acute Changes  Vent Rate: 67 bpm  MS Interval: 154 ms  QRS Duration: 80 ms  QT/QTc: 416/439 ms ECG 12 Lead [ES]      ED Course User Index  [ES] Giuliano Johnston MD                  MDM  Number of Diagnoses or Management Options     Amount and/or Complexity of Data Reviewed  Clinical lab tests: ordered and reviewed  Tests in the radiology section of CPT®: ordered and reviewed  Tests in the medicine section of CPT®: ordered and reviewed  Independent visualization of images, tracings, or specimens: yes    Risk of Complications, Morbidity, and/or Mortality  Presenting problems: moderate  Diagnostic procedures: moderate  Management options: moderate    Patient Progress  Patient progress: stable      Final diagnoses:   Acute bronchitis due to infection              Giuliano Johnston MD  10/19/19 0131

## 2019-10-26 ENCOUNTER — APPOINTMENT (OUTPATIENT)
Dept: GENERAL RADIOLOGY | Facility: HOSPITAL | Age: 39
End: 2019-10-26

## 2019-10-26 ENCOUNTER — HOSPITAL ENCOUNTER (EMERGENCY)
Facility: HOSPITAL | Age: 39
Discharge: ANOTHER HOSPITAL | End: 2019-10-26
Attending: FAMILY MEDICINE | Admitting: FAMILY MEDICINE

## 2019-10-26 ENCOUNTER — APPOINTMENT (OUTPATIENT)
Dept: CT IMAGING | Facility: HOSPITAL | Age: 39
End: 2019-10-26

## 2019-10-26 VITALS
HEIGHT: 62 IN | RESPIRATION RATE: 20 BRPM | WEIGHT: 230 LBS | SYSTOLIC BLOOD PRESSURE: 130 MMHG | OXYGEN SATURATION: 90 % | BODY MASS INDEX: 42.33 KG/M2 | HEART RATE: 90 BPM | DIASTOLIC BLOOD PRESSURE: 64 MMHG | TEMPERATURE: 98.4 F

## 2019-10-26 DIAGNOSIS — J96.21 ACUTE ON CHRONIC RESPIRATORY FAILURE WITH HYPOXEMIA (HCC): ICD-10-CM

## 2019-10-26 DIAGNOSIS — J44.1 COPD EXACERBATION (HCC): Primary | ICD-10-CM

## 2019-10-26 LAB
A-A DO2: 93.2 MMHG (ref 0–300)
ALBUMIN SERPL-MCNC: 4.09 G/DL (ref 3.5–5.2)
ALBUMIN/GLOB SERPL: 1 G/DL
ALP SERPL-CCNC: 83 U/L (ref 39–117)
ALT SERPL W P-5'-P-CCNC: 13 U/L (ref 1–33)
ANION GAP SERPL CALCULATED.3IONS-SCNC: 12.9 MMOL/L (ref 5–15)
APTT PPP: 25.5 SECONDS (ref 23.8–36.1)
ARTERIAL PATENCY WRIST A: POSITIVE
AST SERPL-CCNC: 13 U/L (ref 1–32)
ATMOSPHERIC PRESS: 726 MMHG
BASE EXCESS BLDA CALC-SCNC: 0 MMOL/L (ref 0–2)
BASOPHILS # BLD AUTO: 0.03 10*3/MM3 (ref 0–0.2)
BASOPHILS NFR BLD AUTO: 0.1 % (ref 0–1.5)
BDY SITE: ABNORMAL
BILIRUB SERPL-MCNC: 0.3 MG/DL (ref 0.2–1.2)
BILIRUB UR QL STRIP: NEGATIVE
BODY TEMPERATURE: 0 C
BUN BLD-MCNC: 14 MG/DL (ref 6–20)
BUN/CREAT SERPL: 21.2 (ref 7–25)
CALCIUM SPEC-SCNC: 9.5 MG/DL (ref 8.6–10.5)
CHLORIDE SERPL-SCNC: 102 MMOL/L (ref 98–107)
CLARITY UR: CLEAR
CO2 BLDA-SCNC: 27.8 MMOL/L (ref 22–33)
CO2 SERPL-SCNC: 22.1 MMOL/L (ref 22–29)
COHGB MFR BLD: 1.2 % (ref 0–5)
COLOR UR: YELLOW
CREAT BLD-MCNC: 0.66 MG/DL (ref 0.57–1)
D-LACTATE SERPL-SCNC: 2 MMOL/L (ref 0.5–2)
DEPRECATED RDW RBC AUTO: 41.7 FL (ref 37–54)
EOSINOPHIL # BLD AUTO: 0 10*3/MM3 (ref 0–0.4)
EOSINOPHIL NFR BLD AUTO: 0 % (ref 0.3–6.2)
ERYTHROCYTE [DISTWIDTH] IN BLOOD BY AUTOMATED COUNT: 13.2 % (ref 12.3–15.4)
FLUAV AG NPH QL: NEGATIVE
FLUBV AG NPH QL IA: NEGATIVE
GAS FLOW AIRWAY: 2.5 LPM
GFR SERPL CREATININE-BSD FRML MDRD: 100 ML/MIN/1.73
GLOBULIN UR ELPH-MCNC: 4 GM/DL
GLUCOSE BLD-MCNC: 136 MG/DL (ref 65–99)
GLUCOSE UR STRIP-MCNC: NEGATIVE MG/DL
HCG SERPL QL: NEGATIVE
HCO3 BLDA-SCNC: 26.4 MMOL/L (ref 20–26)
HCT VFR BLD AUTO: 47.5 % (ref 34–46.6)
HCT VFR BLD CALC: 48.3 % (ref 38–51)
HGB BLD-MCNC: 15.4 G/DL (ref 12–15.9)
HGB BLDA-MCNC: 15.8 G/DL (ref 13.5–17.5)
HGB UR QL STRIP.AUTO: NEGATIVE
HOLD SPECIMEN: NORMAL
HOROWITZ INDEX BLD+IHG-RTO: 30 %
IMM GRANULOCYTES # BLD AUTO: 0.13 10*3/MM3 (ref 0–0.05)
IMM GRANULOCYTES NFR BLD AUTO: 0.6 % (ref 0–0.5)
INR PPP: 0.91 (ref 0.9–1.1)
KETONES UR QL STRIP: NEGATIVE
L PNEUMO1 AG UR QL IA: NEGATIVE
LEUKOCYTE ESTERASE UR QL STRIP.AUTO: NEGATIVE
LYMPHOCYTES # BLD AUTO: 1.1 10*3/MM3 (ref 0.7–3.1)
LYMPHOCYTES NFR BLD AUTO: 5.4 % (ref 19.6–45.3)
Lab: ABNORMAL
MAGNESIUM SERPL-MCNC: 1.9 MG/DL (ref 1.6–2.6)
MCH RBC QN AUTO: 28.2 PG (ref 26.6–33)
MCHC RBC AUTO-ENTMCNC: 32.4 G/DL (ref 31.5–35.7)
MCV RBC AUTO: 86.8 FL (ref 79–97)
METHGB BLD QL: 0.4 % (ref 0–3)
MODALITY: ABNORMAL
MONOCYTES # BLD AUTO: 0.54 10*3/MM3 (ref 0.1–0.9)
MONOCYTES NFR BLD AUTO: 2.6 % (ref 5–12)
NEUTROPHILS # BLD AUTO: 18.59 10*3/MM3 (ref 1.7–7)
NEUTROPHILS NFR BLD AUTO: 91.3 % (ref 42.7–76)
NITRITE UR QL STRIP: NEGATIVE
NOTE: ABNORMAL
NRBC BLD AUTO-RTO: 0 /100 WBC (ref 0–0.2)
NT-PROBNP SERPL-MCNC: 224.7 PG/ML (ref 5–450)
OXYHGB MFR BLDV: 87.8 % (ref 94–99)
PCO2 BLDA: 48 MM HG (ref 35–45)
PCO2 TEMP ADJ BLD: ABNORMAL MM[HG]
PH BLDA: 7.35 PH UNITS (ref 7.35–7.45)
PH UR STRIP.AUTO: 6 [PH] (ref 5–8)
PH, TEMP CORRECTED: ABNORMAL
PLATELET # BLD AUTO: 353 10*3/MM3 (ref 140–450)
PMV BLD AUTO: 10 FL (ref 6–12)
PO2 BLDA: 57.1 MM HG (ref 83–108)
PO2 TEMP ADJ BLD: ABNORMAL MM[HG]
POTASSIUM BLD-SCNC: 4.5 MMOL/L (ref 3.5–5.2)
PROCALCITONIN SERPL-MCNC: <0.02 NG/ML (ref 0.1–0.25)
PROT SERPL-MCNC: 8.1 G/DL (ref 6–8.5)
PROT UR QL STRIP: NEGATIVE
PROTHROMBIN TIME: 12.7 SECONDS (ref 11–15.4)
RBC # BLD AUTO: 5.47 10*6/MM3 (ref 3.77–5.28)
SAO2 % BLDCOA: 89.2 % (ref 94–99)
SODIUM BLD-SCNC: 137 MMOL/L (ref 136–145)
SP GR UR STRIP: 1.01 (ref 1–1.03)
TROPONIN T SERPL-MCNC: <0.01 NG/ML (ref 0–0.03)
UROBILINOGEN UR QL STRIP: NORMAL
VENTILATOR MODE: ABNORMAL
WBC NRBC COR # BLD: 20.39 10*3/MM3 (ref 3.4–10.8)
WHOLE BLOOD HOLD SPECIMEN: NORMAL
WHOLE BLOOD HOLD SPECIMEN: NORMAL

## 2019-10-26 PROCEDURE — 0 IOVERSOL 74 % SOLUTION: Performed by: FAMILY MEDICINE

## 2019-10-26 PROCEDURE — 81003 URINALYSIS AUTO W/O SCOPE: CPT | Performed by: FAMILY MEDICINE

## 2019-10-26 PROCEDURE — 84145 PROCALCITONIN (PCT): CPT | Performed by: FAMILY MEDICINE

## 2019-10-26 PROCEDURE — 99285 EMERGENCY DEPT VISIT HI MDM: CPT

## 2019-10-26 PROCEDURE — 71275 CT ANGIOGRAPHY CHEST: CPT

## 2019-10-26 PROCEDURE — 96365 THER/PROPH/DIAG IV INF INIT: CPT

## 2019-10-26 PROCEDURE — 83735 ASSAY OF MAGNESIUM: CPT | Performed by: FAMILY MEDICINE

## 2019-10-26 PROCEDURE — 93010 ELECTROCARDIOGRAM REPORT: CPT | Performed by: INTERNAL MEDICINE

## 2019-10-26 PROCEDURE — 87899 AGENT NOS ASSAY W/OPTIC: CPT | Performed by: FAMILY MEDICINE

## 2019-10-26 PROCEDURE — 83050 HGB METHEMOGLOBIN QUAN: CPT

## 2019-10-26 PROCEDURE — 87804 INFLUENZA ASSAY W/OPTIC: CPT | Performed by: FAMILY MEDICINE

## 2019-10-26 PROCEDURE — 82375 ASSAY CARBOXYHB QUANT: CPT

## 2019-10-26 PROCEDURE — 83880 ASSAY OF NATRIURETIC PEPTIDE: CPT | Performed by: FAMILY MEDICINE

## 2019-10-26 PROCEDURE — 71045 X-RAY EXAM CHEST 1 VIEW: CPT

## 2019-10-26 PROCEDURE — 94640 AIRWAY INHALATION TREATMENT: CPT

## 2019-10-26 PROCEDURE — 80053 COMPREHEN METABOLIC PANEL: CPT | Performed by: FAMILY MEDICINE

## 2019-10-26 PROCEDURE — 84484 ASSAY OF TROPONIN QUANT: CPT | Performed by: FAMILY MEDICINE

## 2019-10-26 PROCEDURE — 82805 BLOOD GASES W/O2 SATURATION: CPT

## 2019-10-26 PROCEDURE — 85610 PROTHROMBIN TIME: CPT | Performed by: FAMILY MEDICINE

## 2019-10-26 PROCEDURE — 84703 CHORIONIC GONADOTROPIN ASSAY: CPT | Performed by: FAMILY MEDICINE

## 2019-10-26 PROCEDURE — 83605 ASSAY OF LACTIC ACID: CPT | Performed by: FAMILY MEDICINE

## 2019-10-26 PROCEDURE — 36600 WITHDRAWAL OF ARTERIAL BLOOD: CPT

## 2019-10-26 PROCEDURE — 85025 COMPLETE CBC W/AUTO DIFF WBC: CPT | Performed by: FAMILY MEDICINE

## 2019-10-26 PROCEDURE — 94799 UNLISTED PULMONARY SVC/PX: CPT

## 2019-10-26 PROCEDURE — 93005 ELECTROCARDIOGRAM TRACING: CPT | Performed by: FAMILY MEDICINE

## 2019-10-26 PROCEDURE — 96367 TX/PROPH/DG ADDL SEQ IV INF: CPT

## 2019-10-26 PROCEDURE — 87040 BLOOD CULTURE FOR BACTERIA: CPT | Performed by: FAMILY MEDICINE

## 2019-10-26 PROCEDURE — 25010000002 CEFTRIAXONE: Performed by: FAMILY MEDICINE

## 2019-10-26 PROCEDURE — 85730 THROMBOPLASTIN TIME PARTIAL: CPT | Performed by: FAMILY MEDICINE

## 2019-10-26 RX ORDER — IPRATROPIUM BROMIDE AND ALBUTEROL SULFATE 2.5; .5 MG/3ML; MG/3ML
3 SOLUTION RESPIRATORY (INHALATION) ONCE
Status: COMPLETED | OUTPATIENT
Start: 2019-10-26 | End: 2019-10-26

## 2019-10-26 RX ORDER — ALBUTEROL SULFATE 2.5 MG/3ML
2.5 SOLUTION RESPIRATORY (INHALATION) ONCE
Status: COMPLETED | OUTPATIENT
Start: 2019-10-26 | End: 2019-10-26

## 2019-10-26 RX ORDER — SODIUM CHLORIDE 0.9 % (FLUSH) 0.9 %
10 SYRINGE (ML) INJECTION AS NEEDED
Status: DISCONTINUED | OUTPATIENT
Start: 2019-10-26 | End: 2019-10-26 | Stop reason: HOSPADM

## 2019-10-26 RX ORDER — ACETAMINOPHEN 325 MG/1
650 TABLET ORAL ONCE
Status: COMPLETED | OUTPATIENT
Start: 2019-10-26 | End: 2019-10-26

## 2019-10-26 RX ADMIN — ACETAMINOPHEN 650 MG: 325 TABLET ORAL at 09:11

## 2019-10-26 RX ADMIN — ALBUTEROL SULFATE 2.5 MG: 2.5 SOLUTION RESPIRATORY (INHALATION) at 09:20

## 2019-10-26 RX ADMIN — CEFTRIAXONE 1 G: 1 INJECTION, POWDER, FOR SOLUTION INTRAMUSCULAR; INTRAVENOUS at 09:05

## 2019-10-26 RX ADMIN — DOXYCYCLINE 100 MG: 100 INJECTION, POWDER, LYOPHILIZED, FOR SOLUTION INTRAVENOUS at 09:40

## 2019-10-26 RX ADMIN — IPRATROPIUM BROMIDE AND ALBUTEROL SULFATE 3 ML: .5; 3 SOLUTION RESPIRATORY (INHALATION) at 08:24

## 2019-10-26 RX ADMIN — IOVERSOL 90 ML: 741 INJECTION INTRA-ARTERIAL; INTRAVENOUS at 10:12

## 2019-10-31 LAB
BACTERIA SPEC AEROBE CULT: NORMAL
BACTERIA SPEC AEROBE CULT: NORMAL

## 2020-02-22 ENCOUNTER — APPOINTMENT (OUTPATIENT)
Dept: GENERAL RADIOLOGY | Facility: HOSPITAL | Age: 40
End: 2020-02-22

## 2020-02-22 ENCOUNTER — HOSPITAL ENCOUNTER (INPATIENT)
Facility: HOSPITAL | Age: 40
LOS: 3 days | Discharge: LEFT BEFORE DISCHARGE | End: 2020-02-26
Attending: FAMILY MEDICINE | Admitting: HOSPITALIST

## 2020-02-22 ENCOUNTER — APPOINTMENT (OUTPATIENT)
Dept: CT IMAGING | Facility: HOSPITAL | Age: 40
End: 2020-02-22

## 2020-02-22 DIAGNOSIS — J96.01 ACUTE RESPIRATORY FAILURE WITH HYPOXIA (HCC): Primary | ICD-10-CM

## 2020-02-22 DIAGNOSIS — B34.8 RHINOVIRUS INFECTION: ICD-10-CM

## 2020-02-22 LAB
A-A DO2: 39.1 MMHG (ref 0–300)
ALBUMIN SERPL-MCNC: 3.51 G/DL (ref 3.5–5.2)
ALBUMIN/GLOB SERPL: 1 G/DL
ALP SERPL-CCNC: 78 U/L (ref 39–117)
ALT SERPL W P-5'-P-CCNC: 8 U/L (ref 1–33)
ANION GAP SERPL CALCULATED.3IONS-SCNC: 12.1 MMOL/L (ref 5–15)
ARTERIAL PATENCY WRIST A: POSITIVE
AST SERPL-CCNC: 10 U/L (ref 1–32)
ATMOSPHERIC PRESS: 734 MMHG
BASE EXCESS BLDA CALC-SCNC: 4.7 MMOL/L (ref 0–2)
BASOPHILS # BLD AUTO: 0.05 10*3/MM3 (ref 0–0.2)
BASOPHILS NFR BLD AUTO: 0.6 % (ref 0–1.5)
BDY SITE: ABNORMAL
BILIRUB SERPL-MCNC: 0.2 MG/DL (ref 0.2–1.2)
BILIRUB UR QL STRIP: NEGATIVE
BODY TEMPERATURE: 0 C
BUN BLD-MCNC: 9 MG/DL (ref 6–20)
BUN/CREAT SERPL: 12.7 (ref 7–25)
CALCIUM SPEC-SCNC: 9 MG/DL (ref 8.6–10.5)
CHLORIDE SERPL-SCNC: 103 MMOL/L (ref 98–107)
CLARITY UR: CLEAR
CO2 BLDA-SCNC: 31.2 MMOL/L (ref 22–33)
CO2 SERPL-SCNC: 25.9 MMOL/L (ref 22–29)
COHGB MFR BLD: 2.9 % (ref 0–5)
COLOR UR: YELLOW
CREAT BLD-MCNC: 0.71 MG/DL (ref 0.57–1)
CRP SERPL-MCNC: 1.85 MG/DL (ref 0–0.5)
D-LACTATE SERPL-SCNC: 1.5 MMOL/L (ref 0.5–2)
DEPRECATED RDW RBC AUTO: 40.1 FL (ref 37–54)
EOSINOPHIL # BLD AUTO: 0.34 10*3/MM3 (ref 0–0.4)
EOSINOPHIL NFR BLD AUTO: 4.3 % (ref 0.3–6.2)
ERYTHROCYTE [DISTWIDTH] IN BLOOD BY AUTOMATED COUNT: 12.5 % (ref 12.3–15.4)
FLUAV AG NPH QL: NEGATIVE
FLUBV AG NPH QL IA: NEGATIVE
GFR SERPL CREATININE-BSD FRML MDRD: 92 ML/MIN/1.73
GLOBULIN UR ELPH-MCNC: 3.6 GM/DL
GLUCOSE BLD-MCNC: 109 MG/DL (ref 65–99)
GLUCOSE UR STRIP-MCNC: NEGATIVE MG/DL
HCO3 BLDA-SCNC: 29.8 MMOL/L (ref 20–26)
HCT VFR BLD AUTO: 43.7 % (ref 34–46.6)
HCT VFR BLD CALC: 43.5 % (ref 38–51)
HGB BLD-MCNC: 14 G/DL (ref 12–15.9)
HGB BLDA-MCNC: 14.2 G/DL (ref 13.5–17.5)
HGB UR QL STRIP.AUTO: NEGATIVE
HOROWITZ INDEX BLD+IHG-RTO: 21 %
IMM GRANULOCYTES # BLD AUTO: 0.02 10*3/MM3 (ref 0–0.05)
IMM GRANULOCYTES NFR BLD AUTO: 0.3 % (ref 0–0.5)
KETONES UR QL STRIP: NEGATIVE
LEUKOCYTE ESTERASE UR QL STRIP.AUTO: NEGATIVE
LYMPHOCYTES # BLD AUTO: 1.71 10*3/MM3 (ref 0.7–3.1)
LYMPHOCYTES NFR BLD AUTO: 21.7 % (ref 19.6–45.3)
Lab: ABNORMAL
Lab: ABNORMAL
MCH RBC QN AUTO: 28.2 PG (ref 26.6–33)
MCHC RBC AUTO-ENTMCNC: 32 G/DL (ref 31.5–35.7)
MCV RBC AUTO: 87.9 FL (ref 79–97)
METHGB BLD QL: 0.2 % (ref 0–3)
MODALITY: ABNORMAL
MONOCYTES # BLD AUTO: 0.49 10*3/MM3 (ref 0.1–0.9)
MONOCYTES NFR BLD AUTO: 6.2 % (ref 5–12)
NEUTROPHILS # BLD AUTO: 5.28 10*3/MM3 (ref 1.7–7)
NEUTROPHILS NFR BLD AUTO: 66.9 % (ref 42.7–76)
NITRITE UR QL STRIP: NEGATIVE
NOTE: ABNORMAL
NOTIFIED BY: ABNORMAL
NOTIFIED WHO: ABNORMAL
NRBC BLD AUTO-RTO: 0 /100 WBC (ref 0–0.2)
NT-PROBNP SERPL-MCNC: 56.2 PG/ML (ref 5–450)
OXYHGB MFR BLDV: 87.1 % (ref 94–99)
PCO2 BLDA: 44.9 MM HG (ref 35–45)
PCO2 TEMP ADJ BLD: ABNORMAL MM[HG]
PH BLDA: 7.43 PH UNITS (ref 7.35–7.45)
PH UR STRIP.AUTO: 7 [PH] (ref 5–8)
PH, TEMP CORRECTED: ABNORMAL
PLATELET # BLD AUTO: 283 10*3/MM3 (ref 140–450)
PMV BLD AUTO: 9.9 FL (ref 6–12)
PO2 BLDA: 54.6 MM HG (ref 83–108)
PO2 TEMP ADJ BLD: ABNORMAL MM[HG]
POTASSIUM BLD-SCNC: 3.5 MMOL/L (ref 3.5–5.2)
PROT SERPL-MCNC: 7.1 G/DL (ref 6–8.5)
PROT UR QL STRIP: NEGATIVE
RBC # BLD AUTO: 4.97 10*6/MM3 (ref 3.77–5.28)
SAO2 % BLDCOA: 89.9 % (ref 94–99)
SODIUM BLD-SCNC: 141 MMOL/L (ref 136–145)
SP GR UR STRIP: 1.01 (ref 1–1.03)
TROPONIN T SERPL-MCNC: <0.01 NG/ML (ref 0–0.03)
UROBILINOGEN UR QL STRIP: NORMAL
VENTILATOR MODE: ABNORMAL
WBC NRBC COR # BLD: 7.89 10*3/MM3 (ref 3.4–10.8)

## 2020-02-22 PROCEDURE — 80307 DRUG TEST PRSMV CHEM ANLYZR: CPT | Performed by: HOSPITALIST

## 2020-02-22 PROCEDURE — 0099U HC BIOFIRE FILMARRAY RESP PANEL 1: CPT | Performed by: PHYSICIAN ASSISTANT

## 2020-02-22 PROCEDURE — 83605 ASSAY OF LACTIC ACID: CPT | Performed by: PHYSICIAN ASSISTANT

## 2020-02-22 PROCEDURE — 0 IOPAMIDOL PER 1 ML: Performed by: FAMILY MEDICINE

## 2020-02-22 PROCEDURE — 94799 UNLISTED PULMONARY SVC/PX: CPT

## 2020-02-22 PROCEDURE — 93005 ELECTROCARDIOGRAM TRACING: CPT | Performed by: PHYSICIAN ASSISTANT

## 2020-02-22 PROCEDURE — 71045 X-RAY EXAM CHEST 1 VIEW: CPT

## 2020-02-22 PROCEDURE — 87804 INFLUENZA ASSAY W/OPTIC: CPT | Performed by: PHYSICIAN ASSISTANT

## 2020-02-22 PROCEDURE — 81003 URINALYSIS AUTO W/O SCOPE: CPT | Performed by: PHYSICIAN ASSISTANT

## 2020-02-22 PROCEDURE — 71275 CT ANGIOGRAPHY CHEST: CPT

## 2020-02-22 PROCEDURE — 36600 WITHDRAWAL OF ARTERIAL BLOOD: CPT

## 2020-02-22 PROCEDURE — 36415 COLL VENOUS BLD VENIPUNCTURE: CPT

## 2020-02-22 PROCEDURE — 25010000002 METHYLPREDNISOLONE PER 125 MG: Performed by: PHYSICIAN ASSISTANT

## 2020-02-22 PROCEDURE — 83050 HGB METHEMOGLOBIN QUAN: CPT

## 2020-02-22 PROCEDURE — 83036 HEMOGLOBIN GLYCOSYLATED A1C: CPT | Performed by: HOSPITALIST

## 2020-02-22 PROCEDURE — 86140 C-REACTIVE PROTEIN: CPT | Performed by: PHYSICIAN ASSISTANT

## 2020-02-22 PROCEDURE — 83880 ASSAY OF NATRIURETIC PEPTIDE: CPT | Performed by: PHYSICIAN ASSISTANT

## 2020-02-22 PROCEDURE — 82375 ASSAY CARBOXYHB QUANT: CPT

## 2020-02-22 PROCEDURE — 82805 BLOOD GASES W/O2 SATURATION: CPT

## 2020-02-22 PROCEDURE — 87040 BLOOD CULTURE FOR BACTERIA: CPT | Performed by: PHYSICIAN ASSISTANT

## 2020-02-22 PROCEDURE — 93010 ELECTROCARDIOGRAM REPORT: CPT | Performed by: INTERNAL MEDICINE

## 2020-02-22 PROCEDURE — 85025 COMPLETE CBC W/AUTO DIFF WBC: CPT | Performed by: PHYSICIAN ASSISTANT

## 2020-02-22 PROCEDURE — 84484 ASSAY OF TROPONIN QUANT: CPT | Performed by: PHYSICIAN ASSISTANT

## 2020-02-22 PROCEDURE — 80053 COMPREHEN METABOLIC PANEL: CPT | Performed by: PHYSICIAN ASSISTANT

## 2020-02-22 PROCEDURE — 94640 AIRWAY INHALATION TREATMENT: CPT

## 2020-02-22 PROCEDURE — 99285 EMERGENCY DEPT VISIT HI MDM: CPT

## 2020-02-22 RX ORDER — IPRATROPIUM BROMIDE AND ALBUTEROL SULFATE 2.5; .5 MG/3ML; MG/3ML
3 SOLUTION RESPIRATORY (INHALATION) ONCE
Status: COMPLETED | OUTPATIENT
Start: 2020-02-22 | End: 2020-02-22

## 2020-02-22 RX ORDER — LORAZEPAM 2 MG/ML
INJECTION INTRAMUSCULAR
Status: DISCONTINUED
Start: 2020-02-22 | End: 2020-02-22 | Stop reason: WASHOUT

## 2020-02-22 RX ORDER — METHYLPREDNISOLONE SODIUM SUCCINATE 125 MG/2ML
125 INJECTION, POWDER, LYOPHILIZED, FOR SOLUTION INTRAMUSCULAR; INTRAVENOUS ONCE
Status: COMPLETED | OUTPATIENT
Start: 2020-02-22 | End: 2020-02-22

## 2020-02-22 RX ORDER — SODIUM CHLORIDE 0.9 % (FLUSH) 0.9 %
10 SYRINGE (ML) INJECTION AS NEEDED
Status: DISCONTINUED | OUTPATIENT
Start: 2020-02-22 | End: 2020-02-26 | Stop reason: HOSPADM

## 2020-02-22 RX ADMIN — IPRATROPIUM BROMIDE AND ALBUTEROL SULFATE 3 ML: .5; 3 SOLUTION RESPIRATORY (INHALATION) at 22:03

## 2020-02-22 RX ADMIN — IOPAMIDOL 70 ML: 755 INJECTION, SOLUTION INTRAVENOUS at 23:40

## 2020-02-22 RX ADMIN — SODIUM CHLORIDE 500 ML: 9 INJECTION, SOLUTION INTRAVENOUS at 21:57

## 2020-02-22 RX ADMIN — METHYLPREDNISOLONE SODIUM SUCCINATE 125 MG: 125 INJECTION, POWDER, FOR SOLUTION INTRAMUSCULAR; INTRAVENOUS at 21:57

## 2020-02-23 PROBLEM — J96.01 ACUTE RESPIRATORY FAILURE WITH HYPOXIA (HCC): Status: ACTIVE | Noted: 2020-02-23

## 2020-02-23 LAB
6-ACETYL MORPHINE: NEGATIVE
A-A DO2: ABNORMAL
ALBUMIN SERPL-MCNC: 3.59 G/DL (ref 3.5–5.2)
ALBUMIN/GLOB SERPL: 0.9 G/DL
ALP SERPL-CCNC: 76 U/L (ref 39–117)
ALT SERPL W P-5'-P-CCNC: 8 U/L (ref 1–33)
AMPHET+METHAMPHET UR QL: NEGATIVE
ANION GAP SERPL CALCULATED.3IONS-SCNC: 12.4 MMOL/L (ref 5–15)
ARTERIAL PATENCY WRIST A: POSITIVE
AST SERPL-CCNC: 10 U/L (ref 1–32)
ATMOSPHERIC PRESS: 733 MMHG
B PERT DNA SPEC QL NAA+PROBE: NOT DETECTED
BARBITURATES UR QL SCN: NEGATIVE
BASE EXCESS BLDA CALC-SCNC: -0.1 MMOL/L (ref 0–2)
BASOPHILS # BLD AUTO: 0.01 10*3/MM3 (ref 0–0.2)
BASOPHILS NFR BLD AUTO: 0.1 % (ref 0–1.5)
BDY SITE: ABNORMAL
BENZODIAZ UR QL SCN: NEGATIVE
BILIRUB SERPL-MCNC: 0.2 MG/DL (ref 0.2–1.2)
BODY TEMPERATURE: 0 C
BUN BLD-MCNC: 10 MG/DL (ref 6–20)
BUN/CREAT SERPL: 13.5 (ref 7–25)
BUPRENORPHINE SERPL-MCNC: POSITIVE NG/ML
C PNEUM DNA NPH QL NAA+NON-PROBE: NOT DETECTED
CALCIUM SPEC-SCNC: 9.3 MG/DL (ref 8.6–10.5)
CANNABINOIDS SERPL QL: NEGATIVE
CHLORIDE SERPL-SCNC: 103 MMOL/L (ref 98–107)
CO2 BLDA-SCNC: 23.4 MMOL/L (ref 22–33)
CO2 SERPL-SCNC: 22.6 MMOL/L (ref 22–29)
COCAINE UR QL: NEGATIVE
COHGB MFR BLD: 1.5 % (ref 0–5)
CREAT BLD-MCNC: 0.74 MG/DL (ref 0.57–1)
CRP SERPL-MCNC: 1.46 MG/DL (ref 0–0.5)
DEPRECATED RDW RBC AUTO: 39 FL (ref 37–54)
EOSINOPHIL # BLD AUTO: 0 10*3/MM3 (ref 0–0.4)
EOSINOPHIL NFR BLD AUTO: 0 % (ref 0.3–6.2)
ERYTHROCYTE [DISTWIDTH] IN BLOOD BY AUTOMATED COUNT: 12.4 % (ref 12.3–15.4)
FLUAV H1 2009 PAND RNA NPH QL NAA+PROBE: NOT DETECTED
FLUAV H1 HA GENE NPH QL NAA+PROBE: NOT DETECTED
FLUAV H3 RNA NPH QL NAA+PROBE: NOT DETECTED
FLUAV SUBTYP SPEC NAA+PROBE: NOT DETECTED
FLUBV RNA ISLT QL NAA+PROBE: NOT DETECTED
GFR SERPL CREATININE-BSD FRML MDRD: 88 ML/MIN/1.73
GLOBULIN UR ELPH-MCNC: 3.8 GM/DL
GLUCOSE BLD-MCNC: 119 MG/DL (ref 65–99)
GLUCOSE BLDC GLUCOMTR-MCNC: 110 MG/DL (ref 70–130)
GLUCOSE BLDC GLUCOMTR-MCNC: 117 MG/DL (ref 70–130)
GLUCOSE BLDC GLUCOMTR-MCNC: 176 MG/DL (ref 70–130)
GLUCOSE BLDC GLUCOMTR-MCNC: 189 MG/DL (ref 70–130)
GLUCOSE BLDC GLUCOMTR-MCNC: 267 MG/DL (ref 70–130)
HADV DNA SPEC NAA+PROBE: NOT DETECTED
HBA1C MFR BLD: 5.4 % (ref 4.8–5.6)
HCO3 BLDA-SCNC: 22.5 MMOL/L (ref 20–26)
HCOV 229E RNA SPEC QL NAA+PROBE: NOT DETECTED
HCOV HKU1 RNA SPEC QL NAA+PROBE: NOT DETECTED
HCOV NL63 RNA SPEC QL NAA+PROBE: NOT DETECTED
HCOV OC43 RNA SPEC QL NAA+PROBE: NOT DETECTED
HCT VFR BLD AUTO: 43 % (ref 34–46.6)
HCT VFR BLD CALC: 45.3 % (ref 38–51)
HGB BLD-MCNC: 13.8 G/DL (ref 12–15.9)
HGB BLDA-MCNC: 14.8 G/DL (ref 13.5–17.5)
HMPV RNA NPH QL NAA+NON-PROBE: NOT DETECTED
HOROWITZ INDEX BLD+IHG-RTO: 28 %
HPIV1 RNA SPEC QL NAA+PROBE: NOT DETECTED
HPIV2 RNA SPEC QL NAA+PROBE: NOT DETECTED
HPIV3 RNA NPH QL NAA+PROBE: NOT DETECTED
HPIV4 P GENE NPH QL NAA+PROBE: NOT DETECTED
IMM GRANULOCYTES # BLD AUTO: 0.05 10*3/MM3 (ref 0–0.05)
IMM GRANULOCYTES NFR BLD AUTO: 0.5 % (ref 0–0.5)
LYMPHOCYTES # BLD AUTO: 0.74 10*3/MM3 (ref 0.7–3.1)
LYMPHOCYTES NFR BLD AUTO: 7.7 % (ref 19.6–45.3)
Lab: ABNORMAL
M PNEUMO IGG SER IA-ACNC: NOT DETECTED
MCH RBC QN AUTO: 27.8 PG (ref 26.6–33)
MCHC RBC AUTO-ENTMCNC: 32.1 G/DL (ref 31.5–35.7)
MCV RBC AUTO: 86.7 FL (ref 79–97)
METHADONE UR QL SCN: NEGATIVE
METHGB BLD QL: 0.2 % (ref 0–3)
MODALITY: ABNORMAL
MONOCYTES # BLD AUTO: 0.19 10*3/MM3 (ref 0.1–0.9)
MONOCYTES NFR BLD AUTO: 2 % (ref 5–12)
NEUTROPHILS # BLD AUTO: 8.64 10*3/MM3 (ref 1.7–7)
NEUTROPHILS NFR BLD AUTO: 89.7 % (ref 42.7–76)
NOTE: ABNORMAL
NRBC BLD AUTO-RTO: 0 /100 WBC (ref 0–0.2)
OPIATES UR QL: NEGATIVE
OXYCODONE UR QL SCN: NEGATIVE
OXYHGB MFR BLDV: 98 % (ref 94–99)
PCO2 BLDA: 30.7 MM HG (ref 35–45)
PCO2 TEMP ADJ BLD: ABNORMAL MM[HG]
PCP UR QL SCN: NEGATIVE
PH BLDA: 7.47 PH UNITS (ref 7.35–7.45)
PH, TEMP CORRECTED: ABNORMAL
PLATELET # BLD AUTO: 326 10*3/MM3 (ref 140–450)
PMV BLD AUTO: 10 FL (ref 6–12)
PO2 BLDA: 175 MM HG (ref 83–108)
PO2 TEMP ADJ BLD: ABNORMAL MM[HG]
POTASSIUM BLD-SCNC: 4.5 MMOL/L (ref 3.5–5.2)
PROT SERPL-MCNC: 7.4 G/DL (ref 6–8.5)
RBC # BLD AUTO: 4.96 10*6/MM3 (ref 3.77–5.28)
RHINOVIRUS RNA SPEC NAA+PROBE: DETECTED
RSV RNA NPH QL NAA+NON-PROBE: NOT DETECTED
SAO2 % BLDCOA: >99.2 % (ref 94–99)
SODIUM BLD-SCNC: 138 MMOL/L (ref 136–145)
VENTILATOR MODE: ABNORMAL
WBC NRBC COR # BLD: 9.63 10*3/MM3 (ref 3.4–10.8)

## 2020-02-23 PROCEDURE — 25010000002 HEPARIN (PORCINE) PER 1000 UNITS: Performed by: HOSPITALIST

## 2020-02-23 PROCEDURE — 25010000002 METHYLPREDNISOLONE PER 40 MG: Performed by: HOSPITALIST

## 2020-02-23 PROCEDURE — 82375 ASSAY CARBOXYHB QUANT: CPT

## 2020-02-23 PROCEDURE — 80053 COMPREHEN METABOLIC PANEL: CPT | Performed by: HOSPITALIST

## 2020-02-23 PROCEDURE — 82962 GLUCOSE BLOOD TEST: CPT

## 2020-02-23 PROCEDURE — 99222 1ST HOSP IP/OBS MODERATE 55: CPT | Performed by: NURSE PRACTITIONER

## 2020-02-23 PROCEDURE — 86140 C-REACTIVE PROTEIN: CPT | Performed by: HOSPITALIST

## 2020-02-23 PROCEDURE — 85025 COMPLETE CBC W/AUTO DIFF WBC: CPT | Performed by: HOSPITALIST

## 2020-02-23 PROCEDURE — 82805 BLOOD GASES W/O2 SATURATION: CPT

## 2020-02-23 PROCEDURE — 94799 UNLISTED PULMONARY SVC/PX: CPT

## 2020-02-23 PROCEDURE — 63710000001 INSULIN ASPART PER 5 UNITS: Performed by: NURSE PRACTITIONER

## 2020-02-23 PROCEDURE — 83050 HGB METHEMOGLOBIN QUAN: CPT

## 2020-02-23 PROCEDURE — 36600 WITHDRAWAL OF ARTERIAL BLOOD: CPT

## 2020-02-23 RX ORDER — HYDROCODONE BITARTRATE AND ACETAMINOPHEN 7.5; 325 MG/1; MG/1
1 TABLET ORAL ONCE
Status: COMPLETED | OUTPATIENT
Start: 2020-02-23 | End: 2020-02-23

## 2020-02-23 RX ORDER — SODIUM CHLORIDE 0.9 % (FLUSH) 0.9 %
10 SYRINGE (ML) INJECTION AS NEEDED
Status: DISCONTINUED | OUTPATIENT
Start: 2020-02-23 | End: 2020-02-26 | Stop reason: HOSPADM

## 2020-02-23 RX ORDER — ALBUTEROL SULFATE 2.5 MG/3ML
2.5 SOLUTION RESPIRATORY (INHALATION) EVERY 4 HOURS PRN
Status: CANCELLED | OUTPATIENT
Start: 2020-02-23

## 2020-02-23 RX ORDER — METHYLPREDNISOLONE SODIUM SUCCINATE 40 MG/ML
40 INJECTION, POWDER, LYOPHILIZED, FOR SOLUTION INTRAMUSCULAR; INTRAVENOUS EVERY 12 HOURS
Status: DISCONTINUED | OUTPATIENT
Start: 2020-02-23 | End: 2020-02-25

## 2020-02-23 RX ORDER — DEXTROSE MONOHYDRATE 25 G/50ML
25 INJECTION, SOLUTION INTRAVENOUS
Status: DISCONTINUED | OUTPATIENT
Start: 2020-02-23 | End: 2020-02-26 | Stop reason: HOSPADM

## 2020-02-23 RX ORDER — IPRATROPIUM BROMIDE AND ALBUTEROL SULFATE 2.5; .5 MG/3ML; MG/3ML
3 SOLUTION RESPIRATORY (INHALATION)
Status: DISCONTINUED | OUTPATIENT
Start: 2020-02-23 | End: 2020-02-26 | Stop reason: HOSPADM

## 2020-02-23 RX ORDER — HEPARIN SODIUM 5000 [USP'U]/ML
5000 INJECTION, SOLUTION INTRAVENOUS; SUBCUTANEOUS EVERY 12 HOURS SCHEDULED
Status: DISCONTINUED | OUTPATIENT
Start: 2020-02-23 | End: 2020-02-26 | Stop reason: HOSPADM

## 2020-02-23 RX ORDER — NICOTINE POLACRILEX 4 MG
15 LOZENGE BUCCAL
Status: DISCONTINUED | OUTPATIENT
Start: 2020-02-23 | End: 2020-02-26 | Stop reason: HOSPADM

## 2020-02-23 RX ORDER — BUDESONIDE 0.5 MG/2ML
0.5 INHALANT ORAL
Status: DISCONTINUED | OUTPATIENT
Start: 2020-02-23 | End: 2020-02-24

## 2020-02-23 RX ORDER — GABAPENTIN 100 MG/1
200 CAPSULE ORAL 3 TIMES DAILY
Status: DISCONTINUED | OUTPATIENT
Start: 2020-02-23 | End: 2020-02-26 | Stop reason: HOSPADM

## 2020-02-23 RX ORDER — SODIUM CHLORIDE 0.9 % (FLUSH) 0.9 %
10 SYRINGE (ML) INJECTION EVERY 12 HOURS SCHEDULED
Status: DISCONTINUED | OUTPATIENT
Start: 2020-02-23 | End: 2020-02-26 | Stop reason: HOSPADM

## 2020-02-23 RX ORDER — NICOTINE POLACRILEX 4 MG
15 LOZENGE BUCCAL
Status: DISCONTINUED | OUTPATIENT
Start: 2020-02-23 | End: 2020-02-23 | Stop reason: SDUPTHER

## 2020-02-23 RX ORDER — CETIRIZINE HYDROCHLORIDE 10 MG/1
10 TABLET ORAL DAILY
Status: DISCONTINUED | OUTPATIENT
Start: 2020-02-23 | End: 2020-02-26 | Stop reason: HOSPADM

## 2020-02-23 RX ORDER — BUPRENORPHINE HYDROCHLORIDE AND NALOXONE HYDROCHLORIDE DIHYDRATE 8; 2 MG/1; MG/1
1 TABLET SUBLINGUAL DAILY
Status: DISCONTINUED | OUTPATIENT
Start: 2020-02-23 | End: 2020-02-26 | Stop reason: HOSPADM

## 2020-02-23 RX ORDER — BUPRENORPHINE HYDROCHLORIDE AND NALOXONE HYDROCHLORIDE DIHYDRATE 8; 2 MG/1; MG/1
2 TABLET SUBLINGUAL DAILY
Status: DISCONTINUED | OUTPATIENT
Start: 2020-02-23 | End: 2020-02-23

## 2020-02-23 RX ORDER — DEXTROSE MONOHYDRATE 25 G/50ML
25 INJECTION, SOLUTION INTRAVENOUS
Status: DISCONTINUED | OUTPATIENT
Start: 2020-02-23 | End: 2020-02-23 | Stop reason: SDUPTHER

## 2020-02-23 RX ORDER — GABAPENTIN 800 MG/1
800 TABLET ORAL 4 TIMES DAILY
COMMUNITY

## 2020-02-23 RX ORDER — FLUTICASONE PROPIONATE 50 MCG
2 SPRAY, SUSPENSION (ML) NASAL DAILY
Status: DISCONTINUED | OUTPATIENT
Start: 2020-02-23 | End: 2020-02-26 | Stop reason: HOSPADM

## 2020-02-23 RX ORDER — IPRATROPIUM BROMIDE AND ALBUTEROL SULFATE 2.5; .5 MG/3ML; MG/3ML
3 SOLUTION RESPIRATORY (INHALATION) EVERY 4 HOURS PRN
Status: DISCONTINUED | OUTPATIENT
Start: 2020-02-23 | End: 2020-02-23

## 2020-02-23 RX ADMIN — IPRATROPIUM BROMIDE AND ALBUTEROL SULFATE 3 ML: .5; 3 SOLUTION RESPIRATORY (INHALATION) at 07:00

## 2020-02-23 RX ADMIN — BUDESONIDE 0.5 MG: 0.5 INHALANT RESPIRATORY (INHALATION) at 07:00

## 2020-02-23 RX ADMIN — DOXYCYCLINE 100 MG: 100 INJECTION, POWDER, LYOPHILIZED, FOR SOLUTION INTRAVENOUS at 01:44

## 2020-02-23 RX ADMIN — HYDROCODONE BITARTRATE AND ACETAMINOPHEN 1 TABLET: 7.5; 325 TABLET ORAL at 01:07

## 2020-02-23 RX ADMIN — METHYLPREDNISOLONE SODIUM SUCCINATE 40 MG: 40 INJECTION, POWDER, FOR SOLUTION INTRAMUSCULAR; INTRAVENOUS at 21:00

## 2020-02-23 RX ADMIN — BUPRENORPHINE HYDROCHLORIDE AND NALOXONE HYDROCHLORIDE 1 TABLET: 8; 2 TABLET SUBLINGUAL at 08:59

## 2020-02-23 RX ADMIN — GABAPENTIN 200 MG: 100 CAPSULE ORAL at 21:14

## 2020-02-23 RX ADMIN — IPRATROPIUM BROMIDE AND ALBUTEROL SULFATE 3 ML: .5; 3 SOLUTION RESPIRATORY (INHALATION) at 03:49

## 2020-02-23 RX ADMIN — BUDESONIDE 0.5 MG: 0.5 INHALANT RESPIRATORY (INHALATION) at 19:21

## 2020-02-23 RX ADMIN — METHYLPREDNISOLONE SODIUM SUCCINATE 40 MG: 40 INJECTION, POWDER, FOR SOLUTION INTRAMUSCULAR; INTRAVENOUS at 08:58

## 2020-02-23 RX ADMIN — FLUTICASONE PROPIONATE 2 SPRAY: 50 SPRAY, METERED NASAL at 08:59

## 2020-02-23 RX ADMIN — INSULIN ASPART 2 UNITS: 100 INJECTION, SOLUTION INTRAVENOUS; SUBCUTANEOUS at 16:59

## 2020-02-23 RX ADMIN — GABAPENTIN 200 MG: 100 CAPSULE ORAL at 08:59

## 2020-02-23 RX ADMIN — CETIRIZINE HYDROCHLORIDE 10 MG: 10 TABLET, FILM COATED ORAL at 08:59

## 2020-02-23 RX ADMIN — DOXYCYCLINE 100 MG: 100 INJECTION, POWDER, LYOPHILIZED, FOR SOLUTION INTRAVENOUS at 14:00

## 2020-02-23 RX ADMIN — INSULIN ASPART 2 UNITS: 100 INJECTION, SOLUTION INTRAVENOUS; SUBCUTANEOUS at 08:58

## 2020-02-23 RX ADMIN — HEPARIN SODIUM 5000 UNITS: 5000 INJECTION INTRAVENOUS; SUBCUTANEOUS at 08:58

## 2020-02-23 RX ADMIN — HEPARIN SODIUM 5000 UNITS: 5000 INJECTION INTRAVENOUS; SUBCUTANEOUS at 21:09

## 2020-02-23 RX ADMIN — GABAPENTIN 200 MG: 100 CAPSULE ORAL at 17:00

## 2020-02-23 RX ADMIN — SODIUM CHLORIDE, PRESERVATIVE FREE 10 ML: 5 INJECTION INTRAVENOUS at 21:09

## 2020-02-23 RX ADMIN — SODIUM CHLORIDE, PRESERVATIVE FREE 10 ML: 5 INJECTION INTRAVENOUS at 08:58

## 2020-02-23 RX ADMIN — IPRATROPIUM BROMIDE AND ALBUTEROL SULFATE 3 ML: .5; 3 SOLUTION RESPIRATORY (INHALATION) at 19:21

## 2020-02-23 RX ADMIN — IPRATROPIUM BROMIDE AND ALBUTEROL SULFATE 3 ML: .5; 3 SOLUTION RESPIRATORY (INHALATION) at 13:10

## 2020-02-24 LAB
ANION GAP SERPL CALCULATED.3IONS-SCNC: 13 MMOL/L (ref 5–15)
BUN BLD-MCNC: 16 MG/DL (ref 6–20)
BUN/CREAT SERPL: 19.5 (ref 7–25)
CALCIUM SPEC-SCNC: 9 MG/DL (ref 8.6–10.5)
CHLORIDE SERPL-SCNC: 103 MMOL/L (ref 98–107)
CO2 SERPL-SCNC: 22 MMOL/L (ref 22–29)
CREAT BLD-MCNC: 0.82 MG/DL (ref 0.57–1)
DEPRECATED RDW RBC AUTO: 42 FL (ref 37–54)
ERYTHROCYTE [DISTWIDTH] IN BLOOD BY AUTOMATED COUNT: 12.8 % (ref 12.3–15.4)
GFR SERPL CREATININE-BSD FRML MDRD: 78 ML/MIN/1.73
GLUCOSE BLD-MCNC: 139 MG/DL (ref 65–99)
GLUCOSE BLDC GLUCOMTR-MCNC: 114 MG/DL (ref 70–130)
GLUCOSE BLDC GLUCOMTR-MCNC: 118 MG/DL (ref 70–130)
GLUCOSE BLDC GLUCOMTR-MCNC: 118 MG/DL (ref 70–130)
GLUCOSE BLDC GLUCOMTR-MCNC: 123 MG/DL (ref 70–130)
HCT VFR BLD AUTO: 42.7 % (ref 34–46.6)
HGB BLD-MCNC: 13.2 G/DL (ref 12–15.9)
MCH RBC QN AUTO: 27.8 PG (ref 26.6–33)
MCHC RBC AUTO-ENTMCNC: 30.9 G/DL (ref 31.5–35.7)
MCV RBC AUTO: 89.9 FL (ref 79–97)
PLATELET # BLD AUTO: 315 10*3/MM3 (ref 140–450)
PMV BLD AUTO: 10.1 FL (ref 6–12)
POTASSIUM BLD-SCNC: 4.2 MMOL/L (ref 3.5–5.2)
RBC # BLD AUTO: 4.75 10*6/MM3 (ref 3.77–5.28)
SODIUM BLD-SCNC: 138 MMOL/L (ref 136–145)
WBC NRBC COR # BLD: 16.7 10*3/MM3 (ref 3.4–10.8)

## 2020-02-24 PROCEDURE — 99232 SBSQ HOSP IP/OBS MODERATE 35: CPT | Performed by: HOSPITALIST

## 2020-02-24 PROCEDURE — 25010000002 HEPARIN (PORCINE) PER 1000 UNITS: Performed by: HOSPITALIST

## 2020-02-24 PROCEDURE — 80048 BASIC METABOLIC PNL TOTAL CA: CPT | Performed by: INTERNAL MEDICINE

## 2020-02-24 PROCEDURE — 82962 GLUCOSE BLOOD TEST: CPT

## 2020-02-24 PROCEDURE — 94799 UNLISTED PULMONARY SVC/PX: CPT

## 2020-02-24 PROCEDURE — 25010000002 METHYLPREDNISOLONE PER 40 MG: Performed by: HOSPITALIST

## 2020-02-24 PROCEDURE — 85027 COMPLETE CBC AUTOMATED: CPT | Performed by: INTERNAL MEDICINE

## 2020-02-24 PROCEDURE — 25010000002 FUROSEMIDE PER 20 MG: Performed by: HOSPITALIST

## 2020-02-24 RX ORDER — ACETAMINOPHEN 325 MG/1
650 TABLET ORAL ONCE
Status: COMPLETED | OUTPATIENT
Start: 2020-02-24 | End: 2020-02-24

## 2020-02-24 RX ORDER — ARFORMOTEROL TARTRATE 15 UG/2ML
15 SOLUTION RESPIRATORY (INHALATION)
Status: DISCONTINUED | OUTPATIENT
Start: 2020-02-24 | End: 2020-02-26 | Stop reason: HOSPADM

## 2020-02-24 RX ORDER — FUROSEMIDE 10 MG/ML
40 INJECTION INTRAMUSCULAR; INTRAVENOUS ONCE
Status: COMPLETED | OUTPATIENT
Start: 2020-02-24 | End: 2020-02-24

## 2020-02-24 RX ORDER — BUDESONIDE 0.5 MG/2ML
0.5 INHALANT ORAL
Status: DISCONTINUED | OUTPATIENT
Start: 2020-02-24 | End: 2020-02-26 | Stop reason: HOSPADM

## 2020-02-24 RX ADMIN — SODIUM CHLORIDE, PRESERVATIVE FREE 10 ML: 5 INJECTION INTRAVENOUS at 20:17

## 2020-02-24 RX ADMIN — HEPARIN SODIUM 5000 UNITS: 5000 INJECTION INTRAVENOUS; SUBCUTANEOUS at 20:17

## 2020-02-24 RX ADMIN — CETIRIZINE HYDROCHLORIDE 10 MG: 10 TABLET, FILM COATED ORAL at 08:34

## 2020-02-24 RX ADMIN — IPRATROPIUM BROMIDE AND ALBUTEROL SULFATE 3 ML: .5; 3 SOLUTION RESPIRATORY (INHALATION) at 18:39

## 2020-02-24 RX ADMIN — SODIUM CHLORIDE, PRESERVATIVE FREE 10 ML: 5 INJECTION INTRAVENOUS at 08:34

## 2020-02-24 RX ADMIN — IPRATROPIUM BROMIDE AND ALBUTEROL SULFATE 3 ML: .5; 3 SOLUTION RESPIRATORY (INHALATION) at 00:46

## 2020-02-24 RX ADMIN — FLUTICASONE PROPIONATE 2 SPRAY: 50 SPRAY, METERED NASAL at 08:35

## 2020-02-24 RX ADMIN — GABAPENTIN 200 MG: 100 CAPSULE ORAL at 20:17

## 2020-02-24 RX ADMIN — HEPARIN SODIUM 5000 UNITS: 5000 INJECTION INTRAVENOUS; SUBCUTANEOUS at 08:34

## 2020-02-24 RX ADMIN — METHYLPREDNISOLONE SODIUM SUCCINATE 40 MG: 40 INJECTION, POWDER, FOR SOLUTION INTRAMUSCULAR; INTRAVENOUS at 08:35

## 2020-02-24 RX ADMIN — BUPRENORPHINE HYDROCHLORIDE AND NALOXONE HYDROCHLORIDE 1 TABLET: 8; 2 TABLET SUBLINGUAL at 08:34

## 2020-02-24 RX ADMIN — DOXYCYCLINE 100 MG: 100 INJECTION, POWDER, LYOPHILIZED, FOR SOLUTION INTRAVENOUS at 03:00

## 2020-02-24 RX ADMIN — ACETAMINOPHEN 650 MG: 325 TABLET ORAL at 03:01

## 2020-02-24 RX ADMIN — GABAPENTIN 200 MG: 100 CAPSULE ORAL at 08:34

## 2020-02-24 RX ADMIN — FUROSEMIDE 40 MG: 10 INJECTION, SOLUTION INTRAMUSCULAR; INTRAVENOUS at 14:19

## 2020-02-24 RX ADMIN — METHYLPREDNISOLONE SODIUM SUCCINATE 40 MG: 40 INJECTION, POWDER, FOR SOLUTION INTRAMUSCULAR; INTRAVENOUS at 20:18

## 2020-02-24 RX ADMIN — DOXYCYCLINE 100 MG: 100 INJECTION, POWDER, LYOPHILIZED, FOR SOLUTION INTRAVENOUS at 14:19

## 2020-02-24 RX ADMIN — GABAPENTIN 200 MG: 100 CAPSULE ORAL at 16:29

## 2020-02-25 LAB
ANION GAP SERPL CALCULATED.3IONS-SCNC: 10.1 MMOL/L (ref 5–15)
BASOPHILS # BLD AUTO: 0.01 10*3/MM3 (ref 0–0.2)
BASOPHILS NFR BLD AUTO: 0.1 % (ref 0–1.5)
BUN BLD-MCNC: 18 MG/DL (ref 6–20)
BUN/CREAT SERPL: 26.1 (ref 7–25)
CALCIUM SPEC-SCNC: 8.9 MG/DL (ref 8.6–10.5)
CHLORIDE SERPL-SCNC: 103 MMOL/L (ref 98–107)
CO2 SERPL-SCNC: 23.9 MMOL/L (ref 22–29)
CREAT BLD-MCNC: 0.69 MG/DL (ref 0.57–1)
DEPRECATED RDW RBC AUTO: 42.5 FL (ref 37–54)
EOSINOPHIL # BLD AUTO: 0 10*3/MM3 (ref 0–0.4)
EOSINOPHIL NFR BLD AUTO: 0 % (ref 0.3–6.2)
ERYTHROCYTE [DISTWIDTH] IN BLOOD BY AUTOMATED COUNT: 13 % (ref 12.3–15.4)
GFR SERPL CREATININE-BSD FRML MDRD: 95 ML/MIN/1.73
GLUCOSE BLD-MCNC: 114 MG/DL (ref 65–99)
GLUCOSE BLDC GLUCOMTR-MCNC: 116 MG/DL (ref 70–130)
GLUCOSE BLDC GLUCOMTR-MCNC: 120 MG/DL (ref 70–130)
GLUCOSE BLDC GLUCOMTR-MCNC: 123 MG/DL (ref 70–130)
GLUCOSE BLDC GLUCOMTR-MCNC: 155 MG/DL (ref 70–130)
HCT VFR BLD AUTO: 43 % (ref 34–46.6)
HGB BLD-MCNC: 13.4 G/DL (ref 12–15.9)
IMM GRANULOCYTES # BLD AUTO: 0.12 10*3/MM3 (ref 0–0.05)
IMM GRANULOCYTES NFR BLD AUTO: 0.8 % (ref 0–0.5)
LYMPHOCYTES # BLD AUTO: 1.36 10*3/MM3 (ref 0.7–3.1)
LYMPHOCYTES NFR BLD AUTO: 9.4 % (ref 19.6–45.3)
MCH RBC QN AUTO: 27.9 PG (ref 26.6–33)
MCHC RBC AUTO-ENTMCNC: 31.2 G/DL (ref 31.5–35.7)
MCV RBC AUTO: 89.4 FL (ref 79–97)
MONOCYTES # BLD AUTO: 0.47 10*3/MM3 (ref 0.1–0.9)
MONOCYTES NFR BLD AUTO: 3.3 % (ref 5–12)
NEUTROPHILS # BLD AUTO: 12.47 10*3/MM3 (ref 1.7–7)
NEUTROPHILS NFR BLD AUTO: 86.4 % (ref 42.7–76)
NRBC BLD AUTO-RTO: 0 /100 WBC (ref 0–0.2)
PLATELET # BLD AUTO: 283 10*3/MM3 (ref 140–450)
PMV BLD AUTO: 10.4 FL (ref 6–12)
POTASSIUM BLD-SCNC: 4.3 MMOL/L (ref 3.5–5.2)
RBC # BLD AUTO: 4.81 10*6/MM3 (ref 3.77–5.28)
SODIUM BLD-SCNC: 137 MMOL/L (ref 136–145)
WBC NRBC COR # BLD: 14.43 10*3/MM3 (ref 3.4–10.8)

## 2020-02-25 PROCEDURE — 99232 SBSQ HOSP IP/OBS MODERATE 35: CPT | Performed by: HOSPITALIST

## 2020-02-25 PROCEDURE — 85025 COMPLETE CBC W/AUTO DIFF WBC: CPT | Performed by: HOSPITALIST

## 2020-02-25 PROCEDURE — 25010000002 METHYLPREDNISOLONE PER 40 MG: Performed by: HOSPITALIST

## 2020-02-25 PROCEDURE — 80048 BASIC METABOLIC PNL TOTAL CA: CPT | Performed by: HOSPITALIST

## 2020-02-25 PROCEDURE — 63710000001 PROMETHAZINE PER 12.5 MG: Performed by: NURSE PRACTITIONER

## 2020-02-25 PROCEDURE — 94799 UNLISTED PULMONARY SVC/PX: CPT

## 2020-02-25 PROCEDURE — 82962 GLUCOSE BLOOD TEST: CPT

## 2020-02-25 PROCEDURE — 25010000002 HEPARIN (PORCINE) PER 1000 UNITS: Performed by: HOSPITALIST

## 2020-02-25 RX ORDER — BISACODYL 10 MG
10 SUPPOSITORY, RECTAL RECTAL ONCE
Status: COMPLETED | OUTPATIENT
Start: 2020-02-25 | End: 2020-02-25

## 2020-02-25 RX ORDER — DOXYCYCLINE 100 MG/1
100 CAPSULE ORAL EVERY 12 HOURS SCHEDULED
Status: DISCONTINUED | OUTPATIENT
Start: 2020-02-25 | End: 2020-02-26 | Stop reason: HOSPADM

## 2020-02-25 RX ORDER — LACTULOSE 10 G/15ML
30 SOLUTION ORAL DAILY
Status: COMPLETED | OUTPATIENT
Start: 2020-02-25 | End: 2020-02-25

## 2020-02-25 RX ORDER — PROMETHAZINE HYDROCHLORIDE 12.5 MG/1
12.5 TABLET ORAL EVERY 6 HOURS PRN
Status: DISCONTINUED | OUTPATIENT
Start: 2020-02-25 | End: 2020-02-26 | Stop reason: HOSPADM

## 2020-02-25 RX ORDER — PREDNISONE 20 MG/1
40 TABLET ORAL
Status: DISCONTINUED | OUTPATIENT
Start: 2020-02-26 | End: 2020-02-26 | Stop reason: HOSPADM

## 2020-02-25 RX ADMIN — HEPARIN SODIUM 5000 UNITS: 5000 INJECTION INTRAVENOUS; SUBCUTANEOUS at 08:30

## 2020-02-25 RX ADMIN — POLYETHYLENE GLYCOL (3350) 17 G: 17 POWDER, FOR SOLUTION ORAL at 16:13

## 2020-02-25 RX ADMIN — PROMETHAZINE HYDROCHLORIDE 12.5 MG: 12.5 TABLET ORAL at 23:28

## 2020-02-25 RX ADMIN — GABAPENTIN 200 MG: 100 CAPSULE ORAL at 16:13

## 2020-02-25 RX ADMIN — GABAPENTIN 200 MG: 100 CAPSULE ORAL at 08:28

## 2020-02-25 RX ADMIN — BISACODYL 10 MG: 10 SUPPOSITORY RECTAL at 16:13

## 2020-02-25 RX ADMIN — CETIRIZINE HYDROCHLORIDE 10 MG: 10 TABLET, FILM COATED ORAL at 08:28

## 2020-02-25 RX ADMIN — BUPRENORPHINE HYDROCHLORIDE AND NALOXONE HYDROCHLORIDE 1 TABLET: 8; 2 TABLET SUBLINGUAL at 08:28

## 2020-02-25 RX ADMIN — IPRATROPIUM BROMIDE AND ALBUTEROL SULFATE 3 ML: .5; 3 SOLUTION RESPIRATORY (INHALATION) at 07:28

## 2020-02-25 RX ADMIN — BUDESONIDE 0.5 MG: 0.5 INHALANT RESPIRATORY (INHALATION) at 23:34

## 2020-02-25 RX ADMIN — BUDESONIDE 0.5 MG: 0.5 INHALANT RESPIRATORY (INHALATION) at 10:41

## 2020-02-25 RX ADMIN — FLUTICASONE PROPIONATE 2 SPRAY: 50 SPRAY, METERED NASAL at 08:30

## 2020-02-25 RX ADMIN — LACTULOSE 30 G: 10 SOLUTION ORAL at 16:13

## 2020-02-25 RX ADMIN — SODIUM CHLORIDE, PRESERVATIVE FREE 10 ML: 5 INJECTION INTRAVENOUS at 20:46

## 2020-02-25 RX ADMIN — DOXYCYCLINE 100 MG: 100 INJECTION, POWDER, LYOPHILIZED, FOR SOLUTION INTRAVENOUS at 01:40

## 2020-02-25 RX ADMIN — GABAPENTIN 200 MG: 100 CAPSULE ORAL at 20:46

## 2020-02-25 RX ADMIN — DOXYCYCLINE 100 MG: 100 CAPSULE ORAL at 20:46

## 2020-02-25 RX ADMIN — SODIUM CHLORIDE, PRESERVATIVE FREE 10 ML: 5 INJECTION INTRAVENOUS at 08:30

## 2020-02-25 RX ADMIN — METHYLPREDNISOLONE SODIUM SUCCINATE 40 MG: 40 INJECTION, POWDER, FOR SOLUTION INTRAMUSCULAR; INTRAVENOUS at 08:30

## 2020-02-25 RX ADMIN — ARFORMOTEROL TARTRATE 15 MCG: 15 SOLUTION RESPIRATORY (INHALATION) at 23:34

## 2020-02-25 RX ADMIN — ARFORMOTEROL TARTRATE 15 MCG: 15 SOLUTION RESPIRATORY (INHALATION) at 10:41

## 2020-02-26 VITALS
WEIGHT: 238.2 LBS | HEIGHT: 62 IN | DIASTOLIC BLOOD PRESSURE: 78 MMHG | HEART RATE: 71 BPM | BODY MASS INDEX: 43.84 KG/M2 | RESPIRATION RATE: 18 BRPM | SYSTOLIC BLOOD PRESSURE: 126 MMHG | OXYGEN SATURATION: 94 % | TEMPERATURE: 97.7 F

## 2020-02-26 LAB
BASOPHILS # BLD AUTO: 0.05 10*3/MM3 (ref 0–0.2)
BASOPHILS NFR BLD AUTO: 0.4 % (ref 0–1.5)
DEPRECATED RDW RBC AUTO: 42.8 FL (ref 37–54)
EOSINOPHIL # BLD AUTO: 0.12 10*3/MM3 (ref 0–0.4)
EOSINOPHIL NFR BLD AUTO: 0.9 % (ref 0.3–6.2)
ERYTHROCYTE [DISTWIDTH] IN BLOOD BY AUTOMATED COUNT: 13.3 % (ref 12.3–15.4)
GLUCOSE BLDC GLUCOMTR-MCNC: 110 MG/DL (ref 70–130)
HCT VFR BLD AUTO: 44.7 % (ref 34–46.6)
HGB BLD-MCNC: 14.2 G/DL (ref 12–15.9)
IMM GRANULOCYTES # BLD AUTO: 0.12 10*3/MM3 (ref 0–0.05)
IMM GRANULOCYTES NFR BLD AUTO: 0.9 % (ref 0–0.5)
LYMPHOCYTES # BLD AUTO: 2.37 10*3/MM3 (ref 0.7–3.1)
LYMPHOCYTES NFR BLD AUTO: 17.3 % (ref 19.6–45.3)
MCH RBC QN AUTO: 28.3 PG (ref 26.6–33)
MCHC RBC AUTO-ENTMCNC: 31.8 G/DL (ref 31.5–35.7)
MCV RBC AUTO: 89 FL (ref 79–97)
MONOCYTES # BLD AUTO: 1.03 10*3/MM3 (ref 0.1–0.9)
MONOCYTES NFR BLD AUTO: 7.5 % (ref 5–12)
NEUTROPHILS # BLD AUTO: 10 10*3/MM3 (ref 1.7–7)
NEUTROPHILS NFR BLD AUTO: 73 % (ref 42.7–76)
NRBC BLD AUTO-RTO: 0 /100 WBC (ref 0–0.2)
PLATELET # BLD AUTO: 260 10*3/MM3 (ref 140–450)
PMV BLD AUTO: 10.1 FL (ref 6–12)
RBC # BLD AUTO: 5.02 10*6/MM3 (ref 3.77–5.28)
WBC NRBC COR # BLD: 13.69 10*3/MM3 (ref 3.4–10.8)

## 2020-02-26 PROCEDURE — 63710000001 PROMETHAZINE PER 12.5 MG: Performed by: NURSE PRACTITIONER

## 2020-02-26 PROCEDURE — 94799 UNLISTED PULMONARY SVC/PX: CPT

## 2020-02-26 PROCEDURE — 85025 COMPLETE CBC W/AUTO DIFF WBC: CPT | Performed by: HOSPITALIST

## 2020-02-26 PROCEDURE — 82962 GLUCOSE BLOOD TEST: CPT

## 2020-02-26 PROCEDURE — 99239 HOSP IP/OBS DSCHRG MGMT >30: CPT | Performed by: HOSPITALIST

## 2020-02-26 PROCEDURE — 63710000001 PREDNISONE PER 1 MG: Performed by: HOSPITALIST

## 2020-02-26 RX ORDER — DOXYCYCLINE 100 MG/1
100 CAPSULE ORAL EVERY 12 HOURS SCHEDULED
Qty: 2 CAPSULE | Refills: 0 | Status: SHIPPED | OUTPATIENT
Start: 2020-02-26 | End: 2020-02-27

## 2020-02-26 RX ADMIN — IPRATROPIUM BROMIDE AND ALBUTEROL SULFATE 3 ML: .5; 3 SOLUTION RESPIRATORY (INHALATION) at 07:22

## 2020-02-26 RX ADMIN — GABAPENTIN 200 MG: 100 CAPSULE ORAL at 08:50

## 2020-02-26 RX ADMIN — BUPRENORPHINE HYDROCHLORIDE AND NALOXONE HYDROCHLORIDE 1 TABLET: 8; 2 TABLET SUBLINGUAL at 08:50

## 2020-02-26 RX ADMIN — FLUTICASONE PROPIONATE 2 SPRAY: 50 SPRAY, METERED NASAL at 08:50

## 2020-02-26 RX ADMIN — PREDNISONE 40 MG: 20 TABLET ORAL at 08:50

## 2020-02-26 RX ADMIN — DOXYCYCLINE 100 MG: 100 CAPSULE ORAL at 08:50

## 2020-02-26 RX ADMIN — PROMETHAZINE HYDROCHLORIDE 12.5 MG: 12.5 TABLET ORAL at 08:54

## 2020-02-26 RX ADMIN — BUDESONIDE 0.5 MG: 0.5 INHALANT RESPIRATORY (INHALATION) at 10:34

## 2020-02-26 RX ADMIN — ARFORMOTEROL TARTRATE 15 MCG: 15 SOLUTION RESPIRATORY (INHALATION) at 10:34

## 2020-02-26 RX ADMIN — CETIRIZINE HYDROCHLORIDE 10 MG: 10 TABLET, FILM COATED ORAL at 08:50

## 2020-02-27 LAB
BACTERIA SPEC AEROBE CULT: NORMAL
BACTERIA SPEC AEROBE CULT: NORMAL

## 2020-09-07 ENCOUNTER — APPOINTMENT (OUTPATIENT)
Dept: NUCLEAR MEDICINE | Facility: HOSPITAL | Age: 40
End: 2020-09-07

## 2020-09-07 ENCOUNTER — HOSPITAL ENCOUNTER (INPATIENT)
Facility: HOSPITAL | Age: 40
LOS: 2 days | Discharge: LEFT BEFORE DISCHARGE | End: 2020-09-09
Attending: EMERGENCY MEDICINE | Admitting: INTERNAL MEDICINE

## 2020-09-07 ENCOUNTER — APPOINTMENT (OUTPATIENT)
Dept: CT IMAGING | Facility: HOSPITAL | Age: 40
End: 2020-09-07

## 2020-09-07 ENCOUNTER — APPOINTMENT (OUTPATIENT)
Dept: GENERAL RADIOLOGY | Facility: HOSPITAL | Age: 40
End: 2020-09-07

## 2020-09-07 DIAGNOSIS — J44.1 COPD WITH ACUTE EXACERBATION (HCC): ICD-10-CM

## 2020-09-07 DIAGNOSIS — J96.22 ACUTE ON CHRONIC RESPIRATORY FAILURE WITH HYPERCAPNIA (HCC): Primary | ICD-10-CM

## 2020-09-07 LAB
6-ACETYL MORPHINE: NEGATIVE
A-A DO2: 106.8 MMHG (ref 0–300)
A-A DO2: 48.4 MMHG (ref 0–300)
A-A DO2: 86.5 MMHG (ref 0–300)
A-A DO2: 95.1 MMHG (ref 0–300)
ALBUMIN SERPL-MCNC: 3.84 G/DL (ref 3.5–5.2)
ALBUMIN/GLOB SERPL: 1.1 G/DL
ALP SERPL-CCNC: 76 U/L (ref 39–117)
ALT SERPL W P-5'-P-CCNC: 9 U/L (ref 1–33)
AMPHET+METHAMPHET UR QL: NEGATIVE
ANION GAP SERPL CALCULATED.3IONS-SCNC: 6.9 MMOL/L (ref 5–15)
ARTERIAL PATENCY WRIST A: POSITIVE
AST SERPL-CCNC: 12 U/L (ref 1–32)
ATMOSPHERIC PRESS: 726 MMHG
ATMOSPHERIC PRESS: 727 MMHG
ATMOSPHERIC PRESS: 727 MMHG
ATMOSPHERIC PRESS: 728 MMHG
BARBITURATES UR QL SCN: NEGATIVE
BASE EXCESS BLDA CALC-SCNC: 1.2 MMOL/L (ref 0–2)
BASE EXCESS BLDA CALC-SCNC: 2.9 MMOL/L (ref 0–2)
BASE EXCESS BLDA CALC-SCNC: 3.4 MMOL/L (ref 0–2)
BASE EXCESS BLDA CALC-SCNC: 4.7 MMOL/L (ref 0–2)
BASOPHILS # BLD AUTO: 0.03 10*3/MM3 (ref 0–0.2)
BASOPHILS NFR BLD AUTO: 0.6 % (ref 0–1.5)
BDY SITE: ABNORMAL
BENZODIAZ UR QL SCN: NEGATIVE
BILIRUB SERPL-MCNC: <0.2 MG/DL (ref 0–1.2)
BILIRUB UR QL STRIP: NEGATIVE
BODY TEMPERATURE: 0 C
BUN SERPL-MCNC: 7 MG/DL (ref 6–20)
BUN/CREAT SERPL: 10.1 (ref 7–25)
BUPRENORPHINE SERPL-MCNC: POSITIVE NG/ML
CALCIUM SPEC-SCNC: 8.9 MG/DL (ref 8.6–10.5)
CANNABINOIDS SERPL QL: NEGATIVE
CHLORIDE SERPL-SCNC: 103 MMOL/L (ref 98–107)
CLARITY UR: CLEAR
CO2 BLDA-SCNC: 30.4 MMOL/L (ref 22–33)
CO2 BLDA-SCNC: 31.6 MMOL/L (ref 22–33)
CO2 BLDA-SCNC: 32.4 MMOL/L (ref 22–33)
CO2 BLDA-SCNC: 34.4 MMOL/L (ref 22–33)
CO2 SERPL-SCNC: 31.1 MMOL/L (ref 22–29)
COCAINE UR QL: NEGATIVE
COHGB MFR BLD: 1 % (ref 0–5)
COHGB MFR BLD: 1.1 % (ref 0–5)
COHGB MFR BLD: 1.1 % (ref 0–5)
COHGB MFR BLD: 1.5 % (ref 0–5)
COLOR UR: YELLOW
CREAT SERPL-MCNC: 0.69 MG/DL (ref 0.57–1)
CRP SERPL-MCNC: 2.68 MG/DL (ref 0–0.5)
D DIMER PPP FEU-MCNC: 1.65 MCGFEU/ML (ref 0–0.5)
D-LACTATE SERPL-SCNC: 0.7 MMOL/L (ref 0.5–2)
DEPRECATED RDW RBC AUTO: 41.8 FL (ref 37–54)
EOSINOPHIL # BLD AUTO: 0.31 10*3/MM3 (ref 0–0.4)
EOSINOPHIL NFR BLD AUTO: 5.9 % (ref 0.3–6.2)
EPAP: 6
EPAP: 6
ERYTHROCYTE [DISTWIDTH] IN BLOOD BY AUTOMATED COUNT: 13.2 % (ref 12.3–15.4)
FLUAV AG NPH QL: NEGATIVE
FLUBV AG NPH QL IA: NEGATIVE
GAS FLOW AIRWAY: 2 LPM
GFR SERPL CREATININE-BSD FRML MDRD: 95 ML/MIN/1.73
GLOBULIN UR ELPH-MCNC: 3.5 GM/DL
GLUCOSE SERPL-MCNC: 103 MG/DL (ref 65–99)
GLUCOSE UR STRIP-MCNC: NEGATIVE MG/DL
HBA1C MFR BLD: 5.6 % (ref 4.8–5.6)
HCO3 BLDA-SCNC: 28.6 MMOL/L (ref 20–26)
HCO3 BLDA-SCNC: 29.9 MMOL/L (ref 20–26)
HCO3 BLDA-SCNC: 30.7 MMOL/L (ref 20–26)
HCO3 BLDA-SCNC: 32.5 MMOL/L (ref 20–26)
HCT VFR BLD AUTO: 37.8 % (ref 34–46.6)
HCT VFR BLD CALC: 37.1 % (ref 38–51)
HCT VFR BLD CALC: 38.8 % (ref 38–51)
HCT VFR BLD CALC: 39.1 % (ref 38–51)
HCT VFR BLD CALC: 39.3 % (ref 38–51)
HGB BLD-MCNC: 11.6 G/DL (ref 12–15.9)
HGB BLDA-MCNC: 12.1 G/DL (ref 13.5–17.5)
HGB BLDA-MCNC: 12.6 G/DL (ref 13.5–17.5)
HGB BLDA-MCNC: 12.8 G/DL (ref 13.5–17.5)
HGB BLDA-MCNC: 12.8 G/DL (ref 13.5–17.5)
HGB UR QL STRIP.AUTO: NEGATIVE
HOLD SPECIMEN: NORMAL
HOLD SPECIMEN: NORMAL
IMM GRANULOCYTES # BLD AUTO: 0.02 10*3/MM3 (ref 0–0.05)
IMM GRANULOCYTES NFR BLD AUTO: 0.4 % (ref 0–0.5)
INHALED O2 CONCENTRATION: 28 %
INHALED O2 CONCENTRATION: 32 %
INHALED O2 CONCENTRATION: 35 %
INHALED O2 CONCENTRATION: 35 %
IPAP: 20
IPAP: 20
KETONES UR QL STRIP: NEGATIVE
LEUKOCYTE ESTERASE UR QL STRIP.AUTO: NEGATIVE
LYMPHOCYTES # BLD AUTO: 1.32 10*3/MM3 (ref 0.7–3.1)
LYMPHOCYTES NFR BLD AUTO: 25.2 % (ref 19.6–45.3)
Lab: ABNORMAL
MCH RBC QN AUTO: 26.7 PG (ref 26.6–33)
MCHC RBC AUTO-ENTMCNC: 30.7 G/DL (ref 31.5–35.7)
MCV RBC AUTO: 86.9 FL (ref 79–97)
METHADONE UR QL SCN: POSITIVE
METHGB BLD QL: 0 % (ref 0–3)
METHGB BLD QL: 0.1 % (ref 0–3)
MODALITY: ABNORMAL
MONOCYTES # BLD AUTO: 0.31 10*3/MM3 (ref 0.1–0.9)
MONOCYTES NFR BLD AUTO: 5.9 % (ref 5–12)
NEUTROPHILS NFR BLD AUTO: 3.24 10*3/MM3 (ref 1.7–7)
NEUTROPHILS NFR BLD AUTO: 62 % (ref 42.7–76)
NITRITE UR QL STRIP: NEGATIVE
NOTE: ABNORMAL
NRBC BLD AUTO-RTO: 0 /100 WBC (ref 0–0.2)
OPIATES UR QL: NEGATIVE
OXYCODONE UR QL SCN: NEGATIVE
OXYHGB MFR BLDV: 92 % (ref 94–99)
OXYHGB MFR BLDV: 92.4 % (ref 94–99)
OXYHGB MFR BLDV: 92.6 % (ref 94–99)
OXYHGB MFR BLDV: 95.1 % (ref 94–99)
PCO2 BLDA: 55.7 MM HG (ref 35–45)
PCO2 BLDA: 56.9 MM HG (ref 35–45)
PCO2 BLDA: 57.7 MM HG (ref 35–45)
PCO2 BLDA: 63.1 MM HG (ref 35–45)
PCO2 TEMP ADJ BLD: ABNORMAL MM[HG]
PCP UR QL SCN: NEGATIVE
PH BLDA: 7.31 PH UNITS (ref 7.35–7.45)
PH BLDA: 7.32 PH UNITS (ref 7.35–7.45)
PH BLDA: 7.33 PH UNITS (ref 7.35–7.45)
PH BLDA: 7.34 PH UNITS (ref 7.35–7.45)
PH UR STRIP.AUTO: <=5 [PH] (ref 5–8)
PH, TEMP CORRECTED: ABNORMAL
PLATELET # BLD AUTO: 238 10*3/MM3 (ref 140–450)
PMV BLD AUTO: 9.4 FL (ref 6–12)
PO2 BLDA: 67.6 MM HG (ref 83–108)
PO2 BLDA: 67.8 MM HG (ref 83–108)
PO2 BLDA: 71.5 MM HG (ref 83–108)
PO2 BLDA: 80.9 MM HG (ref 83–108)
PO2 TEMP ADJ BLD: ABNORMAL MM[HG]
POTASSIUM SERPL-SCNC: 4.6 MMOL/L (ref 3.5–5.2)
PROT SERPL-MCNC: 7.3 G/DL (ref 6–8.5)
PROT UR QL STRIP: NEGATIVE
RBC # BLD AUTO: 4.35 10*6/MM3 (ref 3.77–5.28)
SAO2 % BLDCOA: 93.1 % (ref 94–99)
SAO2 % BLDCOA: 93.4 % (ref 94–99)
SAO2 % BLDCOA: 94.1 % (ref 94–99)
SAO2 % BLDCOA: 96.2 % (ref 94–99)
SARS-COV-2 RDRP RESP QL NAA+PROBE: NOT DETECTED
SET MECH RESP RATE: 22
SET MECH RESP RATE: 22
SODIUM SERPL-SCNC: 141 MMOL/L (ref 136–145)
SP GR UR STRIP: 1.01 (ref 1–1.03)
TROPONIN T SERPL-MCNC: <0.01 NG/ML (ref 0–0.03)
UROBILINOGEN UR QL STRIP: NORMAL
VENTILATOR MODE: ABNORMAL
WBC # BLD AUTO: 5.23 10*3/MM3 (ref 3.4–10.8)
WHOLE BLOOD HOLD SPECIMEN: NORMAL
WHOLE BLOOD HOLD SPECIMEN: NORMAL

## 2020-09-07 PROCEDURE — 99285 EMERGENCY DEPT VISIT HI MDM: CPT

## 2020-09-07 PROCEDURE — 86140 C-REACTIVE PROTEIN: CPT | Performed by: PHYSICIAN ASSISTANT

## 2020-09-07 PROCEDURE — 87040 BLOOD CULTURE FOR BACTERIA: CPT | Performed by: PHYSICIAN ASSISTANT

## 2020-09-07 PROCEDURE — 93010 ELECTROCARDIOGRAM REPORT: CPT | Performed by: INTERNAL MEDICINE

## 2020-09-07 PROCEDURE — 80307 DRUG TEST PRSMV CHEM ANLYZR: CPT | Performed by: PHYSICIAN ASSISTANT

## 2020-09-07 PROCEDURE — 94799 UNLISTED PULMONARY SVC/PX: CPT

## 2020-09-07 PROCEDURE — 82375 ASSAY CARBOXYHB QUANT: CPT

## 2020-09-07 PROCEDURE — 85379 FIBRIN DEGRADATION QUANT: CPT | Performed by: PHYSICIAN ASSISTANT

## 2020-09-07 PROCEDURE — 84702 CHORIONIC GONADOTROPIN TEST: CPT | Performed by: INTERNAL MEDICINE

## 2020-09-07 PROCEDURE — 83050 HGB METHEMOGLOBIN QUAN: CPT

## 2020-09-07 PROCEDURE — 80053 COMPREHEN METABOLIC PANEL: CPT | Performed by: PHYSICIAN ASSISTANT

## 2020-09-07 PROCEDURE — 83605 ASSAY OF LACTIC ACID: CPT | Performed by: PHYSICIAN ASSISTANT

## 2020-09-07 PROCEDURE — 83036 HEMOGLOBIN GLYCOSYLATED A1C: CPT | Performed by: NURSE PRACTITIONER

## 2020-09-07 PROCEDURE — 94660 CPAP INITIATION&MGMT: CPT

## 2020-09-07 PROCEDURE — 36600 WITHDRAWAL OF ARTERIAL BLOOD: CPT

## 2020-09-07 PROCEDURE — 0 IOVERSOL 74 % SOLUTION: Performed by: FAMILY MEDICINE

## 2020-09-07 PROCEDURE — 82805 BLOOD GASES W/O2 SATURATION: CPT

## 2020-09-07 PROCEDURE — 25010000002 METHYLPREDNISOLONE PER 125 MG: Performed by: PHYSICIAN ASSISTANT

## 2020-09-07 PROCEDURE — 25010000002 NALOXONE PER 1 MG: Performed by: PHYSICIAN ASSISTANT

## 2020-09-07 PROCEDURE — 93005 ELECTROCARDIOGRAM TRACING: CPT | Performed by: EMERGENCY MEDICINE

## 2020-09-07 PROCEDURE — 74176 CT ABD & PELVIS W/O CONTRAST: CPT

## 2020-09-07 PROCEDURE — 99223 1ST HOSP IP/OBS HIGH 75: CPT | Performed by: NURSE PRACTITIONER

## 2020-09-07 PROCEDURE — 81003 URINALYSIS AUTO W/O SCOPE: CPT | Performed by: PHYSICIAN ASSISTANT

## 2020-09-07 PROCEDURE — G0432 EIA HIV-1/HIV-2 SCREEN: HCPCS | Performed by: INTERNAL MEDICINE

## 2020-09-07 PROCEDURE — 71275 CT ANGIOGRAPHY CHEST: CPT

## 2020-09-07 PROCEDURE — 87804 INFLUENZA ASSAY W/OPTIC: CPT | Performed by: PHYSICIAN ASSISTANT

## 2020-09-07 PROCEDURE — 84484 ASSAY OF TROPONIN QUANT: CPT | Performed by: PHYSICIAN ASSISTANT

## 2020-09-07 PROCEDURE — 87635 SARS-COV-2 COVID-19 AMP PRB: CPT | Performed by: PHYSICIAN ASSISTANT

## 2020-09-07 PROCEDURE — 71045 X-RAY EXAM CHEST 1 VIEW: CPT

## 2020-09-07 PROCEDURE — 36415 COLL VENOUS BLD VENIPUNCTURE: CPT

## 2020-09-07 PROCEDURE — 85025 COMPLETE CBC W/AUTO DIFF WBC: CPT | Performed by: PHYSICIAN ASSISTANT

## 2020-09-07 PROCEDURE — 25010000002 AZITHROMYCIN 500 MG/250 ML: Performed by: PHYSICIAN ASSISTANT

## 2020-09-07 RX ORDER — NICOTINE POLACRILEX 4 MG
15 LOZENGE BUCCAL
Status: DISCONTINUED | OUTPATIENT
Start: 2020-09-07 | End: 2020-09-09 | Stop reason: HOSPADM

## 2020-09-07 RX ORDER — METHYLPREDNISOLONE SODIUM SUCCINATE 125 MG/2ML
125 INJECTION, POWDER, LYOPHILIZED, FOR SOLUTION INTRAMUSCULAR; INTRAVENOUS ONCE
Status: COMPLETED | OUTPATIENT
Start: 2020-09-07 | End: 2020-09-07

## 2020-09-07 RX ORDER — SODIUM CHLORIDE 0.9 % (FLUSH) 0.9 %
10 SYRINGE (ML) INJECTION AS NEEDED
Status: DISCONTINUED | OUTPATIENT
Start: 2020-09-07 | End: 2020-09-09 | Stop reason: HOSPADM

## 2020-09-07 RX ORDER — NALOXONE HCL 0.4 MG/ML
1 VIAL (ML) INJECTION ONCE
Status: COMPLETED | OUTPATIENT
Start: 2020-09-07 | End: 2020-09-07

## 2020-09-07 RX ORDER — DEXTROSE MONOHYDRATE 25 G/50ML
25 INJECTION, SOLUTION INTRAVENOUS
Status: DISCONTINUED | OUTPATIENT
Start: 2020-09-07 | End: 2020-09-09 | Stop reason: HOSPADM

## 2020-09-07 RX ADMIN — SODIUM CHLORIDE 500 ML: 9 INJECTION, SOLUTION INTRAVENOUS at 14:45

## 2020-09-07 RX ADMIN — METHYLPREDNISOLONE SODIUM SUCCINATE 125 MG: 125 INJECTION, POWDER, FOR SOLUTION INTRAMUSCULAR; INTRAVENOUS at 23:00

## 2020-09-07 RX ADMIN — IOVERSOL 90 ML: 741 INJECTION INTRA-ARTERIAL; INTRAVENOUS at 19:58

## 2020-09-07 RX ADMIN — AZITHROMYCIN MONOHYDRATE 500 MG: 500 INJECTION, POWDER, LYOPHILIZED, FOR SOLUTION INTRAVENOUS at 23:10

## 2020-09-07 RX ADMIN — NALOXONE HYDROCHLORIDE 1 MG: 0.4 INJECTION, SOLUTION INTRAMUSCULAR; INTRAVENOUS; SUBCUTANEOUS at 15:32

## 2020-09-07 NOTE — ED PROVIDER NOTES
"Subjective   40 year old female with past medical hx of asthma, COPD (oxygen dependent at night using 1L, but states she doesn't always use it because she doesn't want to become dependent on it), DM, hepatitis C, HTN, autoimmune disease, and IVDA presents to the ED with complaints of SOA and cough x 4 days. Patient also endorses fever (T-max 104) with most recent fever today at 102, vomiting, diarrhea, and stress incontinence (secondary to coughing).  Denies abdominal pain, chest pain, or back pain.  Pt states she has felt \"out of her head\" the past few days and is unsure why. She reports to taking 3200mg of Neurontin daily, as well as Suboxone.  Denies being around any sick contacts to her knowledge or travel.      History provided by:  Patient   used: No        Review of Systems   Constitutional: Positive for fever.   HENT: Negative.    Respiratory: Positive for cough and shortness of breath.    Cardiovascular: Negative.  Negative for chest pain.   Gastrointestinal: Positive for diarrhea, nausea and vomiting. Negative for abdominal pain.   Endocrine: Negative.    Genitourinary: Negative.  Negative for dysuria.   Skin: Negative.    Neurological: Negative.    Psychiatric/Behavioral: Negative.    All other systems reviewed and are negative.      Past Medical History:   Diagnosis Date   • Asthma    • COPD (chronic obstructive pulmonary disease) (CMS/HCC)    • Diabetes mellitus (CMS/HCC)    • Hepatitis C    • History of gallstones    • Hypertension    • IV drug abuse (CMS/HCC)    • Lupus (CMS/HCC)    • Rheumatoid arthritis (CMS/HCC)        No Known Allergies    Past Surgical History:   Procedure Laterality Date   • ABDOMINAL SURGERY     • CHOLECYSTECTOMY     • PERINEAL LESION/CYST EXCISION N/A 4/3/2017    Procedure: EXCISION OF VAGINAL SKIN TAG;  Surgeon: Kee Crooks III, MD;  Location: [facility];  Service:    • TUBAL COAGULATION LAPAROSCOPIC Bilateral 4/3/2017    Procedure: BILATERAL TUBAL " FALLOPE FILSHIE CLIPPING LAPAROSCOPIC,IUD REMOVAL;  Surgeon: Kee Crooks III, MD;  Location: [facility];  Service:        Family History   Problem Relation Age of Onset   • No Known Problems Mother    • No Known Problems Father    • No Known Problems Sister    • No Known Problems Brother    • No Known Problems Son    • No Known Problems Daughter    • No Known Problems Maternal Grandmother    • No Known Problems Maternal Grandfather    • No Known Problems Paternal Grandmother    • No Known Problems Paternal Grandfather    • No Known Problems Cousin    • No Known Problems Other    • Rheum arthritis Neg Hx    • Osteoarthritis Neg Hx    • Asthma Neg Hx    • Diabetes Neg Hx    • Heart failure Neg Hx    • Hyperlipidemia Neg Hx    • Hypertension Neg Hx    • Migraines Neg Hx    • Rashes / Skin problems Neg Hx    • Seizures Neg Hx    • Stroke Neg Hx    • Thyroid disease Neg Hx        Social History     Socioeconomic History   • Marital status: Legally      Spouse name: Not on file   • Number of children: Not on file   • Years of education: Not on file   • Highest education level: Not on file   Tobacco Use   • Smoking status: Current Every Day Smoker     Packs/day: 1.00     Years: 15.00     Pack years: 15.00     Types: Cigarettes   • Smokeless tobacco: Never Used   Substance and Sexual Activity   • Alcohol use: No   • Drug use: No   • Sexual activity: Yes     Partners: Male     Birth control/protection: IUD           Objective   Physical Exam   Constitutional: She is oriented to person, place, and time. She appears well-developed and well-nourished. No distress. Nasal cannula in place.   Pt appears very drowsy and unable to hold conversation very long without nodding off - however easily arousable to voice   HENT:   Head: Normocephalic and atraumatic.   Right Ear: External ear normal.   Left Ear: External ear normal.   Nose: Nose normal.   Eyes: Pupils are equal, round, and reactive to light. Conjunctivae and EOM  are normal.   Pinpoint pupils that are sluggish to react bilaterally   Neck: Normal range of motion. Neck supple. No JVD present. No tracheal deviation present.   Cardiovascular: Normal rate, regular rhythm and normal heart sounds.   No murmur heard.  Pulmonary/Chest: Effort normal. No respiratory distress. She has wheezes. She has rhonchi.   Abdominal: Soft. Bowel sounds are normal. There is no tenderness.   Musculoskeletal: Normal range of motion. She exhibits no edema or deformity.   Neurological: She is alert and oriented to person, place, and time. No cranial nerve deficit.   Skin: Skin is warm and dry. No rash noted. She is not diaphoretic. No erythema. No pallor.   Psychiatric: She has a normal mood and affect. Her behavior is normal. Thought content normal.   Nursing note and vitals reviewed.      Procedures           ED Course  ED Course as of Sep 07 2310   Mon Sep 07, 2020   1523 IMPRESSION:   No acute cardiopulmonary findings.    Signer Name: ANIYA Barajas MD  Signed: 9/7/2020 2:51 PM  Workstation Name: [facility]-   [facility]    XR Chest 1 View [TK]   1533 Per Dr. Irizarry, no acute ischemic changes noted.  Normal sinus rhythm with heart rate 72.  DE interval 136.  QRS duration 82.  QTc 453.  No significant change found when compared to EKG on February 22, 2020.   ECG 12 Lead [TK]   1615 COVID19: Not Detected [TK]   1618 Creatinine: 0.69 [TK]   2050 IMPRESSION:   1. Negative for pulmonary embolus.  2. Negative for thoracic aortic aneurysm/dissection.  3. Persistent patchy areas of air trapping and mosaic perfusion throughout both lungs, most prominent in the bilateral upper lobes, similar in appearance to the prior examination. This again may be secondary to small vessel or small airways disease. No  focal areas of dense consolidation.  4. Stable 6 mm noncalcified pulmonary nodule in the right middle lobe. Management recommendation: Current published guidelines recommend  initial follow-up CT in 6-12 months, and again in 18-24 months for uncomplicated pulmonary nodules measuring 6 to 8  mm in high-risk patients (Fleischner Society guidelines, 2017).    Signer Name: Eric Costa MD  Signed: 9/7/2020 8:47 PM  Workstation Name: [facility]   [facility]    CT Chest Pulmonary Embolism [TK]   2050 IMPRESSION:     1. No acute abdominal or pelvic findings allowing for lack of IV contrast.  2. Normal appendix.  3. Splenomegaly.  4. Moderate stool burden.  5. Cholecystectomy.      Signer Name: Eric Costa MD  Signed: 9/7/2020 8:39 PM  Workstation Name: [facility]   [facility]    CT Abdomen Pelvis Without Contrast [TK]   2057 Pt found to be wondering around her room without oxygen and eating a honey bun.    [TK]   2132 Pt now back on BIPAP    [TK]   2132 Paged hospitalist    [TK]   2133 No bouts of diarrhea or vomiting while in the ED    [TK]   2205 Spoke with Dr. Gutierrez, she will admit pt to hospitalist services.    [TK]      ED Course User Index  [TK] Vincent Story, BASHIR                                           MDM  Number of Diagnoses or Management Options  Acute on chronic respiratory failure with hypercapnia (CMS/HCC): new and requires workup  COPD with acute exacerbation (CMS/HCC): new and requires workup     Amount and/or Complexity of Data Reviewed  Clinical lab tests: reviewed and ordered  Tests in the radiology section of CPT®: reviewed and ordered  Tests in the medicine section of CPT®: reviewed and ordered  Decide to obtain previous medical records or to obtain history from someone other than the patient: yes  Discuss the patient with other providers: yes (Brenda Irizarry)    Risk of Complications, Morbidity, and/or Mortality  Presenting problems: moderate  Diagnostic procedures: moderate  Management options: moderate    Patient Progress  Patient progress: stable      Final diagnoses:   Acute on chronic respiratory  failure with hypercapnia (CMS/HCC)   COPD with acute exacerbation (CMS/HCC)            Vincent Story PA-C  09/07/20 9551

## 2020-09-08 ENCOUNTER — APPOINTMENT (OUTPATIENT)
Dept: CARDIOLOGY | Facility: HOSPITAL | Age: 40
End: 2020-09-08

## 2020-09-08 ENCOUNTER — APPOINTMENT (OUTPATIENT)
Dept: ULTRASOUND IMAGING | Facility: HOSPITAL | Age: 40
End: 2020-09-08

## 2020-09-08 LAB
A-A DO2: 120.6 MMHG (ref 0–300)
A-A DO2: 98 MMHG (ref 0–300)
ARTERIAL PATENCY WRIST A: POSITIVE
ARTERIAL PATENCY WRIST A: POSITIVE
ATMOSPHERIC PRESS: 727 MMHG
ATMOSPHERIC PRESS: 728 MMHG
B PERT DNA SPEC QL NAA+PROBE: NOT DETECTED
BASE EXCESS BLDA CALC-SCNC: 2.7 MMOL/L (ref 0–2)
BASE EXCESS BLDA CALC-SCNC: 3.1 MMOL/L (ref 0–2)
BDY SITE: ABNORMAL
BDY SITE: ABNORMAL
BH CV ECHO MEAS - % IVS THICK: 41.2 %
BH CV ECHO MEAS - % LVPW THICK: 93.4 %
BH CV ECHO MEAS - ACS: 2.1 CM
BH CV ECHO MEAS - AO ROOT AREA (BSA CORRECTED): 1.3
BH CV ECHO MEAS - AO ROOT AREA: 6.4 CM^2
BH CV ECHO MEAS - AO ROOT DIAM: 2.9 CM
BH CV ECHO MEAS - BSA(HAYCOCK): 2.4 M^2
BH CV ECHO MEAS - BSA: 2.2 M^2
BH CV ECHO MEAS - BZI_BMI: 48.7 KILOGRAMS/M^2
BH CV ECHO MEAS - BZI_METRIC_HEIGHT: 157.5 CM
BH CV ECHO MEAS - BZI_METRIC_WEIGHT: 120.7 KG
BH CV ECHO MEAS - EDV(CUBED): 116.9 ML
BH CV ECHO MEAS - EDV(MOD-SP4): 46 ML
BH CV ECHO MEAS - EDV(TEICH): 112.3 ML
BH CV ECHO MEAS - EF(CUBED): 73.5 %
BH CV ECHO MEAS - EF(MOD-SP4): 60.4 %
BH CV ECHO MEAS - EF(TEICH): 65.2 %
BH CV ECHO MEAS - ESV(CUBED): 31 ML
BH CV ECHO MEAS - ESV(MOD-SP4): 18.2 ML
BH CV ECHO MEAS - ESV(TEICH): 39.1 ML
BH CV ECHO MEAS - FS: 35.8 %
BH CV ECHO MEAS - IVS/LVPW: 1.1
BH CV ECHO MEAS - IVSD: 1.1 CM
BH CV ECHO MEAS - IVSS: 1.6 CM
BH CV ECHO MEAS - LA DIMENSION: 4.5 CM
BH CV ECHO MEAS - LA/AO: 1.6
BH CV ECHO MEAS - LV DIASTOLIC VOL/BSA (35-75): 21.3 ML/M^2
BH CV ECHO MEAS - LV MASS(C)D: 191.6 GRAMS
BH CV ECHO MEAS - LV MASS(C)DI: 88.8 GRAMS/M^2
BH CV ECHO MEAS - LV MASS(C)S: 224 GRAMS
BH CV ECHO MEAS - LV MASS(C)SI: 103.8 GRAMS/M^2
BH CV ECHO MEAS - LV SYSTOLIC VOL/BSA (12-30): 8.4 ML/M^2
BH CV ECHO MEAS - LVIDD: 4.9 CM
BH CV ECHO MEAS - LVIDS: 3.1 CM
BH CV ECHO MEAS - LVLD AP4: 6.9 CM
BH CV ECHO MEAS - LVLS AP4: 6.1 CM
BH CV ECHO MEAS - LVOT AREA (M): 2.5 CM^2
BH CV ECHO MEAS - LVOT AREA: 2.5 CM^2
BH CV ECHO MEAS - LVOT DIAM: 1.8 CM
BH CV ECHO MEAS - LVPWD: 1 CM
BH CV ECHO MEAS - LVPWS: 2 CM
BH CV ECHO MEAS - MV A MAX VEL: 77.6 CM/SEC
BH CV ECHO MEAS - MV E MAX VEL: 93 CM/SEC
BH CV ECHO MEAS - MV E/A: 1.2
BH CV ECHO MEAS - PA ACC TIME: 0.11 SEC
BH CV ECHO MEAS - PA PR(ACCEL): 28.2 MMHG
BH CV ECHO MEAS - RVDD: 1.8 CM
BH CV ECHO MEAS - SI(CUBED): 39.8 ML/M^2
BH CV ECHO MEAS - SI(MOD-SP4): 12.9 ML/M^2
BH CV ECHO MEAS - SI(TEICH): 33.9 ML/M^2
BH CV ECHO MEAS - SV(CUBED): 86 ML
BH CV ECHO MEAS - SV(MOD-SP4): 27.8 ML
BH CV ECHO MEAS - SV(TEICH): 73.2 ML
BODY TEMPERATURE: 0 C
BODY TEMPERATURE: 0 C
C PNEUM DNA NPH QL NAA+NON-PROBE: NOT DETECTED
CO2 BLDA-SCNC: 32 MMOL/L (ref 22–33)
CO2 BLDA-SCNC: 32 MMOL/L (ref 22–33)
COHGB MFR BLD: 0.8 % (ref 0–5)
COHGB MFR BLD: 0.9 % (ref 0–5)
FLUAV H1 2009 PAND RNA NPH QL NAA+PROBE: NOT DETECTED
FLUAV H1 HA GENE NPH QL NAA+PROBE: NOT DETECTED
FLUAV H3 RNA NPH QL NAA+PROBE: NOT DETECTED
FLUAV SUBTYP SPEC NAA+PROBE: NOT DETECTED
FLUBV RNA ISLT QL NAA+PROBE: NOT DETECTED
GAS FLOW AIRWAY: 4 LPM
GLUCOSE BLDC GLUCOMTR-MCNC: 166 MG/DL (ref 70–130)
GLUCOSE BLDC GLUCOMTR-MCNC: 175 MG/DL (ref 70–130)
GLUCOSE BLDC GLUCOMTR-MCNC: 175 MG/DL (ref 70–130)
GLUCOSE BLDC GLUCOMTR-MCNC: 188 MG/DL (ref 70–130)
HADV DNA SPEC NAA+PROBE: NOT DETECTED
HCG INTACT+B SERPL-ACNC: <0.5 MIU/ML
HCO3 BLDA-SCNC: 30.2 MMOL/L (ref 20–26)
HCO3 BLDA-SCNC: 30.2 MMOL/L (ref 20–26)
HCOV 229E RNA SPEC QL NAA+PROBE: NOT DETECTED
HCOV HKU1 RNA SPEC QL NAA+PROBE: NOT DETECTED
HCOV NL63 RNA SPEC QL NAA+PROBE: NOT DETECTED
HCOV OC43 RNA SPEC QL NAA+PROBE: NOT DETECTED
HCT VFR BLD CALC: 38.3 % (ref 38–51)
HCT VFR BLD CALC: 38.4 % (ref 38–51)
HGB BLDA-MCNC: 12.5 G/DL (ref 13.5–17.5)
HGB BLDA-MCNC: 12.5 G/DL (ref 13.5–17.5)
HIV1+2 AB SER QL: NORMAL
HMPV RNA NPH QL NAA+NON-PROBE: NOT DETECTED
HPIV1 RNA SPEC QL NAA+PROBE: NOT DETECTED
HPIV2 RNA SPEC QL NAA+PROBE: NOT DETECTED
HPIV3 RNA NPH QL NAA+PROBE: NOT DETECTED
HPIV4 P GENE NPH QL NAA+PROBE: NOT DETECTED
INHALED O2 CONCENTRATION: 32 %
INHALED O2 CONCENTRATION: 36 %
Lab: ABNORMAL
Lab: ABNORMAL
M PNEUMO IGG SER IA-ACNC: NOT DETECTED
MAXIMAL PREDICTED HEART RATE: 181 BPM
METHGB BLD QL: 0.1 % (ref 0–3)
METHGB BLD QL: 0.2 % (ref 0–3)
MODALITY: ABNORMAL
MODALITY: ABNORMAL
NOTE: ABNORMAL
NOTE: ABNORMAL
OXYHGB MFR BLDV: 88.9 % (ref 94–99)
OXYHGB MFR BLDV: 89.1 % (ref 94–99)
PCO2 BLDA: 56.5 MM HG (ref 35–45)
PCO2 BLDA: 59.1 MM HG (ref 35–45)
PCO2 TEMP ADJ BLD: ABNORMAL MM[HG]
PCO2 TEMP ADJ BLD: ABNORMAL MM[HG]
PH BLDA: 7.32 PH UNITS (ref 7.35–7.45)
PH BLDA: 7.34 PH UNITS (ref 7.35–7.45)
PH, TEMP CORRECTED: ABNORMAL
PH, TEMP CORRECTED: ABNORMAL
PO2 BLDA: 56.9 MM HG (ref 83–108)
PO2 BLDA: 59.4 MM HG (ref 83–108)
PO2 TEMP ADJ BLD: ABNORMAL MM[HG]
PO2 TEMP ADJ BLD: ABNORMAL MM[HG]
RHINOVIRUS RNA SPEC NAA+PROBE: DETECTED
RSV RNA NPH QL NAA+NON-PROBE: NOT DETECTED
SAO2 % BLDCOA: 89.8 % (ref 94–99)
SAO2 % BLDCOA: 90 % (ref 94–99)
STRESS TARGET HR: 154 BPM
VENTILATOR MODE: ABNORMAL
VENTILATOR MODE: ABNORMAL

## 2020-09-08 PROCEDURE — 93970 EXTREMITY STUDY: CPT | Performed by: RADIOLOGY

## 2020-09-08 PROCEDURE — 93306 TTE W/DOPPLER COMPLETE: CPT | Performed by: SPECIALIST

## 2020-09-08 PROCEDURE — 94660 CPAP INITIATION&MGMT: CPT

## 2020-09-08 PROCEDURE — 82962 GLUCOSE BLOOD TEST: CPT

## 2020-09-08 PROCEDURE — 25010000002 ENOXAPARIN PER 10 MG: Performed by: INTERNAL MEDICINE

## 2020-09-08 PROCEDURE — 94640 AIRWAY INHALATION TREATMENT: CPT

## 2020-09-08 PROCEDURE — 0099U HC BIOFIRE FILMARRAY RESP PANEL 1: CPT | Performed by: NURSE PRACTITIONER

## 2020-09-08 PROCEDURE — 93306 TTE W/DOPPLER COMPLETE: CPT

## 2020-09-08 PROCEDURE — 25010000002 ONDANSETRON PER 1 MG

## 2020-09-08 PROCEDURE — 36600 WITHDRAWAL OF ARTERIAL BLOOD: CPT

## 2020-09-08 PROCEDURE — 93970 EXTREMITY STUDY: CPT

## 2020-09-08 PROCEDURE — 83050 HGB METHEMOGLOBIN QUAN: CPT

## 2020-09-08 PROCEDURE — 99253 IP/OBS CNSLTJ NEW/EST LOW 45: CPT | Performed by: PSYCHIATRY & NEUROLOGY

## 2020-09-08 PROCEDURE — 82805 BLOOD GASES W/O2 SATURATION: CPT

## 2020-09-08 PROCEDURE — 25010000002 ONDANSETRON PER 1 MG: Performed by: NURSE PRACTITIONER

## 2020-09-08 PROCEDURE — 99291 CRITICAL CARE FIRST HOUR: CPT | Performed by: INTERNAL MEDICINE

## 2020-09-08 PROCEDURE — 63710000001 INSULIN ASPART PER 5 UNITS: Performed by: NURSE PRACTITIONER

## 2020-09-08 PROCEDURE — 94799 UNLISTED PULMONARY SVC/PX: CPT

## 2020-09-08 PROCEDURE — 82375 ASSAY CARBOXYHB QUANT: CPT

## 2020-09-08 PROCEDURE — 99233 SBSQ HOSP IP/OBS HIGH 50: CPT | Performed by: FAMILY MEDICINE

## 2020-09-08 PROCEDURE — 25010000002 METHYLPREDNISOLONE PER 40 MG: Performed by: INTERNAL MEDICINE

## 2020-09-08 RX ORDER — CLONIDINE HYDROCHLORIDE 0.1 MG/1
0.1 TABLET ORAL NIGHTLY
COMMUNITY

## 2020-09-08 RX ORDER — ONDANSETRON 2 MG/ML
4 INJECTION INTRAMUSCULAR; INTRAVENOUS EVERY 6 HOURS PRN
Status: DISCONTINUED | OUTPATIENT
Start: 2020-09-08 | End: 2020-09-09 | Stop reason: HOSPADM

## 2020-09-08 RX ORDER — BUDESONIDE 0.5 MG/2ML
0.5 INHALANT ORAL
Status: DISCONTINUED | OUTPATIENT
Start: 2020-09-08 | End: 2020-09-09 | Stop reason: HOSPADM

## 2020-09-08 RX ORDER — ALBUTEROL SULFATE 1.25 MG/3ML
1 SOLUTION RESPIRATORY (INHALATION) EVERY 6 HOURS PRN
COMMUNITY

## 2020-09-08 RX ORDER — PANTOPRAZOLE SODIUM 40 MG/1
40 TABLET, DELAYED RELEASE ORAL EVERY MORNING
Status: CANCELLED | OUTPATIENT
Start: 2020-09-09

## 2020-09-08 RX ORDER — LEVOTHYROXINE SODIUM 0.03 MG/1
25 TABLET ORAL
Status: CANCELLED | OUTPATIENT
Start: 2020-09-08

## 2020-09-08 RX ORDER — FLUTICASONE PROPIONATE 50 MCG
2 SPRAY, SUSPENSION (ML) NASAL DAILY
Status: DISCONTINUED | OUTPATIENT
Start: 2020-09-08 | End: 2020-09-09 | Stop reason: HOSPADM

## 2020-09-08 RX ORDER — NITROGLYCERIN 0.4 MG/1
0.4 TABLET SUBLINGUAL
Status: DISCONTINUED | OUTPATIENT
Start: 2020-09-08 | End: 2020-09-09 | Stop reason: HOSPADM

## 2020-09-08 RX ORDER — GABAPENTIN 800 MG/1
800 TABLET ORAL 4 TIMES DAILY
Status: CANCELLED | OUTPATIENT
Start: 2020-09-08

## 2020-09-08 RX ORDER — SODIUM CHLORIDE 0.9 % (FLUSH) 0.9 %
10 SYRINGE (ML) INJECTION EVERY 12 HOURS SCHEDULED
Status: DISCONTINUED | OUTPATIENT
Start: 2020-09-08 | End: 2020-09-09 | Stop reason: HOSPADM

## 2020-09-08 RX ORDER — FLUOXETINE 10 MG/1
10 CAPSULE ORAL DAILY
Status: DISCONTINUED | OUTPATIENT
Start: 2020-09-08 | End: 2020-09-09 | Stop reason: HOSPADM

## 2020-09-08 RX ORDER — METHYLPREDNISOLONE SODIUM SUCCINATE 40 MG/ML
40 INJECTION, POWDER, LYOPHILIZED, FOR SOLUTION INTRAMUSCULAR; INTRAVENOUS EVERY 12 HOURS
Status: DISCONTINUED | OUTPATIENT
Start: 2020-09-08 | End: 2020-09-09 | Stop reason: HOSPADM

## 2020-09-08 RX ORDER — BENZONATATE 100 MG/1
100 CAPSULE ORAL 3 TIMES DAILY PRN
Status: DISCONTINUED | OUTPATIENT
Start: 2020-09-08 | End: 2020-09-09 | Stop reason: HOSPADM

## 2020-09-08 RX ORDER — ALBUTEROL SULFATE 2.5 MG/3ML
2.5 SOLUTION RESPIRATORY (INHALATION) EVERY 4 HOURS PRN
Status: CANCELLED | OUTPATIENT
Start: 2020-09-08

## 2020-09-08 RX ORDER — POLYETHYLENE GLYCOL 3350 17 G/17G
17 POWDER, FOR SOLUTION ORAL DAILY
Status: CANCELLED | OUTPATIENT
Start: 2020-09-08

## 2020-09-08 RX ORDER — FAMOTIDINE 20 MG/1
20 TABLET, FILM COATED ORAL
Status: DISCONTINUED | OUTPATIENT
Start: 2020-09-08 | End: 2020-09-09 | Stop reason: HOSPADM

## 2020-09-08 RX ORDER — TRAZODONE HYDROCHLORIDE 50 MG/1
50 TABLET ORAL NIGHTLY
Status: DISCONTINUED | OUTPATIENT
Start: 2020-09-08 | End: 2020-09-09 | Stop reason: HOSPADM

## 2020-09-08 RX ORDER — OMEPRAZOLE 20 MG/1
20 CAPSULE, DELAYED RELEASE ORAL DAILY
COMMUNITY

## 2020-09-08 RX ORDER — IPRATROPIUM BROMIDE AND ALBUTEROL SULFATE 2.5; .5 MG/3ML; MG/3ML
3 SOLUTION RESPIRATORY (INHALATION) EVERY 4 HOURS PRN
Status: CANCELLED | OUTPATIENT
Start: 2020-09-08

## 2020-09-08 RX ORDER — BUPRENORPHINE HYDROCHLORIDE AND NALOXONE HYDROCHLORIDE DIHYDRATE 8; 2 MG/1; MG/1
1 TABLET SUBLINGUAL 2 TIMES DAILY
Status: DISCONTINUED | OUTPATIENT
Start: 2020-09-08 | End: 2020-09-09 | Stop reason: HOSPADM

## 2020-09-08 RX ORDER — SODIUM CHLORIDE 0.9 % (FLUSH) 0.9 %
10 SYRINGE (ML) INJECTION AS NEEDED
Status: DISCONTINUED | OUTPATIENT
Start: 2020-09-08 | End: 2020-09-09 | Stop reason: HOSPADM

## 2020-09-08 RX ORDER — LEVOTHYROXINE SODIUM 0.03 MG/1
25 TABLET ORAL DAILY
COMMUNITY

## 2020-09-08 RX ORDER — CLONIDINE HYDROCHLORIDE 0.1 MG/1
0.1 TABLET ORAL NIGHTLY
Status: CANCELLED | OUTPATIENT
Start: 2020-09-08

## 2020-09-08 RX ORDER — MONTELUKAST SODIUM 10 MG/1
10 TABLET ORAL NIGHTLY
Status: DISCONTINUED | OUTPATIENT
Start: 2020-09-08 | End: 2020-09-09 | Stop reason: HOSPADM

## 2020-09-08 RX ORDER — AMOXICILLIN AND CLAVULANATE POTASSIUM 875; 125 MG/1; MG/1
1 TABLET, FILM COATED ORAL 2 TIMES DAILY
COMMUNITY

## 2020-09-08 RX ORDER — IPRATROPIUM BROMIDE AND ALBUTEROL SULFATE 2.5; .5 MG/3ML; MG/3ML
3 SOLUTION RESPIRATORY (INHALATION)
Status: COMPLETED | OUTPATIENT
Start: 2020-09-08 | End: 2020-09-08

## 2020-09-08 RX ORDER — IPRATROPIUM BROMIDE AND ALBUTEROL SULFATE 2.5; .5 MG/3ML; MG/3ML
3 SOLUTION RESPIRATORY (INHALATION)
Status: DISCONTINUED | OUTPATIENT
Start: 2020-09-08 | End: 2020-09-09 | Stop reason: HOSPADM

## 2020-09-08 RX ORDER — POLYETHYLENE GLYCOL 3350 17 G/17G
17 POWDER, FOR SOLUTION ORAL DAILY
COMMUNITY

## 2020-09-08 RX ORDER — ONDANSETRON 2 MG/ML
INJECTION INTRAMUSCULAR; INTRAVENOUS
Status: DISPENSED
Start: 2020-09-08 | End: 2020-09-08

## 2020-09-08 RX ORDER — BENZONATATE 100 MG/1
100 CAPSULE ORAL 3 TIMES DAILY PRN
COMMUNITY

## 2020-09-08 RX ORDER — AMOXICILLIN AND CLAVULANATE POTASSIUM 875; 125 MG/1; MG/1
1 TABLET, FILM COATED ORAL 2 TIMES DAILY
Status: CANCELLED | OUTPATIENT
Start: 2020-09-08 | End: 2020-09-15

## 2020-09-08 RX ADMIN — IPRATROPIUM BROMIDE AND ALBUTEROL SULFATE 3 ML: .5; 3 SOLUTION RESPIRATORY (INHALATION) at 07:31

## 2020-09-08 RX ADMIN — IPRATROPIUM BROMIDE AND ALBUTEROL SULFATE 3 ML: .5; 3 SOLUTION RESPIRATORY (INHALATION) at 03:02

## 2020-09-08 RX ADMIN — INSULIN ASPART 2 UNITS: 100 INJECTION, SOLUTION INTRAVENOUS; SUBCUTANEOUS at 09:03

## 2020-09-08 RX ADMIN — IPRATROPIUM BROMIDE AND ALBUTEROL SULFATE 3 ML: .5; 3 SOLUTION RESPIRATORY (INHALATION) at 13:05

## 2020-09-08 RX ADMIN — METHYLPREDNISOLONE SODIUM SUCCINATE 40 MG: 40 INJECTION, POWDER, FOR SOLUTION INTRAMUSCULAR; INTRAVENOUS at 13:16

## 2020-09-08 RX ADMIN — IPRATROPIUM BROMIDE AND ALBUTEROL SULFATE 3 ML: .5; 3 SOLUTION RESPIRATORY (INHALATION) at 12:24

## 2020-09-08 RX ADMIN — ONDANSETRON 4 MG: 2 INJECTION INTRAMUSCULAR; INTRAVENOUS at 02:11

## 2020-09-08 RX ADMIN — IPRATROPIUM BROMIDE AND ALBUTEROL SULFATE 3 ML: .5; 3 SOLUTION RESPIRATORY (INHALATION) at 12:46

## 2020-09-08 RX ADMIN — BUPRENORPHINE HYDROCHLORIDE AND NALOXONE HYDROCHLORIDE DIHYDRATE 1 TABLET: 8; 2 TABLET SUBLINGUAL at 10:01

## 2020-09-08 RX ADMIN — FLUTICASONE PROPIONATE 2 SPRAY: 50 SPRAY, METERED NASAL at 09:06

## 2020-09-08 RX ADMIN — MONTELUKAST SODIUM 10 MG: 10 TABLET, COATED ORAL at 21:25

## 2020-09-08 RX ADMIN — IPRATROPIUM BROMIDE AND ALBUTEROL SULFATE 3 ML: .5; 3 SOLUTION RESPIRATORY (INHALATION) at 19:25

## 2020-09-08 RX ADMIN — ENOXAPARIN SODIUM 40 MG: 40 INJECTION SUBCUTANEOUS at 21:24

## 2020-09-08 RX ADMIN — INSULIN ASPART 2 UNITS: 100 INJECTION, SOLUTION INTRAVENOUS; SUBCUTANEOUS at 17:11

## 2020-09-08 RX ADMIN — SODIUM CHLORIDE, PRESERVATIVE FREE 10 ML: 5 INJECTION INTRAVENOUS at 02:11

## 2020-09-08 RX ADMIN — BUDESONIDE 0.5 MG: 0.5 INHALANT RESPIRATORY (INHALATION) at 07:41

## 2020-09-08 RX ADMIN — BUPRENORPHINE HYDROCHLORIDE AND NALOXONE HYDROCHLORIDE DIHYDRATE 1 TABLET: 8; 2 TABLET SUBLINGUAL at 22:03

## 2020-09-08 RX ADMIN — INSULIN ASPART 2 UNITS: 100 INJECTION, SOLUTION INTRAVENOUS; SUBCUTANEOUS at 11:34

## 2020-09-08 RX ADMIN — BUDESONIDE 0.5 MG: 0.5 INHALANT RESPIRATORY (INHALATION) at 19:25

## 2020-09-08 RX ADMIN — NICOTINE 1 PATCH: 7 PATCH, EXTENDED RELEASE TRANSDERMAL at 09:03

## 2020-09-08 RX ADMIN — SODIUM CHLORIDE, PRESERVATIVE FREE 10 ML: 5 INJECTION INTRAVENOUS at 21:25

## 2020-09-08 RX ADMIN — FAMOTIDINE 20 MG: 20 TABLET, FILM COATED ORAL at 17:11

## 2020-09-08 NOTE — PROGRESS NOTES
[facility] HOSPITALIST    PROGRESS NOTE    Name:  Mary Monaco   Age:  40 y.o.  Sex:  female  :  1980  MRN:  [id number]   Visit Number:  [id number]  Admission Date:  2020  Date Of Service:  20  Primary Care Physician:  Elsa Dean APRN     LOS: 1 day :  Patient Care Team:  Elsa Dean APRN as PCP - General (Nurse Practitioner):    Chief Complaint:    Shortness of breath      Subjective / Interval History:   Patient seen and examined bedside  No adverse events overnight  Patient denies chest pain, abdominal pain, extremity pain      Review of Systems:     General ROS: Patient denies any fevers, chills or loss of consciousness.  Respiratory ROS: Denies cough or shortness of breath.  Cardiovascular ROS: Denies chest pain or palpitations. No history of exertional chest pain.  Gastrointestinal ROS: Denies nausea and vomiting. Denies any abdominal pain. No diarrhea.  Neurological ROS: Denies any focal weakness. No loss of consciousness. Denies any numbness. Denies neck pain.  Dermatological ROS: Denies any redness or pruritis.    Vital Signs:    Temp:  [97.9 °F (36.6 °C)-98 °F (36.7 °C)] 98 °F (36.7 °C)  Heart Rate:  [] 105  Resp:  [16-28] 20  BP: (118-133)/() 120/65    Intake and output:    I/O last 3 completed shifts:  In: 860 [P.O.:240; I.V.:120; IV Piggyback:500]  Out: 300 [Urine:300]  I/O this shift:  In: 720 [P.O.:720]  Out: 300 [Urine:300]    Physical Examination:    General Appearance:  Alert and cooperative, not in any acute distress.   Head:  Atraumatic and normocephalic, without obvious abnormality.   Eyes:          PERRLA, conjunctivae and sclerae normal, no Icterus. No pallor. Extra-occular movements are within normal limits.   Neck: Supple, trachea midline, no thyromegaly, no carotid bruit.   Lungs:   Chest shape is normal. Breath sounds heard bilaterally equally.  No crackles or wheezing. No Pleural rub or bronchial breathing.   Heart:  Normal S1 and  S2, no murmur, no gallop, no rub. No JVD   Abdomen:   Normal bowel sounds, no masses, no organomegaly. Soft       non-tender, non-distended, no guarding, no rebound tenderness.   Extremities: Moves all extremities well, no edema, no cyanosis, no            clubbing.   Skin: No bleeding, bruising or rash.   Neurologic: Awake, alert and oriented times 3. Moves all 4 extremities equally.   Laboratory results:    Results from last 7 days   Lab Units 09/07/20  1442   SODIUM mmol/L 141   POTASSIUM mmol/L 4.6   CHLORIDE mmol/L 103   CO2 mmol/L 31.1*   BUN mg/dL 7   CREATININE mg/dL 0.69   CALCIUM mg/dL 8.9   BILIRUBIN mg/dL <0.2   ALK PHOS U/L 76   ALT (SGPT) U/L 9   AST (SGOT) U/L 12   GLUCOSE mg/dL 103*     Results from last 7 days   Lab Units 09/07/20  1442   WBC 10*3/mm3 5.23   HEMOGLOBIN g/dL 11.6*   HEMATOCRIT % 37.8   PLATELETS 10*3/mm3 238         Results from last 7 days   Lab Units 09/07/20  1442   TROPONIN T ng/mL <0.010           I have reviewed the patient's laboratory results.    Radiology results:    Imaging Results (Last 24 Hours)     Procedure Component Value Units Date/Time    CT Chest Pulmonary Embolism [[id number]] Collected:  09/07/20 2047     Updated:  09/07/20 2049    Narrative:       CT CHEST PULMONARY EMBOLISM W CONTRAST    INDICATION:   40-year-old female with cough and shortness of air today.    TECHNIQUE:   CT angiogram of the chest with IV contrast. 3-D reconstructions were obtained and reviewed.   Radiation dose reduction techniques included automated exposure control or exposure modulation based on body size. Count of known CT and cardiac nuc med studies  performed in previous 12 months: 2.     COMPARISON:   CTA chest 2/22/2020    FINDINGS:   Adequate opacification of the pulmonary arteries with no filling defects. Thoracic aorta normal in course and caliber without dissection. Heart size is normal. No pericardial effusion.    No pleural effusion. No pneumothorax. Again noted are patchy areas  of air trapping and mosaic perfusion throughout both lungs, most prominent in the bilateral upper lobes. This is similar to the prior examination and may get in represent the sequelae of  small vessel or small airways disease. No focal areas of dense consolidation. Chronic lingular atelectasis again noted. Stable 6 mm noncalcified pulmonary nodule in the right middle lobe.    Please refer to CT abdomen pelvis performed the same date and dictated separately for detailed findings below the diaphragm.    No acute osseous abnormality.      Impression:       1. Negative for pulmonary embolus.  2. Negative for thoracic aortic aneurysm/dissection.  3. Persistent patchy areas of air trapping and mosaic perfusion throughout both lungs, most prominent in the bilateral upper lobes, similar in appearance to the prior examination. This again may be secondary to small vessel or small airways disease. No  focal areas of dense consolidation.  4. Stable 6 mm noncalcified pulmonary nodule in the right middle lobe. Management recommendation: Current published guidelines recommend initial follow-up CT in 6-12 months, and again in 18-24 months for uncomplicated pulmonary nodules measuring 6 to 8  mm in high-risk patients (Fleischner Society guidelines, 2017).    Signer Name: Eric Costa MD   Signed: 9/7/2020 8:47 PM   Workstation Name: [id number]    [facility]    CT Abdomen Pelvis Without Contrast [[id number]] Collected:  09/07/20 2039     Updated:  09/07/20 2041    Narrative:       CT Abdomen Pelvis WO    INDICATION:   40-year-old female with vomiting and fever today.    TECHNIQUE:   CT of the abdomen and pelvis without IV contrast. Coronal and sagittal reconstructions were obtained.  Radiation dose reduction techniques included automated exposure control or exposure modulation based on body size. Count of known CT and cardiac nuc  med studies performed in previous 12 months: 2.     COMPARISON:   None  available.    FINDINGS:  Please refer to CTA chest performed the same date and dictated separately for detailed findings above the diaphragm.    Abdomen:   The liver is within normal limits. Mild splenomegaly. Pancreas is unremarkable. Cholecystectomy. Both adrenal glands are normal. The kidneys are normal. Abdominal aorta normal in course and caliber. Small bowel is unremarkable without obstruction. Normal  appendix. The colon is unremarkable. Moderate stool burden throughout the colon. No free fluid or free air.    Pelvis:   The urinary bladder is unremarkable.  The uterus and adnexa are within normal limits. No free pelvic fluid.    No acute osseous abnormality.        Impression:         1. No acute abdominal or pelvic findings allowing for lack of IV contrast.  2. Normal appendix.  3. Splenomegaly.  4. Moderate stool burden.  5. Cholecystectomy.          Signer Name: Eric Costa MD   Signed: 9/7/2020 8:39 PM   Workstation Name: [email or web address]    [facility]    XR Chest 1 View [[id number]] Collected:  09/07/20 1451     Updated:  09/07/20 1453    Narrative:       CR Chest 1 Vw    INDICATION:   Short severe cough. Fever. States she has COPD.     COMPARISON:    2/22/2020    FINDINGS:  Single portable AP view(s) of the chest.  The heart and mediastinal contours are normal. The lungs are clear. No pneumothorax or pleural effusion.      Impression:       No acute cardiopulmonary findings.    Signer Name: ANIYA Barajas MD   Signed: 9/7/2020 2:51 PM   Workstation Name: [email or web address]    [facility]          I have reviewed the patient's radiology reports.    Medication Review:     I have reviewed the patients active and prn medications.       Assessment:    Acute on chronic respiratory failure with hypercapnia (CMS/HCC)          Plan:    Acute hypoxemic and hypercapnic respiratory failure complicated by COPD exacerbation  Oxygen supplementation  Failed outpatient  treatment with Z-Asael and Augmentin  Present on admission  Hypoxemia measured is 84% on presentation in the ER yesterday  Brief period of BiPAP in the emergency department  A Zithromax Solu-Medrol  Appreciate pulmonology consult leaning towards bronchiectasis patient has a history of rheumatoid arthritis and lupus could be contributing to patient's pulmonary disease    Nausea and vomiting, improved with antiemetics  Continue Zofran PRN    Diabetes mellitus type 2 diet controlled Accu-Cheks and sliding scale insulin here  Hemoglobin A1c reviewed    Essential hypertension  Resume home meds    Rheumatoid arthritis lupus  To some degree IV Solu Medrol with COPD exacerbation and acute hypoxemic respiratory failure will cover some of the inflammatory responses of rheumatoid rested she does not currently have a flare is primary pulmonology bronchiectasis reason for admission  Provide supportive care    Abnormal labs  Elevated d-dimer  CT PE protocol negative for pulmonary embolism          Carb conscious diet  Lovenox      40 minutes was spent in chart review, examining the patient and formulating new plans for care.    Lamberto Gibbons DO  09/08/20  14:09

## 2020-09-08 NOTE — CONSULTS
Referring Provider: DENISE Rosenberg  Reason for Consultation: depression    Chief complaint/Focus of Exam: depression    Subjective .     History of present illness: Patient is a 40-year-old female admitted with shortness of breath.  Past medical history of COPD, diabetes mellitus type 2, hepatitis C, hypertension, lupus and rheumatic neuritis.  Patient with worsening mood since the sudden death of her 13-year-old son due to meningitis that occurred in May of this year.  Symptoms have worsened since that time.  Psychiatry was consulted for recommendations.    Patient reports 13-year-old son passed away in May of this year after a very rapid course of meningitis.  Since that time, she is experienced extreme sadness, mood lability, anhedonia, insomnia, high anxiety, hopelessness.  Symptoms are severe, persistent, present all settings, worse by intrusive thoughts, improved by nothing.  Patient denies any psychiatric history prior to this.  She is seeing a grief counselor since her son passed away, but no meds have been prescribed thus far.  Given the length of time and worsening severity of her symptoms, it is reasonable to conclude that she has developed depression in the setting of extended grief and she is appropriate for treatment at this time.  Patient is agreeable to treatment and was counseled on medication options.    Review of Systems  All systems were reviewed and negative except for:  Respiratory: positive for  shortness of air  Behavioral/Psych: positive for  depression and sleep disturbance    History  Past Medical History:   Diagnosis Date   • Asthma    • COPD (chronic obstructive pulmonary disease) (CMS/HCC)    • Diabetes mellitus (CMS/HCC)    • Hepatitis C    • History of gallstones    • Hypertension    • IV drug abuse (CMS/HCC)    • Lupus (CMS/HCC)    • Rheumatoid arthritis (CMS/HCC)    ,   Past Surgical History:   Procedure Laterality Date   • ABDOMINAL SURGERY     • CHOLECYSTECTOMY     • PERINEAL  LESION/CYST EXCISION N/A 4/3/2017    Procedure: EXCISION OF VAGINAL SKIN TAG;  Surgeon: Kee Crooks III, MD;  Location: [facility] OR;  Service:    • TUBAL COAGULATION LAPAROSCOPIC Bilateral 4/3/2017    Procedure: BILATERAL TUBAL FALLOPE FILSHIE CLIPPING LAPAROSCOPIC,IUD REMOVAL;  Surgeon: Kee Crooks III, MD;  Location: [facility] OR;  Service:    ,   Family History   Problem Relation Age of Onset   • No Known Problems Mother    • No Known Problems Father    • No Known Problems Sister    • No Known Problems Brother    • No Known Problems Son    • No Known Problems Daughter    • No Known Problems Maternal Grandmother    • No Known Problems Maternal Grandfather    • No Known Problems Paternal Grandmother    • No Known Problems Paternal Grandfather    • No Known Problems Cousin    • No Known Problems Other    • Rheum arthritis Neg Hx    • Osteoarthritis Neg Hx    • Asthma Neg Hx    • Diabetes Neg Hx    • Heart failure Neg Hx    • Hyperlipidemia Neg Hx    • Hypertension Neg Hx    • Migraines Neg Hx    • Rashes / Skin problems Neg Hx    • Seizures Neg Hx    • Stroke Neg Hx    • Thyroid disease Neg Hx    ,   Social History     Tobacco Use   • Smoking status: Current Every Day Smoker     Packs/day: 1.00     Years: 15.00     Pack years: 15.00     Types: Cigarettes   • Smokeless tobacco: Never Used   Substance Use Topics   • Alcohol use: No   • Drug use: No   ,   Medications Prior to Admission   Medication Sig Dispense Refill Last Dose   • albuterol (PROVENTIL HFA;VENTOLIN HFA) 108 (90 Base) MCG/ACT inhaler Inhale 2 puffs Every 4 (Four) Hours As Needed. 18 g 0 9/7/2020 at AM   • benzonatate (TESSALON) 100 MG capsule Take 100 mg by mouth 3 (Three) Times a Day As Needed for Cough.   9/7/2020 at AM   • budesonide (PULMICORT) 0.5 MG/2ML nebulizer solution Take 2 mL by nebulization 2 (Two) Times a Day. 60 each 0 9/7/2020 at AM   • buprenorphine-naloxone (SUBOXONE) 8-2 MG per SL tablet Place 1 tablet under the tongue 2 (two) times a  day.   9/7/2020 at AM   • fluticasone (FLONASE) 50 MCG/ACT nasal spray Inhale 2 sprays into the nostril(s) once Daily. 16 g 0 9/7/2020 at AM   • gabapentin (NEURONTIN) 800 MG tablet Take 800 mg by mouth 4 (Four) Times a Day.   9/7/2020 at AM   • ipratropium-albuterol (DUO-NEB) 0.5-2.5 mg/3 ml nebulizer Take 3 mL by nebulization Every 4 (Four) Hours As Needed for Shortness of Air. 360 mL 0 9/7/2020 at AM   , Scheduled Meds:    azithromycin 500 mg Intravenous Q24H   budesonide 0.5 mg Nebulization BID - RT   buprenorphine-naloxone 1 tablet Sublingual BID   enoxaparin 40 mg Subcutaneous Q12H   famotidine 20 mg Oral BID AC   fluticasone 2 spray Nasal Daily   insulin aspart 0-7 Units Subcutaneous TID AC   ipratropium-albuterol 3 mL Nebulization 4x Daily - RT   methylPREDNISolone sodium succinate 40 mg Intravenous Q12H   montelukast 10 mg Oral Nightly   nicotine 1 patch Transdermal Q24H   sodium chloride 10 mL Intravenous Q12H   , Continuous Infusions:   , PRN Meds:  benzonatate  •  dextrose  •  dextrose  •  glucagon (human recombinant)  •  nitroglycerin  •  ondansetron  •  [COMPLETED] Insert peripheral IV **AND** sodium chloride  •  sodium chloride and Allergies:  Patient has no known allergies.    Objective     Vital Signs   Temp:  [97.9 °F (36.6 °C)-98.1 °F (36.7 °C)] 98 °F (36.7 °C)  Heart Rate:  [] 103  Resp:  [16-28] 20  BP: (118-133)/() 120/65    Mental Status Exam:  Hygiene:   fair  Cooperation:  Cooperative  Eye Contact:  Fair  Psychomotor Behavior:  Appropriate  Affect:  Restricted, with some emotional lability and significant tearfulness  Hopelessness: 3  Speech:  Normal  Thought Progress:  Linear  Thought Content:  Normal and Mood congruent  Suicidal:  None  Homicidal:  None  Hallucinations:  None  Delusion:  None  Memory:  Intact  Orientation:  Person, Place, Time and Situation  Reliability:  fair  Insight:  Fair  Judgement:  Fair  Impulse Control:  Fair    Results Review:   I reviewed the  patient's new clinical results.  I reviewed the patient's other test results and agree with the interpretation  I personally viewed and interpreted the patient's EKG/Telemetry data  Lab Results (last 24 hours)     Procedure Component Value Units Date/Time    hCG, Quantitative, Pregnancy [[id number]] Collected:  09/07/20 1442    Specimen:  Blood from Arm, Left Updated:  09/08/20 1228    HIV-1 / O / 2 Ag / Antibody 4th Generation [[id number]] Collected:  09/07/20 1442    Specimen:  Blood from Arm, Left Updated:  09/08/20 1218    POC Glucose Once [[id number]]  (Abnormal) Collected:  09/08/20 1054    Specimen:  Blood Updated:  09/08/20 1101     Glucose 175 mg/dL     Blood Gas, Arterial With Co-Ox [[id number]]  (Abnormal) Collected:  09/08/20 0652    Specimen:  Arterial Blood Updated:  09/08/20 0700     Site Right Radial     Zbigniew's Test Positive     pH, Arterial 7.317 pH units      Comment: 84 Value below reference range        pCO2, Arterial 59.1 mm Hg      Comment: 83 Value above reference range        pO2, Arterial 59.4 mm Hg      Comment: 84 Value below reference range        HCO3, Arterial 30.2 mmol/L      Comment: 83 Value above reference range        Base Excess, Arterial 2.7 mmol/L      O2 Saturation, Arterial 90.0 %      Comment: 84 Value below reference range        Hemoglobin, Blood Gas 12.5 g/dL      Comment: 84 Value below reference range        Hematocrit, Blood Gas 38.4 %      Oxyhemoglobin 89.1 %      Comment: 84 Value below reference range        Methemoglobin 0.20 %      Carboxyhemoglobin 0.8 %      A-a Gradiant 120.6 mmHg      CO2 Content 32.0 mmol/L      Temperature 0.0 C      Barometric Pressure for Blood Gas 728 mmHg      Modality Nasal Cannula     FIO2 36 %      Flow Rate 4.0 lpm      Ventilator Mode NA     Note --     Collected by [id number]     Comment: Meter: [id number]     :  [id number]        pH, Temp Corrected --     pCO2, Temperature Corrected --     pO2, Temperature Corrected --    POC  Glucose Once [[id number]]  (Abnormal) Collected:  09/08/20 0611    Specimen:  Blood Updated:  09/08/20 0617     Glucose 175 mg/dL     Respiratory Panel, PCR - Swab, Nasopharynx [[id number]]  (Abnormal) Collected:  09/08/20 0213    Specimen:  Swab from Nasopharynx Updated:  09/08/20 0345     ADENOVIRUS, PCR Not Detected     Coronavirus 229E Not Detected     Coronavirus HKU1 Not Detected     Coronavirus NL63 Not Detected     Coronavirus OC43 Not Detected     Human Metapneumovirus Not Detected     Human Rhinovirus/Enterovirus Detected     Influenza B PCR Not Detected     Parainfluenza Virus 1 Not Detected     Parainfluenza Virus 2 Not Detected     Parainfluenza Virus 3 Not Detected     Parainfluenza Virus 4 Not Detected     Bordetella pertussis pcr Not Detected     Influenza A H1 2009 PCR Not Detected     Chlamydophila pneumoniae PCR Not Detected     Mycoplasma pneumo by PCR Not Detected     Influenza A PCR Not Detected     Influenza A H3 Not Detected     Influenza A H1 Not Detected     RSV, PCR Not Detected    Narrative:       The coronavirus on the RVP is NOT COVID-19 and is NOT indicative of infection with COVID-19.     Blood Gas, Arterial With Co-Ox [[id number]]  (Abnormal) Collected:  09/08/20 0311    Specimen:  Arterial Blood Updated:  09/08/20 0318     Site Right Radial     Zbigniew's Test Positive     pH, Arterial 7.336 pH units      Comment: 84 Value below reference range        pCO2, Arterial 56.5 mm Hg      Comment: 83 Value above reference range        pO2, Arterial 56.9 mm Hg      Comment: 84 Value below reference range        HCO3, Arterial 30.2 mmol/L      Comment: 83 Value above reference range        Base Excess, Arterial 3.1 mmol/L      O2 Saturation, Arterial 89.8 %      Comment: 84 Value below reference range        Hemoglobin, Blood Gas 12.5 g/dL      Comment: 84 Value below reference range        Hematocrit, Blood Gas 38.3 %      Oxyhemoglobin 88.9 %      Comment: 84 Value below reference range         Methemoglobin 0.10 %      Carboxyhemoglobin 0.9 %      A-a Gradiant 98.0 mmHg      CO2 Content 32.0 mmol/L      Temperature 0.0 C      Barometric Pressure for Blood Gas 727 mmHg      Modality Nasal Cannula     FIO2 32 %      Ventilator Mode NA     Note --     Collected by [id number]     Comment: Meter: [id number]     :  [id number]        pH, Temp Corrected --     pCO2, Temperature Corrected --     pO2, Temperature Corrected --    Hemoglobin A1c [[id number]]  (Normal) Collected:  09/07/20 1442    Specimen:  Blood from Arm, Left Updated:  09/07/20 2307     Hemoglobin A1C 5.60 %     Narrative:       Hemoglobin A1C Ranges:    Increased Risk for Diabetes  5.7% to 6.4%  Diabetes                     >= 6.5%  Diabetic Goal                < 7.0%    Blood Gas, Arterial With Co-Ox [[id number]]  (Abnormal) Collected:  09/07/20 2232    Specimen:  Arterial Blood Updated:  09/07/20 2233     Site Left Radial     Zbigniew's Test Positive     pH, Arterial 7.334 pH units      Comment: 84 Value below reference range        pCO2, Arterial 57.7 mm Hg      Comment: 83 Value above reference range        pO2, Arterial 67.6 mm Hg      Comment: 84 Value below reference range        HCO3, Arterial 30.7 mmol/L      Comment: 83 Value above reference range        Base Excess, Arterial 3.4 mmol/L      O2 Saturation, Arterial 93.4 %      Comment: 84 Value below reference range        Hemoglobin, Blood Gas 12.8 g/dL      Comment: 84 Value below reference range        Hematocrit, Blood Gas 39.3 %      Oxyhemoglobin 92.4 %      Comment: 84 Value below reference range        Methemoglobin 0.10 %      Carboxyhemoglobin 1.0 %      A-a Gradiant 106.8 mmHg      CO2 Content 32.4 mmol/L      Temperature 0.0 C      Barometric Pressure for Blood Gas 728 mmHg      Modality BiPap     FIO2 35 %      Ventilator Mode BiPAP     Set Mech Resp Rate 22.0     IPAP 20     Comment: Meter: [id number]     :  [id number]        EPAP 6     Note --     Collected  by [id number]     pH, Temp Corrected --     pCO2, Temperature Corrected --     pO2, Temperature Corrected --    Blood Gas, Arterial With Co-Ox [[id number]]  (Abnormal) Collected:  09/07/20 2115    Specimen:  Arterial Blood Updated:  09/07/20 2119     Site Left Radial     Zbigniew's Test Positive     pH, Arterial 7.310 pH units      Comment: 84 Value below reference range        pCO2, Arterial 56.9 mm Hg      Comment: 83 Value above reference range        pO2, Arterial 67.8 mm Hg      Comment: 84 Value below reference range        HCO3, Arterial 28.6 mmol/L      Comment: 83 Value above reference range        Base Excess, Arterial 1.2 mmol/L      O2 Saturation, Arterial 93.1 %      Comment: 84 Value below reference range        Hemoglobin, Blood Gas 12.6 g/dL      Comment: 84 Value below reference range        Hematocrit, Blood Gas 38.8 %      Oxyhemoglobin 92.0 %      Comment: 84 Value below reference range        Methemoglobin 0.10 %      Carboxyhemoglobin 1.1 %      A-a Gradiant 86.5 mmHg      CO2 Content 30.4 mmol/L      Temperature 0.0 C      Barometric Pressure for Blood Gas 727 mmHg      Modality Nasal Cannula     FIO2 32 %      Ventilator Mode NA     Note --     Collected by [id number]     Comment: Meter: [id number]     :  [id number]        pH, Temp Corrected --     pCO2, Temperature Corrected --     pO2, Temperature Corrected --    Blood Gas, Arterial With Co-Ox [[id number]]  (Abnormal) Collected:  09/07/20 1844    Specimen:  Arterial Blood Updated:  09/07/20 1846     Site Left Radial     Zbigniew's Test Positive     pH, Arterial 7.338 pH units      Comment: 84 Value below reference range        pCO2, Arterial 55.7 mm Hg      Comment: 83 Value above reference range        pO2, Arterial 80.9 mm Hg      HCO3, Arterial 29.9 mmol/L      Comment: 83 Value above reference range        Base Excess, Arterial 2.9 mmol/L      O2 Saturation, Arterial 96.2 %      Hemoglobin, Blood Gas 12.8 g/dL      Comment: 84 Value below  reference range        Hematocrit, Blood Gas 39.1 %      Oxyhemoglobin 95.1 %      Methemoglobin 0.00 %      Carboxyhemoglobin 1.1 %      A-a Gradiant 95.1 mmHg      CO2 Content 31.6 mmol/L      Temperature 0.0 C      Barometric Pressure for Blood Gas 726 mmHg      Modality BiPap     FIO2 35 %      Ventilator Mode BiPAP     Set [facility] Resp Rate 22.0     IPAP 20     Comment: Meter: [id number]     :  [id number]        EPAP 6     Note --     Collected by [id number]     pH, Temp Corrected --     pCO2, Temperature Corrected --     pO2, Temperature Corrected --    Urine Drug Screen - Urine, Clean Catch [[id number]]  (Abnormal) Collected:  09/07/20 1632    Specimen:  Urine, Clean Catch Updated:  09/07/20 1702     Amphetamine Screen, Urine Negative     Barbiturates Screen, Urine Negative     Benzodiazepine Screen, Urine Negative     Cocaine Screen, Urine Negative     Methadone Screen, Urine Positive     Opiate Screen Negative     Phencyclidine (PCP), Urine Negative     THC, Screen, Urine Negative     6-ACETYL MORPHINE Negative     Buprenorphine, Screen, Urine Positive     Oxycodone Screen, Urine Negative    Narrative:       Negative Thresholds For Drugs Screened:                  Amphetamines              1000 ng/ml               Barbiturates               200 ng/ml               Benzodiazepines            200 ng/ml              Cocaine                    300 ng/ml              Methadone                  300 ng/ml              Opiates                    300 ng/ml               Phencyclidine               25 ng/ml               THC                         50 ng/ml              6-Acetyl Morphine           10 ng/ml              Buprenorphine                5 ng/ml              Oxycodone                  300 ng/ml    The reference range for all drugs tested is negative. This report includes final unconfirmed qualitative results to be used for medical treatment purposes only. Unconfirmed results must not be used for  non-medical purposes such as employment or legal testing. Clinical consideration should be applied to any drug of abuse test, especially when unconfirmed quantitative results are used.        Urinalysis With Microscopic If Indicated (No Culture) - Urine, Clean Catch [[id number]]  (Normal) Collected:  09/07/20 1632    Specimen:  Urine, Clean Catch Updated:  09/07/20 1649     Color, UA Yellow     Appearance, UA Clear     pH, UA <=5.0     Specific Gravity, UA 1.011     Glucose, UA Negative     Ketones, UA Negative     Bilirubin, UA Negative     Blood, UA Negative     Protein, UA Negative     Leuk Esterase, UA Negative     Nitrite, UA Negative     Urobilinogen, UA 0.2 E.U./dL    Narrative:       Urine microscopic not indicated.    COVID-19, [facility] IN-HOUSE,NP Swab (NO TRANSPORT MEDIA) 2 HR TAT - Swab, Nasopharynx [[id number]]  (Normal) Collected:  09/07/20 1442    Specimen:  Swab from Nasopharynx Updated:  09/07/20 1615     COVID19 Not Detected    Narrative:       Fact sheet for providers: https://www.fda.gov/media/501111/download     Fact sheet for patients: https://www.fda.gov/media/715685/download    [place] Draw [[id number]] Collected:  09/07/20 1442    Specimen:  Blood from Arm, Left Updated:  09/07/20 1545    Narrative:       The following orders were created for panel order [place] [facility].  Procedure                               Abnormality         Status                     ---------                               -----------         ------                     Light Blue Top[[id number]]                                   Final result               Green Top (Gel)[[id number]]                                  Final result               Lavender Top[[id number]]                                     Final result               Gold Top - SST[[id number]]                                   Final result                 Please view results for these tests on the individual orders.    Light Blue Top [[id number]] Collected:  09/07/20  1442    Specimen:  Blood from Arm, Left Updated:  09/07/20 1545     Extra Tube hold for add-on     Comment: Auto resulted       Green Top (Gel) [[id number]] Collected:  09/07/20 1442    Specimen:  Blood from Arm, Left Updated:  09/07/20 1545     Extra Tube Hold for add-ons.     Comment: Auto resulted.       Lavender Top [[id number]] Collected:  09/07/20 1442    Specimen:  Blood from Arm, Left Updated:  09/07/20 1545     Extra Tube hold for add-on     Comment: Auto resulted       Gold Top - SST [[id number]] Collected:  09/07/20 1442    Specimen:  Blood from Arm, Left Updated:  09/07/20 1545     Extra Tube Hold for add-ons.     Comment: Auto resulted.       Comprehensive Metabolic Panel [[id number]]  (Abnormal) Collected:  09/07/20 1442    Specimen:  Blood from Arm, Left Updated:  09/07/20 1518     Glucose 103 mg/dL      BUN 7 mg/dL      Creatinine 0.69 mg/dL      Sodium 141 mmol/L      Potassium 4.6 mmol/L      Chloride 103 mmol/L      CO2 31.1 mmol/L      Calcium 8.9 mg/dL      Total Protein 7.3 g/dL      Albumin 3.84 g/dL      ALT (SGPT) 9 U/L      AST (SGOT) 12 U/L      Alkaline Phosphatase 76 U/L      Total Bilirubin <0.2 mg/dL      eGFR Non African Amer 95 mL/min/1.73      Globulin 3.5 gm/dL      A/G Ratio 1.1 g/dL      BUN/Creatinine Ratio 10.1     Anion Gap 6.9 mmol/L     Narrative:       GFR Normal >60  Chronic Kidney Disease <60  Kidney Failure <15      C-reactive Protein [[id number]]  (Abnormal) Collected:  09/07/20 1442    Specimen:  Blood from Arm, Left Updated:  09/07/20 1518     C-Reactive Protein 2.68 mg/dL     Troponin [[id number]]  (Normal) Collected:  09/07/20 1442    Specimen:  Blood from Arm, Left Updated:  09/07/20 1518     Troponin T <0.010 ng/mL     Narrative:       Troponin T Reference Range:  <= 0.03 ng/mL-   Negative for AMI  >0.03 ng/mL-     Abnormal for myocardial necrosis.  Clinicians would have to utilize clinical acumen, EKG, Troponin and serial changes to determine if it is an Acute  Myocardial Infarction or myocardial injury due to an underlying chronic condition.       Results may be falsely decreased if patient taking Biotin.      Lactic Acid, Plasma [[id number]]  (Normal) Collected:  09/07/20 1442    Specimen:  Blood from Arm, Left Updated:  09/07/20 1514     Lactate 0.7 mmol/L     D-dimer, Quantitative [[id number]]  (Abnormal) Collected:  09/07/20 1442    Specimen:  Blood from Arm, Left Updated:  09/07/20 1514     D-Dimer, Quantitative 1.65 MCGFEU/mL     Narrative:       d-Dimer assay result is to be used in conjunction with a non-high clinical pretest probability (PTP) assessment tool to exclude PE and aid in diagnosis of VTE with cutoff of 0.5 MCGFEU/mL.    Influenza Antigen, Rapid - Swab, Nasopharynx [[id number]]  (Normal) Collected:  09/07/20 1442    Specimen:  Swab from Nasopharynx Updated:  09/07/20 1509     Influenza A Ag, EIA Negative     Influenza B Ag, EIA Negative    CBC & Differential [[id number]] Collected:  09/07/20 1442    Specimen:  Blood from Arm, Left Updated:  09/07/20 1456    Narrative:       The following orders were created for panel order CBC & Differential.  Procedure                               Abnormality         Status                     ---------                               -----------         ------                     CBC Auto Differential[[id number]]        Abnormal            Final result                 Please view results for these tests on the individual orders.    CBC Auto Differential [[id number]]  (Abnormal) Collected:  09/07/20 1442    Specimen:  Blood from Arm, Left Updated:  09/07/20 1456     WBC 5.23 10*3/mm3      RBC 4.35 10*6/mm3      Hemoglobin 11.6 g/dL      Hematocrit 37.8 %      MCV 86.9 fL      MCH 26.7 pg      MCHC 30.7 g/dL      RDW 13.2 %      RDW-SD 41.8 fl      MPV 9.4 fL      Platelets 238 10*3/mm3      Neutrophil % 62.0 %      Lymphocyte % 25.2 %      Monocyte % 5.9 %      Eosinophil % 5.9 %      Basophil % 0.6 %      Immature Grans %  0.4 %      Neutrophils, Absolute 3.24 10*3/mm3      Lymphocytes, Absolute 1.32 10*3/mm3      Monocytes, Absolute 0.31 10*3/mm3      Eosinophils, Absolute 0.31 10*3/mm3      Basophils, Absolute 0.03 10*3/mm3      Immature Grans, Absolute 0.02 10*3/mm3      nRBC 0.0 /100 WBC     Blood Culture - Blood, Arm, Left [[id number]] Collected:  09/07/20 1442    Specimen:  Blood from Arm, Left Updated:  09/07/20 1453    Blood Culture - Blood, Arm, Left [[id number]] Collected:  09/07/20 1442    Specimen:  Blood from Arm, Left Updated:  09/07/20 1453    Blood Gas, Arterial With Co-Ox [[id number]]  (Abnormal) Collected:  09/07/20 1434    Specimen:  Arterial Blood Updated:  09/07/20 1437     Site Left Radial     Zbigniew's Test Positive     pH, Arterial 7.320 pH units      Comment: 84 Value below reference range        pCO2, Arterial 63.1 mm Hg      Comment: 83 Value above reference range        pO2, Arterial 71.5 mm Hg      Comment: 84 Value below reference range        HCO3, Arterial 32.5 mmol/L      Comment: 83 Value above reference range        Base Excess, Arterial 4.7 mmol/L      O2 Saturation, Arterial 94.1 %      Hemoglobin, Blood Gas 12.1 g/dL      Comment: 84 Value below reference range        Hematocrit, Blood Gas 37.1 %      Comment: 84 Value below reference range        Oxyhemoglobin 92.6 %      Comment: 84 Value below reference range        Methemoglobin 0.10 %      Carboxyhemoglobin 1.5 %      A-a Gradiant 48.4 mmHg      CO2 Content 34.4 mmol/L      Temperature 0.0 C      Barometric Pressure for Blood Gas 727 mmHg      Modality Nasal Cannula     FIO2 28 %      Flow Rate 2.0 lpm      Ventilator Mode NA     Note --     Collected by [id number]     Comment: Meter: [id number]     :  [id number]        pH, Temp Corrected --     pCO2, Temperature Corrected --     pO2, Temperature Corrected --        Imaging Results (Last 24 Hours)     Procedure Component Value Units Date/Time    CT Chest Pulmonary Embolism [[id number]]  Collected:  09/07/20 2047     Updated:  09/07/20 2049    Narrative:       CT CHEST PULMONARY EMBOLISM W CONTRAST    INDICATION:   40-year-old female with cough and shortness of air today.    TECHNIQUE:   CT angiogram of the chest with IV contrast. 3-D reconstructions were obtained and reviewed.   Radiation dose reduction techniques included automated exposure control or exposure modulation based on body size. Count of known CT and cardiac nuc med studies  performed in previous 12 months: 2.     COMPARISON:   CTA chest 2/22/2020    FINDINGS:   Adequate opacification of the pulmonary arteries with no filling defects. Thoracic aorta normal in course and caliber without dissection. Heart size is normal. No pericardial effusion.    No pleural effusion. No pneumothorax. Again noted are patchy areas of air trapping and mosaic perfusion throughout both lungs, most prominent in the bilateral upper lobes. This is similar to the prior examination and may get in represent the sequelae of  small vessel or small airways disease. No focal areas of dense consolidation. Chronic lingular atelectasis again noted. Stable 6 mm noncalcified pulmonary nodule in the right middle lobe.    Please refer to CT abdomen pelvis performed the same date and dictated separately for detailed findings below the diaphragm.    No acute osseous abnormality.      Impression:       1. Negative for pulmonary embolus.  2. Negative for thoracic aortic aneurysm/dissection.  3. Persistent patchy areas of air trapping and mosaic perfusion throughout both lungs, most prominent in the bilateral upper lobes, similar in appearance to the prior examination. This again may be secondary to small vessel or small airways disease. No  focal areas of dense consolidation.  4. Stable 6 mm noncalcified pulmonary nodule in the right middle lobe. Management recommendation: Current published guidelines recommend initial follow-up CT in 6-12 months, and again in 18-24 months for  uncomplicated pulmonary nodules measuring 6 to 8  mm in high-risk patients (Fleischner Society guidelines, 2017).    Signer Name: Eric Costa MD   Signed: 9/7/2020 8:47 PM   Workstation Name: [facility]    Radiology [facility]    CT Abdomen Pelvis Without Contrast [[id number]] Collected:  09/07/20 2039     Updated:  09/07/20 2041    Narrative:       CT Abdomen Pelvis WO    INDICATION:   40-year-old female with vomiting and fever today.    TECHNIQUE:   CT of the abdomen and pelvis without IV contrast. Coronal and sagittal reconstructions were obtained.  Radiation dose reduction techniques included automated exposure control or exposure modulation based on body size. Count of known CT and cardiac nuc  med studies performed in previous 12 months: 2.     COMPARISON:   None available.    FINDINGS:  Please refer to CTA chest performed the same date and dictated separately for detailed findings above the diaphragm.    Abdomen:   The liver is within normal limits. Mild splenomegaly. Pancreas is unremarkable. Cholecystectomy. Both adrenal glands are normal. The kidneys are normal. Abdominal aorta normal in course and caliber. Small bowel is unremarkable without obstruction. Normal  appendix. The colon is unremarkable. Moderate stool burden throughout the colon. No free fluid or free air.    Pelvis:   The urinary bladder is unremarkable.  The uterus and adnexa are within normal limits. No free pelvic fluid.    No acute osseous abnormality.        Impression:         1. No acute abdominal or pelvic findings allowing for lack of IV contrast.  2. Normal appendix.  3. Splenomegaly.  4. Moderate stool burden.  5. Cholecystectomy.          Signer Name: Eric Costa MD   Signed: 9/7/2020 8:39 PM   Workstation Name: [facility]    Radiology [facility]    XR Chest 1 View [[id number]] Collected:  09/07/20 1451     Updated:  09/07/20 1453    Narrative:       CR Chest 1 Vw    INDICATION:   Short severe  cough. Fever. States she has COPD.     COMPARISON:    2/22/2020    FINDINGS:  Single portable AP view(s) of the chest.  The heart and mediastinal contours are normal. The lungs are clear. No pneumothorax or pleural effusion.      Impression:       No acute cardiopulmonary findings.    Signer Name: ANIYA Barajas MD   Signed: 9/7/2020 2:51 PM   Workstation Name: [facility]    [facility]            Assessment/Plan     Active Problems:    Acute on chronic respiratory failure with hypercapnia (CMS/HCC)     Major depressive disorder, severe, single episode, without psychosis  -Patient with multiple symptoms of major depressive disorder in the setting of extended grief following the sudden passing of her 13-year-old son in May of this year  -Given the severity of symptoms and duration of greater than 3 months, is appropriate to consider patient to be experiencing major depressive disorder  -Begin fluoxetine 20 mg every morning.  If she experiences GI upset, can back down to 10 mg.  Can titrate upward every 2 to 4 weeks to effect  -Recommend trazodone 50 mg nightly for insomnia  -Patient is already established with outpatient counseling and she plans to continue that following hospital discharge    Thank you for this consult.  I discussed the patients findings and my recommendations with patient and nursing staff    Angel Mock MD  09/08/20  12:31

## 2020-09-08 NOTE — H&P
[facility] Medicine Services  History & Physical    Patient Identification:  Name:  Mary Monaco  Age:  40 y.o.  Sex:  female  :  1980  MRN:  [id number]   Visit Number:  [id number]  Primary Care Physician:  Annette Wade APRN    I have seen the patient in conjunction with DENISE Sheldon and I agree with the following statements:    Subjective     Chief complaint: Shortness of breath    History of presenting illness:      Mary Monaco is a 40 y.o. female with past medical history significant for oxygen dependent COPD, diabetes mellitus type 2, hepatitis C, IV drug abuse, essential hypertension, lupus and rheumatic neuritis.    Ms. Monaco presented to [facility] emergency department on 2020 with complaints of shortness of breath and cough which has been present for approximately 2 weeks but has failed outpatient treatment and is progressively worse.  Patient also reports being febrile at home with fever today of 102 and temperature max 104.  She also reports episodes of nausea and vomiting.  Ms. Monaco explains that over the last 2 weeks she has been seen by her PCP and taken a Z-Asael and Augmentin.  She reports that she finished the last dose of Augmentin on 2020.  She reports that she is supposed to wear home oxygen PRN, but denies use.  She also reports that she does not have access to home nebulizer machine as hers is broken.  She reports that her shortness of breath has worsened over the last 2 days.  She denies any edema.  She does report a cough which is productive at times.  The patient denies any chest pain, edema, urinary symptoms, dizziness, lightheadedness, seizures or syncopal episodes.  She does report that she has been dealing with extreme anxiety and depression since the loss of her 13-year-old son in May 2020.  She reports that she is followed by an outpatient therapist but has not been prescribed any medications to help manage  anxiety/depression.  She denies SI or HI during examination.  The patient denies any illegal drug use and reports compliance with home Suboxone regimen.  She denies any alcohol use but does report smoking approximately 1 to 2 packs of cigarettes per day.    Upon arrival to the ED, vital signs were temperature 98.1, pulse 76, respirations 20, blood pressure 126/70 with oxygen saturation 84% on room air.  Initial arterial blood gas shows pH 7.320, PCO2 63.1, PO2 71.5 and oxygen saturation 94.1 on 2 L nasal cannula.  Patient placed on BiPAP with repeat ABG showing pH 7.338, PCO2 55.7, PO2 80.9, HCO3 29.9 and oxygen saturation 96.2.  CT of the chest with pulmonary embolism notes negative for pulmonary embolus, negative for thoracic aortic aneurysm/dissection, persistent patchy areas of air trapping and mosaic perfusion throughout both lungs, most prominent in the bilateral upper lobes, similar in appearance to the prior examination.  This again may be secondary to small vessel or small airway disease with no focal areas of dense consolidation.  Stable 6 mm noncalcified pulmonary nodule in the right middle lobe.  Management recommendations: Current published guidelines recommend initial follow-up CT in 6 to 12 months and again in 18 to 24 months for uncomplicated pulmonary nodules measuring 6 to 8 mm in high risk patients, per radiology.  CT of the abdomen pelvis without contrast shows no acute abdominal or pelvic findings allowing for lack of IV contrast, normal appendix, splenomegaly, moderate stool burden and cholecystectomy, per radiology.  ---------------------------------------------------------------------------------------------------------------------   Review of Systems   Constitutional: Positive for chills and fever. Negative for activity change and appetite change.   HENT: Negative for congestion, rhinorrhea and trouble swallowing.    Eyes: Negative for photophobia and visual disturbance.   Respiratory:  Positive for cough and shortness of breath.    Cardiovascular: Negative for chest pain and leg swelling.   Gastrointestinal: Positive for nausea and vomiting. Negative for abdominal pain, constipation and diarrhea.   Genitourinary: Negative for difficulty urinating, dysuria, frequency, hematuria and urgency.   Musculoskeletal: Negative for back pain and gait problem.   Skin: Negative for color change, pallor, rash and wound.   Allergic/Immunologic: Negative for immunocompromised state.   Neurological: Positive for weakness. Negative for dizziness, seizures, syncope and light-headedness.   Psychiatric/Behavioral: Negative for agitation, confusion and suicidal ideas.        Patient reports worsening anxiety and depression related to the loss of her teenage son.      ---------------------------------------------------------------------------------------------------------------------   Past Medical History:   Diagnosis Date   • Asthma    • COPD (chronic obstructive pulmonary disease) (CMS/[facility])    • Diabetes mellitus (CMS/[facility])    • Hepatitis C    • History of gallstones    • Hypertension    • IV drug abuse (CMS/[facility])    • Lupus (CMS/[facility])    • Rheumatoid arthritis (CMS/[facility])      Past Surgical History:   Procedure Laterality Date   • ABDOMINAL SURGERY     • CHOLECYSTECTOMY     • PERINEAL LESION/CYST EXCISION N/A 4/3/2017    Procedure: EXCISION OF VAGINAL SKIN TAG;  Surgeon: Kee Crooks III, MD;  Location: [facility];  Service:    • TUBAL COAGULATION LAPAROSCOPIC Bilateral 4/3/2017    Procedure: BILATERAL TUBAL FALLOPE FILSHIE CLIPPING LAPAROSCOPIC,IUD REMOVAL;  Surgeon: Kee Crooks III, MD;  Location: [facility];  Service:      Family History   Problem Relation Age of Onset   • No Known Problems Mother    • No Known Problems Father    • No Known Problems Sister    • No Known Problems Brother    • No Known Problems Son    • No Known Problems Daughter    • No Known Problems Maternal Grandmother    • No Known Problems  Maternal Grandfather    • No Known Problems Paternal Grandmother    • No Known Problems Paternal Grandfather    • No Known Problems Cousin    • No Known Problems Other    • Rheum arthritis Neg Hx    • Osteoarthritis Neg Hx    • Asthma Neg Hx    • Diabetes Neg Hx    • Heart failure Neg Hx    • Hyperlipidemia Neg Hx    • Hypertension Neg Hx    • Migraines Neg Hx    • Rashes / Skin problems Neg Hx    • Seizures Neg Hx    • Stroke Neg Hx    • Thyroid disease Neg Hx      Social History     Socioeconomic History   • Marital status: Legally      Spouse name: Not on file   • Number of children: Not on file   • Years of education: Not on file   • Highest education level: Not on file   Tobacco Use   • Smoking status: Current Every Day Smoker     Packs/day: 1.00     Years: 15.00     Pack years: 15.00     Types: Cigarettes   • Smokeless tobacco: Never Used   Substance and Sexual Activity   • Alcohol use: No   • Drug use: No   • Sexual activity: Yes     Partners: Male     Birth control/protection: IUD     ---------------------------------------------------------------------------------------------------------------------   Allergies:  Patient has no known allergies.  ---------------------------------------------------------------------------------------------------------------------   Home medications:    Medications below are reported home medications pulling from within the system; at this time, these medications have not been reconciled unless otherwise specified and are in the verification process for further verifcation as current home medications.    (Not in a hospital admission)    Hospital Scheduled Meds:    azithromycin 500 mg Intravenous Once   methylPREDNISolone sodium succinate 125 mg Intravenous Once        Current listed hospital scheduled medications may not yet reflect those currently placed in orders that are signed and held awaiting patient's arrival to floor.    ---------------------------------------------------------------------------------------------------------------------     Objective     Vital Signs:  Temp:  [98.1 °F (36.7 °C)] 98.1 °F (36.7 °C)  Heart Rate:  [69-88] 88  Resp:  [16-28] 25  BP: (121-131)/(70-75) 131/75      09/07/20  1338   Weight: 104 kg (230 lb)     Body mass index is 42.07 kg/m².  ---------------------------------------------------------------------------------------------------------------------   Physical Exam   Constitutional: She is oriented to person, place, and time and well-developed, well-nourished, and in no distress. She has a sickly appearance.   HENT:   Head: Normocephalic and atraumatic.   Eyes: Pupils are equal, round, and reactive to light. EOM are normal.   Neck: Normal range of motion. No JVD present. No thyromegaly present.   Cardiovascular: Normal rate, regular rhythm and normal heart sounds.   Pulmonary/Chest: Effort normal. No respiratory distress. She has wheezes.   Abdominal: Soft. Bowel sounds are normal. She exhibits no distension. There is no tenderness.   Musculoskeletal: Normal range of motion. She exhibits no edema or deformity.   Neurological: She is alert and oriented to person, place, and time.   Skin: Skin is warm and dry. No rash noted. She is not diaphoretic. No erythema. No pallor.   Psychiatric: Memory, affect and judgment normal. She exhibits a depressed mood. She expresses no homicidal and no suicidal ideation.     ---------------------------------------------------------------------------------------------------------------------  EKG:      ---------------------------------------------------------------------------------------------------------------------   Results from last 7 days   Lab Units 09/07/20  1442   CRP mg/dL 2.68*   LACTATE mmol/L 0.7   WBC 10*3/mm3 5.23   HEMOGLOBIN g/dL 11.6*   HEMATOCRIT % 37.8   MCV fL 86.9   MCHC g/dL 30.7*   PLATELETS 10*3/mm3 238     Results from last 7 days   Lab Units  09/07/20  2115 09/07/20  1844 09/07/20  1434   PH, ARTERIAL pH units 7.310* 7.338* 7.320*   PO2 ART mm Hg 67.8* 80.9* 71.5*   PCO2, ARTERIAL mm Hg 56.9* 55.7* 63.1*   HCO3 ART mmol/L 28.6* 29.9* 32.5*     Results from last 7 days   Lab Units 09/07/20  1442   SODIUM mmol/L 141   POTASSIUM mmol/L 4.6   CHLORIDE mmol/L 103   CO2 mmol/L 31.1*   BUN mg/dL 7   CREATININE mg/dL 0.69   EGFR IF NONAFRICN AM mL/min/1.73 95   CALCIUM mg/dL 8.9   GLUCOSE mg/dL 103*   ALBUMIN g/dL 3.84   BILIRUBIN mg/dL <0.2   ALK PHOS U/L 76   AST (SGOT) U/L 12   ALT (SGPT) U/L 9   Estimated Creatinine Clearance: 123.9 mL/min (by C-G formula based on SCr of 0.69 mg/dL).  No results found for: AMMONIA  Results from last 7 days   Lab Units 09/07/20  1442   TROPONIN T ng/mL <0.010         Lab Results   Component Value Date    HGBA1C 5.40 02/22/2020     Lab Results   Component Value Date    TSH 1.129 10/09/2018    FREET4 0.90 10/21/2015     Lab Results   Component Value Date    PREGTESTUR Negative 10/04/2018     Pain Management Panel     Pain Management Panel Latest Ref Rng & Units 9/7/2020 2/22/2020    AMPHETAMINES SCREEN, URINE Negative Negative Negative    BARBITURATES SCREEN Negative Negative Negative    BENZODIAZEPINE SCREEN, URINE Negative Negative Negative    BUPRENORPHINEUR Negative Positive(A) Positive(A)    COCAINE SCREEN, URINE Negative Negative Negative    METHADONE SCREEN, URINE Negative Positive(A) Negative            ---------------------------------------------------------------------------------------------------------------------  Imaging Results (Last 7 Days)     Procedure Component Value Units Date/Time    CT Chest Pulmonary Embolism [[id number]] Collected:  09/07/20 2047     Updated:  09/07/20 2049    Narrative:       CT CHEST PULMONARY EMBOLISM W CONTRAST    INDICATION:   40-year-old female with cough and shortness of air today.    TECHNIQUE:   CT angiogram of the chest with IV contrast. 3-D reconstructions were obtained and  reviewed.   Radiation dose reduction techniques included automated exposure control or exposure modulation based on body size. Count of known CT and cardiac nuc med studies  performed in previous 12 months: 2.     COMPARISON:   CTA chest 2/22/2020    FINDINGS:   Adequate opacification of the pulmonary arteries with no filling defects. Thoracic aorta normal in course and caliber without dissection. Heart size is normal. No pericardial effusion.    No pleural effusion. No pneumothorax. Again noted are patchy areas of air trapping and mosaic perfusion throughout both lungs, most prominent in the bilateral upper lobes. This is similar to the prior examination and may get in represent the sequelae of  small vessel or small airways disease. No focal areas of dense consolidation. Chronic lingular atelectasis again noted. Stable 6 mm noncalcified pulmonary nodule in the right middle lobe.    Please refer to CT abdomen pelvis performed the same date and dictated separately for detailed findings below the diaphragm.    No acute osseous abnormality.      Impression:       1. Negative for pulmonary embolus.  2. Negative for thoracic aortic aneurysm/dissection.  3. Persistent patchy areas of air trapping and mosaic perfusion throughout both lungs, most prominent in the bilateral upper lobes, similar in appearance to the prior examination. This again may be secondary to small vessel or small airways disease. No  focal areas of dense consolidation.  4. Stable 6 mm noncalcified pulmonary nodule in the right middle lobe. Management recommendation: Current published guidelines recommend initial follow-up CT in 6-12 months, and again in 18-24 months for uncomplicated pulmonary nodules measuring 6 to 8  mm in high-risk patients (Fleischner Society guidelines, 2017).    Signer Name: Eric Costa MD   Signed: 9/7/2020 8:47 PM   Workstation Name: [facility]    [facility]    CT Abdomen Pelvis Without Contrast  [[id number]] Collected:  09/07/20 2039     Updated:  09/07/20 2041    Narrative:       CT Abdomen Pelvis WO    INDICATION:   40-year-old female with vomiting and fever today.    TECHNIQUE:   CT of the abdomen and pelvis without IV contrast. Coronal and sagittal reconstructions were obtained.  Radiation dose reduction techniques included automated exposure control or exposure modulation based on body size. Count of known CT and cardiac nuc  med studies performed in previous 12 months: 2.     COMPARISON:   None available.    FINDINGS:  Please refer to CTA chest performed the same date and dictated separately for detailed findings above the diaphragm.    Abdomen:   The liver is within normal limits. Mild splenomegaly. Pancreas is unremarkable. Cholecystectomy. Both adrenal glands are normal. The kidneys are normal. Abdominal aorta normal in course and caliber. Small bowel is unremarkable without obstruction. Normal  appendix. The colon is unremarkable. Moderate stool burden throughout the colon. No free fluid or free air.    Pelvis:   The urinary bladder is unremarkable.  The uterus and adnexa are within normal limits. No free pelvic fluid.    No acute osseous abnormality.        Impression:         1. No acute abdominal or pelvic findings allowing for lack of IV contrast.  2. Normal appendix.  3. Splenomegaly.  4. Moderate stool burden.  5. Cholecystectomy.          Signer Name: Eric Costa MD   Signed: 9/7/2020 8:39 PM   [facility]station [facility]: [facility]    [facility]    XR Chest 1 View [[id number]] Collected:  09/07/20 1451     Updated:  09/07/20 1453    Narrative:       CR Chest 1 Vw    INDICATION:   Short severe cough. Fever. States she has COPD.     COMPARISON:    2/22/2020    FINDINGS:  Single portable AP view(s) of the chest.  The heart and mediastinal contours are normal. The lungs are clear. No pneumothorax or pleural effusion.      Impression:       No acute cardiopulmonary  findings.    Signer Name: ANIYA Barajas MD   Signed: 9/7/2020 2:51 PM   Workstation Name: [facility]    [facility]          Cultures:  None.     Last echocardiogram:  Results for orders placed during the hospital encounter of 10/04/18   Adult Transthoracic Echo Complete W/ Cont if Necessary Per Protocol    Narrative · Left ventricular systolic function is hyperdynamic (EF > 70).  · Left atrial cavity size is mildly dilated.  · Nornal diastolic function and RVSP        I have personally reviewed the radiology images and read the final radiology report.  ---------------------------------------------------------------------------------------------------------------------  Assessment / Plan     Active Hospital Problems    Diagnosis POA   • Acute on chronic respiratory failure with hypercapnia (CMS/[facility]) [J96.22] Yes       ASSESSMENT/PLAN:  · Acute hypercapnic and hypoxic respiratory failure secondary to COPD exacerbation which has failed outpatient treatment: Patient reports being treated with a Z-Asael and Augmentin for the last 2 weeks, reporting she took her last dose of Augmentin on 9/6/2020 for COPD exacerbation.  Patient presents with complaints of worsening shortness of breath.  ABG on room air was not obtained as patient presented with 2 L of supplemental oxygen via nasal cannula in place.  However, saturations on room air 84%.  Patient reports that she is supposed to use 1 L of supplemental oxygen nightly, however, patient is noncompliant.  Patient placed on BiPAP in ED.  We will continue to follow with a.m. ABG.  Duo nebs ordered.  IV Solu-Medrol and IV azithromycin ordered.  Encourage incentive spirometer.  Continuous pulse ox monitoring ordered.  COVID screening negative.  Respiratory panel ordered.  CT of the chest notes persistent patchy areas of air trapping and mosaic perfusion throughout both lungs, most prominent in the bilateral upper lobes, similar appearance to prior  examination this again may be secondary to small vessel or small airway disease with no focal areas of dense consolidation.  Patient does report being febrile, however, has been afebrile since arrival to ED and WBC and lactate are WNL. C-RP is mildly elevated, this may be secondary to lupus as CRP has been chronically elevated in the past.  May have considered additional COVID screening had CT of the chest indicated any consolidation, however, in its absence do not consider [employer] COVID screening as a false negative. Will continue to monitor with AM CBC, CMP and C-RP. Pulmonology consult placed.     · Elevated d-dimer: D-dimer 1.65.  CT PE protocol does not appreciate PE.  Subcu Lovenox has been ordered. Venous Doppler of the bilateral lower extremities has been ordered to rule out DVT.    · Nausea with vomiting: Antiemetic ordered.  Gastrointestinal panel has been ordered.    · Untreated anxiety and depression s/p loss of teenage son in May 2020: Patient reports continued depression and anxiety after losing her 13-year-old son.  She reports that she has been dealing with a great amount of anxiety and depression and follows with an outpatient therapist, however, she has not tried any medication regimens.  Inpatient psych consult placed.    · Non-insulin-dependent diabetes mellitus type 2: Patient reports that she has been able to control blood glucose levels through diet.  However, in the setting of COPD exacerbation with administration of IV steroids, Accu-Cheks and sliding scale insulin coverage have been ordered.  Hemoglobin A1c ordered.    · Essential hypertension: Continue to monitor vital signs per protocol.  Will review and continue home antihypertensive regimen, with holding parameters, once available from pharmacy.    · Rheumatoid arthritis: Supportive care provided.  We will review home medication regimen once available from pharmacy.    · History of hepatitis C with history of IV drug use: LFTs WNL.   Continue to monitor with a.m. CMP.  Avoid hepatotoxic agents.  Patient reports that she has not used IV drugs in approximately 2 years and is currently on Suboxone regimen.  Will review and continue home medications once available from pharmacy.     · Tobacco abuse: Patient reports smoking approximately 1 pack cigarettes per day.  Nicotine patch ordered.    · Morbid obesity: BMI 42.07    ----------  -DVT prophylaxis: SQ Lovenox  -Diet: Regular  -Activity: Up Ad Nidia   -Expected length of stay: INPATIENT status due to the need for care which can only be reasonably provided in an hospital setting such as aggressive/expedited ancillary services and/or consultation services, the necessity for IV medications, close physician monitoring and/or the possible need for procedures.  In such, I feel patient’s risk for adverse outcomes and need for care warrant INPATIENT evaluation and predict the patient’s care encounter to likely last beyond 2 midnights.    Patient is high risk due to acute hypercapnic and hypoxic respiratory failure secondary to  COPD exacerbation.     Enedina Rosenberg, APRN  09/07/20  22:13

## 2020-09-08 NOTE — PLAN OF CARE
Problem: Patient Care Overview  Goal: Plan of Care Review  Outcome: Ongoing (interventions implemented as appropriate)  Flowsheets (Taken 9/8/2020 1568)  Progress: improving  Plan of Care Reviewed With: patient  Outcome Summary: Pt alert and oriented. Pt has been very anxious. 4L NC. Will continue to monitor.

## 2020-09-08 NOTE — PLAN OF CARE
Problem: Patient Care Overview  Goal: Plan of Care Review  Outcome: Ongoing (interventions implemented as appropriate)  Flowsheets (Taken 9/8/2020 1483)  Progress: no change  Plan of Care Reviewed With: patient  Outcome Summary: Pt admitted with SOA, pt currently on 4LNC and has repeat blood gas this AM. Bipap at bedside.

## 2020-09-08 NOTE — PAYOR COMM NOTE
"  [facility]  NPI: [id number]    Utilization Review   Contact:Cleo Pantoja MSN, APRN, NP-C  Phone: [phone]  Fax: [phone]    Lizzie mcaid/Attn: kavitha Martin Req  REF: [id number]  DX: J96.22, J44.1  Mary Abreu (40 y.o. Female)     Date of Birth Social Security Number Address Home Phone MRN    1980  [address] [place], [place] [address] [phone] [id number]    Protestant Marital Status          None Legally        Admission Date Admission Type Admitting Provider Attending Provider Department, Room/Bed    20 Emergency Tayla Gutierrez MD Likens, Dwayne, DO [address], 3348/1S    Discharge Date Discharge Disposition Discharge Destination                       Attending Provider:  Lamberto Gibbons DO    Allergies:  No Known Allergies    Isolation:  None   Infection:  Rhinovirus  (20), COVID (rule out) (20)   Code Status:  CPR    Ht:  157.5 cm (62.01\")   Wt:  121 kg (266 lb)    Admission Cmt:  None   Principal Problem:  None                Active Insurance as of 2020     Primary Coverage     Payor Plan Insurance Group Employer/Plan Group    ANTHEM MEDICAID ANTH MEDICAID [id number]     Payor Plan Address Payor Plan Phone Number Payor Plan Fax Number Effective Dates    [address] [address] [phone]  2017 - None Entered    [facility] [address]       Subscriber Name Subscriber Birth Date Member ID       MARY ABREU 1980 [id number]                 Emergency Contacts      (Rel.) Home Phone Work Phone Mobile Phone    Moris Johnson (Significant Other) [phone] -- [phone]    Monica Barajas (Mother) -- -- [phone]               History & Physical      Enedina Rosenberg APRN at 20 2212              [facility] Medicine Services  History & Physical    Patient Identification:  Name:  Mary Abreu  Age:  40 y.o.  Sex:  female  :  1980  MRN:  [id number] "   Visit Number:  [id number]  Primary Care Physician:  Annette Wade APRN    I have seen the patient in conjunction with DENISE Sheldon and I agree with the following statements:    Subjective     Chief complaint: Shortness of breath    History of presenting illness:      Mary Monaco is a 40 y.o. female with past medical history significant for oxygen dependent COPD, diabetes mellitus type 2, hepatitis C, IV drug abuse, essential hypertension, lupus and rheumatic neuritis.    Ms. Monaco presented to [facility] emergency department on September 7, 2020 with complaints of shortness of breath and cough which has been present for approximately 2 weeks but has failed outpatient treatment and is progressively worse.  Patient also reports being febrile at home with fever today of 102 and temperature max 104.  She also reports episodes of nausea and vomiting.  Ms. Monaco explains that over the last 2 weeks she has been seen by her PCP and taken a Z-Asael and Augmentin.  She reports that she finished the last dose of Augmentin on 9/6/2020.  She reports that she is supposed to wear home oxygen PRN, but denies use.  She also reports that she does not have access to home nebulizer machine as hers is broken.  She reports that her shortness of breath has worsened over the last 2 days.  She denies any edema.  She does report a cough which is productive at times.  The patient denies any chest pain, edema, urinary symptoms, dizziness, lightheadedness, seizures or syncopal episodes.  She does report that she has been dealing with extreme anxiety and depression since the loss of her 13-year-old son in May 2020.  She reports that she is followed by an outpatient therapist but has not been prescribed any medications to help manage anxiety/depression.  She denies SI or HI during examination.  The patient denies any illegal drug use and reports compliance with home Suboxone regimen.  She denies any alcohol use but does  report smoking approximately 1 to 2 packs of cigarettes per day.    Upon arrival to the ED, vital signs were temperature 98.1, pulse 76, respirations 20, blood pressure 126/70 with oxygen saturation 84% on room air.  Initial arterial blood gas shows pH 7.320, PCO2 63.1, PO2 71.5 and oxygen saturation 94.1 on 2 L nasal cannula.  Patient placed on BiPAP with repeat ABG showing pH 7.338, PCO2 55.7, PO2 80.9, HCO3 29.9 and oxygen saturation 96.2.  CT of the chest with pulmonary embolism notes negative for pulmonary embolus, negative for thoracic aortic aneurysm/dissection, persistent patchy areas of air trapping and mosaic perfusion throughout both lungs, most prominent in the bilateral upper lobes, similar in appearance to the prior examination.  This again may be secondary to small vessel or small airway disease with no focal areas of dense consolidation.  Stable 6 mm noncalcified pulmonary nodule in the right middle lobe.  Management recommendations: Current published guidelines recommend initial follow-up CT in 6 to 12 months and again in 18 to 24 months for uncomplicated pulmonary nodules measuring 6 to 8 mm in high risk patients, per radiology.  CT of the abdomen pelvis without contrast shows no acute abdominal or pelvic findings allowing for lack of IV contrast, normal appendix, splenomegaly, moderate stool burden and cholecystectomy, per radiology.  ---------------------------------------------------------------------------------------------------------------------   Review of Systems   Constitutional: Positive for chills and fever. Negative for activity change and appetite change.   HENT: Negative for congestion, rhinorrhea and trouble swallowing.    Eyes: Negative for photophobia and visual disturbance.   Respiratory: Positive for cough and shortness of breath.    Cardiovascular: Negative for chest pain and leg swelling.   Gastrointestinal: Positive for nausea and vomiting. Negative for abdominal pain,  constipation and diarrhea.   Genitourinary: Negative for difficulty urinating, dysuria, frequency, hematuria and urgency.   Musculoskeletal: Negative for back pain and gait problem.   Skin: Negative for color change, pallor, rash and wound.   Allergic/Immunologic: Negative for immunocompromised state.   Neurological: Positive for weakness. Negative for dizziness, seizures, syncope and light-headedness.   Psychiatric/Behavioral: Negative for agitation, confusion and suicidal ideas.        Patient reports worsening anxiety and depression related to the loss of her teenage son.      ---------------------------------------------------------------------------------------------------------------------   Past Medical History:   Diagnosis Date   • Asthma    • COPD (chronic obstructive pulmonary disease) (CMS/HCC)    • Diabetes mellitus (CMS/HCC)    • Hepatitis C    • History of gallstones    • Hypertension    • IV drug abuse (CMS/HCC)    • Lupus (CMS/HCC)    • Rheumatoid arthritis (CMS/HCC)      Past Surgical History:   Procedure Laterality Date   • ABDOMINAL SURGERY     • CHOLECYSTECTOMY     • PERINEAL LESION/CYST EXCISION N/A 4/3/2017    Procedure: EXCISION OF VAGINAL SKIN TAG;  Surgeon: Kee Crooks III, MD;  Location: [facility];  Service:    • TUBAL COAGULATION LAPAROSCOPIC Bilateral 4/3/2017    Procedure: BILATERAL TUBAL FALLOPE FILSHIE CLIPPING LAPAROSCOPIC,IUD REMOVAL;  Surgeon: Kee Crooks III, MD;  Location: [facility];  Service:      Family History   Problem Relation Age of Onset   • No Known Problems Mother    • No Known Problems Father    • No Known Problems Sister    • No Known Problems Brother    • No Known Problems Son    • No Known Problems Daughter    • No Known Problems Maternal Grandmother    • No Known Problems Maternal Grandfather    • No Known Problems Paternal Grandmother    • No Known Problems Paternal Grandfather    • No Known Problems Cousin    • No Known Problems Other    • Rheum arthritis Neg  Hx    • Osteoarthritis Neg Hx    • Asthma Neg Hx    • Diabetes Neg Hx    • Heart failure Neg Hx    • Hyperlipidemia Neg Hx    • Hypertension Neg Hx    • Migraines Neg Hx    • Rashes / Skin problems Neg Hx    • Seizures Neg Hx    • Stroke Neg Hx    • Thyroid disease Neg Hx      Social History     Socioeconomic History   • Marital status: Legally      Spouse name: Not on file   • Number of children: Not on file   • Years of education: Not on file   • Highest education level: Not on file   Tobacco Use   • Smoking status: Current Every Day Smoker     Packs/day: 1.00     Years: 15.00     Pack years: 15.00     Types: Cigarettes   • Smokeless tobacco: Never Used   Substance and Sexual Activity   • Alcohol use: No   • Drug use: No   • Sexual activity: Yes     Partners: Male     Birth control/protection: IUD     ---------------------------------------------------------------------------------------------------------------------   Allergies:  Patient has no known allergies.  ---------------------------------------------------------------------------------------------------------------------   Home medications:    Medications below are reported home medications pulling from within the system; at this time, these medications have not been reconciled unless otherwise specified and are in the verification process for further verifcation as current home medications.    (Not in a hospital admission)    Hospital Scheduled Meds:    azithromycin 500 mg Intravenous Once   methylPREDNISolone sodium succinate 125 mg Intravenous Once        Current listed hospital scheduled medications may not yet reflect those currently placed in orders that are signed and held awaiting patient's arrival to floor.   ---------------------------------------------------------------------------------------------------------------------     Objective     Vital Signs:  Temp:  [98.1 °F (36.7 °C)] 98.1 °F (36.7 °C)  Heart Rate:  [69-88] 88  Resp:   [16-28] 25  BP: (121-131)/(70-75) 131/75      09/07/20  1338   Weight: 104 kg (230 lb)     Body mass index is 42.07 kg/m².  ---------------------------------------------------------------------------------------------------------------------   Physical Exam   Constitutional: She is oriented to person, place, and time and well-developed, well-nourished, and in no distress. She has a sickly appearance.   HENT:   Head: Normocephalic and atraumatic.   Eyes: Pupils are equal, round, and reactive to light. EOM are normal.   Neck: Normal range of motion. No JVD present. No thyromegaly present.   Cardiovascular: Normal rate, regular rhythm and normal heart sounds.   Pulmonary/Chest: Effort normal. No respiratory distress. She has wheezes.   Abdominal: Soft. Bowel sounds are normal. She exhibits no distension. There is no tenderness.   Musculoskeletal: Normal range of motion. She exhibits no edema or deformity.   Neurological: She is alert and oriented to person, place, and time.   Skin: Skin is warm and dry. No rash noted. She is not diaphoretic. No erythema. No pallor.   Psychiatric: Memory, affect and judgment normal. She exhibits a depressed mood. She expresses no homicidal and no suicidal ideation.     ---------------------------------------------------------------------------------------------------------------------  EKG:      ---------------------------------------------------------------------------------------------------------------------   Results from last 7 days   Lab Units 09/07/20  1442   CRP mg/dL 2.68*   LACTATE mmol/L 0.7   WBC 10*3/mm3 5.23   HEMOGLOBIN g/dL 11.6*   HEMATOCRIT % 37.8   MCV fL 86.9   MCHC g/dL 30.7*   PLATELETS 10*3/mm3 238     Results from last 7 days   Lab Units 09/07/20  2115 09/07/20  1844 09/07/20  1434   PH, ARTERIAL pH units 7.310* 7.338* 7.320*   PO2 ART mm Hg 67.8* 80.9* 71.5*   PCO2, ARTERIAL mm Hg 56.9* 55.7* 63.1*   HCO3 ART mmol/L 28.6* 29.9* 32.5*     Results from last 7 days    Lab Units 09/07/20  1442   SODIUM mmol/L 141   POTASSIUM mmol/L 4.6   CHLORIDE mmol/L 103   CO2 mmol/L 31.1*   BUN mg/dL 7   CREATININE mg/dL 0.69   EGFR IF NONAFRICN AM mL/min/1.73 95   CALCIUM mg/dL 8.9   GLUCOSE mg/dL 103*   ALBUMIN g/dL 3.84   BILIRUBIN mg/dL <0.2   ALK PHOS U/L 76   AST (SGOT) U/L 12   ALT (SGPT) U/L 9   Estimated Creatinine Clearance: 123.9 mL/min (by C-G formula based on SCr of 0.69 mg/dL).  No results found for: AMMONIA  Results from last 7 days   Lab Units 09/07/20  1442   TROPONIN T ng/mL <0.010         Lab Results   Component Value Date    HGBA1C 5.40 02/22/2020     Lab Results   Component Value Date    TSH 1.129 10/09/2018    FREET4 0.90 10/21/2015     Lab Results   Component Value Date    PREGTESTUR Negative 10/04/2018     Pain Management Panel     Pain Management Panel Latest Ref Rng & Units 9/7/2020 2/22/2020    AMPHETAMINES SCREEN, URINE Negative Negative Negative    BARBITURATES SCREEN Negative Negative Negative    BENZODIAZEPINE SCREEN, URINE Negative Negative Negative    BUPRENORPHINEUR Negative Positive(A) Positive(A)    COCAINE SCREEN, URINE Negative Negative Negative    METHADONE SCREEN, URINE Negative Positive(A) Negative            ---------------------------------------------------------------------------------------------------------------------  Imaging Results (Last 7 Days)     Procedure Component Value Units Date/Time    CT Chest Pulmonary Embolism [[id number]] Collected:  09/07/20 2047     Updated:  09/07/20 2049    Narrative:       CT CHEST PULMONARY EMBOLISM W CONTRAST    INDICATION:   40-year-old female with cough and shortness of air today.    TECHNIQUE:   CT angiogram of the chest with IV contrast. 3-D reconstructions were obtained and reviewed.   Radiation dose reduction techniques included automated exposure control or exposure modulation based on body size. Count of known CT and cardiac nuc med studies  performed in previous 12 months: 2.     COMPARISON:   CTA  chest 2/22/2020    FINDINGS:   Adequate opacification of the pulmonary arteries with no filling defects. Thoracic aorta normal in course and caliber without dissection. Heart size is normal. No pericardial effusion.    No pleural effusion. No pneumothorax. Again noted are patchy areas of air trapping and mosaic perfusion throughout both lungs, most prominent in the bilateral upper lobes. This is similar to the prior examination and may get in represent the sequelae of  small vessel or small airways disease. No focal areas of dense consolidation. Chronic lingular atelectasis again noted. Stable 6 mm noncalcified pulmonary nodule in the right middle lobe.    Please refer to CT abdomen pelvis performed the same date and dictated separately for detailed findings below the diaphragm.    No acute osseous abnormality.      Impression:       1. Negative for pulmonary embolus.  2. Negative for thoracic aortic aneurysm/dissection.  3. Persistent patchy areas of air trapping and mosaic perfusion throughout both lungs, most prominent in the bilateral upper lobes, similar in appearance to the prior examination. This again may be secondary to small vessel or small airways disease. No  focal areas of dense consolidation.  4. Stable 6 mm noncalcified pulmonary nodule in the right middle lobe. Management recommendation: Current published guidelines recommend initial follow-up CT in 6-12 months, and again in 18-24 months for uncomplicated pulmonary nodules measuring 6 to 8  mm in high-risk patients (Fleischner Society guidelines, 2017).    Signer Name: Eric Costa MD   Signed: 9/7/2020 8:47 PM   Workstation Name: [email or web address]    [facility]    CT Abdomen Pelvis Without Contrast [[id number]] Collected:  09/07/20 2039     Updated:  09/07/20 2041    Narrative:       CT Abdomen Pelvis WO    INDICATION:   40-year-old female with vomiting and fever today.    TECHNIQUE:   CT of the abdomen and pelvis without IV  contrast. Coronal and sagittal reconstructions were obtained.  Radiation dose reduction techniques included automated exposure control or exposure modulation based on body size. Count of known CT and cardiac nuc  med studies performed in previous 12 months: 2.     COMPARISON:   None available.    FINDINGS:  Please refer to CTA chest performed the same date and dictated separately for detailed findings above the diaphragm.    Abdomen:   The liver is within normal limits. Mild splenomegaly. Pancreas is unremarkable. Cholecystectomy. Both adrenal glands are normal. The kidneys are normal. Abdominal aorta normal in course and caliber. Small bowel is unremarkable without obstruction. Normal  appendix. The colon is unremarkable. Moderate stool burden throughout the colon. No free fluid or free air.    Pelvis:   The urinary bladder is unremarkable.  The uterus and adnexa are within normal limits. No free pelvic fluid.    No acute osseous abnormality.        Impression:         1. No acute abdominal or pelvic findings allowing for lack of IV contrast.  2. Normal appendix.  3. Splenomegaly.  4. Moderate stool burden.  5. Cholecystectomy.          Signer Name: Eric Costa MD   Signed: 9/7/2020 8:39 PM   [facility]: [email or web address]    [facility]    XR Chest 1 View [[id number]] Collected:  09/07/20 1451     Updated:  09/07/20 1453    Narrative:       CR Chest 1 Vw    INDICATION:   Short severe cough. Fever. States she has COPD.     COMPARISON:    2/22/2020    FINDINGS:  Single portable AP view(s) of the chest.  The heart and mediastinal contours are normal. The lungs are clear. No pneumothorax or pleural effusion.      Impression:       No acute cardiopulmonary findings.    Signer Name: ANIYA Barajas MD   Signed: 9/7/2020 2:51 PM   Workstation Name: [facility]    [facility]          Cultures:  None.     Last echocardiogram:  Results for orders placed during the  hospital encounter of 10/04/18   Adult Transthoracic Echo Complete W/ Cont if Necessary Per Protocol    Narrative · Left ventricular systolic function is hyperdynamic (EF > 70).  · Left atrial cavity size is mildly dilated.  · Nornal diastolic function and RVSP        I have personally reviewed the radiology images and read the final radiology report.  ---------------------------------------------------------------------------------------------------------------------  Assessment / Plan     Active Hospital Problems    Diagnosis POA   • Acute on chronic respiratory failure with hypercapnia (CMS/HCC) [J96.22] Yes       ASSESSMENT/PLAN:  · Acute hypercapnic and hypoxic respiratory failure secondary to COPD exacerbation which has failed outpatient treatment: Patient reports being treated with a Z-Asael and Augmentin for the last 2 weeks, reporting she took her last dose of Augmentin on 9/6/2020 for COPD exacerbation.  Patient presents with complaints of worsening shortness of breath.  ABG on room air was not obtained as patient presented with 2 L of supplemental oxygen via nasal cannula in place.  However, saturations on room air 84%.  Patient reports that she is supposed to use 1 L of supplemental oxygen nightly, however, patient is noncompliant.  Patient placed on BiPAP in ED.  We will continue to follow with a.m. ABG.  Duo nebs ordered.  IV Solu-Medrol and IV azithromycin ordered.  Encourage incentive spirometer.  Continuous pulse ox monitoring ordered.  COVID screening negative.  Respiratory panel ordered.  CT of the chest notes persistent patchy areas of air trapping and mosaic perfusion throughout both lungs, most prominent in the bilateral upper lobes, similar appearance to prior examination this again may be secondary to small vessel or small airway disease with no focal areas of dense consolidation.  Patient does report being febrile, however, has been afebrile since arrival to ED and WBC and lactate are WNL.  C-RP is mildly elevated, this may be secondary to lupus as CRP has been chronically elevated in the past.  May have considered additional COVID screening had CT of the chest indicated any consolidation, however, in its absence do not consider [employer] COVID screening as a false negative. Will continue to monitor with AM CBC, CMP and C-RP. Pulmonology consult placed.     · Elevated d-dimer: D-dimer 1.65.  CT PE protocol does not appreciate PE.  Subcu Lovenox has been ordered. Venous Doppler of the bilateral lower extremities has been ordered to rule out DVT.    · Nausea with vomiting: Antiemetic ordered.  Gastrointestinal panel has been ordered.    · Untreated anxiety and depression s/p loss of teenage son in May 2020: Patient reports continued depression and anxiety after losing her 13-year-old son.  She reports that she has been dealing with a great amount of anxiety and depression and follows with an outpatient therapist, however, she has not tried any medication regimens.  Inpatient psych consult placed.    · Non-insulin-dependent diabetes mellitus type 2: Patient reports that she has been able to control blood glucose levels through diet.  However, in the setting of COPD exacerbation with administration of IV steroids, Accu-Cheks and sliding scale insulin coverage have been ordered.  Hemoglobin A1c ordered.    · Essential hypertension: Continue to monitor vital signs per protocol.  Will review and continue home antihypertensive regimen, with holding parameters, once available from pharmacy.    · Rheumatoid arthritis: Supportive care provided.  We will review home medication regimen once available from pharmacy.    · History of hepatitis C with history of IV drug use: LFTs WNL.  Continue to monitor with a.m. CMP.  Avoid hepatotoxic agents.  Patient reports that she has not used IV drugs in approximately 2 years and is currently on Suboxone regimen.  Will review and continue home medications once available from  "pharmacy.     · Tobacco abuse: Patient reports smoking approximately 1 pack cigarettes per day.  Nicotine patch ordered.    · Morbid obesity: BMI 42.07    ----------  -DVT prophylaxis: SQ Lovenox  -Diet: Regular  -Activity: Up Ad Nidia   -Expected length of stay: INPATIENT status due to the need for care which can only be reasonably provided in an hospital setting such as aggressive/expedited ancillary services and/or consultation services, the necessity for IV medications, close physician monitoring and/or the possible need for procedures.  In such, I feel patient’s risk for adverse outcomes and need for care warrant INPATIENT evaluation and predict the patient’s care encounter to likely last beyond 2 midnights.    Patient is high risk due to acute hypercapnic and hypoxic respiratory failure secondary to  COPD exacerbation.     DENISE Beyer  09/07/20  22:13      Electronically signed by Enedina Rosenberg APRN at 09/08/20 0126          Emergency Department Notes      Vincent Story PA-C at 09/07/20 1421          Subjective   40 year old female with past medical hx of asthma, COPD (oxygen dependent at night using 1L, but states she doesn't always use it because she doesn't want to become dependent on it), DM, hepatitis C, HTN, autoimmune disease, and IVDA presents to the ED with complaints of SOA and cough x 4 days. Patient also endorses fever (T-max 104) with most recent fever today at 102, vomiting, diarrhea, and stress incontinence (secondary to coughing).  Denies abdominal pain, chest pain, or back pain.  Pt states she has felt \"out of her head\" the past few days and is unsure why. She reports to taking 3200mg of Neurontin daily, as well as Suboxone.  Denies being around any sick contacts to her knowledge or travel.      History provided by:  Patient   used: No        Review of Systems   Constitutional: Positive for fever.   HENT: Negative.    Respiratory: Positive for cough and " shortness of breath.    Cardiovascular: Negative.  Negative for chest pain.   Gastrointestinal: Positive for diarrhea, nausea and vomiting. Negative for abdominal pain.   Endocrine: Negative.    Genitourinary: Negative.  Negative for dysuria.   Skin: Negative.    Neurological: Negative.    Psychiatric/Behavioral: Negative.    All other systems reviewed and are negative.      Past Medical History:   Diagnosis Date   • Asthma    • COPD (chronic obstructive pulmonary disease) (CMS/HCC)    • Diabetes mellitus (CMS/HCC)    • Hepatitis C    • History of gallstones    • Hypertension    • IV drug abuse (CMS/HCC)    • Lupus (CMS/HCC)    • Rheumatoid arthritis (CMS/HCC)        No Known Allergies    Past Surgical History:   Procedure Laterality Date   • ABDOMINAL SURGERY     • CHOLECYSTECTOMY     • PERINEAL LESION/CYST EXCISION N/A 4/3/2017    Procedure: EXCISION OF VAGINAL SKIN TAG;  Surgeon: Kee Crooks III, MD;  Location: [facility];  Service:    • TUBAL COAGULATION LAPAROSCOPIC Bilateral 4/3/2017    Procedure: BILATERAL TUBAL FALLOPE FILSHIE CLIPPING LAPAROSCOPIC,IUD REMOVAL;  Surgeon: Kee Crooks III, MD;  Location: [facility];  Service:        Family History   Problem Relation Age of Onset   • No Known Problems Mother    • No Known Problems Father    • No Known Problems Sister    • No Known Problems Brother    • No Known Problems Son    • No Known Problems Daughter    • No Known Problems Maternal Grandmother    • No Known Problems Maternal Grandfather    • No Known Problems Paternal Grandmother    • No Known Problems Paternal Grandfather    • No Known Problems Cousin    • No Known Problems Other    • Rheum arthritis Neg Hx    • Osteoarthritis Neg Hx    • Asthma Neg Hx    • Diabetes Neg Hx    • Heart failure Neg Hx    • Hyperlipidemia Neg Hx    • Hypertension Neg Hx    • Migraines Neg Hx    • Rashes / Skin problems Neg Hx    • Seizures Neg Hx    • Stroke Neg Hx    • Thyroid disease Neg Hx        Social History      Socioeconomic History   • Marital status: Legally      Spouse name: Not on file   • Number of children: Not on file   • Years of education: Not on file   • Highest education level: Not on file   Tobacco Use   • Smoking status: Current Every Day Smoker     Packs/day: 1.00     Years: 15.00     Pack years: 15.00     Types: Cigarettes   • Smokeless tobacco: Never Used   Substance and Sexual Activity   • Alcohol use: No   • Drug use: No   • Sexual activity: Yes     Partners: Male     Birth control/protection: IUD           Objective   Physical Exam   Constitutional: She is oriented to person, place, and time. She appears well-developed and well-nourished. No distress. Nasal cannula in place.   Pt appears very drowsy and unable to hold conversation very long without nodding off - however easily arousable to voice   HENT:   Head: Normocephalic and atraumatic.   Right Ear: External ear normal.   Left Ear: External ear normal.   Nose: Nose normal.   Eyes: Pupils are equal, round, and reactive to light. Conjunctivae and EOM are normal.   Pinpoint pupils that are sluggish to react bilaterally   Neck: Normal range of motion. Neck supple. No JVD present. No tracheal deviation present.   Cardiovascular: Normal rate, regular rhythm and normal heart sounds.   No murmur heard.  Pulmonary/Chest: Effort normal. No respiratory distress. She has wheezes. She has rhonchi.   Abdominal: Soft. Bowel sounds are normal. There is no tenderness.   Musculoskeletal: Normal range of motion. She exhibits no edema or deformity.   Neurological: She is alert and oriented to person, place, and time. No cranial nerve deficit.   Skin: Skin is warm and dry. No rash noted. She is not diaphoretic. No erythema. No pallor.   Psychiatric: She has a normal mood and affect. Her behavior is normal. Thought content normal.   Nursing note and vitals reviewed.      Procedures          ED Course  ED Course as of Sep 07 2310   Mon Sep 07, 2020   7313  IMPRESSION:   No acute cardiopulmonary findings.    Signer Name: ANIYA Barajas MD  Signed: 9/7/2020 2:51 PM  Workstation Name: [facility]   Radiology [facility]    XR Chest 1 View [TK]   1533 Per Dr. Irizarry, no acute ischemic changes noted.  Normal sinus rhythm with heart rate 72.  MN interval 136.  QRS duration 82.  QTc 453.  No significant change found when compared to EKG on February 22, 2020.   ECG 12 Lead [TK]   1615 COVID19: Not Detected [TK]   1618 Creatinine: 0.69 [TK]   2050 IMPRESSION:   1. Negative for pulmonary embolus.  2. Negative for thoracic aortic aneurysm/dissection.  3. Persistent patchy areas of air trapping and mosaic perfusion throughout both lungs, most prominent in the bilateral upper lobes, similar in appearance to the prior examination. This again may be secondary to small vessel or small airways disease. No  focal areas of dense consolidation.  4. Stable 6 mm noncalcified pulmonary nodule in the right middle lobe. Management recommendation: Current published guidelines recommend initial follow-up CT in 6-12 months, and again in 18-24 months for uncomplicated pulmonary nodules measuring 6 to 8  mm in high-risk patients (Fleischner Society guidelines, 2017).    Signer Name: Eric Costa MD  Signed: 9/7/2020 8:47 PM  Workstation Name: [facility]    CT Chest Pulmonary Embolism [TK]   2050 IMPRESSION:     1. No acute abdominal or pelvic findings allowing for lack of IV contrast.  2. Normal appendix.  3. Splenomegaly.  4. Moderate stool burden.  5. Cholecystectomy.      Signer Name: Eric Costa MD  Signed: 9/7/2020 8:39 PM  Workstation Name: [facility]   Radiology [facility]    CT Abdomen Pelvis Without Contrast [TK]   2057 Pt found to be wondering around her room without oxygen and eating a honey bun.    [TK]   2132 Pt now back on BIPAP    [TK]   2132 Paged hospitalist    [TK]   2133 No bouts of diarrhea or  vomiting while in the ED    [TK]   2205 Spoke with Dr. Gutierrez, she will admit pt to hospitalist services.    [TK]      ED Course User Index  [TK] Vincent Story PA-C                                           MDM  Number of Diagnoses or Management Options  Acute on chronic respiratory failure with hypercapnia (CMS/HCC): new and requires workup  COPD with acute exacerbation (CMS/HCC): new and requires workup     Amount and/or Complexity of Data Reviewed  Clinical lab tests: reviewed and ordered  Tests in the radiology section of CPT®:  reviewed and ordered  Tests in the medicine section of CPT®:  reviewed and ordered  Decide to obtain previous medical records or to obtain history from someone other than the patient: yes  Discuss the patient with other providers: yes (Brenda Irizarry)    Risk of Complications, Morbidity, and/or Mortality  Presenting problems: moderate  Diagnostic procedures: moderate  Management options: moderate    Patient Progress  Patient progress: stable      Final diagnoses:   Acute on chronic respiratory failure with hypercapnia (CMS/HCC)   COPD with acute exacerbation (CMS/HCC)            Vincent Story PA-C  09/07/20 2311      Electronically signed by Vincent Story PA-C at 09/07/20 2311         Oxygen Therapy (last 2 days)     Date/Time   SpO2   Device (Oxygen Therapy)   Flow (L/min)   Oxygen Concentration (%)   ETCO2 (mmHg)    09/08/20 0751   92   nasal cannula;humidified   4   --   --    09/08/20 0741   96   nasal cannula   4   --   --    09/08/20 0739   94   nasal cannula   4   --   --    09/08/20 0731   92   nasal cannula   4   --   --    09/08/20 0653   90   --   --   --   --    09/08/20 0416   --   nasal cannula   4   --   --    09/08/20 0312   93   nasal cannula   3   --   --    09/08/20 0302   93   nasal cannula   3   --   --    09/08/20 0045   92   nasal cannula   3   --   --    09/08/20 0042   92   nasal cannula   3   --   --    09/07/20 23:25:04   96   NPPV/NIV    --   --   --    09/07/20 2111   97   NPPV/NIV   --   35   --    09/07/20 1824   96   NPPV/NIV   --   35   --    09/07/20 1711   94   NPPV/NIV   --   35   --    09/07/20 15:06:37   92   nasal cannula   2   --   --    09/07/20 14:56:01   94   --   --   --   --    09/07/20 1338   (!) 84   --   --   --   --            Ventilator/Non-Invasive Ventilation Settings (From admission, onward)     Start     Ordered    09/07/20 1617  NIPPV (CPAP or BIPAP)  Until Discontinued     Question Answer Comment   Type: BIPAP    NIPPV Mask Interface: Full Face Mask        09/07/20 1616              Scheduled Meds Sorted by Name   for Mary Monaco as of 9/6/20 through 9/8/20     1 Day 3 Days 7 Days 10 Days < Today >    Legend:                           Inactive     Active     Other Encounter    Linked               Medications 09/06/20 09/07/20 09/08/20   azithromycin 500 MG/250 ML 0.9% NS IVPB (MBP)   Dose: 500 mg  Freq: Once Route: IV  Indications of Use: UPPER RESPIRATORY TRACT INFECTION  Start: 09/07/20 2102 End: 09/08/20 0010    Admin Instructions:   Break seal and mix to activate vial before use     2310             budesonide (PULMICORT) nebulizer solution 0.5 mg   Dose: 0.5 mg  Freq: 2 Times Daily - RT Route: NEBULIZATION  Start: 09/08/20 0700    Admin Instructions:   Do not shake.  Protect from light.      9575   1900          buprenorphine-naloxone (SUBOXONE) 8-2 MG per SL tablet 1 tablet   Dose: 1 tablet  Freq: 2 times daily Route: SL  Indications of Use: Opioid Use Disorder (Continuation of Therapy)  Start: 09/08/20 0900    Admin Instructions:   Hold for over sedation  If given for pain, use the following pain scale:  Mild Pain = Pain Score of 1-3, CPOT 1-2  Moderate Pain = Pain Score of 4-6, CPOT 3-4  Severe Pain = Pain Score of 7-10, CPOT 5-8      0900   2100          enoxaparin (LOVENOX) syringe 40 mg   Dose: 40 mg  Freq: Every 12 Hours Scheduled Route: SC  Indications of Use: PROPHYLAXIS OF VENOUS  THROMBOEMBOLISM  Start: 09/08/20 0300    Admin Instructions:   Give subcutaneous in abdomen only. Do not massage site after injection.      (0488)   2100          fluticasone (FLONASE) 50 MCG/ACT nasal spray 2 spray   Dose: 2 spray  Freq: Daily Route: NA  Start: 09/08/20 0900      0900            insulin aspart (novoLOG) injection 0-7 Units   Dose: 0-7 Units  Freq: 3 Times Daily Before Meals Route: SC  Start: 09/08/20 0730    Admin Instructions:   Correction - Low Dose.  Less than 40 units/day total insulin dose or lean, elderly, renal patients    Blood glucose 150-199 mg/dL - 2 units  Blood glucose 200-249 mg/dL - 3 units  Blood glucose 250-299 mg/dL - 4 units  Blood glucose 300-349 mg/dL - 5 units  Blood glucose 350-400 mg/dL - 6 units  Blood glucose greater than 400 mg/dL - 7 units and call provider        [id number] 4020        Ioversol (OPTIRAY 350) injection 100 mL   Dose: 100 mL  Freq: Once in Imaging Route: IV  Start: 09/07/20 1958 End: 09/07/20 1958 1958             ipratropium-albuterol (DUO-NEB) nebulizer solution 3 mL   Dose: 3 mL  Freq: 4 Times Daily - RT Route: NEBULIZATION  Start: 09/08/20 0230      0302   0731   1300     1900            methylPREDNISolone sodium succinate (SOLU-Medrol) injection 125 mg   Dose: 125 mg  Freq: Once Route: IV  Start: 09/07/20 2102 End: 09/07/20 2300    Admin Instructions:   Caution: Look alike/sound alike drug alert     2300             naloxone (NARCAN) injection 1 mg   Dose: 1 mg  Freq: Once Route: IV  Start: 09/07/20 1508 End: 09/07/20 1532     1532             nicotine (NICODERM CQ) 7 MG/24HR patch 1 patch   Dose: 1 patch  Freq: Every 24 Hours Scheduled Route: TD  Start: 09/08/20 0900    Admin Instructions:   Apply to clean, dry, nonhairy area of skin (typically upper arm or shoulder)   Acutely Hazardous. Waste BOTH Residual Medication and/or Empty Package.      0900            sodium chloride 0.9 % bolus 500 mL   Dose: 500 mL  Freq: Once Route: IV  Last Dose:  Stopped (09/07/20 1515)  Start: 09/07/20 1422 End: 09/07/20 1515     1445   1515           sodium chloride 0.9 % flush 10 mL   Dose: 10 mL  Freq: Every 12 Hours Scheduled Route: IV  Start: 09/08/20 0130      0211   0900   2100        Medications 09/06/20 09/07/20 09/08/20       Continuous Meds Sorted by Name   for Mary Monaco as of 9/6/20 through 9/8/20    Legend:                           Inactive     Active     Other Encounter    Linked               Medications 09/06/20 09/07/20 09/08/20   Pharmacy to Dose enoxaparin (LOVENOX)   Freq: Continuous PRN Route: XX  PRN Reason: Consult  Indications Comment: Prophylaxis of Venous Thromboembolism  Start: 09/08/20 0041 End: 09/08/20 0110    Order specific questions:   Indication of use Prophylaxis      0110-D/C'd              PRN Meds Sorted by Name   for Mary Monaco as of 9/6/20 through 9/8/20    Legend:                           Inactive     Active     Other Encounter    Linked               Medications 09/06/20 09/07/20 09/08/20   benzonatate (TESSALON) capsule 100 mg   Dose: 100 mg  Freq: 3 Times Daily PRN Route: PO  PRN Reason: Cough  Start: 09/08/20 0212    Admin Instructions:   Swallow whole. Do not crush, chew, or open capsule.         dextrose (D50W) 25 g/ 50mL Intravenous Solution 25 g   Dose: 25 g  Freq: Every 15 Minutes PRN Route: IV  PRN Reason: Low Blood Sugar  PRN Comment: Blood Sugar Less Than 70  Start: 09/07/20 2249    Admin Instructions:   Blood sugar less than 70; patient has IV access - Unresponsive, NPO or Unable To Safely Swallow         dextrose (GLUTOSE) oral gel 15 g   Dose: 15 g  Freq: Every 15 Minutes PRN Route: PO  PRN Reason: Low Blood Sugar  PRN Comment: Blood sugar less than 70  Start: 09/07/20 2249    Admin Instructions:   BS<70, Patient Alert, Is not NPO, Can safely swallow.         glucagon (human recombinant) (GLUCAGEN DIAGNOSTIC) injection 1 mg   Dose: 1 mg  Freq: Every 15 Minutes PRN Route: SC  PRN Reason: Low Blood  Sugar  PRN Comment: Blood Glucose Less Than 70  Start: 09/07/20 2249    Admin Instructions:   Blood Glucose Less Than 70 - Patient Without IV Access - Unresponsive, NPO or Unable To Safely Swallow         nitroglycerin (NITROSTAT) SL tablet 0.4 mg   Dose: 0.4 mg  Freq: Every 5 Minutes PRN Route: SL  PRN Reason: Chest Pain  PRN Comment: Only if SBP Greater Than 100  Start: 09/08/20 0041    Admin Instructions:   If Pain Unrelieved After 3 Doses Notify MD         ondansetron (ZOFRAN) injection 4 mg   Dose: 4 mg  Freq: Every 6 Hours PRN Route: IV  PRN Reasons: Nausea,Vomiting  Start: 09/08/20 0122      0211   0212          Pharmacy to Dose enoxaparin (LOVENOX)   Freq: Continuous PRN Route: XX  PRN Reason: Consult  Indications Comment: Prophylaxis of Venous Thromboembolism  Start: 09/08/20 0041 End: 09/08/20 0110    Order specific questions:   Indication of use Prophylaxis      0110-D/C'd          sodium chloride 0.9 % flush 10 mL   Dose: 10 mL  Freq: As Needed Route: IV  PRN Reason: Line Care  Start: 09/08/20 0041         sodium chloride 0.9 % flush 10 mL   Dose: 10 mL  Freq: As Needed Route: IV  PRN Reason: Line Care  Start: 09/07/20 1419         Medications 09/06/20 09/07/20 09/08/20

## 2020-09-08 NOTE — NURSING NOTE
Pt went off the floor to smoke. Educated pt that this is a smoke free facility and she had a nicotine patch on and that she is requiring oxygen. Pt states she knows the rules and the risks. Will continue to monitor.

## 2020-09-08 NOTE — PROGRESS NOTES
Discharge Planning Assessment  [facility]     Patient Name: Mary Monaco  MRN: [id number]  Today's Date: 9/8/2020    Admit Date: 9/7/2020    Discharge Needs Assessment     Row Name 09/08/20 1733       Living Environment    Lives With  parent(s)    Name(s) of Who Lives With Patient  Monica Barajas (mother)     Current Living Arrangements  home/apartment/condo    Family Caregiver Names  Mother Monica Barajas     Quality of Family Relationships  helpful;involved;supportive       Resource/Environmental Concerns    Transportation Concerns  car, none       Transition Planning    Patient/Family Anticipates Transition to  home with family    Transportation Anticipated  public transportation       Discharge Needs Assessment    Equipment Currently Used at Home  none        Discharge Plan     Row Name 09/08/20 0152       Plan    Plan Pt was admitted on 09/07/20. SS received CM consult for patient may need inpt psych facility. SS spoke with pt on this date. Pt lives with her mother, Monica Barajas at [address] [place] [address]. Pt currently does not recieve HH or DME services. Pt does not have a POA/living will. Pt's PCP is Tito Magaña. Pt's pharmacies are [facility]. Pt plans on returning home at discharge. Psychiatry did not think pt needed inpt treatment but notes reflect pt can continue her outpt mental health counseling instead. Pt states she will need [facility] for transportation at discharge. SS will follow.     Row Name 09/08/20 0818       Plan    Plan Comments  9/8:  VSS, Regular Diet, 4L N/C; IV Azithromycin, Solu-Medrol, PO Suboxone; CRP 2.68, D-Dimer 1.65, UDS + Buprenorphine, Methadone, CT Chest=Most prominent patchy areas of air trapping in BUL, 6 mm noncalcified nodule in RML; Hep C +, Smokes 1-2 ppd X 15 yrs, Pt OOB with Bipap O2 off eating, N/V & T 102-104 X 2 wks, Anxiety & Depression since death of 13 year old son, Psych consult, Meds suggested-KLS         Destination      Coordination has  not been started for this encounter.      Durable Medical Equipment      Coordination has not been started for this encounter.      Dialysis/Infusion      Coordination has not been started for this encounter.      Home Medical Care      Coordination has not been started for this encounter.      Therapy      Coordination has not been started for this encounter.      Community Resources      Coordination has not been started for this encounter.          Demographic Summary     Row Name 09/08/20 0362       General Information    Admission Type  inpatient    Arrived From  home    Referral Source  nursing    Reason for Consult  discharge planning    Preferred Language  English        Functional Status    No documentation.       Psychosocial    No documentation.       Abuse/Neglect    No documentation.       Legal    No documentation.       Substance Abuse    No documentation.       Patient Forms    No documentation.           Mame Diallo

## 2020-09-08 NOTE — PROGRESS NOTES
Pharmacy was consulted to dose lovenox for VTE prophylaxis. Based on an estimated CrCl of greater than 120mL/min, and an actual body weight of 121kg (BMI 48.64 kg/m^2), a lovenox dose of 40mg q12hr has been ordered. Thank you for the consult.     Thank you,   Enid Uriarte, PharmD  01:11

## 2020-09-08 NOTE — CONSULTS
Referring Provider: Dr. Gibbons  Reason for Consultation: Hypoxic and Hypercapnic Respiratory failure      Chief complaint: Shortness of breath      History of present illness:  Ms. Monaco is a 40 year old female with a medical history significant for COPD, diabetes, hepatitis C, IV drug abuse, hypertension, lupus and rheumatic neuritis.    She presented to the ED with a complaint of shortness of breath and cough. She reported that her shortness of breath and cough had been present for about 2 weeks and had progressively gotten worse.  She reports being febrile at home with a fever of 102 °F with a max temperature of 104 °F.  She states that she tried outpatient treatment but did not improve.  She reports that her nebulizer machine at home is broken and that she was unable to do neb treatments and that is when she came to the hospital.  She reports a cough that is productive at times but denies any chest pain, edema or dizziness.  She states that she is also been dealing with extreme anxiety and depression since the loss of her 13-year-old son in May 2020.  She reports being followed by outpatient therapist but has not been prescribed any medications to manage her anxiety and depression.  She denies any suicidal ideations.  She denies any illegal drug use and reports compliance with home Suboxone regimen.  She does report smoking about 1 to 2 packs/day.  She also uses supplemental oxygen at home during the night and on an as-needed basis.    Work-up in emergency department revealed an ABG that showed a pH of 7.320, PCO2 of 63, PO2 of 71 and oxygen saturation of 94% on 2 L nasal cannula.  She was placed on BiPAP and repeat ABG showed a pH of 7.338, PCO2 57, PO2 of 80 and a bicarb of 29.  CT chest showed persistent patchy areas of air trapping and mosaic perfusion throughout both lungs, most prominent in the upper lobes bilaterally.  It also showed a stable 6 mm noncalcified pulmonary nodule in the right middle  lobe.       Review of Systems - History obtained from chart review and the patient  General ROS: positive for  - chills, fatigue and fever.  In respiratory distress  Psychological ROS: positive for - anxiety and depression  ENT ROS: negative for - headaches or hearing change  Allergy and Immunology ROS: negative for - nasal congestion, postnasal drip  Endocrine ROS: negative for - polydipsia/polyuria  Respiratory ROS: positive for - cough and shortness of breath.  Positive for wheezing  Cardiovascular ROS: positive for - dyspnea on exertion  Gastrointestinal ROS: no abdominal pain, change in bowel habits, or black or bloody stools  Musculoskeletal ROS: negative for - joint pain, joint stiffness or joint swelling  Neurological ROS: no TIA or stroke symptoms        History  Past Medical History:   Diagnosis Date   • Asthma    • COPD (chronic obstructive pulmonary disease) (CMS/HCC)    • Diabetes mellitus (CMS/HCC)    • Hepatitis C    • History of gallstones    • Hypertension    • IV drug abuse (CMS/HCC)    • Lupus (CMS/HCC)    • Rheumatoid arthritis (CMS/HCC)    , Past Surgical History:   Procedure Laterality Date   • ABDOMINAL SURGERY     • CHOLECYSTECTOMY     • PERINEAL LESION/CYST EXCISION N/A 4/3/2017    Procedure: EXCISION OF VAGINAL SKIN TAG;  Surgeon: Kee Crooks III, MD;  Location: [facility];  Service:    • TUBAL COAGULATION LAPAROSCOPIC Bilateral 4/3/2017    Procedure: BILATERAL TUBAL FALLOPE FILSHIE CLIPPING LAPAROSCOPIC,IUD REMOVAL;  Surgeon: Kee Crooks III, MD;  Location: [facility];  Service:    , Family History   Problem Relation Age of Onset   • No Known Problems Mother    • No Known Problems Father    • No Known Problems Sister    • No Known Problems Brother    • No Known Problems Son    • No Known Problems Daughter    • No Known Problems Maternal Grandmother    • No Known Problems Maternal Grandfather    • No Known Problems Paternal Grandmother    • No Known Problems Paternal Grandfather    • No  Known Problems Cousin    • No Known Problems Other    • Rheum arthritis Neg Hx    • Osteoarthritis Neg Hx    • Asthma Neg Hx    • Diabetes Neg Hx    • Heart failure Neg Hx    • Hyperlipidemia Neg Hx    • Hypertension Neg Hx    • Migraines Neg Hx    • Rashes / Skin problems Neg Hx    • Seizures Neg Hx    • Stroke Neg Hx    • Thyroid disease Neg Hx    , Social History     Tobacco Use   • Smoking status: Current Every Day Smoker     Packs/day: 1.00     Years: 15.00     Pack years: 15.00     Types: Cigarettes   • Smokeless tobacco: Never Used   Substance Use Topics   • Alcohol use: No   • Drug use: No   , Medications Prior to Admission   Medication Sig Dispense Refill Last Dose   • albuterol (PROVENTIL HFA;VENTOLIN HFA) 108 (90 Base) MCG/ACT inhaler Inhale 2 puffs Every 4 (Four) Hours As Needed. 18 g 0 9/7/2020 at AM   • benzonatate (TESSALON) 100 MG capsule Take 100 mg by mouth 3 (Three) Times a Day As Needed for Cough.   9/7/2020 at AM   • budesonide (PULMICORT) 0.5 MG/2ML nebulizer solution Take 2 mL by nebulization 2 (Two) Times a Day. 60 each 0 9/7/2020 at AM   • buprenorphine-naloxone (SUBOXONE) 8-2 MG per SL tablet Place 1 tablet under the tongue 2 (two) times a day.   9/7/2020 at AM   • fluticasone (FLONASE) 50 MCG/ACT nasal spray Inhale 2 sprays into the nostril(s) once Daily. 16 g 0 9/7/2020 at AM   • gabapentin (NEURONTIN) 800 MG tablet Take 800 mg by mouth 4 (Four) Times a Day.   9/7/2020 at AM   • ipratropium-albuterol (DUO-NEB) 0.5-2.5 mg/3 ml nebulizer Take 3 mL by nebulization Every 4 (Four) Hours As Needed for Shortness of Air. 360 mL 0 9/7/2020 at AM   , Scheduled Meds:    budesonide 0.5 mg Nebulization BID - RT   buprenorphine-naloxone 1 tablet Sublingual BID   enoxaparin 40 mg Subcutaneous Q12H   fluticasone 2 spray Nasal Daily   insulin aspart 0-7 Units Subcutaneous TID AC   ipratropium-albuterol 3 mL Nebulization 4x Daily - RT   nicotine 1 patch Transdermal Q24H   sodium chloride 10 mL Intravenous  Q12H   , Continuous Infusions:    and Allergies:  Patient has no known allergies.    Objective     Vital Signs   Temp:  [97.9 °F (36.6 °C)-98.1 °F (36.7 °C)] 97.9 °F (36.6 °C)  Heart Rate:  [] 105  Resp:  [16-28] 20  BP: (118-133)/() 118/59    Physical Exam:               General Appearance: In respiratory distress.    HEENT: Normocephalic, atraumatic, normal right and the left external ear.  Normal nose.  Lungs: Tachypneic.  Using accessory muscles of respiration.  Positive for diffuse wheezing.  Rhonchorous chest.      Heart: Normal rate.  Regular rhythm.  S1 normal and S2 normal.    Abdomen: Abdomen is soft.  Bowel sounds are normal.   There is no abdominal tenderness.     Extremities: Normal range of motion.  Negative for dependent edema    Pulses: Distal pulses are intact.  There are no decreased pulses.    Neurological: Patient is alert and oriented to person, place and time.  Normal strength.    Pupils:  Pupils are equal, round, and reactive to light.    Skin:  Warm and dry.                  Results Review:    LABS:    Lab Results   Component Value Date    GLUCOSE 103 (H) 09/07/2020    BUN 7 09/07/2020    CREATININE 0.69 09/07/2020    EGFRIFNONA 95 09/07/2020    BCR 10.1 09/07/2020    CO2 31.1 (H) 09/07/2020    CALCIUM 8.9 09/07/2020    ALBUMIN 3.84 09/07/2020    LABIL2 1.4 (L) 03/21/2016    AST 12 09/07/2020    ALT 9 09/07/2020    WBC 5.23 09/07/2020    HGB 11.6 (L) 09/07/2020    HCT 37.8 09/07/2020    MCV 86.9 09/07/2020     09/07/2020     09/07/2020    K 4.6 09/07/2020     09/07/2020    ANIONGAP 6.9 09/07/2020       Lab Results   Component Value Date    INR 0.91 10/26/2019    INR <0.90 12/22/2014    PROTIME 12.7 10/26/2019    PROTIME 9.0 (L) 12/22/2014             DENISE Jose  09/08/20  10:15      Attestation: Scribed for Dr. Cote, by DENISE Keys      I have reviewed the past medical history, past surgical history, family history and social history  the patient.    Pertinent past medical history of rheumatoid arthritis, lupus, IV drug abuse, hepatitis C, COPD.  No significant family history.  Current everyday smoker.  Pertinent home medication includes albuterol inhaler, duo nebs, Pulmicort inhaler.  P.o. Suboxone.  I have reviewed the labs.  ABG showed pH of 7.31, PCO2 of 59 and PO2 of 59.  Cardiac troponins within normal limits.  Serum creatinine within normal limits.  Liver function panel normal.  Elevated C-reactive protein.  Lactate level within normal limits.  No leukocytosis.  Anemia.  Platelet count within normal limits.  Influenza antigen negative.  Respiratory viral panel positive for human rhinovirus.  U tox positive for buprenorphine and methadone.  Autoimmune panel consisting of anti-dsDNA, CCP antibodies, ZURDO SSA, JIGNESH SSB, Marisela 1 antibodies were negative in year 2015.    Chest x-ray reviewed.  No pleural effusion.  No pneumothorax noted.    I reviewed the images of the CT scan of the chest with PE protocol that was performed on 9/2/2020.  No pulmonary embolism noted.  Diffuse bilateral groundglass opacities noted with interstitial thickness.  Patchy area of air trapping with moderate mosaic perfusion throughout both lung.  No focal area of consolidation.  6 mm noncalcified pulmonary nodule in right middle lobe.    I have reviewed the EKG report that was performed on 9/7/2020 which showed normal sinus rhythm with QTC of 453 ms.  AL interval of 136 ms QRS duration of 82 ms.    I reviewed the echo report that was performed in 2018 which showed mildly dilated left atrial cavity.  Ejection fraction greater than 70%.    COVID-19 in-house swab negative.    Assessment and plan  · Acute hypoxic and hypercarbic respiratory failure due to acute acute exacerbation of COPD.  · Concern for RSV bronchiolitis versus follicular bronchiolitis in the setting of lupus and rheumatoid arthritis  · Bilateral upper lobe viral pneumonia due to human rhinovirus  · Active  smoker  · Concern for ARSEN/OHS  · Respiratory acidosis with metabolic alkalemia.  Elevated A-a gradient.  · 6 mm right middle lobe pulmonary nodule        Ordered hCG qualitative.  Consider anemia work-up.   Ordered HIV antibodies  Follow-up on blood culture  Ordered echo  Titrate FiO2 to keep SPO2 around 90%  Recommend incentive spirometer to avoid basal atelectasis  Recommend walk oximetry before discharge to assess supplemental oxygen need on exertion  Recommend outpatient follow-up with pulmonary medicine on discharge.  Patient will require pulmonary function test and sleep study.  Please arrange outpatient follow-up with tertiary care center for further evaluation.  Concern for bronchiolitis.   Recommend aggressive PT OT while in hospital.  Recommend arranging for pulmonary rehabilitation on discharge.  I recommend discharging the patient on short acting beta agonist as rescue inhaler, long-acting muscarinic antagonist inhaler, long-acting beta agonist inhaler and inhaled steroid.  I recommend discharging the patient with TRILOGY positive airway pressure therapy machine.  Please involve  for insurance clearance.   AVAPS AE settings:  Maximum pressure: 30 cmH2O  PS maximum: 16 cmH2O  PS minimum: 6 cmH2O  EPAP maximum: 14 cmH2O  EPAP minimum: 4 cmH2O  Breath rate: Auto  AVAPS rate: 1  Target tidal volume: 6 to 8 mL/kg (300mL to 400 mL)  Follow-up CT in 8 months to reevaluate the pulmonary nodule.  Strongly recommend smoking cessation.  On Pulmicort nebs on duo nebs.  I started the patient on IV steroids 40 mg twice daily COPD exacerbation.  I have started patient on montelukast p.o.  I started patient on IV azithromycin for COPD exacerbation.  Started patient on p.o. famotidine while patient is on IV steroids.  Will give 3 back-to-back treatment with duo nebs.  Gap of 20 minutes between each treatment.  I have started the patient on BiPAP with IPAP of 24, EPAP of 6 with a rate of 18 and I time of 0.65  seconds.  We will repeat the ABG in 2 hours.  Patient's follow-up with Dr. Helm (rheumatologist) in [place].  As per her, she was tried on multiple disease modifying antirheumatic agents for lupus and rheumatoid arthritis.  Currently she is not on any antirheumatic agent.  Recommend arranging outpatient follow-up at tertiary care center on discharge with rheumatology and pulmonology for further care and management.       VTE prophylaxis  Mechanical Order History:     None      Pharmalogical Order History:     Ordered     Dose Route Frequency Stop    09/08/20 0109  enoxaparin (LOVENOX) syringe 40 mg      40 mg SC Every 12 Hours Scheduled --    09/08/20 0041  Pharmacy to Dose enoxaparin (LOVENOX)  Status:  Discontinued     Question:  Indication of use  Answer:  Prophylaxis    -- XX Continuous PRN 09/08/20 0110          I have discussed the clinical plan with Dr. Gibbons.    The clinical plan was discussed extensively with the respiratory therapist.   Critical Care time spent in direct patient care: 37 minutes (excluding procedure time).  Patient is critically ill and is at higher risk for further mortality/morbidity.     IMarcelo M.D. attest that the above note accurately reflects the work and decisions made by me. Patient was seen and evaluated by me, including history of present illness, physical exam, assessment, and treatment plan.  The above note was reviewed and edited by me.    Marcelo Cote M.D  Pulmonary and Critical Care Medicine

## 2020-09-09 VITALS
HEART RATE: 72 BPM | SYSTOLIC BLOOD PRESSURE: 114 MMHG | DIASTOLIC BLOOD PRESSURE: 50 MMHG | HEIGHT: 62 IN | TEMPERATURE: 98.2 F | WEIGHT: 266 LBS | OXYGEN SATURATION: 95 % | RESPIRATION RATE: 20 BRPM | BODY MASS INDEX: 48.95 KG/M2

## 2020-09-09 PROCEDURE — 25010000002 ONDANSETRON PER 1 MG: Performed by: NURSE PRACTITIONER

## 2020-09-09 PROCEDURE — 25010000002 METHYLPREDNISOLONE PER 40 MG: Performed by: INTERNAL MEDICINE

## 2020-09-09 PROCEDURE — 25010000002 AZITHROMYCIN PER 500 MG: Performed by: INTERNAL MEDICINE

## 2020-09-09 PROCEDURE — 99239 HOSP IP/OBS DSCHRG MGMT >30: CPT | Performed by: FAMILY MEDICINE

## 2020-09-09 RX ADMIN — METHYLPREDNISOLONE SODIUM SUCCINATE 40 MG: 40 INJECTION, POWDER, FOR SOLUTION INTRAMUSCULAR; INTRAVENOUS at 00:20

## 2020-09-09 RX ADMIN — AZITHROMYCIN MONOHYDRATE 500 MG: 500 INJECTION, POWDER, LYOPHILIZED, FOR SOLUTION INTRAVENOUS at 00:16

## 2020-09-09 RX ADMIN — ONDANSETRON 4 MG: 2 INJECTION INTRAMUSCULAR; INTRAVENOUS at 00:20

## 2020-09-09 NOTE — PAYOR COMM NOTE
"  [facility]  NPI: [id number]    Utilization Review   Contact:Cleo Pantoja MSN, APRN, NP-C  Phone: [phone]  Fax: [phone]    Lizzie mcaid /Attn: [email or web address]  Discharge notification 9-9-20 left ama  REF: [id number]  Mary Abreu (40 y.o. Female)     Date of Birth Social Security Number Address Home Phone MRN    1980  [address] [place], [place] [address] [phone] [id number]    Sikhism Marital Status          None Legally        Admission Date Admission Type Admitting Provider Attending Provider Department, Room/Bed    9/7/20 Emergency Tayla Gutierrez MD  [address], 3348/1S    Discharge Date Discharge Disposition Discharge Destination        9/9/2020 Left Against Medical Advice              Attending Provider:  (none)   Allergies:  No Known Allergies    Isolation:  None   Infection:  None   Code Status:  Prior    Ht:  157.5 cm (62.01\")   Wt:  121 kg (266 lb)    Admission Cmt:  None   Principal Problem:  None                Active Insurance as of 9/7/2020     Primary Coverage     Payor Plan Insurance Group Employer/Plan Group    [employer]HE[employer] MEDICAID AN[employer]M MEDICAID [id number]     Payor Plan Address Payor Plan Phone Number Payor Plan Fax Number Effective Dates    [address] [address] [phone]  1/1/2017 - None Entered    [facility] [id number]       Subscriber Name Subscriber Birth Date Member ID       MARY ABREU 1980 [id number]                 Emergency Contacts      (Rel.) Home Phone Work Phone Mobile Phone    Moris Johnson (Significant Other) [phone] -- [phone]    Monica Barajas (Mother) -- -- [phone]            "

## 2020-09-09 NOTE — PLAN OF CARE
"  Problem: Pain, Chronic (Adult)  Goal: Acceptable Pain/Comfort Level and Functional Ability  Outcome: Ongoing (interventions implemented as appropriate)     Problem: Pain, Chronic (Adult)  Goal: Identify Related Risk Factors and Signs and Symptoms  Outcome: Ongoing (interventions implemented as appropriate)     Problem: Patient Care Overview  Goal: Plan of Care Review  Outcome: Ongoing (interventions implemented as appropriate)  Flowsheets (Taken 9/9/2020 6275)  Progress: no change  Plan of Care Reviewed With: patient  Outcome Summary: pt states \"I am going outside to smoke\". educated pt that she is not allowed to go off the floor or smoke on the hospital property. pt became angry and states \"I am not in a intermediate\" and asked for AMA papers. provided pt with papers and educated her the importance of staying at the hospital and continuing to recieve care. pt insists on signing papers. Pt alert and oriented, vital signs stable.      "

## 2020-09-09 NOTE — PROGRESS NOTES
[facility] HOSPITALISTS CROSS COVER NOTE    Patient Identification:  Name:  Mary Monaco  Age:  40 y.o.  Sex:  female  :  1980  MRN:  [id number]  Visit number:  [id number]  Primary Care Provider:  Elsa Dean APRN    Length of stay in inpatient status:  2    Brief Update     I was informed that the patient had already signed her AMA papers and was leaving the hospital facilities.  Her reason for leaving AGAINST MEDICAL ADVICE was that we would not allow her to go outside and smoke cigarettes.      Tayla Gutierrez MD  [facility] Hospitalist  20  03:53

## 2020-09-12 LAB
BACTERIA SPEC AEROBE CULT: NORMAL
BACTERIA SPEC AEROBE CULT: NORMAL

## 2020-09-26 NOTE — DISCHARGE SUMMARY
[facility] HOSPITALIST   DISCHARGE SUMMARY      Name:  Mary Monaco   Age:  40 y.o.  Sex:  female  :  1980  MRN:  [id number]   Visit Number:  [id number]    Admission Date:  2020  Date of Discharge:  2020  Primary Care Physician:  Elsa Dean APRN    Discharge Diagnoses:           Acute on chronic respiratory failure with hypercapnia (CMS/HCC)      Presenting Problem:    Acute on chronic respiratory failure with hypercapnia (CMS/HCC) [J96.22]     Consults:     Consults     Date and Time Order Name Status Description    2020 Inpatient Psychiatrist Consult Completed     2020 Inpatient Pulmonology Consult Completed         Consulting Physician(s)     Provider Relationship Specialty    Hanh Hernandez MD Consulting Physician Psychiatry    Marcelo Cote MD Consulting Physician Pulmonary Disease          Procedures Performed:    BIPAP         History of presenting illness:    Mary Monaco is a 40 y.o. female with past medical history significant for oxygen dependent COPD, diabetes mellitus type 2, hepatitis C, IV drug abuse, essential hypertension, lupus and rheumatic neuritis.     Ms. Monaco presented to [facility] emergency department on 2020 with complaints of shortness of breath and cough which has been present for approximately 2 weeks but has failed outpatient treatment and is progressively worse.  Patient also reports being febrile at home with fever today of 102 and temperature max 104.  She also reports episodes of nausea and vomiting.  Ms. Monaco explains that over the last 2 weeks she has been seen by her PCP and taken a Z-Asael and Augmentin.  She reports that she finished the last dose of Augmentin on 2020.  She reports that she is supposed to wear home oxygen PRN, but denies use.  She also reports that she does not have access to home nebulizer machine as hers is broken.  She reports that her shortness of breath has  worsened over the last 2 days.  She denies any edema.  She does report a cough which is productive at times.  The patient denies any chest pain, edema, urinary symptoms, dizziness, lightheadedness, seizures or syncopal episodes.  She does report that she has been dealing with extreme anxiety and depression since the loss of her 13-year-old son in May 2020.  She reports that she is followed by an outpatient therapist but has not been prescribed any medications to help manage anxiety/depression.  She denies SI or HI during examination.  The patient denies any illegal drug use and reports compliance with home Suboxone regimen.  She denies any alcohol use but does report smoking approximately 1 to 2 packs of cigarettes per day.     Upon arrival to the ED, vital signs were temperature 98.1, pulse 76, respirations 20, blood pressure 126/70 with oxygen saturation 84% on room air.  Initial arterial blood gas shows pH 7.320, PCO2 63.1, PO2 71.5 and oxygen saturation 94.1 on 2 L nasal cannula.  Patient placed on BiPAP with repeat ABG showing pH 7.338, PCO2 55.7, PO2 80.9, HCO3 29.9 and oxygen saturation 96.2.  CT of the chest with pulmonary embolism notes negative for pulmonary embolus, negative for thoracic aortic aneurysm/dissection, persistent patchy areas of air trapping and mosaic perfusion throughout both lungs, most prominent in the bilateral upper lobes, similar in appearance to the prior examination.  This again may be secondary to small vessel or small airway disease with no focal areas of dense consolidation.  Stable 6 mm noncalcified pulmonary nodule in the right middle lobe.  Management recommendations: Current published guidelines recommend initial follow-up CT in 6 to 12 months and again in 18 to 24 months for uncomplicated pulmonary nodules measuring 6 to 8 mm in high risk patients, per radiology.  CT of the abdomen pelvis without contrast shows no acute abdominal or pelvic findings allowing for lack of IV  contrast, normal appendix, splenomegaly, moderate stool burden and cholecystectomy, per radiology.      Hospital Course:    PULM consulted, O2 supplemental was used  Nebs per PULM  Signed out AMA    Vital Signs:  n/a       Physical Exam: could not examine as patient signed out AMA                                                                    Pertinent Lab Results:           Invalid input(s): LABALBU, PROT                                    Invalid input(s): USDES,  BLOODU, NITRITITE, BACT, EP  Pain Management Panel     Pain Management Panel Latest Ref Rng & Units 9/7/2020 2/22/2020    AMPHETAMINES SCREEN, URINE Negative Negative Negative    BARBITURATES SCREEN Negative Negative Negative    BENZODIAZEPINE SCREEN, URINE Negative Negative Negative    BUPRENORPHINEUR Negative Positive(A) Positive(A)    COCAINE SCREEN, URINE Negative Negative Negative    METHADONE SCREEN, URINE Negative Positive(A) Negative              Pertinent Radiology Results:    Imaging Results (All)     Procedure Component Value Units Date/Time    US Venous Doppler Lower Extremity Bilateral (duplex) [[id number]] Collected: 09/08/20 1453     Updated: 09/08/20 1455    Narrative:      US VENOUS DOPPLER LOWER EXTREMITY BILATERAL (DUPLEX)-     REASON FOR EXAM:  Elevated d-dimer; J96.22-Acute and chronic respiratory  failure with hypercapnia; J44.1-Chronic obstructive pulmonary disease  with (acute) exacerbation     Multiple real-time images were obtained. The deep veins were well  demonstrated sonographically. There is good color doppler signal seen  filling the deep veins. They were completely compressed by the  ultrasound transducer. There was good spontaneous venous flow and  augmentation. There are no echoes seen along the deep veins to suggest  clot.          Impression:      No sonographic findings of DVT in the lower extremities     This report was finalized on 9/8/2020 2:53 PM by Dr. Rosalio Devi II, MD.       CT Chest Pulmonary Embolism  [[id number]] Collected: 09/07/20 2047     Updated: 09/07/20 2049    Narrative:      CT CHEST PULMONARY EMBOLISM W CONTRAST    INDICATION:   40-year-old female with cough and shortness of air today.    TECHNIQUE:   CT angiogram of the chest with IV contrast. 3-D reconstructions were obtained and reviewed.   Radiation dose reduction techniques included automated exposure control or exposure modulation based on body size. Count of known CT and cardiac nuc med studies  performed in previous 12 months: 2.     COMPARISON:   CTA chest 2/22/2020    FINDINGS:   Adequate opacification of the pulmonary arteries with no filling defects. Thoracic aorta normal in course and caliber without dissection. Heart size is normal. No pericardial effusion.    No pleural effusion. No pneumothorax. Again noted are patchy areas of air trapping and mosaic perfusion throughout both lungs, most prominent in the bilateral upper lobes. This is similar to the prior examination and may get in represent the sequelae of  small vessel or small airways disease. No focal areas of dense consolidation. Chronic lingular atelectasis again noted. Stable 6 mm noncalcified pulmonary nodule in the right middle lobe.    Please refer to CT abdomen pelvis performed the same date and dictated separately for detailed findings below the diaphragm.    No acute osseous abnormality.      Impression:      1. Negative for pulmonary embolus.  2. Negative for thoracic aortic aneurysm/dissection.  3. Persistent patchy areas of air trapping and mosaic perfusion throughout both lungs, most prominent in the bilateral upper lobes, similar in appearance to the prior examination. This again may be secondary to small vessel or small airways disease. No  focal areas of dense consolidation.  4. Stable 6 mm noncalcified pulmonary nodule in the right middle lobe. Management recommendation: Current published guidelines recommend initial follow-up CT in 6-12 months, and again in 18-24  months for uncomplicated pulmonary nodules measuring 6 to 8  mm in high-risk patients (Fleischner Society guidelines, 2017).    Signer Name: Eric Costa MD   Signed: 9/7/2020 8:47 PM   Workstation Name: [facility]    Radiology [facility]    CT Abdomen Pelvis Without Contrast [[id number]] Collected: 09/07/20 2039     Updated: 09/07/20 2041    Narrative:      CT Abdomen Pelvis WO    INDICATION:   40-year-old female with vomiting and fever today.    TECHNIQUE:   CT of the abdomen and pelvis without IV contrast. Coronal and sagittal reconstructions were obtained.  Radiation dose reduction techniques included automated exposure control or exposure modulation based on body size. Count of known CT and cardiac nuc  med studies performed in previous 12 months: 2.     COMPARISON:   None available.    FINDINGS:  Please refer to CTA chest performed the same date and dictated separately for detailed findings above the diaphragm.    Abdomen:   The liver is within normal limits. Mild splenomegaly. Pancreas is unremarkable. Cholecystectomy. Both adrenal glands are normal. The kidneys are normal. Abdominal aorta normal in course and caliber. Small bowel is unremarkable without obstruction. Normal  appendix. The colon is unremarkable. Moderate stool burden throughout the colon. No free fluid or free air.    Pelvis:   The urinary bladder is unremarkable.  The uterus and adnexa are within normal limits. No free pelvic fluid.    No acute osseous abnormality.        Impression:        1. No acute abdominal or pelvic findings allowing for lack of IV contrast.  2. Normal appendix.  3. Splenomegaly.  4. Moderate stool burden.  5. Cholecystectomy.          Signer Name: Eric Costa MD   Signed: 9/7/2020 8:39 PM   Workstation Name: [facility]    Radiology [facility]    XR Chest 1 View [[id number]] Collected: 09/07/20 1451     Updated: 09/07/20 1453    Narrative:      CR Chest 1 Vw    INDICATION:   Short  severe cough. Fever. States she has COPD.     COMPARISON:    2/22/2020    FINDINGS:  Single portable AP view(s) of the chest.  The heart and mediastinal contours are normal. The lungs are clear. No pneumothorax or pleural effusion.      Impression:      No acute cardiopulmonary findings.    Signer Name: ANIYA Barajas MD   Signed: 9/7/2020 2:51 PM   Workstation Name: [facility]    [facility]          Condition on Discharge:      Stable.    Code status during the hospital stay:    Code Status and Medical Interventions:   Ordered at: 09/07/20 220     Level Of Support Discussed With:    Patient     Code Status:    CPR     Medical Interventions (Level of Support Prior to Arrest):    Full       Discharge Disposition:    Left Against Medical Advice    Discharge Medications:       Discharge Medications      ASK your doctor about these medications      Instructions Start Date   amoxicillin-clavulanate 875-125 MG per tablet  Commonly known as: AUGMENTIN   1 tablet, Oral, 2 Times Daily      benzonatate 100 MG capsule  Commonly known as: TESSALON   100 mg, Oral, 3 Times Daily PRN      buprenorphine-naloxone 8-2 MG per SL tablet  Commonly known as: SUBOXONE   1 tablet, Sublingual, 2 times daily      cloNIDine 0.1 MG tablet  Commonly known as: CATAPRES   0.1 mg, Oral, Nightly      fluticasone 50 MCG/ACT nasal spray  Commonly known as: FLONASE   Inhale 2 sprays into the nostril(s) once Daily.      gabapentin 800 MG tablet  Commonly known as: NEURONTIN   800 mg, Oral, 4 Times Daily      levothyroxine 25 MCG tablet  Commonly known as: SYNTHROID, LEVOTHROID   25 mcg, Oral, Daily      omeprazole 20 MG capsule  Commonly known as: priLOSEC   20 mg, Oral, Daily      polyethylene glycol 17 g packet  Commonly known as: MIRALAX   17 g, Oral, Daily      albuterol 1.25 MG/3ML nebulizer solution  Commonly known as: ACCUNEB   1 ampule, Nebulization, Every 6 Hours PRN      Ventolin  (90 Base) MCG/ACT  inhaler  Generic drug: albuterol sulfate HFA   2 puffs, Inhalation, Every 4 Hours PRN             Discharge Diet:   diabetic      Activity at Discharge: as tolerated        Follow-up Appointments:    Follow-up Information     Elsa Dean APRN .    Specialty: Nurse Practitioner  Contact information:  [address]  [address] [address]  [phone]                   No future appointments.        Test Results Pending at Discharge:    none       Lamberto Gibbons DO  09/26/20  11:23 EDT    Time spent: 35 minutes

## 2021-04-20 ENCOUNTER — HOSPITAL ENCOUNTER (EMERGENCY)
Facility: HOSPITAL | Age: 41
Discharge: HOME | End: 2021-04-20
Attending: EMERGENCY MEDICINE | Admitting: EMERGENCY MEDICINE

## 2021-04-20 ENCOUNTER — APPOINTMENT (OUTPATIENT)
Dept: CT IMAGING | Facility: HOSPITAL | Age: 41
End: 2021-04-20

## 2021-04-20 VITALS
BODY MASS INDEX: 46.01 KG/M2 | OXYGEN SATURATION: 97 % | DIASTOLIC BLOOD PRESSURE: 71 MMHG | SYSTOLIC BLOOD PRESSURE: 116 MMHG | HEART RATE: 70 BPM | RESPIRATION RATE: 16 BRPM | WEIGHT: 250 LBS | HEIGHT: 62 IN | TEMPERATURE: 97.9 F

## 2021-04-20 DIAGNOSIS — L03.113 RIGHT ARM CELLULITIS: Primary | ICD-10-CM

## 2021-04-20 LAB
ALBUMIN SERPL-MCNC: 3.37 G/DL (ref 3.5–5.2)
ALBUMIN/GLOB SERPL: 0.9 G/DL
ALP SERPL-CCNC: 90 U/L (ref 39–117)
ALT SERPL W P-5'-P-CCNC: 10 U/L (ref 1–33)
ANION GAP SERPL CALCULATED.3IONS-SCNC: 8.4 MMOL/L (ref 5–15)
AST SERPL-CCNC: 10 U/L (ref 1–32)
BASOPHILS # BLD AUTO: 0.03 10*3/MM3 (ref 0–0.2)
BASOPHILS NFR BLD AUTO: 0.3 % (ref 0–1.5)
BILIRUB SERPL-MCNC: 0.2 MG/DL (ref 0–1.2)
BUN SERPL-MCNC: 6 MG/DL (ref 6–20)
BUN/CREAT SERPL: 9 (ref 7–25)
CALCIUM SPEC-SCNC: 8.8 MG/DL (ref 8.6–10.5)
CHLORIDE SERPL-SCNC: 101 MMOL/L (ref 98–107)
CO2 SERPL-SCNC: 26.6 MMOL/L (ref 22–29)
CREAT SERPL-MCNC: 0.67 MG/DL (ref 0.57–1)
CRP SERPL-MCNC: 7.52 MG/DL (ref 0–0.5)
D-LACTATE SERPL-SCNC: 1.4 MMOL/L (ref 0.5–2)
DEPRECATED RDW RBC AUTO: 41.1 FL (ref 37–54)
EOSINOPHIL # BLD AUTO: 0.29 10*3/MM3 (ref 0–0.4)
EOSINOPHIL NFR BLD AUTO: 3 % (ref 0.3–6.2)
ERYTHROCYTE [DISTWIDTH] IN BLOOD BY AUTOMATED COUNT: 12.8 % (ref 12.3–15.4)
ERYTHROCYTE [SEDIMENTATION RATE] IN BLOOD: 100 MM/HR (ref 0–20)
ETHANOL BLD-MCNC: <10 MG/DL (ref 0–10)
ETHANOL UR QL: <0.01 %
GFR SERPL CREATININE-BSD FRML MDRD: 98 ML/MIN/1.73
GLOBULIN UR ELPH-MCNC: 3.6 GM/DL
GLUCOSE SERPL-MCNC: 109 MG/DL (ref 65–99)
HCG SERPL QL: NEGATIVE
HCT VFR BLD AUTO: 36.6 % (ref 34–46.6)
HGB BLD-MCNC: 11.8 G/DL (ref 12–15.9)
IMM GRANULOCYTES # BLD AUTO: 0.03 10*3/MM3 (ref 0–0.05)
IMM GRANULOCYTES NFR BLD AUTO: 0.3 % (ref 0–0.5)
LYMPHOCYTES # BLD AUTO: 1.59 10*3/MM3 (ref 0.7–3.1)
LYMPHOCYTES NFR BLD AUTO: 16.4 % (ref 19.6–45.3)
MCH RBC QN AUTO: 28.2 PG (ref 26.6–33)
MCHC RBC AUTO-ENTMCNC: 32.2 G/DL (ref 31.5–35.7)
MCV RBC AUTO: 87.6 FL (ref 79–97)
MONOCYTES # BLD AUTO: 0.64 10*3/MM3 (ref 0.1–0.9)
MONOCYTES NFR BLD AUTO: 6.6 % (ref 5–12)
NEUTROPHILS NFR BLD AUTO: 7.12 10*3/MM3 (ref 1.7–7)
NEUTROPHILS NFR BLD AUTO: 73.4 % (ref 42.7–76)
NRBC BLD AUTO-RTO: 0 /100 WBC (ref 0–0.2)
PLATELET # BLD AUTO: 260 10*3/MM3 (ref 140–450)
PMV BLD AUTO: 9.3 FL (ref 6–12)
POTASSIUM SERPL-SCNC: 3.7 MMOL/L (ref 3.5–5.2)
PROCALCITONIN SERPL-MCNC: 0.05 NG/ML (ref 0–0.25)
PROT SERPL-MCNC: 7 G/DL (ref 6–8.5)
RBC # BLD AUTO: 4.18 10*6/MM3 (ref 3.77–5.28)
SODIUM SERPL-SCNC: 136 MMOL/L (ref 136–145)
WBC # BLD AUTO: 9.7 10*3/MM3 (ref 3.4–10.8)

## 2021-04-20 PROCEDURE — 99284 EMERGENCY DEPT VISIT MOD MDM: CPT

## 2021-04-20 PROCEDURE — 83605 ASSAY OF LACTIC ACID: CPT | Performed by: EMERGENCY MEDICINE

## 2021-04-20 PROCEDURE — 73201 CT UPPER EXTREMITY W/DYE: CPT

## 2021-04-20 PROCEDURE — 82077 ASSAY SPEC XCP UR&BREATH IA: CPT | Performed by: EMERGENCY MEDICINE

## 2021-04-20 PROCEDURE — 36415 COLL VENOUS BLD VENIPUNCTURE: CPT

## 2021-04-20 PROCEDURE — 96366 THER/PROPH/DIAG IV INF ADDON: CPT

## 2021-04-20 PROCEDURE — 96365 THER/PROPH/DIAG IV INF INIT: CPT

## 2021-04-20 PROCEDURE — 25010000002 HYDROMORPHONE 1 MG/ML SOLUTION: Performed by: EMERGENCY MEDICINE

## 2021-04-20 PROCEDURE — 86140 C-REACTIVE PROTEIN: CPT | Performed by: EMERGENCY MEDICINE

## 2021-04-20 PROCEDURE — 96375 TX/PRO/DX INJ NEW DRUG ADDON: CPT

## 2021-04-20 PROCEDURE — 84145 PROCALCITONIN (PCT): CPT | Performed by: EMERGENCY MEDICINE

## 2021-04-20 PROCEDURE — 25010000002 DROPERIDOL PER 5 MG: Performed by: EMERGENCY MEDICINE

## 2021-04-20 PROCEDURE — 84703 CHORIONIC GONADOTROPIN ASSAY: CPT | Performed by: EMERGENCY MEDICINE

## 2021-04-20 PROCEDURE — 85025 COMPLETE CBC W/AUTO DIFF WBC: CPT | Performed by: EMERGENCY MEDICINE

## 2021-04-20 PROCEDURE — 25010000002 VANCOMYCIN: Performed by: EMERGENCY MEDICINE

## 2021-04-20 PROCEDURE — 85652 RBC SED RATE AUTOMATED: CPT | Performed by: EMERGENCY MEDICINE

## 2021-04-20 PROCEDURE — 25010000002 IOPAMIDOL 61 % SOLUTION: Performed by: EMERGENCY MEDICINE

## 2021-04-20 PROCEDURE — 80053 COMPREHEN METABOLIC PANEL: CPT | Performed by: EMERGENCY MEDICINE

## 2021-04-20 PROCEDURE — 87040 BLOOD CULTURE FOR BACTERIA: CPT | Performed by: EMERGENCY MEDICINE

## 2021-04-20 RX ORDER — SULFAMETHOXAZOLE AND TRIMETHOPRIM 800; 160 MG/1; MG/1
1 TABLET ORAL 2 TIMES DAILY
Qty: 28 TABLET | Refills: 0 | Status: SHIPPED | OUTPATIENT
Start: 2021-04-20 | End: 2021-05-04

## 2021-04-20 RX ORDER — CEPHALEXIN 500 MG/1
500 CAPSULE ORAL 4 TIMES DAILY
Qty: 56 CAPSULE | Refills: 0 | Status: SHIPPED | OUTPATIENT
Start: 2021-04-20 | End: 2021-05-04

## 2021-04-20 RX ORDER — HYDROCODONE BITARTRATE AND ACETAMINOPHEN 5; 325 MG/1; MG/1
1 TABLET ORAL EVERY 6 HOURS PRN
Qty: 10 TABLET | Refills: 0 | Status: SHIPPED | OUTPATIENT
Start: 2021-04-20

## 2021-04-20 RX ORDER — SODIUM CHLORIDE 0.9 % (FLUSH) 0.9 %
10 SYRINGE (ML) INJECTION AS NEEDED
Status: DISCONTINUED | OUTPATIENT
Start: 2021-04-20 | End: 2021-04-20 | Stop reason: HOSPADM

## 2021-04-20 RX ORDER — DROPERIDOL 2.5 MG/ML
1.25 INJECTION, SOLUTION INTRAMUSCULAR; INTRAVENOUS ONCE
Status: COMPLETED | OUTPATIENT
Start: 2021-04-20 | End: 2021-04-20

## 2021-04-20 RX ADMIN — DROPERIDOL 1.25 MG: 2.5 INJECTION, SOLUTION INTRAMUSCULAR; INTRAVENOUS at 04:35

## 2021-04-20 RX ADMIN — HYDROMORPHONE HYDROCHLORIDE 1 MG: 1 INJECTION, SOLUTION INTRAMUSCULAR; INTRAVENOUS; SUBCUTANEOUS at 04:35

## 2021-04-20 RX ADMIN — IOPAMIDOL 87 ML: 612 INJECTION, SOLUTION INTRAVENOUS at 05:17

## 2021-04-20 RX ADMIN — VANCOMYCIN HYDROCHLORIDE 2000 MG: 1 INJECTION, POWDER, LYOPHILIZED, FOR SOLUTION INTRAVENOUS at 05:00

## 2021-04-20 NOTE — ED PROVIDER NOTES
Subjective     History provided by:  Patient   used: No    Arm Pain  Location:  Right wrist/forearm.  Quality:  Patient rates her pain is a 4/10 on the pain severity scale.  Severity:  Moderate  Onset quality:  Gradual  Timing:  Constant  Progression:  Worsening  Chronicity:  New  Context:  Patient reports that she fell a few days ago and since that time developed a painful right wrist/forearm.  Relieved by:  Nothing.  Worsened by:  Movement.  Ineffective treatments:  None tried at this time.  Associated symptoms: no abdominal pain, no chest pain, no congestion, no cough, no diarrhea, no ear pain, no fatigue, no fever, no headaches, no loss of consciousness, no myalgias, no nausea, no rash, no rhinorrhea, no shortness of breath, no sore throat, no vomiting and no wheezing        Review of Systems   Constitutional: Negative for activity change, appetite change, chills, diaphoresis, fatigue and fever.   HENT: Negative for congestion, ear pain, rhinorrhea and sore throat.    Eyes: Negative for redness.   Respiratory: Negative for cough, chest tightness, shortness of breath and wheezing.    Cardiovascular: Negative for chest pain, palpitations and leg swelling.   Gastrointestinal: Negative for abdominal pain, diarrhea, nausea and vomiting.   Genitourinary: Negative for dysuria and urgency.   Musculoskeletal: Negative for arthralgias, back pain, myalgias and neck pain.   Skin: Negative for pallor, rash and wound.   Neurological: Negative for dizziness, loss of consciousness, speech difficulty, weakness and headaches.   Psychiatric/Behavioral: Negative for agitation, behavioral problems, confusion and decreased concentration.   All other systems reviewed and are negative.      Past Medical History:   Diagnosis Date   • Asthma    • COPD (chronic obstructive pulmonary disease) (CMS/HCC)    • Diabetes mellitus (CMS/HCC)    • Hepatitis C    • History of gallstones    • Hypertension    • IV drug abuse  (CMS/HCC)    • Lupus (CMS/HCC)    • Rheumatoid arthritis (CMS/HCC)        No Known Allergies    Past Surgical History:   Procedure Laterality Date   • ABDOMINAL SURGERY     • CHOLECYSTECTOMY     • PERINEAL LESION/CYST EXCISION N/A 4/3/2017    Procedure: EXCISION OF VAGINAL SKIN TAG;  Surgeon: Kee Crooks III, MD;  Location: [facility];  Service:    • TUBAL COAGULATION LAPAROSCOPIC Bilateral 4/3/2017    Procedure: BILATERAL TUBAL FALLOPE FILSHIE CLIPPING LAPAROSCOPIC,IUD REMOVAL;  Surgeon: Kee Crooks III, MD;  Location: [facility];  Service:        Family History   Problem Relation Age of Onset   • No Known Problems Mother    • No Known Problems Father    • No Known Problems Sister    • No Known Problems Brother    • No Known Problems Son    • No Known Problems Daughter    • No Known Problems Maternal Grandmother    • No Known Problems Maternal Grandfather    • No Known Problems Paternal Grandmother    • No Known Problems Paternal Grandfather    • No Known Problems Cousin    • No Known Problems Other    • Rheum arthritis Neg Hx    • Osteoarthritis Neg Hx    • Asthma Neg Hx    • Diabetes Neg Hx    • Heart failure Neg Hx    • Hyperlipidemia Neg Hx    • Hypertension Neg Hx    • Migraines Neg Hx    • Rashes / Skin problems Neg Hx    • Seizures Neg Hx    • Stroke Neg Hx    • Thyroid disease Neg Hx        Social History     Socioeconomic History   • Marital status: Legally      Spouse name: Not on file   • Number of children: Not on file   • Years of education: Not on file   • Highest education level: Not on file   Tobacco Use   • Smoking status: Current Every Day Smoker     Packs/day: 1.00     Years: 15.00     Pack years: 15.00     Types: Cigarettes   • Smokeless tobacco: Never Used   Substance and Sexual Activity   • Alcohol use: No   • Drug use: No   • Sexual activity: Yes     Partners: Male     Birth control/protection: I.U.D.           Objective   Physical Exam  Vitals and nursing note reviewed.    Constitutional:       General: She is not in acute distress.     Appearance: Normal appearance. She is well-developed. She is not toxic-appearing or diaphoretic.   HENT:      Head: Normocephalic and atraumatic.      Right Ear: External ear normal.      Left Ear: External ear normal.      Nose: Nose normal.      Mouth/Throat:      Pharynx: No oropharyngeal exudate.      Tonsils: No tonsillar exudate.   Eyes:      General: Lids are normal.      Conjunctiva/sclera: Conjunctivae normal.      Pupils: Pupils are equal, round, and reactive to light.   Neck:      Thyroid: No thyromegaly.   Cardiovascular:      Rate and Rhythm: Normal rate and regular rhythm.      Pulses: Normal pulses.      Heart sounds: Normal heart sounds, S1 normal and S2 normal.   Pulmonary:      Effort: Pulmonary effort is normal. No tachypnea or respiratory distress.      Breath sounds: Normal breath sounds. No decreased breath sounds, wheezing or rales.   Chest:      Chest wall: No tenderness.   Abdominal:      General: Bowel sounds are normal. There is no distension.      Palpations: Abdomen is soft.      Tenderness: There is no abdominal tenderness. There is no guarding or rebound.   Musculoskeletal:         General: No tenderness or deformity. Normal range of motion.      Cervical back: Full passive range of motion without pain, normal range of motion and neck supple.   Lymphadenopathy:      Cervical: No cervical adenopathy.   Skin:     General: Skin is warm and dry.      Coloration: Skin is not pale.      Findings: Erythema present. No rash.          Neurological:      Mental Status: She is alert and oriented to person, place, and time.      GCS: GCS eye subscore is 4. GCS verbal subscore is 5. GCS motor subscore is 6.      Cranial Nerves: No cranial nerve deficit.      Sensory: No sensory deficit.   Psychiatric:         Speech: Speech normal.         Behavior: Behavior normal.         Thought Content: Thought content normal.         Judgment:  Judgment normal.         Procedures           ED Course  ED Course as of Apr 20 0723   Tue Apr 20, 2021   0555 IMPRESSION:     1. No fracture or evidence of osteomyelitis. No malalignment.  2. Soft tissue edema as above with no focal loculated abscess. There is no soft tissue gas or radiopaque foreign body.  3. No joint effusion in the elbow or wrist.  4. No intramuscular abnormality.   CT Upper Extremity Right With Contrast [ES]   0659 Patient to be reevaluated for possible admission of right arm cellulitis once antibiotic completed.  May need to be evaluated by general surgery prior to discharge.    [ES]      ED Course User Index  [ES] Giuliano Johnston MD                                           MDM  Number of Diagnoses or Management Options     Amount and/or Complexity of Data Reviewed  Clinical lab tests: reviewed and ordered  Tests in the radiology section of CPT®: reviewed and ordered  Tests in the medicine section of CPT®: ordered and reviewed  Review and summarize past medical records: yes  Independent visualization of images, tracings, or specimens: yes    Risk of Complications, Morbidity, and/or Mortality  Presenting problems: moderate  Diagnostic procedures: moderate  Management options: moderate    Patient Progress  Patient progress: stable      Final diagnoses:   Right arm cellulitis       ED Disposition  ED Disposition     ED Disposition Condition Comment    Discharge Stable           Marciano Mccallum MD  [address] [id number] [phone]    Schedule an appointment as soon as possible for a visit in 1 day  REEVALUATE         Medication List      New Prescriptions    cephalexin 500 MG capsule  Commonly known as: KEFLEX  Take 1 capsule by mouth 4 (Four) Times a Day for 14 days.     HYDROcodone-acetaminophen 5-325 MG per tablet  Commonly known as: NORCO  Take 1 tablet by mouth Every 6 (Six) Hours As Needed for Severe Pain .     sulfamethoxazole-trimethoprim 800-160 MG per  tablet  Commonly known as: BACTRIM DS,SEPTRA DS  Take 1 tablet by mouth 2 (Two) Times a Day for 14 days.           Where to Get Your Medications      You can get these medications from any pharmacy    Bring a paper prescription for each of these medications  · cephalexin 500 MG capsule  · HYDROcodone-acetaminophen 5-325 MG per tablet  · sulfamethoxazole-trimethoprim 800-160 MG per tablet          Giuliano Johnston MD  04/20/21 5410       Giuliano Johnston MD  04/20/21 9362

## 2021-04-23 ENCOUNTER — TRANSCRIBE ORDERS (OUTPATIENT)
Dept: INFUSION THERAPY | Facility: HOSPITAL | Age: 41
End: 2021-04-23

## 2021-04-23 ENCOUNTER — HOSPITAL ENCOUNTER (OUTPATIENT)
Dept: INFUSION THERAPY | Facility: HOSPITAL | Age: 41
Discharge: HOME | End: 2021-04-23
Admitting: SURGERY

## 2021-04-23 ENCOUNTER — OFFICE VISIT (OUTPATIENT)
Dept: SURGERY | Facility: CLINIC | Age: 41
End: 2021-04-23

## 2021-04-23 VITALS
HEART RATE: 79 BPM | BODY MASS INDEX: 49.69 KG/M2 | WEIGHT: 270 LBS | DIASTOLIC BLOOD PRESSURE: 94 MMHG | HEIGHT: 62 IN | SYSTOLIC BLOOD PRESSURE: 146 MMHG

## 2021-04-23 VITALS
HEART RATE: 78 BPM | TEMPERATURE: 98 F | DIASTOLIC BLOOD PRESSURE: 96 MMHG | SYSTOLIC BLOOD PRESSURE: 149 MMHG | RESPIRATION RATE: 18 BRPM

## 2021-04-23 DIAGNOSIS — L02.413 CUTANEOUS ABSCESS OF RIGHT UPPER EXTREMITY: Primary | ICD-10-CM

## 2021-04-23 DIAGNOSIS — L02.413 ABSCESS OF RIGHT ARM: Primary | ICD-10-CM

## 2021-04-23 DIAGNOSIS — L02.413 ABSCESS OF RIGHT ARM: ICD-10-CM

## 2021-04-23 PROCEDURE — 96365 THER/PROPH/DIAG IV INF INIT: CPT

## 2021-04-23 PROCEDURE — 87205 SMEAR GRAM STAIN: CPT | Performed by: SURGERY

## 2021-04-23 PROCEDURE — 99203 OFFICE O/P NEW LOW 30 MIN: CPT | Performed by: SURGERY

## 2021-04-23 PROCEDURE — 10061 I&D ABSCESS COMP/MULTIPLE: CPT | Performed by: SURGERY

## 2021-04-23 PROCEDURE — 76881 US COMPL JOINT R-T W/IMG: CPT | Performed by: SURGERY

## 2021-04-23 PROCEDURE — 25010000002 VANCOMYCIN 1 G RECONSTITUTED SOLUTION: Performed by: SURGERY

## 2021-04-23 PROCEDURE — 87070 CULTURE OTHR SPECIMN AEROBIC: CPT | Performed by: SURGERY

## 2021-04-23 PROCEDURE — 25010000002 DALBAVANCIN 500 MG RECONSTITUTED SOLUTION 1 EACH VIAL: Performed by: SURGERY

## 2021-04-23 PROCEDURE — 96367 TX/PROPH/DG ADDL SEQ IV INF: CPT

## 2021-04-23 RX ORDER — DEXTROSE MONOHYDRATE 50 MG/ML
50 INJECTION, SOLUTION INTRAVENOUS ONCE
Status: COMPLETED | OUTPATIENT
Start: 2021-04-23 | End: 2021-04-23

## 2021-04-23 RX ORDER — LISINOPRIL 20 MG/1
TABLET ORAL
COMMUNITY
Start: 2021-04-22

## 2021-04-23 RX ADMIN — DEXTROSE MONOHYDRATE 50 ML: 50 INJECTION, SOLUTION INTRAVENOUS at 14:57

## 2021-04-23 RX ADMIN — DALBAVANCIN 1500 MG: 500 INJECTION, POWDER, FOR SOLUTION INTRAVENOUS at 14:56

## 2021-04-23 RX ADMIN — VANCOMYCIN HYDROCHLORIDE 1000 MG: 1 INJECTION, POWDER, LYOPHILIZED, FOR SOLUTION INTRAVENOUS at 13:54

## 2021-04-23 NOTE — PATIENT INSTRUCTIONS
Vancomycin injection  What is this medicine?  VANCOMYCIN (van koe MYE sin) is a glycopeptide antibiotic. It is used to treat certain kinds of bacterial infections. It will not work for colds, flu, or other viral infections.  This medicine may be used for other purposes; ask your health care provider or pharmacist if you have questions.  COMMON BRAND NAME(S): Vancocin, VANCOSOL  What should I tell my health care provider before I take this medicine?  They need to know if you have any of these conditions:  · dehydration  · hearing loss  · kidney disease  · other chronic illness  · an unusual or allergic reaction to vancomycin, other medicines, foods, dyes, or preservatives  · pregnant or trying to get pregnant  · breast-feeding  How should I use this medicine?  This medicine is infused into a vein. It is usually given by a health care provider in a hospital or clinic.  If you receive this medicine at home, you will receive special instructions. Take your medicine at regular intervals. Do not take your medicine more often than directed. Take all of your medicine as directed even if you think you are better. Do not skip doses or stop your medicine early.  It is important that you put your used needles and syringes in a special sharps container. Do not put them in a trash can. If you do not have a sharps container, call your pharmacist or healthcare provider to get one.  Talk to your pediatrician regarding the use of this medicine in children. While this drug may be prescribed for even very young infants for selected conditions, precautions do apply.  Overdosage: If you think you have taken too much of this medicine contact a poison control center or emergency room at once.  NOTE: This medicine is only for you. Do not share this medicine with others.  What if I miss a dose?  If you miss a dose, take it as soon as you can. If it is almost time for your next dose, take only that dose. Do not take double or extra  doses.  What may interact with this medicine?  · amphotericin B  · anesthetics  · bacitracin  · birth control pills  · cisplatin  · colistin  · diuretics  · other aminoglycoside antibiotics  · polymyxin B  This list may not describe all possible interactions. Give your health care provider a list of all the medicines, herbs, non-prescription drugs, or dietary supplements you use. Also tell them if you smoke, drink alcohol, or use illegal drugs. Some items may interact with your medicine.  What should I watch for while using this medicine?  Tell your doctor or health care provider if your symptoms do not improve or if you get new symptoms. Your condition and lab work will be monitored while you are taking this medicine.  Do not treat diarrhea with over the counter products. Contact your doctor if you have diarrhea that lasts more than 2 days or if it is severe and watery.  This medicine may cause serious skin reactions. They can happen weeks to months after starting the medicine. Contact your health care provider right away if you notice fevers or flu-like symptoms with a rash. The rash may be red or purple and then turn into blisters or peeling of the skin. Or, you might notice a red rash with swelling of the face, lips or lymph nodes in your neck or under your arms.  What side effects may I notice from receiving this medicine?  Side effects that you should report to your doctor or health care professional as soon as possible:  · allergic reactions like skin rash, itching or hives, swelling of the face, lips, or tongue  · breathing difficulty, wheezing  · change in amount, color of urine  · change in hearing  · chest pain  · dizziness  · fever, chills  · flushing of the face and neck (reddening)  · low blood pressure  · rash, fever, and swollen lymph nodes  · redness, blistering, peeling or loosening of the skin, including inside the mouth  · unusual bleeding or bruising  · unusually weak or tired  Side effects that  usually do not require medical attention (report to your doctor or health care professional if they continue or are bothersome):  · nausea, vomiting  · pain, swelling where injected  · stomach cramps  This list may not describe all possible side effects. Call your doctor for medical advice about side effects. You may report side effects to FDA at 5-586-VXI-4359.  Where should I keep my medicine?  Keep out of the reach of children.  You will be instructed on how to store this medicine, if needed. Throw away any unused medicine after the expiration date on the label.  NOTE: This sheet is a summary. It may not cover all possible information. If you have questions about this medicine, talk to your doctor, pharmacist, or health care provider.  © 2021 [employer]/Gold Standard (2020-03-27 16:14:12)  Dalbavancin injection  What is this medicine?  DALBAVANCIN (EBENEZER ba van sin) is an antibiotic. It is used to treat certain kinds of bacterial infections. It will not work for colds, flu, or other viral infections.  This medicine may be used for other purposes; ask your health care provider or pharmacist if you have questions.  COMMON BRAND NAME(S): DALVANCE  What should I tell my health care provider before I take this medicine?  They need to know if you have any of these conditions:  · intestine problems, like colitis  · an unusual or allergic reaction to dalbavancin, other medicines, foods, dyes, or preservatives  · pregnant or trying to get pregnant  · breast-feeding  How should I use this medicine?  This medicine is infused into a vein. It is usually given by a health care professional in a hospital or clinic setting.  If you get this medicine at home, you will be taught how to prepare and give this medicine. Use exactly as directed. Take your medicine at regular intervals. Do not take your medicine more often than directed.  It is important that you put your used needles and syringes in a special sharps container. Do not put  them in a trash can. If you do not have a sharps container, call your pharmacist or healthcare provider to get one.  Talk to your pediatrician regarding the use of this medicine in children. Special care may be needed.  Overdosage: If you think you have taken too much of this medicine contact a poison control center or emergency room at once.  NOTE: This medicine is only for you. Do not share this medicine with others.  What if I miss a dose?  It is important not to miss your dose. Call your doctor or health care professional if you are unable to keep an appointment. If you give yourself the medicine and you miss a dose, take it as soon as you can. If it is almost time for your next dose, take only that dose. Do not take double or extra doses.  What may interact with this medicine?  This medicine may interact with the following medications:  · birth control pills  This list may not describe all possible interactions. Give your health care provider a list of all the medicines, herbs, non-prescription drugs, or dietary supplements you use. Also tell them if you smoke, drink alcohol, or use illegal drugs. Some items may interact with your medicine.  What should I watch for while using this medicine?  Tell your doctor or healthcare professional if your symptoms do not start to get better or if they get worse.  Do not treat diarrhea with over the counter products. Contact your doctor if you have diarrhea that lasts more than 2 days or if it is severe and watery.  What side effects may I notice from receiving this medicine?  Side effects that you should report to your doctor or health care professional as soon as possible:  · allergic reactions like skin rash, itching or hives, swelling of the face, lips, or tongue  · bloody or watery diarrhea  Side effects that usually do not require medical attention (report to your doctor or health care professional if they continue or are  bothersome):  · diarrhea  · headache  · nausea, vomiting  This list may not describe all possible side effects. Call your doctor for medical advice about side effects. You may report side effects to FDA at 3-414-HRW-3155.  Where should I keep my medicine?  Keep out of the reach of children.  This drug is usually given in a hospital or clinic and will not be stored at home. In rare cases, this medicine may be given at home.  If you are using this medicine at home, you will be instructed on how to store this medicine. Throw away any unused medicine after the expiration date on the label.  NOTE: This sheet is a summary. It may not cover all possible information. If you have questions about this medicine, talk to your doctor, pharmacist, or health care provider.  © 2021 [employer]/Gold Standard (2017-01-19 08:38:08)

## 2021-04-23 NOTE — PROGRESS NOTES
Subjective   Mary Monaco is a 40 y.o. female here today for arm abscess referred from the ER.    History of Present Illness  Ms. Monaco was seen in the office today for evaluation of an abscess on the right wrist.  She states that this developed following a fall.  She also reports a second area on the medial aspect of the elbow which has come up over the last few days.  Patient was seen in the ED on 4/20/2021 and had a CT which showed edema but no abscess and no fracture or evidence of trauma.  Patient was given antibiotics and states that the area has failed to improve with Keflex.  She was also given a prescription for 7 days of Bactrim and also given hydrocodone in the ED.  No Known Allergies  Current Outpatient Medications   Medication Sig Dispense Refill   • albuterol (ACCUNEB) 1.25 MG/3ML nebulizer solution Take 1 ampule by nebulization Every 6 (Six) Hours As Needed for Wheezing.     • albuterol (PROVENTIL HFA;VENTOLIN HFA) 108 (90 Base) MCG/ACT inhaler Inhale 2 puffs Every 4 (Four) Hours As Needed. 18 g 0   • amoxicillin-clavulanate (AUGMENTIN) 875-125 MG per tablet Take 1 tablet by mouth 2 (Two) Times a Day.     • benzonatate (TESSALON) 100 MG capsule Take 100 mg by mouth 3 (Three) Times a Day As Needed for Cough.     • buprenorphine-naloxone (SUBOXONE) 8-2 MG per SL tablet Place 1 tablet under the tongue 2 (two) times a day.     • cephalexin (KEFLEX) 500 MG capsule Take 1 capsule by mouth 4 (Four) Times a Day for 14 days. 56 capsule 0   • cloNIDine (CATAPRES) 0.1 MG tablet Take 0.1 mg by mouth Every Night.     • fluticasone (FLONASE) 50 MCG/ACT nasal spray Inhale 2 sprays into the nostril(s) once Daily. 16 g 0   • gabapentin (NEURONTIN) 800 MG tablet Take 800 mg by mouth 4 (Four) Times a Day.     • levothyroxine (SYNTHROID, LEVOTHROID) 25 MCG tablet Take 25 mcg by mouth Daily.     • lisinopril (PRINIVIL,ZESTRIL) 20 MG tablet      • metFORMIN (GLUCOPHAGE) 1000 MG tablet      • omeprazole (priLOSEC) 20 MG  "capsule Take 20 mg by mouth Daily.     • polyethylene glycol (MIRALAX) 17 g packet Take 17 g by mouth Daily.     • sulfamethoxazole-trimethoprim (BACTRIM DS,SEPTRA DS) 800-160 MG per tablet Take 1 tablet by mouth 2 (Two) Times a Day for 14 days. 28 tablet 0   • HYDROcodone-acetaminophen (NORCO) 5-325 MG per tablet Take 1 tablet by mouth Every 6 (Six) Hours As Needed for Severe Pain . 10 tablet 0     No current facility-administered medications for this visit.     Past Medical History:   Diagnosis Date   • Asthma    • COPD (chronic obstructive pulmonary disease) (CMS/HCC)    • Diabetes mellitus (CMS/HCC)    • Hepatitis C    • History of gallstones    • Hypertension    • IV drug abuse (CMS/HCC)    • Lupus (CMS/HCC)    • Rheumatoid arthritis (CMS/HCC)      Past Surgical History:   Procedure Laterality Date   • ABDOMINAL SURGERY     • CHOLECYSTECTOMY     • PERINEAL LESION/CYST EXCISION N/A 4/3/2017    Procedure: EXCISION OF VAGINAL SKIN TAG;  Surgeon: Kee Crooks III, MD;  Location: [facility];  Service:    • TUBAL COAGULATION LAPAROSCOPIC Bilateral 4/3/2017    Procedure: BILATERAL TUBAL FALLOPE FILSHIE CLIPPING LAPAROSCOPIC,IUD REMOVAL;  Surgeon: Kee Crooks III, MD;  Location: [facility] OR;  Service:        Pertinent Review of Systems:  Respiratory: no shortness of breath  Cardiovascular: no chest pain  Other pertinent:      Objective   Ht 157.5 cm (62\")   Wt 122 kg (270 lb)   BMI 49.38 kg/m²   Physical Exam  On examination this is a morbidly obese female in no acute distress  HEENT examination: Within normal limits.  Conjunctiva pink.  Nose and ears appear normal.  Neck: Supple, full range of motion.  No JVD.  Musculoskeletal: Full range of motion all extremities without focal weakness. Normal gait. No digital cyanosis.  Psych: Patient is alert, oriented x3. Mood and affect are appropriate.  Skin: On the right proximal forearm and the area over the radial artery there is an indurated area with cellulitis with " superficial epidermal slough with purulent fluid under it.  In the right medial elbow at the medial aspect of the antecubital space there is a second firm area of induration and cellulitis.    Procedures   Ultrasound soft tissue right extremity    Indication: Cellulitis and induration  Description: With use of the 12.5 MHz transducer the area of clinical concern was scanned in multiple planes.  Findings: Both areas were examined under ultrasound guidance and demonstrated a pocket of fluid consistent with abscess  Impression: Abscess overlying the right radial artery and medial antecubital space  Plan: Incision and drainage    Procedure Note    Procedure: Incision and drainage    Location: Abscess overlying right radial artery    Anesthesia: 1% lidocaine plain    Description:  The risks and benefits of the procedure were discussed.  After prep with Betadine and infiltration with lidocaine an incision was made with a 15 scalpel overlying the area.  Abscess cavity was entered and pus was evacuated.  Culture was done.  The wound was then irrigated with peroxide and packed with quarter inch Nu Gauze.  Dressing was placed    Complications:  none    Follow-up: As needed      Procedure Note    Procedure: Incision and drainage    Location: Abscess medial aspect of antecubital space    Anesthesia: 1% lidocaine plain    Description:  The risks and benefits of the procedure were discussed.  After prep with Betadine and infiltration with lidocaine an incision was made over the area of induration and erythema.  Abscess cavity was entered.  Pus was evacuated.  The cavity tracked into the antecubital space.  After irrigation with peroxide the wound was packed with quarter inch Nu Gauze    Complications:  none    Follow-up: As needed    Results/Data:  Imaging: CT of the upper extremity reviewed.  I agree with the assessment  Notes: Records from ED were reviewed.  Lab: All laboratory studies done in the ED were reviewed.  Other:      Assessment/Plan   Abscesses in the right upper extremity overlying the radial artery and the medial aspect of the antecubital space.  The location of the abscesses is most consistent with abscess secondary to IV injection rather than from trauma.  Particularly as there are 2 separate abscesses.    Plan: Local wound care  Patient to go down to the wound clinic and receive a dose of IV vancomycin as well as Dalvance         Discussion/Summary    Time spent:     Patient's Body mass index is 49.38 kg/m². BMI is above normal parameters. Recommendations include: educational material.       No future appointments.      Please note that portions of this note were completed with a voice recognition program.

## 2021-04-25 LAB
BACTERIA SPEC AEROBE CULT: NORMAL
BACTERIA SPEC AEROBE CULT: NORMAL

## 2021-04-26 ENCOUNTER — TELEPHONE (OUTPATIENT)
Dept: SURGERY | Facility: CLINIC | Age: 41
End: 2021-04-26

## 2021-04-26 LAB
BACTERIA SPEC AEROBE CULT: NORMAL
BACTERIA SPEC AEROBE CULT: NORMAL
GRAM STN SPEC: NORMAL

## 2021-04-26 NOTE — TELEPHONE ENCOUNTER
Patients Mother called and said Mary is in a lot of pain. I ask Dr. Gupta about it and she said [facility] had called on Friday with same question. She had told them to get a urine drug screen and after seeing that would send something in. Dr. Gupta was on call and was here all weekend but a drug screen was never done. Therefore the answer is no to sending any pain meds in. I have tried to reach the patient to explain but the phone number does not work so I cannot even leave a message.

## 2021-04-27 ENCOUNTER — TELEPHONE (OUTPATIENT)
Dept: SURGERY | Facility: CLINIC | Age: 41
End: 2021-04-27

## 2021-04-27 NOTE — TELEPHONE ENCOUNTER
Tried to call Miss Hernandez to tell her if her pain is not improving she should go to the ER per Dr. Fuentes.

## 2021-04-27 NOTE — ADDENDUM NOTE
Encounter addended by: Sheri Galeano, PharmD on: 4/27/2021 3:06 PM   Actions taken: [employer] created or edited

## 2021-10-13 NOTE — THERAPY EVALUATION
Acute Care - Physical Therapy Initial Evaluation   [place]     Patient Name: Mary Monaco  : 1980  MRN: [id number]  Today's Date: 10/8/2018   Onset of Illness/Injury or Date of Surgery: 10/04/18  Date of Referral to PT: 10/07/18  Referring Physician: Dr. Rich      Admit Date: 10/4/2018    Visit Dx:     ICD-10-CM ICD-9-CM   1. COPD exacerbation (CMS/HCC) J44.1 491.21     Patient Active Problem List   Diagnosis   • COPD exacerbation (CMS/HCC)     Past Medical History:   Diagnosis Date   • Asthma    • COPD (chronic obstructive pulmonary disease) (CMS/HCC)    • Diabetes mellitus (CMS/HCC)    • Hepatitis C    • History of gallstones    • Hypertension    • Lupus    • Rheumatoid arthritis (CMS/HCC)      Past Surgical History:   Procedure Laterality Date   • ABDOMINAL SURGERY     • CHOLECYSTECTOMY     • PERINEAL LESION/CYST EXCISION N/A 4/3/2017    Procedure: EXCISION OF VAGINAL SKIN TAG;  Surgeon: Kee Crooks III, MD;  Location: [facility];  Service:    • TUBAL COAGULATION LAPAROSCOPIC Bilateral 4/3/2017    Procedure: BILATERAL TUBAL FALLOPE FILSHIE CLIPPING LAPAROSCOPIC,IUD REMOVAL;  Surgeon: Kee Crooks III, MD;  Location: [facility];  Service:         PT ASSESSMENT (last 12 hours)      Physical Therapy Evaluation     Row Name 10/08/18 1706          PT Evaluation Time/Intention    Subjective Information no complaints  -AG     Document Type evaluation  -AG     Mode of Treatment individual therapy;physical therapy  -AG     Patient Effort good  -AG     Symptoms Noted During/After Treatment none  -AG     Row Name 10/08/18 1706          General Information    Patient Profile Reviewed? yes  -AG     Onset of Illness/Injury or Date of Surgery 10/04/18  -AG     Referring Physician Dr. Rich  -AG     Patient Observations alert;cooperative;agree to therapy  -AG     Patient/Family Observations pt. supine; IV in place; alert, cooperative.   -AG     Prior Level of Function independent:;community mobility  -AG      Equipment Currently Used at Home none  -AG     Pertinent History of Current Functional Problem pt. presented to ER with COPD, DM; exacerbation of ashtma; c/o productive cough, wheezing and shortness of breath.   -AG     Existing Precautions/Restrictions fall;oxygen therapy device and L/min  -AG     Row Name 10/08/18 1706          Resource/Environmental Concerns    Current Living Arrangements home/apartment/condo  -AG     Row Name 10/08/18 1706          Cognitive Assessment/Intervention- PT/OT    Orientation Status (Cognition) oriented x 3  -AG     Follows Commands (Cognition) WFL  -AG     Row Name 10/08/18 1706          Safety Issues, Functional Mobility    Safety Issues Affecting Function (Mobility) other (see comments)   O2 nc tubing  -AG     Row Name 10/08/18 1706          Mobility Assessment/Treatment    Extremity Weight-bearing Status left lower extremity;right lower extremity  -AG     Left Lower Extremity (Weight-bearing Status) full weight-bearing (FWB)  -AG     Right Lower Extremity (Weight-bearing Status) full weight-bearing (FWB)  -AG     Row Name 10/08/18 1706          Bed Mobility Assessment/Treatment    Bed Mobility Assessment/Treatment bed mobility (all) activities  -AG     [place] Level (Bed Mobility) independent  -AG     Assistive Device (Bed Mobility) bed rails  -AG     Row Name 10/08/18 1706          Transfer Assessment/Treatment    Transfer Assessment/Treatment sit-stand transfer;stand-sit transfer;stand pivot/stand step transfer;toilet transfer  -AG     Sit-Stand [place] (Transfers) independent  -AG     Stand-Sit [place] (Transfers) independent  -AG     [place] Level (Toilet Transfer) independent  -AG     Assistive Device (Toilet Transfer) grab bars/safety frame  -AG     Row Name 10/08/18 1706          Sit-Stand Transfer    Assistive Device (Sit-Stand Transfers) --   none  -AG     Row Name 10/08/18 1706          Stand-Sit Transfer    Assistive Device (Stand-Sit Transfers) --    none  -AG     Row Name 10/08/18 1706          Stand Pivot/Stand Step Transfer    Stand Pivot/Stand Step [place] independent  -AG     Assistive Device (Stand Pivot Stand Step Transfer) --   none  -AG     Row Name 10/08/18 1706          Toilet Transfer    Type (Toilet Transfer) stand pivot/stand step  -AG     Row Name 10/08/18 1706          Gait/Stairs Assessment/Training    Gait/Stairs Assessment/Training gait/ambulation independence;gait/ambulation assistive device;distance ambulated;gait pattern;gait deviations;maintains weight-bearing status  -AG     [place] Level (Gait) independent  -AG     Assistive Device (Gait) other (see comments)   no assistive device  -AG     Distance in Feet (Gait) 30 (about room)  -AG     Pattern (Gait) step-through  -AG     Deviations/Abnormal Patterns (Gait) gregorio decreased  -AG     Row Name 10/08/18 1706          General ROM    GENERAL ROM COMMENTS no LE AROM deficits  -AG     Row Name 10/08/18 1706          MMT (Manual Muscle Testing)    General MMT Comments B LE grossly 4+/5  -AG     Row Name 10/08/18 1706          Sensory Assessment/Intervention    Sensory General Assessment no sensation deficits identified  -AG     Row Name 10/08/18 1706          Vision Assessment/Intervention    Visual Impairment/Limitations WFL  -AG     Row Name 10/08/18 1706          Pain Assessment    Additional Documentation Pain Scale: Numbers Pre/Post-Treatment (Group)  -AG     Row Name 10/08/18 1706          Pain Scale: Numbers Pre/Post-Treatment    Pain Scale: Numbers, Pretreatment 0/10 - no pain  -AG     Pain Scale: Numbers, Post-Treatment 0/10 - no pain  -AG     Row Name 10/08/18 1706          Coping    Observed Emotional State accepting;calm  -AG     Verbalized Emotional State acceptance  -AG     Row Name 10/08/18 1706          Physical Therapy Clinical Impression    Date of Referral to PT 10/07/18  -AG     Functional Level at Time of Evaluation (PT Clinical Impression) independent without  assistive device  -AG     Criteria for Skilled Interventions Met (PT Clinical Impression) no problems identified which require skilled intervention  -AG     Row Name 10/08/18 1706          Vital Signs    Post SpO2 (%) 93  -AG     Post Patient Position Standing  -AG     Row Name 10/08/18 1706          Positioning and Restraints    Pre-Treatment Position in bed  -AG     Post Treatment Position bed  -AG     In Bed notified nsg;supine;call light within reach;encouraged to call for assist;side rails up x3  -AG     Row Name 10/08/18 1706          Living Environment    Home Accessibility wheelchair accessible  -AG       User Key  (r) = Recorded By, (t) = Taken By, (c) = Cosigned By    Initials Name Provider Type     Mame Richey, PT Physical Therapist          Physical Therapy Education     Title: PT OT SLP Therapies (Done)     Topic: Physical Therapy (Done)     Point: Mobility training (Done)    Learning Progress Summary     Learner Status Readiness Method Response Comment Documented by    Patient Done Acceptance [facility] 10/08/18 1727          Point: Home exercise program (Done)    Learning Progress Summary     Learner Status Readiness Method Response Comment Documented by    Patient Done Acceptance [facility] 10/08/18 1727          Point: Body mechanics (Done)    Learning Progress Summary     Learner Status Readiness Method Response Comment Documented by    Patient Done Acceptance ED [facility] 10/08/18 1727          Point: Precautions (Done)    Learning Progress Summary     Learner Status Readiness Method Response Comment Documented by    Patient Done Acceptance [facility] 10/08/18 1727                      User Key     Initials Effective Dates Name Provider Type [facility] 04/03/18 -  Mame Richey, PT Physical Therapist PT                PT Recommendation and Plan  Therapy Frequency (PT Clinical Impression): evaluation only             Outcome Measures     Row Name 10/08/18 1700             How much help  from another person do you currently need...    Turning from your back to your side while in flat bed without using bedrails? 4  -AG      Moving from lying on back to sitting on the side of a flat bed without bedrails? 4  -AG      Moving to and from a bed to a chair (including a wheelchair)? 4  -AG      Standing up from a chair using your arms (e.g., wheelchair, bedside chair)? 4  -AG      Climbing 3-5 steps with a railing? 4  -AG      To walk in hospital room? 4  -AG      AM-PAC 6 Clicks Score 24  -AG         Functional Assessment    Outcome Measure Options AM-PAC 6 Clicks Basic Mobility (PT)  -AG        User Key  (r) = Recorded By, (t) = Taken By, (c) = Cosigned By    Initials Name Provider Type    Mame Thornton, PT Physical Therapist           Time Calculation:         PT Charges     Row Name 10/08/18 1728             Time Calculation    PT Received On 10/08/18  -AG         Time Calculation- PT    TCU Minutes- PT 15 min  -AG        User Key  (r) = Recorded By, (t) = Taken By, (c) = Cosigned By    Initials Name Provider Type    Mame Thornton, PT Physical Therapist        Therapy Suggested Charges     Code   Minutes Charges    None           Therapy Charges for Today     Code Description Service Date Service Provider Modifiers Qty    86309785512 HC PT MOBILITY CURRENT 10/8/2018 Mame Richey, PT GP,  1    19809775717 HC PT MOBILITY PROJECTED 10/8/2018 Mame Richey, PT GP,  1    85600593345 HC PT MOBILITY DISCHARGE 10/8/2018 Mame Richey, PT GP,  1    [id number]  PT EVAL MOD COMPLEXITY 1 10/8/2018 Mame Richey, PT GP 1          PT G-Codes  Outcome Measure Options: AM-PAC 6 Clicks Basic Mobility (PT)  AM-PAC 6 Clicks Score: 24  Functional Limitation: Mobility: Walking and moving around  Mobility: Walking and Moving Around Current Status (): 0 percent impaired, limited or restricted  Mobility: Walking and Moving Around Goal Status (): 0 percent impaired, limited or  restricted  Mobility: Walking and Moving Around Discharge Status (): 0 percent impaired, limited or restricted      Mame Richey, PT  10/8/2018        headache

## 2022-10-24 ENCOUNTER — TRANSCRIBE ORDERS (OUTPATIENT)
Dept: LAB | Facility: HOSPITAL | Age: 42
End: 2022-10-24

## 2022-10-24 ENCOUNTER — LAB (OUTPATIENT)
Dept: LAB | Facility: HOSPITAL | Age: 42
End: 2022-10-24

## 2022-10-24 DIAGNOSIS — F11.20 OPIOID TYPE DEPENDENCE, CONTINUOUS: Primary | ICD-10-CM

## 2022-10-24 DIAGNOSIS — F11.20 OPIOID TYPE DEPENDENCE, CONTINUOUS: ICD-10-CM

## 2022-10-24 LAB
AMPHET+METHAMPHET UR QL: POSITIVE
AMPHETAMINES UR QL: NEGATIVE
BARBITURATES UR QL SCN: NEGATIVE
BENZODIAZ UR QL SCN: NEGATIVE
BUPRENORPHINE SERPL-MCNC: POSITIVE NG/ML
CANNABINOIDS SERPL QL: NEGATIVE
COCAINE UR QL: NEGATIVE
METHADONE UR QL SCN: NEGATIVE
OPIATES UR QL: NEGATIVE
OXYCODONE UR QL SCN: NEGATIVE
PCP UR QL SCN: NEGATIVE
PROPOXYPH UR QL: NEGATIVE
TRICYCLICS UR QL SCN: NEGATIVE

## 2022-10-24 PROCEDURE — 36415 COLL VENOUS BLD VENIPUNCTURE: CPT

## 2022-10-24 PROCEDURE — 80074 ACUTE HEPATITIS PANEL: CPT

## 2022-10-24 PROCEDURE — 86592 SYPHILIS TEST NON-TREP QUAL: CPT

## 2022-10-24 PROCEDURE — 86780 TREPONEMA PALLIDUM: CPT

## 2022-10-24 PROCEDURE — 84703 CHORIONIC GONADOTROPIN ASSAY: CPT

## 2022-10-24 PROCEDURE — 80307 DRUG TEST PRSMV CHEM ANLYZR: CPT

## 2022-10-24 PROCEDURE — 86593 SYPHILIS TEST NON-TREP QUANT: CPT

## 2022-10-24 PROCEDURE — 80053 COMPREHEN METABOLIC PANEL: CPT

## 2022-10-24 PROCEDURE — G0432 EIA HIV-1/HIV-2 SCREEN: HCPCS

## 2022-10-24 PROCEDURE — 85025 COMPLETE CBC W/AUTO DIFF WBC: CPT

## 2022-10-25 LAB
ALBUMIN SERPL-MCNC: 4.1 G/DL (ref 3.5–5.2)
ALBUMIN/GLOB SERPL: 1.3 G/DL
ALP SERPL-CCNC: 85 U/L (ref 39–117)
ALT SERPL W P-5'-P-CCNC: 14 U/L (ref 1–33)
ANION GAP SERPL CALCULATED.3IONS-SCNC: 9.5 MMOL/L (ref 5–15)
AST SERPL-CCNC: 17 U/L (ref 1–32)
BASOPHILS # BLD AUTO: 0.04 10*3/MM3 (ref 0–0.2)
BASOPHILS NFR BLD AUTO: 0.6 % (ref 0–1.5)
BILIRUB SERPL-MCNC: <0.2 MG/DL (ref 0–1.2)
BUN SERPL-MCNC: 10 MG/DL (ref 6–20)
BUN/CREAT SERPL: 14.1 (ref 7–25)
CALCIUM SPEC-SCNC: 8.8 MG/DL (ref 8.6–10.5)
CHLORIDE SERPL-SCNC: 101 MMOL/L (ref 98–107)
CO2 SERPL-SCNC: 26.5 MMOL/L (ref 22–29)
CREAT SERPL-MCNC: 0.71 MG/DL (ref 0.57–1)
DEPRECATED RDW RBC AUTO: 42.5 FL (ref 37–54)
EGFRCR SERPLBLD CKD-EPI 2021: 109.7 ML/MIN/1.73
EOSINOPHIL # BLD AUTO: 0.37 10*3/MM3 (ref 0–0.4)
EOSINOPHIL NFR BLD AUTO: 5.4 % (ref 0.3–6.2)
ERYTHROCYTE [DISTWIDTH] IN BLOOD BY AUTOMATED COUNT: 13.6 % (ref 12.3–15.4)
ETHANOL UR-MCNC: NEGATIVE %
FENTANYL UR-MCNC: NEGATIVE PG/ML
GLOBULIN UR ELPH-MCNC: 3.1 GM/DL
GLUCOSE SERPL-MCNC: 91 MG/DL (ref 65–99)
HAV IGM SERPL QL IA: ABNORMAL
HBV CORE IGM SERPL QL IA: ABNORMAL
HBV SURFACE AG SERPL QL IA: ABNORMAL
HCG SERPL QL: NEGATIVE
HCT VFR BLD AUTO: 39 % (ref 34–46.6)
HCV AB SER DONR QL: REACTIVE
HGB BLD-MCNC: 12.5 G/DL (ref 12–15.9)
HIV1+2 AB SER QL: NORMAL
IMM GRANULOCYTES # BLD AUTO: 0.01 10*3/MM3 (ref 0–0.05)
IMM GRANULOCYTES NFR BLD AUTO: 0.1 % (ref 0–0.5)
LYMPHOCYTES # BLD AUTO: 1.9 10*3/MM3 (ref 0.7–3.1)
LYMPHOCYTES NFR BLD AUTO: 28 % (ref 19.6–45.3)
MCH RBC QN AUTO: 27.4 PG (ref 26.6–33)
MCHC RBC AUTO-ENTMCNC: 32.1 G/DL (ref 31.5–35.7)
MCV RBC AUTO: 85.5 FL (ref 79–97)
MONOCYTES # BLD AUTO: 0.37 10*3/MM3 (ref 0.1–0.9)
MONOCYTES NFR BLD AUTO: 5.4 % (ref 5–12)
NEUTROPHILS NFR BLD AUTO: 4.1 10*3/MM3 (ref 1.7–7)
NEUTROPHILS NFR BLD AUTO: 60.5 % (ref 42.7–76)
NRBC BLD AUTO-RTO: 0 /100 WBC (ref 0–0.2)
PLATELET # BLD AUTO: 257 10*3/MM3 (ref 140–450)
PMV BLD AUTO: 10.3 FL (ref 6–12)
POTASSIUM SERPL-SCNC: 4.3 MMOL/L (ref 3.5–5.2)
PROT SERPL-MCNC: 7.2 G/DL (ref 6–8.5)
RBC # BLD AUTO: 4.56 10*6/MM3 (ref 3.77–5.28)
SODIUM SERPL-SCNC: 137 MMOL/L (ref 136–145)
WBC NRBC COR # BLD: 6.79 10*3/MM3 (ref 3.4–10.8)

## 2022-10-26 LAB
RPR SER QL: REACTIVE
RPR SER-TITR: ABNORMAL {TITER}
TREPONEMA PALLIDUM IGG+IGM AB [PRESENCE] IN SERUM OR PLASMA BY IMMUNOASSAY: REACTIVE

## 2023-09-29 ENCOUNTER — LAB (OUTPATIENT)
Dept: LAB | Facility: HOSPITAL | Age: 43
End: 2023-09-29
Payer: MEDICAID

## 2023-09-29 ENCOUNTER — HOSPITAL ENCOUNTER (OUTPATIENT)
Dept: RESPIRATORY THERAPY | Facility: HOSPITAL | Age: 43
Discharge: HOME | End: 2023-09-29
Payer: MEDICAID

## 2023-09-29 ENCOUNTER — TRANSCRIBE ORDERS (OUTPATIENT)
Dept: ADMINISTRATIVE | Facility: HOSPITAL | Age: 43
End: 2023-09-29
Payer: MEDICAID

## 2023-09-29 DIAGNOSIS — Z79.891 ENCOUNTER FOR LONG-TERM METHADONE USE: ICD-10-CM

## 2023-09-29 DIAGNOSIS — Z79.891 ENCOUNTER FOR LONG-TERM METHADONE USE: Primary | ICD-10-CM

## 2023-09-29 LAB
QT INTERVAL: 450 MS
QTC INTERVAL: 460 MS

## 2023-09-29 PROCEDURE — 80053 COMPREHEN METABOLIC PANEL: CPT

## 2023-09-29 PROCEDURE — 93005 ELECTROCARDIOGRAM TRACING: CPT

## 2023-09-29 PROCEDURE — 36415 COLL VENOUS BLD VENIPUNCTURE: CPT

## 2023-09-29 PROCEDURE — 85025 COMPLETE CBC W/AUTO DIFF WBC: CPT

## 2023-09-30 LAB
ALBUMIN SERPL-MCNC: 4 G/DL (ref 3.5–5.2)
ALBUMIN/GLOB SERPL: 1.1 G/DL
ALP SERPL-CCNC: 93 U/L (ref 39–117)
ALT SERPL W P-5'-P-CCNC: 10 U/L (ref 1–33)
ANION GAP SERPL CALCULATED.3IONS-SCNC: 9.1 MMOL/L (ref 5–15)
AST SERPL-CCNC: 15 U/L (ref 1–32)
BASOPHILS # BLD AUTO: 0.04 10*3/MM3 (ref 0–0.2)
BASOPHILS NFR BLD AUTO: 0.5 % (ref 0–1.5)
BILIRUB SERPL-MCNC: <0.2 MG/DL (ref 0–1.2)
BUN SERPL-MCNC: 15 MG/DL (ref 6–20)
BUN/CREAT SERPL: 15.2 (ref 7–25)
CALCIUM SPEC-SCNC: 9.1 MG/DL (ref 8.6–10.5)
CHLORIDE SERPL-SCNC: 100 MMOL/L (ref 98–107)
CO2 SERPL-SCNC: 27.9 MMOL/L (ref 22–29)
CREAT SERPL-MCNC: 0.99 MG/DL (ref 0.57–1)
DEPRECATED RDW RBC AUTO: 45.3 FL (ref 37–54)
EGFRCR SERPLBLD CKD-EPI 2021: 73.2 ML/MIN/1.73
EOSINOPHIL # BLD AUTO: 0.3 10*3/MM3 (ref 0–0.4)
EOSINOPHIL NFR BLD AUTO: 4 % (ref 0.3–6.2)
ERYTHROCYTE [DISTWIDTH] IN BLOOD BY AUTOMATED COUNT: 14.8 % (ref 12.3–15.4)
GLOBULIN UR ELPH-MCNC: 3.7 GM/DL
GLUCOSE SERPL-MCNC: 79 MG/DL (ref 65–99)
HCT VFR BLD AUTO: 40.8 % (ref 34–46.6)
HGB BLD-MCNC: 13.4 G/DL (ref 12–15.9)
IMM GRANULOCYTES # BLD AUTO: 0.02 10*3/MM3 (ref 0–0.05)
IMM GRANULOCYTES NFR BLD AUTO: 0.3 % (ref 0–0.5)
LYMPHOCYTES # BLD AUTO: 2.12 10*3/MM3 (ref 0.7–3.1)
LYMPHOCYTES NFR BLD AUTO: 28.5 % (ref 19.6–45.3)
MCH RBC QN AUTO: 27.6 PG (ref 26.6–33)
MCHC RBC AUTO-ENTMCNC: 32.8 G/DL (ref 31.5–35.7)
MCV RBC AUTO: 84.1 FL (ref 79–97)
MONOCYTES # BLD AUTO: 0.5 10*3/MM3 (ref 0.1–0.9)
MONOCYTES NFR BLD AUTO: 6.7 % (ref 5–12)
NEUTROPHILS NFR BLD AUTO: 4.45 10*3/MM3 (ref 1.7–7)
NEUTROPHILS NFR BLD AUTO: 60 % (ref 42.7–76)
NRBC BLD AUTO-RTO: 0 /100 WBC (ref 0–0.2)
PLATELET # BLD AUTO: 297 10*3/MM3 (ref 140–450)
PMV BLD AUTO: 11.1 FL (ref 6–12)
POTASSIUM SERPL-SCNC: 5.1 MMOL/L (ref 3.5–5.2)
PROT SERPL-MCNC: 7.7 G/DL (ref 6–8.5)
RBC # BLD AUTO: 4.85 10*6/MM3 (ref 3.77–5.28)
SODIUM SERPL-SCNC: 137 MMOL/L (ref 136–145)
WBC NRBC COR # BLD: 7.43 10*3/MM3 (ref 3.4–10.8)

## 2024-07-22 ENCOUNTER — HOSPITAL ENCOUNTER (EMERGENCY)
Facility: HOSPITAL | Age: 44
Discharge: HOME | End: 2024-07-22
Attending: STUDENT IN AN ORGANIZED HEALTH CARE EDUCATION/TRAINING PROGRAM | Admitting: STUDENT IN AN ORGANIZED HEALTH CARE EDUCATION/TRAINING PROGRAM
Payer: MEDICAID

## 2024-07-22 ENCOUNTER — APPOINTMENT (OUTPATIENT)
Dept: GENERAL RADIOLOGY | Facility: HOSPITAL | Age: 44
End: 2024-07-22
Payer: MEDICAID

## 2024-07-22 VITALS
BODY MASS INDEX: 49.49 KG/M2 | SYSTOLIC BLOOD PRESSURE: 112 MMHG | RESPIRATION RATE: 19 BRPM | HEIGHT: 62 IN | HEART RATE: 61 BPM | TEMPERATURE: 98.1 F | WEIGHT: 268.96 LBS | OXYGEN SATURATION: 94 % | DIASTOLIC BLOOD PRESSURE: 60 MMHG
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
## 2024-07-22 DIAGNOSIS — S89.91XA INJURY OF RIGHT LOWER EXTREMITY, INITIAL ENCOUNTER: ICD-10-CM

## 2024-07-22 PROCEDURE — 73590 X-RAY EXAM OF LOWER LEG: CPT

## 2024-07-22 PROCEDURE — 99283 EMERGENCY DEPT VISIT LOW MDM: CPT

## 2024-07-22 PROCEDURE — 73590 X-RAY EXAM OF LOWER LEG: CPT | Performed by: RADIOLOGY
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
## 2024-07-27 ENCOUNTER — APPOINTMENT (OUTPATIENT)
Dept: CT IMAGING | Facility: HOSPITAL | Age: 44
End: 2024-07-27
Payer: MEDICAID

## 2024-07-27 ENCOUNTER — APPOINTMENT (OUTPATIENT)
Dept: GENERAL RADIOLOGY | Facility: HOSPITAL | Age: 44
End: 2024-07-27
Payer: MEDICAID

## 2024-07-27 ENCOUNTER — HOSPITAL ENCOUNTER (EMERGENCY)
Facility: HOSPITAL | Age: 44
Discharge: HOME | End: 2024-07-27
Attending: FAMILY MEDICINE
Payer: MEDICAID

## 2024-07-27 VITALS
RESPIRATION RATE: 16 BRPM | OXYGEN SATURATION: 93 % | TEMPERATURE: 97.8 F | SYSTOLIC BLOOD PRESSURE: 132 MMHG | HEIGHT: 62 IN | BODY MASS INDEX: 49.32 KG/M2 | HEART RATE: 59 BPM | WEIGHT: 268 LBS | DIASTOLIC BLOOD PRESSURE: 87 MMHG

## 2024-07-27 DIAGNOSIS — T50.901A ACCIDENTAL OVERDOSE, INITIAL ENCOUNTER: Primary | ICD-10-CM

## 2024-07-27 DIAGNOSIS — F19.10 POLYSUBSTANCE ABUSE: ICD-10-CM

## 2024-07-27 LAB
A-A DO2: ABNORMAL
ALBUMIN SERPL-MCNC: 4.2 G/DL (ref 3.5–5.2)
ALBUMIN/GLOB SERPL: 1.2 G/DL
ALP SERPL-CCNC: 102 U/L (ref 39–117)
ALT SERPL W P-5'-P-CCNC: 13 U/L (ref 1–33)
AMMONIA BLD-SCNC: 55 UMOL/L (ref 11–51)
AMPHET+METHAMPHET UR QL: POSITIVE
AMPHETAMINES UR QL: POSITIVE
ANION GAP SERPL CALCULATED.3IONS-SCNC: 12.6 MMOL/L (ref 5–15)
ARTERIAL PATENCY WRIST A: POSITIVE
AST SERPL-CCNC: 21 U/L (ref 1–32)
ATMOSPHERIC PRESS: 730 MMHG
BARBITURATES UR QL SCN: NEGATIVE
BASE EXCESS BLDA CALC-SCNC: 4.5 MMOL/L (ref 0–2)
BASOPHILS # BLD AUTO: 0.04 10*3/MM3 (ref 0–0.2)
BASOPHILS NFR BLD AUTO: 0.4 % (ref 0–1.5)
BDY SITE: ABNORMAL
BENZODIAZ UR QL SCN: POSITIVE
BILIRUB SERPL-MCNC: 0.3 MG/DL (ref 0–1.2)
BILIRUB UR QL STRIP: NEGATIVE
BUN SERPL-MCNC: 21 MG/DL (ref 6–20)
BUN/CREAT SERPL: 17.9 (ref 7–25)
BUPRENORPHINE SERPL-MCNC: NEGATIVE NG/ML
CALCIUM SPEC-SCNC: 9.9 MG/DL (ref 8.6–10.5)
CANNABINOIDS SERPL QL: NEGATIVE
CHLORIDE SERPL-SCNC: 102 MMOL/L (ref 98–107)
CLARITY UR: CLEAR
CO2 BLDA-SCNC: 26.1 MMOL/L (ref 22–33)
CO2 SERPL-SCNC: 28.4 MMOL/L (ref 22–29)
COCAINE UR QL: NEGATIVE
COHGB MFR BLD: 1.4 % (ref 0–5)
COLOR UR: YELLOW
CREAT SERPL-MCNC: 1.17 MG/DL (ref 0.57–1)
CRP SERPL-MCNC: 2.66 MG/DL (ref 0–0.5)
D-LACTATE SERPL-SCNC: 1.3 MMOL/L (ref 0.5–2)
DEPRECATED RDW RBC AUTO: 45.3 FL (ref 37–54)
EGFRCR SERPLBLD CKD-EPI 2021: 59.9 ML/MIN/1.73
EOSINOPHIL # BLD AUTO: 0.07 10*3/MM3 (ref 0–0.4)
EOSINOPHIL NFR BLD AUTO: 0.6 % (ref 0.3–6.2)
ERYTHROCYTE [DISTWIDTH] IN BLOOD BY AUTOMATED COUNT: 14.8 % (ref 12.3–15.4)
ETHANOL BLD-MCNC: <10 MG/DL (ref 0–10)
ETHANOL UR QL: <0.01 %
FENTANYL UR-MCNC: NEGATIVE NG/ML
FLUAV RNA RESP QL NAA+PROBE: NOT DETECTED
FLUBV RNA RESP QL NAA+PROBE: NOT DETECTED
GLOBULIN UR ELPH-MCNC: 3.6 GM/DL
GLUCOSE BLDC GLUCOMTR-MCNC: 98 MG/DL (ref 70–130)
GLUCOSE SERPL-MCNC: 90 MG/DL (ref 65–99)
GLUCOSE UR STRIP-MCNC: NEGATIVE MG/DL
HCG SERPL QL: NEGATIVE
HCO3 BLDA-SCNC: 25.3 MMOL/L (ref 20–26)
HCT VFR BLD AUTO: 42.7 % (ref 34–46.6)
HCT VFR BLD CALC: 44.1 % (ref 38–51)
HGB BLD-MCNC: 13.8 G/DL (ref 12–15.9)
HGB BLDA-MCNC: 14.4 G/DL (ref 13.5–17.5)
HGB UR QL STRIP.AUTO: NEGATIVE
HOLD SPECIMEN: NORMAL
IMM GRANULOCYTES # BLD AUTO: 0.06 10*3/MM3 (ref 0–0.05)
IMM GRANULOCYTES NFR BLD AUTO: 0.5 % (ref 0–0.5)
INHALED O2 CONCENTRATION: 21 %
INR PPP: 0.99 (ref 0.9–1.1)
KETONES UR QL STRIP: NEGATIVE
LEUKOCYTE ESTERASE UR QL STRIP.AUTO: NEGATIVE
LYMPHOCYTES # BLD AUTO: 1.55 10*3/MM3 (ref 0.7–3.1)
LYMPHOCYTES NFR BLD AUTO: 13.7 % (ref 19.6–45.3)
Lab: ABNORMAL
Lab: ABNORMAL
MAGNESIUM SERPL-MCNC: 2.2 MG/DL (ref 1.6–2.6)
MCH RBC QN AUTO: 27.5 PG (ref 26.6–33)
MCHC RBC AUTO-ENTMCNC: 32.3 G/DL (ref 31.5–35.7)
MCV RBC AUTO: 85.2 FL (ref 79–97)
METHADONE UR QL SCN: POSITIVE
METHGB BLD QL: 0 % (ref 0–3)
MODALITY: ABNORMAL
MONOCYTES # BLD AUTO: 0.67 10*3/MM3 (ref 0.1–0.9)
MONOCYTES NFR BLD AUTO: 5.9 % (ref 5–12)
NEUTROPHILS NFR BLD AUTO: 78.9 % (ref 42.7–76)
NEUTROPHILS NFR BLD AUTO: 8.89 10*3/MM3 (ref 1.7–7)
NITRITE UR QL STRIP: NEGATIVE
NOTE: ABNORMAL
NOTIFIED BY: ABNORMAL
NOTIFIED WHO: ABNORMAL
NRBC BLD AUTO-RTO: 0 /100 WBC (ref 0–0.2)
OPIATES UR QL: NEGATIVE
OXYCODONE UR QL SCN: NEGATIVE
OXYHGB MFR BLDV: 98.4 % (ref 94–99)
PCO2 BLDA: 26.9 MM HG (ref 35–45)
PCO2 TEMP ADJ BLD: ABNORMAL MM[HG]
PCP UR QL SCN: NEGATIVE
PH BLDA: 7.58 PH UNITS (ref 7.35–7.45)
PH UR STRIP.AUTO: 6.5 [PH] (ref 5–8)
PH, TEMP CORRECTED: ABNORMAL
PLATELET # BLD AUTO: 267 10*3/MM3 (ref 140–450)
PMV BLD AUTO: 9.9 FL (ref 6–12)
PO2 BLDA: 142 MM HG (ref 83–108)
PO2 TEMP ADJ BLD: ABNORMAL MM[HG]
POTASSIUM SERPL-SCNC: 4.1 MMOL/L (ref 3.5–5.2)
PROCALCITONIN SERPL-MCNC: 0.03 NG/ML (ref 0–0.25)
PROT SERPL-MCNC: 7.8 G/DL (ref 6–8.5)
PROT UR QL STRIP: NEGATIVE
PROTHROMBIN TIME: 13.2 SECONDS (ref 12.1–14.7)
RBC # BLD AUTO: 5.01 10*6/MM3 (ref 3.77–5.28)
SAO2 % BLDCOA: >99.2 % (ref 94–99)
SARS-COV-2 RNA RESP QL NAA+PROBE: NOT DETECTED
SODIUM SERPL-SCNC: 143 MMOL/L (ref 136–145)
SP GR UR STRIP: 1.02 (ref 1–1.03)
T4 FREE SERPL-MCNC: 1.11 NG/DL (ref 0.93–1.7)
TRICYCLICS UR QL SCN: NEGATIVE
TSH SERPL DL<=0.05 MIU/L-ACNC: 3.62 UIU/ML (ref 0.27–4.2)
UROBILINOGEN UR QL STRIP: NORMAL
VENTILATOR MODE: ABNORMAL
WBC NRBC COR # BLD AUTO: 11.28 10*3/MM3 (ref 3.4–10.8)
WHOLE BLOOD HOLD COAG: NORMAL
WHOLE BLOOD HOLD SPECIMEN: NORMAL

## 2024-07-27 PROCEDURE — 83605 ASSAY OF LACTIC ACID: CPT | Performed by: FAMILY MEDICINE

## 2024-07-27 PROCEDURE — 36415 COLL VENOUS BLD VENIPUNCTURE: CPT

## 2024-07-27 PROCEDURE — 83735 ASSAY OF MAGNESIUM: CPT | Performed by: FAMILY MEDICINE

## 2024-07-27 PROCEDURE — 36600 WITHDRAWAL OF ARTERIAL BLOOD: CPT

## 2024-07-27 PROCEDURE — 82948 REAGENT STRIP/BLOOD GLUCOSE: CPT

## 2024-07-27 PROCEDURE — 96374 THER/PROPH/DIAG INJ IV PUSH: CPT

## 2024-07-27 PROCEDURE — 25810000003 SODIUM CHLORIDE 0.9 % SOLUTION: Performed by: EMERGENCY MEDICINE

## 2024-07-27 PROCEDURE — 80307 DRUG TEST PRSMV CHEM ANLYZR: CPT | Performed by: FAMILY MEDICINE

## 2024-07-27 PROCEDURE — 84145 PROCALCITONIN (PCT): CPT | Performed by: FAMILY MEDICINE

## 2024-07-27 PROCEDURE — 80050 GENERAL HEALTH PANEL: CPT | Performed by: FAMILY MEDICINE

## 2024-07-27 PROCEDURE — 70450 CT HEAD/BRAIN W/O DYE: CPT

## 2024-07-27 PROCEDURE — 71045 X-RAY EXAM CHEST 1 VIEW: CPT | Performed by: RADIOLOGY

## 2024-07-27 PROCEDURE — 99284 EMERGENCY DEPT VISIT MOD MDM: CPT

## 2024-07-27 PROCEDURE — 82140 ASSAY OF AMMONIA: CPT | Performed by: FAMILY MEDICINE

## 2024-07-27 PROCEDURE — 82805 BLOOD GASES W/O2 SATURATION: CPT

## 2024-07-27 PROCEDURE — 81003 URINALYSIS AUTO W/O SCOPE: CPT | Performed by: FAMILY MEDICINE

## 2024-07-27 PROCEDURE — 70450 CT HEAD/BRAIN W/O DYE: CPT | Performed by: RADIOLOGY

## 2024-07-27 PROCEDURE — 25010000002 LORAZEPAM PER 2 MG

## 2024-07-27 PROCEDURE — 87636 SARSCOV2 & INF A&B AMP PRB: CPT | Performed by: FAMILY MEDICINE

## 2024-07-27 PROCEDURE — 86140 C-REACTIVE PROTEIN: CPT | Performed by: FAMILY MEDICINE

## 2024-07-27 PROCEDURE — 87147 CULTURE TYPE IMMUNOLOGIC: CPT | Performed by: FAMILY MEDICINE

## 2024-07-27 PROCEDURE — 84439 ASSAY OF FREE THYROXINE: CPT | Performed by: FAMILY MEDICINE

## 2024-07-27 PROCEDURE — 25010000002 DIPHENHYDRAMINE PER 50 MG: Performed by: EMERGENCY MEDICINE

## 2024-07-27 PROCEDURE — 84703 CHORIONIC GONADOTROPIN ASSAY: CPT | Performed by: FAMILY MEDICINE

## 2024-07-27 PROCEDURE — 93005 ELECTROCARDIOGRAM TRACING: CPT | Performed by: FAMILY MEDICINE

## 2024-07-27 PROCEDURE — 85610 PROTHROMBIN TIME: CPT | Performed by: FAMILY MEDICINE

## 2024-07-27 PROCEDURE — 71045 X-RAY EXAM CHEST 1 VIEW: CPT

## 2024-07-27 PROCEDURE — 87076 CULTURE ANAEROBE IDENT EACH: CPT | Performed by: FAMILY MEDICINE

## 2024-07-27 PROCEDURE — 83050 HGB METHEMOGLOBIN QUAN: CPT

## 2024-07-27 PROCEDURE — 82077 ASSAY SPEC XCP UR&BREATH IA: CPT | Performed by: FAMILY MEDICINE

## 2024-07-27 PROCEDURE — P9612 CATHETERIZE FOR URINE SPEC: HCPCS

## 2024-07-27 PROCEDURE — 87154 CUL TYP ID BLD PTHGN 6+ TRGT: CPT | Performed by: FAMILY MEDICINE

## 2024-07-27 PROCEDURE — 87186 SC STD MICRODIL/AGAR DIL: CPT | Performed by: FAMILY MEDICINE

## 2024-07-27 PROCEDURE — 87040 BLOOD CULTURE FOR BACTERIA: CPT | Performed by: FAMILY MEDICINE

## 2024-07-27 PROCEDURE — 82375 ASSAY CARBOXYHB QUANT: CPT

## 2024-07-27 PROCEDURE — 93010 ELECTROCARDIOGRAM REPORT: CPT | Performed by: INTERNAL MEDICINE

## 2024-07-27 PROCEDURE — 96375 TX/PRO/DX INJ NEW DRUG ADDON: CPT

## 2024-07-27 RX ORDER — HALOPERIDOL 5 MG/ML
10 INJECTION INTRAMUSCULAR ONCE
Status: DISCONTINUED | OUTPATIENT
Start: 2024-07-27 | End: 2024-07-27

## 2024-07-27 RX ORDER — LORAZEPAM 2 MG/ML
INJECTION INTRAMUSCULAR
Status: COMPLETED
Start: 2024-07-27 | End: 2024-07-27

## 2024-07-27 RX ORDER — SODIUM CHLORIDE 0.9 % (FLUSH) 0.9 %
10 SYRINGE (ML) INJECTION AS NEEDED
Status: DISCONTINUED | OUTPATIENT
Start: 2024-07-27 | End: 2024-07-27 | Stop reason: HOSPADM

## 2024-07-27 RX ORDER — DIPHENHYDRAMINE HYDROCHLORIDE 50 MG/ML
50 INJECTION INTRAMUSCULAR; INTRAVENOUS ONCE
Status: COMPLETED | OUTPATIENT
Start: 2024-07-27 | End: 2024-07-27

## 2024-07-27 RX ORDER — LORAZEPAM 2 MG/ML
2 INJECTION INTRAMUSCULAR EVERY 4 HOURS PRN
Status: DISCONTINUED | OUTPATIENT
Start: 2024-07-27 | End: 2024-07-27

## 2024-07-27 RX ORDER — LORAZEPAM 2 MG/ML
2 INJECTION INTRAMUSCULAR ONCE
Status: COMPLETED | OUTPATIENT
Start: 2024-07-27 | End: 2024-07-27

## 2024-07-27 RX ORDER — DIPHENHYDRAMINE HYDROCHLORIDE 50 MG/ML
50 INJECTION INTRAMUSCULAR; INTRAVENOUS ONCE
Status: DISCONTINUED | OUTPATIENT
Start: 2024-07-27 | End: 2024-07-27

## 2024-07-27 RX ADMIN — LORAZEPAM 2 MG: 2 INJECTION INTRAMUSCULAR; INTRAVENOUS at 14:29

## 2024-07-27 RX ADMIN — DIPHENHYDRAMINE HYDROCHLORIDE 50 MG: 50 INJECTION, SOLUTION INTRAMUSCULAR; INTRAVENOUS at 14:32

## 2024-07-27 RX ADMIN — SODIUM CHLORIDE 1000 ML: 9 INJECTION, SOLUTION INTRAVENOUS at 17:24

## 2024-07-27 RX ADMIN — LORAZEPAM 2 MG: 2 INJECTION INTRAMUSCULAR at 14:29

## 2024-07-27 NOTE — ED PROVIDER NOTES
Subjective   History of Present Illness  43-year-old female with history of substance abuse, diabetes, COPD presents the emergency room with complaints of altered mental status.  Patient reportedly was found unresponsive for approximately an hour.  Patient was believed to potentially have ingested Neurontin however substance is unable to be confirmed.  Upon EMS arrival patient was noted to be somnolent patient was given 4 mg intranasal Narcan with minimal improvement she then was brought to the emergency room for further evaluation.  Further history is unable to be obtained given patient's current mentation.    Altered Mental Status  Presenting symptoms: partial responsiveness    Severity:  Severe  Most recent episode:  Today  Episode history:  Continuous  Timing:  Constant  Chronicity:  New      Review of Systems   Unable to perform ROS: Patient unresponsive   All other systems reviewed and are negative.      Past Medical History:   Diagnosis Date   • Asthma    • COPD (chronic obstructive pulmonary disease)    • Diabetes mellitus    • Hepatitis C    • History of gallstones    • Hypertension    • IV drug abuse    • Lupus    • Rheumatoid arthritis        No Known Allergies    Past Surgical History:   Procedure Laterality Date   • ABDOMINAL SURGERY     • CHOLECYSTECTOMY     • PERINEAL LESION/CYST EXCISION N/A 4/3/2017    Procedure: EXCISION OF VAGINAL SKIN TAG;  Surgeon: Kee Crooks III, MD;  Location: [facility];  Service:    • TUBAL COAGULATION LAPAROSCOPIC Bilateral 4/3/2017    Procedure: BILATERAL TUBAL FALLOPE FILSHIE CLIPPING LAPAROSCOPIC,IUD REMOVAL;  Surgeon: Kee Crooks III, MD;  Location: [facility];  Service:        Family History   Problem Relation Age of Onset   • No Known Problems Mother    • No Known Problems Father    • No Known Problems Sister    • No Known Problems Brother    • No Known Problems Son    • No Known Problems Daughter    • No Known Problems Maternal Grandmother    • No Known Problems  Maternal Grandfather    • No Known Problems Paternal Grandmother    • No Known Problems Paternal Grandfather    • No Known Problems Cousin    • No Known Problems Other    • Rheum arthritis Neg Hx    • Osteoarthritis Neg Hx    • Asthma Neg Hx    • Diabetes Neg Hx    • Heart failure Neg Hx    • Hyperlipidemia Neg Hx    • Hypertension Neg Hx    • Migraines Neg Hx    • Rashes / Skin problems Neg Hx    • Seizures Neg Hx    • Stroke Neg Hx    • Thyroid disease Neg Hx        Social History     Socioeconomic History   • Marital status: Legally    Tobacco Use   • Smoking status: Every Day     Current packs/day: 1.00     Average packs/day: 1 pack/day for 15.0 years (15.0 ttl pk-yrs)     Types: Cigarettes   • Smokeless tobacco: Never   Substance and Sexual Activity   • Alcohol use: No   • Drug use: No   • Sexual activity: Yes     Partners: Male     Birth control/protection: I.U.D.           Objective   Physical Exam  Vitals and nursing note reviewed.   Constitutional:       Appearance: She is obese.      Comments: Patient lethargic does open eyes to painful stimuli has incomprehensible verbal response patient is moving extremities spontaneously but is able to localize pain.   HENT:      Head: Normocephalic and atraumatic.   Eyes:      Comments: Pinpoint pupils   Cardiovascular:      Rate and Rhythm: Normal rate and regular rhythm.   Pulmonary:      Effort: Pulmonary effort is normal.      Breath sounds: No wheezing or rales.   Abdominal:      General: Bowel sounds are normal.      Palpations: Abdomen is soft.      Tenderness: There is no abdominal tenderness. There is no guarding.   Musculoskeletal:      Right lower leg: No edema.      Left lower leg: No edema.   Skin:     General: Skin is warm and dry.   Neurological:      GCS: GCS eye subscore is 2. GCS verbal subscore is 2. GCS motor subscore is 5.      Comments: GCS 9.         Procedures           ED Course  ED Course as of 07/27/24 1419   Sat Jul 27, 2024   1414  Accu-Chek 98.  Patient's pO2 142.  Patient was able to wake up and states that she took 2 Klonopin and some neurontin [BB]   1419 Have discussed case with Dr. Johnston who has assumed care [BB]      ED Course User Index  [BB] Jacob Irizarry MD                                             Medical Decision Making  Amount and/or Complexity of Data Reviewed  Labs: ordered.  Radiology: ordered.  ECG/medicine tests: ordered.    Risk  Prescription drug management.        Final diagnoses:   None       ED Disposition  ED Disposition       None            No follow-up provider specified.       Medication List      No changes were made to your prescriptions during this visit.

## 2024-07-27 NOTE — ED NOTES
Pt not answering commands; mumbling speech and flailing all limbs. Pt frequently becomes lethargic and only responds to repetitive painful stimuli. Pt putting legs over rails, flipping up over bed rail frequently. Dr. Irizarry at bedside at this time.

## 2024-07-28 LAB
BACTERIA BLD CULT: ABNORMAL
BACTERIA SPEC AEROBE CULT: ABNORMAL
BOTTLE TYPE: ABNORMAL
GRAM STN SPEC: ABNORMAL
QT INTERVAL: 430 MS
QTC INTERVAL: 436 MS

## 2024-08-01 LAB
BACTERIA SPEC AEROBE CULT: ABNORMAL
GRAM STN SPEC: ABNORMAL
ISOLATED FROM: ABNORMAL

## 2024-11-15 ENCOUNTER — LAB (OUTPATIENT)
Dept: LAB | Facility: HOSPITAL | Age: 44
End: 2024-11-15
Payer: COMMERCIAL

## 2024-11-15 ENCOUNTER — TRANSCRIBE ORDERS (OUTPATIENT)
Dept: ADMINISTRATIVE | Facility: HOSPITAL | Age: 44
End: 2024-11-15
Payer: COMMERCIAL

## 2024-11-15 ENCOUNTER — HOSPITAL ENCOUNTER (OUTPATIENT)
Dept: GENERAL RADIOLOGY | Facility: HOSPITAL | Age: 44
Discharge: HOME | End: 2024-11-15
Payer: COMMERCIAL

## 2024-11-15 DIAGNOSIS — Z13.89 SCREENING FOR GOUT: Primary | ICD-10-CM

## 2024-11-15 DIAGNOSIS — Z13.89 SCREENING FOR GOUT: ICD-10-CM

## 2024-11-15 DIAGNOSIS — M54.16 LUMBAR RADICULOPATHY: Primary | ICD-10-CM

## 2024-11-15 DIAGNOSIS — M54.16 LUMBAR RADICULOPATHY: ICD-10-CM

## 2024-11-15 LAB
25(OH)D3 SERPL-MCNC: 15.6 NG/ML (ref 30–100)
ALBUMIN SERPL-MCNC: 3.8 G/DL (ref 3.5–5.2)
ALBUMIN/GLOB SERPL: 1.2 G/DL
ALP SERPL-CCNC: 87 U/L (ref 39–117)
ALT SERPL W P-5'-P-CCNC: 14 U/L (ref 1–33)
ANION GAP SERPL CALCULATED.3IONS-SCNC: 8 MMOL/L (ref 5–15)
AST SERPL-CCNC: 13 U/L (ref 1–32)
BASOPHILS # BLD AUTO: 0.03 10*3/MM3 (ref 0–0.2)
BASOPHILS NFR BLD AUTO: 0.5 % (ref 0–1.5)
BILIRUB SERPL-MCNC: <0.2 MG/DL (ref 0–1.2)
BUN SERPL-MCNC: 14 MG/DL (ref 6–20)
BUN/CREAT SERPL: 15.4 (ref 7–25)
CALCIUM SPEC-SCNC: 9.4 MG/DL (ref 8.6–10.5)
CHLORIDE SERPL-SCNC: 99 MMOL/L (ref 98–107)
CHOLEST SERPL-MCNC: 188 MG/DL (ref 0–200)
CO2 SERPL-SCNC: 31 MMOL/L (ref 22–29)
CREAT SERPL-MCNC: 0.91 MG/DL (ref 0.57–1)
DEPRECATED RDW RBC AUTO: 44 FL (ref 37–54)
EGFRCR SERPLBLD CKD-EPI 2021: 80.4 ML/MIN/1.73
EOSINOPHIL # BLD AUTO: 0.16 10*3/MM3 (ref 0–0.4)
EOSINOPHIL NFR BLD AUTO: 2.4 % (ref 0.3–6.2)
ERYTHROCYTE [DISTWIDTH] IN BLOOD BY AUTOMATED COUNT: 14.7 % (ref 12.3–15.4)
GLOBULIN UR ELPH-MCNC: 3.2 GM/DL
GLUCOSE SERPL-MCNC: 74 MG/DL (ref 65–99)
HCT VFR BLD AUTO: 40.4 % (ref 34–46.6)
HDLC SERPL-MCNC: 36 MG/DL (ref 40–60)
HGB BLD-MCNC: 13 G/DL (ref 12–15.9)
IMM GRANULOCYTES # BLD AUTO: 0.01 10*3/MM3 (ref 0–0.05)
IMM GRANULOCYTES NFR BLD AUTO: 0.2 % (ref 0–0.5)
LDLC SERPL CALC-MCNC: 119 MG/DL (ref 0–100)
LDLC/HDLC SERPL: 3.18 {RATIO}
LYMPHOCYTES # BLD AUTO: 1.38 10*3/MM3 (ref 0.7–3.1)
LYMPHOCYTES NFR BLD AUTO: 21 % (ref 19.6–45.3)
MAGNESIUM SERPL-MCNC: 2.2 MG/DL (ref 1.6–2.6)
MCH RBC QN AUTO: 26.8 PG (ref 26.6–33)
MCHC RBC AUTO-ENTMCNC: 32.2 G/DL (ref 31.5–35.7)
MCV RBC AUTO: 83.3 FL (ref 79–97)
MONOCYTES # BLD AUTO: 0.4 10*3/MM3 (ref 0.1–0.9)
MONOCYTES NFR BLD AUTO: 6.1 % (ref 5–12)
NEUTROPHILS NFR BLD AUTO: 4.58 10*3/MM3 (ref 1.7–7)
NEUTROPHILS NFR BLD AUTO: 69.8 % (ref 42.7–76)
NRBC BLD AUTO-RTO: 0 /100 WBC (ref 0–0.2)
PLATELET # BLD AUTO: 252 10*3/MM3 (ref 140–450)
PMV BLD AUTO: 10.4 FL (ref 6–12)
POTASSIUM SERPL-SCNC: 4.5 MMOL/L (ref 3.5–5.2)
PROT SERPL-MCNC: 7 G/DL (ref 6–8.5)
RBC # BLD AUTO: 4.85 10*6/MM3 (ref 3.77–5.28)
SODIUM SERPL-SCNC: 138 MMOL/L (ref 136–145)
TRIGL SERPL-MCNC: 187 MG/DL (ref 0–150)
VLDLC SERPL-MCNC: 33 MG/DL (ref 5–40)
WBC NRBC COR # BLD AUTO: 6.56 10*3/MM3 (ref 3.4–10.8)

## 2024-11-15 PROCEDURE — 82306 VITAMIN D 25 HYDROXY: CPT

## 2024-11-15 PROCEDURE — 86376 MICROSOMAL ANTIBODY EACH: CPT

## 2024-11-15 PROCEDURE — 72110 X-RAY EXAM L-2 SPINE 4/>VWS: CPT

## 2024-11-15 PROCEDURE — 72110 X-RAY EXAM L-2 SPINE 4/>VWS: CPT | Performed by: RADIOLOGY

## 2024-11-15 PROCEDURE — 36415 COLL VENOUS BLD VENIPUNCTURE: CPT

## 2024-11-15 PROCEDURE — 84439 ASSAY OF FREE THYROXINE: CPT

## 2024-11-15 PROCEDURE — 83735 ASSAY OF MAGNESIUM: CPT

## 2024-11-15 PROCEDURE — 80050 GENERAL HEALTH PANEL: CPT

## 2024-11-15 PROCEDURE — 80061 LIPID PANEL: CPT

## 2024-11-16 LAB
INTERPRETATION: NORMAL
T4 FREE SERPL-MCNC: 0.83 NG/DL (ref 0.82–1.77)
THYROPEROXIDASE AB SERPL-ACNC: 11 IU/ML (ref 0–34)
TSH SERPL DL<=0.005 MIU/L-ACNC: 4.6 UIU/ML (ref 0.45–4.5)

## 2024-12-03 ENCOUNTER — TRANSCRIBE ORDERS (OUTPATIENT)
Dept: ADMINISTRATIVE | Facility: HOSPITAL | Age: 44
End: 2024-12-03
Payer: COMMERCIAL

## 2024-12-03 DIAGNOSIS — M54.16 LUMBAR RADICULOPATHY: Primary | ICD-10-CM

## 2024-12-29 ENCOUNTER — HOSPITAL ENCOUNTER (EMERGENCY)
Facility: HOSPITAL | Age: 44
Discharge: HOME | End: 2024-12-30
Attending: STUDENT IN AN ORGANIZED HEALTH CARE EDUCATION/TRAINING PROGRAM | Admitting: STUDENT IN AN ORGANIZED HEALTH CARE EDUCATION/TRAINING PROGRAM
Payer: COMMERCIAL

## 2024-12-29 ENCOUNTER — APPOINTMENT (OUTPATIENT)
Dept: GENERAL RADIOLOGY | Facility: HOSPITAL | Age: 44
End: 2024-12-29
Payer: COMMERCIAL

## 2024-12-29 DIAGNOSIS — F11.920 OPIOID INTOXICATION WITHOUT COMPLICATION: ICD-10-CM

## 2024-12-29 DIAGNOSIS — F19.10 PSYCHOACTIVE SUBSTANCE ABUSE: ICD-10-CM

## 2024-12-29 DIAGNOSIS — F19.10 POLYSUBSTANCE ABUSE: Primary | ICD-10-CM

## 2024-12-29 LAB
BASOPHILS # BLD AUTO: 0.04 10*3/MM3 (ref 0–0.2)
BASOPHILS NFR BLD AUTO: 0.4 % (ref 0–1.5)
DEPRECATED RDW RBC AUTO: 47.3 FL (ref 37–54)
EOSINOPHIL # BLD AUTO: 0.11 10*3/MM3 (ref 0–0.4)
EOSINOPHIL NFR BLD AUTO: 1.2 % (ref 0.3–6.2)
ERYTHROCYTE [DISTWIDTH] IN BLOOD BY AUTOMATED COUNT: 15.2 % (ref 12.3–15.4)
HCT VFR BLD AUTO: 40.8 % (ref 34–46.6)
HGB BLD-MCNC: 12.8 G/DL (ref 12–15.9)
IMM GRANULOCYTES # BLD AUTO: 0.04 10*3/MM3 (ref 0–0.05)
IMM GRANULOCYTES NFR BLD AUTO: 0.4 % (ref 0–0.5)
LYMPHOCYTES # BLD AUTO: 1.16 10*3/MM3 (ref 0.7–3.1)
LYMPHOCYTES NFR BLD AUTO: 12.7 % (ref 19.6–45.3)
MCH RBC QN AUTO: 26.7 PG (ref 26.6–33)
MCHC RBC AUTO-ENTMCNC: 31.4 G/DL (ref 31.5–35.7)
MCV RBC AUTO: 85.2 FL (ref 79–97)
MONOCYTES # BLD AUTO: 0.65 10*3/MM3 (ref 0.1–0.9)
MONOCYTES NFR BLD AUTO: 7.1 % (ref 5–12)
NEUTROPHILS NFR BLD AUTO: 7.12 10*3/MM3 (ref 1.7–7)
NEUTROPHILS NFR BLD AUTO: 78.2 % (ref 42.7–76)
NRBC BLD AUTO-RTO: 0 /100 WBC (ref 0–0.2)
PLATELET # BLD AUTO: 207 10*3/MM3 (ref 140–450)
PMV BLD AUTO: 9.7 FL (ref 6–12)
RBC # BLD AUTO: 4.79 10*6/MM3 (ref 3.77–5.28)
WBC NRBC COR # BLD AUTO: 9.12 10*3/MM3 (ref 3.4–10.8)

## 2024-12-29 PROCEDURE — 99284 EMERGENCY DEPT VISIT MOD MDM: CPT

## 2024-12-29 PROCEDURE — 80179 DRUG ASSAY SALICYLATE: CPT | Performed by: STUDENT IN AN ORGANIZED HEALTH CARE EDUCATION/TRAINING PROGRAM

## 2024-12-29 PROCEDURE — 71045 X-RAY EXAM CHEST 1 VIEW: CPT

## 2024-12-29 PROCEDURE — 80053 COMPREHEN METABOLIC PANEL: CPT | Performed by: STUDENT IN AN ORGANIZED HEALTH CARE EDUCATION/TRAINING PROGRAM

## 2024-12-29 PROCEDURE — 85025 COMPLETE CBC W/AUTO DIFF WBC: CPT | Performed by: STUDENT IN AN ORGANIZED HEALTH CARE EDUCATION/TRAINING PROGRAM

## 2024-12-29 PROCEDURE — 80143 DRUG ASSAY ACETAMINOPHEN: CPT | Performed by: STUDENT IN AN ORGANIZED HEALTH CARE EDUCATION/TRAINING PROGRAM

## 2024-12-29 RX ORDER — SODIUM CHLORIDE 0.9 % (FLUSH) 0.9 %
10 SYRINGE (ML) INJECTION AS NEEDED
Status: DISCONTINUED | OUTPATIENT
Start: 2024-12-29 | End: 2024-12-30 | Stop reason: HOSPADM

## 2024-12-30 ENCOUNTER — APPOINTMENT (OUTPATIENT)
Dept: CT IMAGING | Facility: HOSPITAL | Age: 44
End: 2024-12-30
Payer: COMMERCIAL

## 2024-12-30 VITALS
HEART RATE: 59 BPM | WEIGHT: 293 LBS | TEMPERATURE: 98.2 F | SYSTOLIC BLOOD PRESSURE: 135 MMHG | DIASTOLIC BLOOD PRESSURE: 85 MMHG | RESPIRATION RATE: 22 BRPM | BODY MASS INDEX: 53.92 KG/M2 | OXYGEN SATURATION: 91 % | HEIGHT: 62 IN

## 2024-12-30 LAB
ALBUMIN SERPL-MCNC: 3.9 G/DL (ref 3.5–5.2)
ALBUMIN/GLOB SERPL: 1.1 G/DL
ALP SERPL-CCNC: 99 U/L (ref 39–117)
ALT SERPL W P-5'-P-CCNC: 15 U/L (ref 1–33)
AMPHET+METHAMPHET UR QL: POSITIVE
AMPHETAMINES UR QL: POSITIVE
ANION GAP SERPL CALCULATED.3IONS-SCNC: 11.3 MMOL/L (ref 5–15)
APAP SERPL-MCNC: <5 MCG/ML (ref 0–30)
AST SERPL-CCNC: 23 U/L (ref 1–32)
BACTERIA UR QL AUTO: NORMAL /HPF
BARBITURATES UR QL SCN: NEGATIVE
BENZODIAZ UR QL SCN: POSITIVE
BILIRUB SERPL-MCNC: 0.2 MG/DL (ref 0–1.2)
BILIRUB UR QL STRIP: NEGATIVE
BUN SERPL-MCNC: 19 MG/DL (ref 6–20)
BUN/CREAT SERPL: 18.1 (ref 7–25)
BUPRENORPHINE SERPL-MCNC: NEGATIVE NG/ML
CALCIUM SPEC-SCNC: 9.2 MG/DL (ref 8.6–10.5)
CANNABINOIDS SERPL QL: POSITIVE
CHLORIDE SERPL-SCNC: 103 MMOL/L (ref 98–107)
CLARITY UR: CLEAR
CO2 SERPL-SCNC: 28.7 MMOL/L (ref 22–29)
COCAINE UR QL: NEGATIVE
COLOR UR: YELLOW
CREAT SERPL-MCNC: 1.05 MG/DL (ref 0.57–1)
EGFRCR SERPLBLD CKD-EPI 2021: 67.8 ML/MIN/1.73
FENTANYL UR-MCNC: POSITIVE NG/ML
GLOBULIN UR ELPH-MCNC: 3.6 GM/DL
GLUCOSE BLDC GLUCOMTR-MCNC: 122 MG/DL (ref 70–130)
GLUCOSE SERPL-MCNC: 69 MG/DL (ref 65–99)
GLUCOSE UR STRIP-MCNC: NEGATIVE MG/DL
HGB UR QL STRIP.AUTO: NEGATIVE
HOLD SPECIMEN: NORMAL
HOLD SPECIMEN: NORMAL
HYALINE CASTS UR QL AUTO: NORMAL /LPF
KETONES UR QL STRIP: NEGATIVE
LEUKOCYTE ESTERASE UR QL STRIP.AUTO: ABNORMAL
METHADONE UR QL SCN: POSITIVE
NITRITE UR QL STRIP: NEGATIVE
OPIATES UR QL: NEGATIVE
OXYCODONE UR QL SCN: NEGATIVE
PCP UR QL SCN: NEGATIVE
PH UR STRIP.AUTO: 8 [PH] (ref 5–8)
POTASSIUM SERPL-SCNC: 4.3 MMOL/L (ref 3.5–5.2)
PROT SERPL-MCNC: 7.5 G/DL (ref 6–8.5)
PROT UR QL STRIP: NEGATIVE
RBC # UR STRIP: NORMAL /HPF
REF LAB TEST METHOD: NORMAL
SALICYLATES SERPL-MCNC: <0.3 MG/DL
SODIUM SERPL-SCNC: 143 MMOL/L (ref 136–145)
SP GR UR STRIP: 1.02 (ref 1–1.03)
SQUAMOUS #/AREA URNS HPF: NORMAL /HPF
TRICYCLICS UR QL SCN: NEGATIVE
UROBILINOGEN UR QL STRIP: ABNORMAL
WBC # UR STRIP: NORMAL /HPF
WHOLE BLOOD HOLD COAG: NORMAL
WHOLE BLOOD HOLD SPECIMEN: NORMAL

## 2024-12-30 PROCEDURE — 80307 DRUG TEST PRSMV CHEM ANLYZR: CPT | Performed by: STUDENT IN AN ORGANIZED HEALTH CARE EDUCATION/TRAINING PROGRAM

## 2024-12-30 PROCEDURE — P9612 CATHETERIZE FOR URINE SPEC: HCPCS

## 2024-12-30 PROCEDURE — 36415 COLL VENOUS BLD VENIPUNCTURE: CPT

## 2024-12-30 PROCEDURE — 25010000002 KETOROLAC TROMETHAMINE PER 15 MG: Performed by: STUDENT IN AN ORGANIZED HEALTH CARE EDUCATION/TRAINING PROGRAM

## 2024-12-30 PROCEDURE — 82948 REAGENT STRIP/BLOOD GLUCOSE: CPT

## 2024-12-30 PROCEDURE — 93005 ELECTROCARDIOGRAM TRACING: CPT | Performed by: STUDENT IN AN ORGANIZED HEALTH CARE EDUCATION/TRAINING PROGRAM

## 2024-12-30 PROCEDURE — 96372 THER/PROPH/DIAG INJ SC/IM: CPT

## 2024-12-30 PROCEDURE — 81001 URINALYSIS AUTO W/SCOPE: CPT | Performed by: STUDENT IN AN ORGANIZED HEALTH CARE EDUCATION/TRAINING PROGRAM

## 2024-12-30 PROCEDURE — 70450 CT HEAD/BRAIN W/O DYE: CPT

## 2024-12-30 RX ORDER — KETOROLAC TROMETHAMINE 30 MG/ML
30 INJECTION, SOLUTION INTRAMUSCULAR; INTRAVENOUS ONCE
Status: DISCONTINUED | OUTPATIENT
Start: 2024-12-30 | End: 2024-12-30

## 2024-12-30 RX ORDER — KETOROLAC TROMETHAMINE 30 MG/ML
30 INJECTION, SOLUTION INTRAMUSCULAR; INTRAVENOUS ONCE
Status: COMPLETED | OUTPATIENT
Start: 2024-12-30 | End: 2024-12-30

## 2024-12-30 RX ADMIN — KETOROLAC TROMETHAMINE 30 MG: 30 INJECTION, SOLUTION INTRAMUSCULAR at 06:05

## 2024-12-30 NOTE — ED PROVIDER NOTES
Subjective   History of Present Illness  44-year-old female with a past medical history of IV drug abuse, hypertension, hepatitis C, and diabetes with COPD presents to the ER due to concerns for possible overdose.  EMS was called to the scene due to concern for the patient being unresponsive.  EMS did not require Narcan.  On arrival to the ER, the patient does appear to be somnolent but answers all questions appropriately.  Patient is awake, alert, oriented x 3.  Patient admitted to purchasing benzodiazepines off the street.  Patient also is prescribed methadone.  No chest pain.  No shortness of breath.  Mild headache.  Somnolence noted.  Vitals stable.      Review of Systems   Constitutional:         Somnolence   Neurological:  Positive for headaches.   All other systems reviewed and are negative.      Past Medical History:   Diagnosis Date    Asthma     COPD (chronic obstructive pulmonary disease)     Diabetes mellitus     Hepatitis C     History of gallstones     Hypertension     IV drug abuse     Lupus     Rheumatoid arthritis        No Known Allergies    Past Surgical History:   Procedure Laterality Date    ABDOMINAL SURGERY      CHOLECYSTECTOMY      PERINEAL LESION/CYST EXCISION N/A 4/3/2017    Procedure: EXCISION OF VAGINAL SKIN TAG;  Surgeon: Kee Crooks III, MD;  Location: [facility];  Service:     TUBAL COAGULATION LAPAROSCOPIC Bilateral 4/3/2017    Procedure: BILATERAL TUBAL FALLOPE FILSHIE CLIPPING LAPAROSCOPIC,IUD REMOVAL;  Surgeon: Kee Crooks III, MD;  Location: [facility] OR;  Service:        Family History   Problem Relation Age of Onset    No Known Problems Mother     No Known Problems Father     No Known Problems Sister     No Known Problems Brother     No Known Problems Son     No Known Problems Daughter     No Known Problems Maternal Grandmother     No Known Problems Maternal Grandfather     No Known Problems Paternal Grandmother     No Known Problems Paternal Grandfather     No Known Problems  Cousin     No Known Problems Other     Rheum arthritis Neg Hx     Osteoarthritis Neg Hx     Asthma Neg Hx     Diabetes Neg Hx     Heart failure Neg Hx     Hyperlipidemia Neg Hx     Hypertension Neg Hx     Migraines Neg Hx     Rashes / Skin problems Neg Hx     Seizures Neg Hx     Stroke Neg Hx     Thyroid disease Neg Hx        Social History     Socioeconomic History    Marital status:    Tobacco Use    Smoking status: Every Day     Current packs/day: 1.00     Average packs/day: 1 pack/day for 15.0 years (15.0 ttl pk-yrs)     Types: Cigarettes    Smokeless tobacco: Never   Substance and Sexual Activity    Alcohol use: No    Drug use: No    Sexual activity: Yes     Partners: Male     Birth control/protection: I.U.D.           Objective   Physical Exam  Constitutional:       General: She is not in acute distress.     Appearance: Normal appearance. She is not ill-appearing.      Comments: Somnolent   HENT:      Head: Normocephalic and atraumatic.      Right Ear: External ear normal.      Left Ear: External ear normal.      Nose: Nose normal.      Mouth/Throat:      Mouth: Mucous membranes are moist.   Eyes:      Extraocular Movements: Extraocular movements intact.      Pupils: Pupils are equal, round, and reactive to light.   Cardiovascular:      Rate and Rhythm: Normal rate and regular rhythm.      Heart sounds: No murmur heard.  Pulmonary:      Effort: Pulmonary effort is normal. No respiratory distress.      Breath sounds: Normal breath sounds. No wheezing.   Abdominal:      General: Bowel sounds are normal.      Palpations: Abdomen is soft.      Tenderness: There is no abdominal tenderness.   Musculoskeletal:         General: No deformity or signs of injury. Normal range of motion.      Cervical back: Normal range of motion and neck supple.   Skin:     General: Skin is warm and dry.      Findings: No erythema.   Neurological:      General: No focal deficit present.      Mental Status: She is alert and  oriented to person, place, and time. Mental status is at baseline.      Cranial Nerves: No cranial nerve deficit.   Psychiatric:         Mood and Affect: Mood normal.         Behavior: Behavior normal.         Thought Content: Thought content normal.         Procedures           ED Course  ED Course as of 12/30/24 0644   Mon Dec 30, 2024   0535 EKG notes sinus rhythm.  65 bpm.  QTc 472.  Nonspecific ST changes.  No acute ST elevation.  Electronically signed by Joss Rivera DO, 12/30/24, 5:35 AM EST.   [SF]      ED Course User Index  [SF] Joss Rivera DO                                         CT Head Without Contrast    Result Date: 12/30/2024  1. No acute intracranial process.  This report was finalized on 12/30/2024 2:06 AM by Stevie Cabral MD.      XR Chest 1 View    Result Date: 12/30/2024   Normal heart size Mild central pulmonary vascular congestion. Mild hypoinflated lungs.   This report was finalized on 12/30/2024 1:07 AM by Tito De La Rosa MD.         Results for orders placed or performed during the hospital encounter of 12/29/24   Comprehensive Metabolic Panel    Collection Time: 12/29/24 11:55 PM    Specimen: Blood   Result Value Ref Range    Glucose 69 65 - 99 mg/dL    BUN 19 6 - 20 mg/dL    Creatinine 1.05 (H) 0.57 - 1.00 mg/dL    Sodium 143 136 - 145 mmol/L    Potassium 4.3 3.5 - 5.2 mmol/L    Chloride 103 98 - 107 mmol/L    CO2 28.7 22.0 - 29.0 mmol/L    Calcium 9.2 8.6 - 10.5 mg/dL    Total Protein 7.5 6.0 - 8.5 g/dL    Albumin 3.9 3.5 - 5.2 g/dL    ALT (SGPT) 15 1 - 33 U/L    AST (SGOT) 23 1 - 32 U/L    Alkaline Phosphatase 99 39 - 117 U/L    Total Bilirubin 0.2 0.0 - 1.2 mg/dL    Globulin 3.6 gm/dL    A/G Ratio 1.1 g/dL    BUN/Creatinine Ratio 18.1 7.0 - 25.0    Anion Gap 11.3 5.0 - 15.0 mmol/L    eGFR 67.8 >60.0 mL/min/1.73   CBC Auto Differential    Collection Time: 12/29/24 11:55 PM    Specimen: Blood   Result Value Ref Range    WBC 9.12 3.40 - 10.80 10*3/mm3    RBC 4.79 3.77 - 5.28  10*6/mm3    Hemoglobin 12.8 12.0 - 15.9 g/dL    Hematocrit 40.8 34.0 - 46.6 %    MCV 85.2 79.0 - 97.0 fL    MCH 26.7 26.6 - 33.0 pg    MCHC 31.4 (L) 31.5 - 35.7 g/dL    RDW 15.2 12.3 - 15.4 %    RDW-SD 47.3 37.0 - 54.0 fl    MPV 9.7 6.0 - 12.0 fL    Platelets 207 140 - 450 10*3/mm3    Neutrophil % 78.2 (H) 42.7 - 76.0 %    Lymphocyte % 12.7 (L) 19.6 - 45.3 %    Monocyte % 7.1 5.0 - 12.0 %    Eosinophil % 1.2 0.3 - 6.2 %    Basophil % 0.4 0.0 - 1.5 %    Immature Grans % 0.4 0.0 - 0.5 %    Neutrophils, Absolute 7.12 (H) 1.70 - 7.00 10*3/mm3    Lymphocytes, Absolute 1.16 0.70 - 3.10 10*3/mm3    Monocytes, Absolute 0.65 0.10 - 0.90 10*3/mm3    Eosinophils, Absolute 0.11 0.00 - 0.40 10*3/mm3    Basophils, Absolute 0.04 0.00 - 0.20 10*3/mm3    Immature Grans, Absolute 0.04 0.00 - 0.05 10*3/mm3    nRBC 0.0 0.0 - 0.2 /100 WBC   Salicylate Level    Collection Time: 12/29/24 11:55 PM    Specimen: Blood   Result Value Ref Range    Salicylate <0.3 <=30.0 mg/dL   Acetaminophen Level    Collection Time: 12/29/24 11:55 PM    Specimen: Blood   Result Value Ref Range    Acetaminophen <5.0 0.0 - 30.0 mcg/mL   Green Top (Gel)    Collection Time: 12/29/24 11:55 PM   Result Value Ref Range    Extra Tube Hold for add-ons.    Lavender Top    Collection Time: 12/29/24 11:55 PM   Result Value Ref Range    Extra Tube hold for add-on    Gold Top - SST    Collection Time: 12/29/24 11:55 PM   Result Value Ref Range    Extra Tube Hold for add-ons.    Light Blue Top    Collection Time: 12/29/24 11:55 PM   Result Value Ref Range    Extra Tube Hold for add-ons.    Urine Drug Screen - Straight Cath    Collection Time: 12/30/24  1:27 AM    Specimen: Straight Cath; Urine   Result Value Ref Range    THC, Screen, Urine Positive (A) Negative    Phencyclidine (PCP), Urine Negative Negative    Cocaine Screen, Urine Negative Negative    Methamphetamine, Ur Positive (A) Negative    Opiate Screen Negative Negative    Amphetamine Screen, Urine Positive (A)  Negative    Benzodiazepine Screen, Urine Positive (A) Negative    Tricyclic Antidepressants Screen Negative Negative    Methadone Screen, Urine Positive (A) Negative    Barbiturates Screen, Urine Negative Negative    Oxycodone Screen, Urine Negative Negative    Buprenorphine, Screen, Urine Negative Negative   Urinalysis With Microscopic If Indicated (No Culture) - Straight Cath    Collection Time: 12/30/24  1:27 AM    Specimen: Straight Cath; Urine   Result Value Ref Range    Color, UA Yellow Yellow, Straw    Appearance, UA Clear Clear    pH, UA 8.0 5.0 - 8.0    Specific Gravity, UA 1.018 1.005 - 1.030    Glucose, UA Negative Negative    Ketones, UA Negative Negative    Bilirubin, UA Negative Negative    Blood, UA Negative Negative    Protein, UA Negative Negative    Leuk Esterase, UA Trace (A) Negative    Nitrite, UA Negative Negative    Urobilinogen, UA 1.0 E.U./dL 0.2 - 1.0 E.U./dL   Fentanyl, Urine - Straight Cath    Collection Time: 12/30/24  1:27 AM    Specimen: Straight Cath; Urine   Result Value Ref Range    Fentanyl, Urine Positive (A) Negative   Urinalysis, Microscopic Only - Straight Cath    Collection Time: 12/30/24  1:27 AM    Specimen: Straight Cath; Urine   Result Value Ref Range    RBC, UA 0-2 None Seen, 0-2 /HPF    WBC, UA 0-2 None Seen, 0-2 /HPF    Bacteria, UA None Seen None Seen /HPF    Squamous Epithelial Cells, UA 0-2 None Seen, 0-2 /HPF    Hyaline Casts, UA None Seen None Seen /LPF    Methodology Automated Microscopy    POC Glucose Once    Collection Time: 12/30/24  2:57 AM    Specimen: Blood   Result Value Ref Range    Glucose 122 70 - 130 mg/dL   ECG 12 Lead Pre-Op / Pre-Procedure    Collection Time: 12/30/24  5:32 AM   Result Value Ref Range    QT Interval 454 ms    QTC Interval 472 ms                   Medical Decision Making  CBC and CMP are listed.  UDS positive for multiple substances including methamphetamines, amphetamines, fentanyl, THC, and opiates.  EKG listed.  Head CT without  contrast revealed no acute abnormality.  Chest x-ray unremarkable.  Poison control was contacted and recommended monitoring this patient for at least 4 hours.  Patient was monitored well past the 4-hour carlyle.  Patient is awake alert, oriented x 3 on reassessment.  Patient noted she ingested multiple substances to alleviate her nurse.  No obvious suicidal or homicidal ideations.  Polysubstance abuse was discussed with the patient and the risks with this behavior.  Patient's altered mental status/somnolence likely secondary to polysubstance intoxication.  Work up and results were discussed throughly with the patient.  The patient will be discharged for further monitoring and management with their PCP.  Red flags, warning signs, worsening symptoms, and when to return to the ER discussed with and understood by the patient.  Patient will follow up with their PCP in a timely manner.  Vitals stable at discharge.    Please follow up with your primary care physician in the next 1-3 days. If this is not possible, please return to the ED for re-evaluation.    It is IMPORTANT to see your DOCTOR OR PRIMARY CARE PROVIDER. Emergency Care may be incomplete without proper follow-up.  Your evaluation in the emergency department is focused on a specific clinical complaint, at a given moment in time.  Symptoms sometimes change or new symptoms might arise after you leave the Emergency Department. It is important that you call your doctor if you become worse in any way, or promptly return to the Emergency Department should any new, or worsening/changing symptom occur.  You are strongly urged to follow-up with your physician to assure complete and thorough care. PLEASE CALL YOUR DOCTORS OFFICE TODAY, INFORM THEM THAT YOU WERE SEEN IN THE EMERGENCY DEPARTMENT, AND THAT YOU NEED TO BE SEEN IMMEDIATELY FOR CLOSE FOLLOW UP.  If you do not have a primary care doctor we encourage you to proactively seek a local physician for close follow up.   Consider local clinics, free or sliding scale clinics, local [facility].  You can always return to the Emergency Department if you are having difficulty coordinating close follow up.  If medications were prescribed you should fill them at your local pharmacy immediately and take only as prescribed.  Bring your new medications to your doctors follow up visit to discuss any changes that would be necessary. RETURN TO THE EMERGENCY DEPARTMENT IMMEDIATELY FOR ANY NEW OR WORSENING SYMPTOMS.    ADDITIONAL DISCHARGE INSTRUCTIONS:     - If you received IV contrast today with a CT or MRI please stay well hydrated for the next 48-72 hours to protect your kidneys.    - If you have been prescribed an antibiotic, taking an over the counter probiotic may help with gastrointestinal side effects and antibiotic associated diarrhea.    - Return to the emergency department or seek immediate medical care if any of the following occur:           - Symptoms do not improve in 8-12 hours. If this occurs, please return to the emergency department for re-evaluation or your symptoms or repeat abdominal examination         - Symptoms worsen at any time.         - If you are unable to follow up with a medical provider as instructed.         - You develop any worrisome symptoms such as fever, chills, uncontrolled pain, change or worsening of your pain symptoms, intractable vomiting, uncontrolled diarrhea, blood in your stool, dark/tarry stool, new neurological symptoms etc.    If you have been prescribed a narcotic pain medication, please take a stool softener to prevent constipation.     During your ED visit, you may have been given narcotics (such as morphine, dilaudid, percocet, vicodin) or sedatives (such as ativan, versed). These medications can impair your reflexes so, DO NOT DRIVE or USE ANY MACHINERY for at least 6 hours if you have been given these medications.    REMINDER FOR METFORMIN USERS: If you are using metformin (also known  as Glucophage) and if you received a CT scan in which you received IV contrast, you must hold your metformin for a total of 72 hrs.  This will prevent any adverse interactions between the two agents.      MDM:    Escalation of care including admission/observation considered    - Discussions of management with other providers:  None    - Discussed/reviewed with Radiology regarding test interpretation    - Independent interpretation: Labs, XR, CT, and EKG    - Additional patient history obtained from: None    - Review of external non-ED record (if available):  Prior Inpt record, Office record, Outpt record, Prior Outpt labs, PCP record, Outside ED record, Other    - Chronic conditions affecting care: See HPI and medical Hx.    - Social Determinants of health significantly affecting care:  None        Medical Decision Making Discussion:      The patient has been given very strict return precautions to return to the emergency department should there be any acute change or worsening of their condition.  I have explained my findings and the patient has expressed understanding to me.  I explained that the work-up performed in the ED has been based on the specific complaint and concern, as the nature of emergency medicine is complaint driven and they understand that new symptoms may arise.  I have told them that, should there be any new symptoms, worsening or changing symptoms, a new work-up may be indicated that they are encouraged to return to the emergency department or promptly contact their primary care physician. We have employed a shared decision-making process as the discussion of their disposition.  The patient has been educated as to the nature of the visit, the tests and work-up performed and the findings from today's visit. At this time, there does not appear to be any acute emergent process that necessitates admission to the hospital, however, the patient understands that this can change unexpectedly. At this  time, the patient is stable for discharge home and agrees to follow-up with her primary care physician in the next 24 to 48 hours or earlier should they be able to obtain an appointment.    The patient was counseled regarding diagnostic results and treatment plan and patient has indicated understanding of these instructions.      Amount and/or Complexity of Data Reviewed  Labs: ordered. Decision-making details documented in ED Course.  Radiology: ordered. Decision-making details documented in ED Course.  ECG/medicine tests: ordered.    Risk  Prescription drug management.        Final diagnoses:   Polysubstance abuse   Opioid intoxication without complication   Psychoactive substance abuse       ED Disposition  ED Disposition       ED Disposition   Discharge    Condition   Stable    Comment   --               Latoya Bettencourt, APRN  [address] [facility] [address]  [phone]    In 1 week      [facility] EMERGENCY DEPARTMENT  [address] [place] [id number] [phone]    If symptoms worsen         Medication List      No changes were made to your prescriptions during this visit.            Joss Rivera, DO  12/30/24 0641       Joss Rivera, DO  12/30/24 0644

## 2024-12-30 NOTE — ED NOTES
Spoke with Nadia from poison control. Nadia advises with Xanax we could see: hypotension, bradycardia, resp. Depression, and lethargy. Nadia advises we order a aspirin, acetaminophen, and ethanol level as well as a CK and lactic. Nadia advises we get an EKG upon arrival and before discharging patient. With xanax we can do supportive care and watch pt for a minimum of 4 hours from arrival @ 2321 and longer if the pt is symptomatic. Pt needs to remain on cardiac monitor. ED provider made aware.

## 2024-12-30 NOTE — ED NOTES
Pt straight cathed at this time for urine d/t pt condition not being able to get clean specimen of urine by herself. ED provider made aware.

## 2024-12-30 NOTE — ED NOTES
Spoke with Nadia from poison center. She called for an update on pt. She advises since pt vitals are stable and pt has been stable, that the case is being closed on her.

## 2024-12-31 LAB
QT INTERVAL: 454 MS
QTC INTERVAL: 472 MS

## 2025-02-18 ENCOUNTER — TELEMEDICINE (OUTPATIENT)
Dept: FAMILY MEDICINE CLINIC | Facility: TELEHEALTH | Age: 45
End: 2025-02-18
Payer: COMMERCIAL

## 2025-02-18 VITALS — TEMPERATURE: 100 F

## 2025-02-18 DIAGNOSIS — R11.0 NAUSEA: Primary | ICD-10-CM

## 2025-02-18 RX ORDER — PANTOPRAZOLE SODIUM 40 MG/1
TABLET, DELAYED RELEASE ORAL
COMMUNITY
Start: 2025-02-11

## 2025-02-18 RX ORDER — ONDANSETRON 8 MG/1
8 TABLET, FILM COATED ORAL EVERY 8 HOURS PRN
Qty: 9 TABLET | Refills: 0 | Status: SHIPPED | OUTPATIENT
Start: 2025-02-18

## 2025-02-18 RX ORDER — CHOLECALCIFEROL (VITAMIN D3) 125 MCG
CAPSULE ORAL
COMMUNITY
Start: 2025-02-11

## 2025-02-18 RX ORDER — METHADONE HYDROCHLORIDE 10 MG/1
10 TABLET ORAL EVERY 8 HOURS
COMMUNITY

## 2025-02-18 RX ORDER — AMLODIPINE BESYLATE 5 MG/1
TABLET ORAL
COMMUNITY
Start: 2025-02-11

## 2025-02-18 RX ORDER — ATORVASTATIN CALCIUM 20 MG/1
TABLET, FILM COATED ORAL
COMMUNITY
Start: 2025-02-11

## 2025-02-18 RX ORDER — BUDESONIDE AND FORMOTEROL FUMARATE DIHYDRATE 160; 4.5 UG/1; UG/1
AEROSOL RESPIRATORY (INHALATION)
COMMUNITY
Start: 2025-02-11

## 2025-02-18 RX ORDER — OMEGA-3-ACID ETHYL ESTERS 1 G/1
CAPSULE, LIQUID FILLED ORAL
COMMUNITY
Start: 2025-02-11

## 2025-02-18 RX ORDER — IBUPROFEN 600 MG/1
TABLET, FILM COATED ORAL
COMMUNITY
Start: 2025-02-11

## 2025-02-18 RX ORDER — FAMOTIDINE 20 MG/1
TABLET, FILM COATED ORAL
COMMUNITY
Start: 2025-02-11

## 2025-02-18 NOTE — PROGRESS NOTES
Mode of Visit: Video  Location of patient: -HOME-  Location of provider: +HOME+  You have chosen to receive care through a telehealth visit.  The patient has signed the video visit consent form.  The visit included audio and video interaction. No technical issues occurred during this visit.    SUE Monaco is a 44 y.o. female  presents with complaint of nausea.  She reports that she is very nauseous and her stomach hurts very bad.  She has been nauseous for couple days and has been unable to eat anything.  She also reports fatigue and a low-grade fever of 100.  She reports intermittent stomach pain but that she has a hernia.  She also reports that she feels constipated.  She does take medication for constipation.  She also reports that she feels like she has a stomach virus.  She has not vomited but reports that the nausea makes her feel like she needs to vomit.    Review of Systems   Constitutional:  Positive for appetite change (decreased), fatigue and fever.   Gastrointestinal:  Positive for nausea (feels like it). Negative for abdominal pain (feels contstipated), blood in stool and diarrhea.       Past Medical History:   Diagnosis Date    Asthma     COPD (chronic obstructive pulmonary disease)     Diabetes mellitus     Hepatitis C     History of gallstones     Hypertension     IV drug abuse     Lupus     Rheumatoid arthritis        Family History   Problem Relation Age of Onset    No Known Problems Mother     No Known Problems Father     No Known Problems Sister     No Known Problems Brother     No Known Problems Son     No Known Problems Daughter     No Known Problems Maternal Grandmother     No Known Problems Maternal Grandfather     No Known Problems Paternal Grandmother     No Known Problems Paternal Grandfather     No Known Problems Cousin     No Known Problems Other     Rheum arthritis Neg Hx     Osteoarthritis Neg Hx     Asthma Neg Hx     Diabetes Neg Hx     Heart failure Neg Hx      Hyperlipidemia Neg Hx     Hypertension Neg Hx     Migraines Neg Hx     Rashes / Skin problems Neg Hx     Seizures Neg Hx     Stroke Neg Hx     Thyroid disease Neg Hx        Social History     Socioeconomic History    Marital status:    Tobacco Use    Smoking status: Every Day     Current packs/day: 1.00     Average packs/day: 1 pack/day for 15.0 years (15.0 ttl pk-yrs)     Types: Cigarettes    Smokeless tobacco: Never   Vaping Use    Vaping status: Never Used   Substance and Sexual Activity    Alcohol use: No    Drug use: No    Sexual activity: Yes     Partners: Male     Birth control/protection: I.U.D.       Mary Monaco  reports that she has been smoking cigarettes. She has a 15 pack-year smoking history. She has never used smokeless tobacco. I have educated her on the risk of diseases from using tobacco products such as cancer, COPD, and heart disease.     I advised her to quit and she is not willing to quit.    I spent  1  minutes counseling the patient.       Temp 100 °F (37.8 °C)   Breastfeeding No     PHYSICAL EXAM  Physical Exam   Constitutional: She is oriented to person, place, and time. She appears well-developed.   HENT:   Head: Normocephalic and atraumatic.   Nose: Nose normal.   Eyes: Lids are normal. Right eye exhibits no discharge. Left eye exhibits no discharge. Right conjunctiva is not injected. Left conjunctiva is not injected.   Pulmonary/Chest:  No respiratory distress.  Neurological: She is alert and oriented to person, place, and time. No cranial nerve deficit.   Psychiatric: She has a normal mood and affect. Her speech is normal and behavior is normal. Judgment and thought content normal.       Results for orders placed or performed during the hospital encounter of 12/29/24   Comprehensive Metabolic Panel    Collection Time: 12/29/24 11:55 PM    Specimen: Blood   Result Value Ref Range    Glucose 69 65 - 99 mg/dL    BUN 19 6 - 20 mg/dL    Creatinine 1.05 (H) 0.57 - 1.00 mg/dL     Sodium 143 136 - 145 mmol/L    Potassium 4.3 3.5 - 5.2 mmol/L    Chloride 103 98 - 107 mmol/L    CO2 28.7 22.0 - 29.0 mmol/L    Calcium 9.2 8.6 - 10.5 mg/dL    Total Protein 7.5 6.0 - 8.5 g/dL    Albumin 3.9 3.5 - 5.2 g/dL    ALT (SGPT) 15 1 - 33 U/L    AST (SGOT) 23 1 - 32 U/L    Alkaline Phosphatase 99 39 - 117 U/L    Total Bilirubin 0.2 0.0 - 1.2 mg/dL    Globulin 3.6 gm/dL    A/G Ratio 1.1 g/dL    BUN/Creatinine Ratio 18.1 7.0 - 25.0    Anion Gap 11.3 5.0 - 15.0 mmol/L    eGFR 67.8 >60.0 mL/min/1.73   CBC Auto Differential    Collection Time: 12/29/24 11:55 PM    Specimen: Blood   Result Value Ref Range    WBC 9.12 3.40 - 10.80 10*3/mm3    RBC 4.79 3.77 - 5.28 10*6/mm3    Hemoglobin 12.8 12.0 - 15.9 g/dL    Hematocrit 40.8 34.0 - 46.6 %    MCV 85.2 79.0 - 97.0 fL    MCH 26.7 26.6 - 33.0 pg    MCHC 31.4 (L) 31.5 - 35.7 g/dL    RDW 15.2 12.3 - 15.4 %    RDW-SD 47.3 37.0 - 54.0 fl    MPV 9.7 6.0 - 12.0 fL    Platelets 207 140 - 450 10*3/mm3    Neutrophil % 78.2 (H) 42.7 - 76.0 %    Lymphocyte % 12.7 (L) 19.6 - 45.3 %    Monocyte % 7.1 5.0 - 12.0 %    Eosinophil % 1.2 0.3 - 6.2 %    Basophil % 0.4 0.0 - 1.5 %    Immature Grans % 0.4 0.0 - 0.5 %    Neutrophils, Absolute 7.12 (H) 1.70 - 7.00 10*3/mm3    Lymphocytes, Absolute 1.16 0.70 - 3.10 10*3/mm3    Monocytes, Absolute 0.65 0.10 - 0.90 10*3/mm3    Eosinophils, Absolute 0.11 0.00 - 0.40 10*3/mm3    Basophils, Absolute 0.04 0.00 - 0.20 10*3/mm3    Immature Grans, Absolute 0.04 0.00 - 0.05 10*3/mm3    nRBC 0.0 0.0 - 0.2 /100 WBC   Salicylate Level    Collection Time: 12/29/24 11:55 PM    Specimen: Blood   Result Value Ref Range    Salicylate <0.3 <=30.0 mg/dL   Acetaminophen Level    Collection Time: 12/29/24 11:55 PM    Specimen: Blood   Result Value Ref Range    Acetaminophen <5.0 0.0 - 30.0 mcg/mL   Green Top (Gel)    Collection Time: 12/29/24 11:55 PM   Result Value Ref Range    Extra Tube Hold for add-ons.    Lavender Top    Collection Time: 12/29/24 11:55 PM    Result Value Ref Range    Extra Tube hold for add-on    Gold Top - SST    Collection Time: 12/29/24 11:55 PM   Result Value Ref Range    Extra Tube Hold for add-ons.    Light Blue Top    Collection Time: 12/29/24 11:55 PM   Result Value Ref Range    Extra Tube Hold for add-ons.    Urine Drug Screen - Straight Cath    Collection Time: 12/30/24  1:27 AM    Specimen: Straight Cath; Urine   Result Value Ref Range    THC, Screen, Urine Positive (A) Negative    Phencyclidine (PCP), Urine Negative Negative    Cocaine Screen, Urine Negative Negative    Methamphetamine, Ur Positive (A) Negative    Opiate Screen Negative Negative    Amphetamine Screen, Urine Positive (A) Negative    Benzodiazepine Screen, Urine Positive (A) Negative    Tricyclic Antidepressants Screen Negative Negative    Methadone Screen, Urine Positive (A) Negative    Barbiturates Screen, Urine Negative Negative    Oxycodone Screen, Urine Negative Negative    Buprenorphine, Screen, Urine Negative Negative   Urinalysis With Microscopic If Indicated (No Culture) - Straight Cath    Collection Time: 12/30/24  1:27 AM    Specimen: Straight Cath; Urine   Result Value Ref Range    Color, UA Yellow Yellow, Straw    Appearance, UA Clear Clear    pH, UA 8.0 5.0 - 8.0    Specific Gravity, UA 1.018 1.005 - 1.030    Glucose, UA Negative Negative    Ketones, UA Negative Negative    Bilirubin, UA Negative Negative    Blood, UA Negative Negative    Protein, UA Negative Negative    Leuk Esterase, UA Trace (A) Negative    Nitrite, UA Negative Negative    Urobilinogen, UA 1.0 E.U./dL 0.2 - 1.0 E.U./dL   Fentanyl, Urine - Straight Cath    Collection Time: 12/30/24  1:27 AM    Specimen: Straight Cath; Urine   Result Value Ref Range    Fentanyl, Urine Positive (A) Negative   Urinalysis, Microscopic Only - Straight Cath    Collection Time: 12/30/24  1:27 AM    Specimen: Straight Cath; Urine   Result Value Ref Range    RBC, UA 0-2 None Seen, 0-2 /HPF    WBC, UA 0-2 None Seen,  0-2 /HPF    Bacteria, UA None Seen None Seen /HPF    Squamous Epithelial Cells, UA 0-2 None Seen, 0-2 /HPF    Hyaline Casts, UA None Seen None Seen /LPF    Methodology Automated Microscopy    POC Glucose Once    Collection Time: 12/30/24  2:57 AM    Specimen: Blood   Result Value Ref Range    Glucose 122 70 - 130 mg/dL   ECG 12 Lead Pre-Op / Pre-Procedure    Collection Time: 12/30/24  5:32 AM   Result Value Ref Range    QT Interval 454 ms    QTC Interval 472 ms       Diagnoses and all orders for this visit:    1. Nausea (Primary)    Other orders  -     ondansetron (ZOFRAN) 8 MG tablet; Take 1 tablet by mouth Every 8 (Eight) Hours As Needed for Nausea.  Dispense: 9 tablet; Refill: 0    Zofran as needed for nausea  Hydrate well. Water, Pedialyte, Liquid IV and Gatorade are best.  [facility] diet  If abdominal discomfort worsens proceed to ER    FOLLOW-UP  If symptoms worsen or persist follow up with PCP or ER    Patient verbalizes understanding of medication dosage, comfort measures, instructions for treatment and follow-up.    Carrie Stdodard, DENISE  02/18/2025  07:29 EST    The use of a video visit has been reviewed with the patient and verbal informed consent has been obtained. Myself and Mary Monaco participated in this visit. The patient is located in [address] [place] [address] [address] [place] [address].    I am located in [place], [place]. [employer] and [employer] were utilized. I spent 22 minutes in the patient's chart for this visit.

## 2025-05-04 ENCOUNTER — APPOINTMENT (OUTPATIENT)
Dept: ULTRASOUND IMAGING | Facility: HOSPITAL | Age: 45
DRG: 208 | End: 2025-05-04
Payer: COMMERCIAL

## 2025-05-04 ENCOUNTER — HOSPITAL ENCOUNTER (INPATIENT)
Facility: HOSPITAL | Age: 45
LOS: 1 days | Discharge: LEFT BEFORE DISCHARGE | DRG: 208 | End: 2025-05-05
Attending: STUDENT IN AN ORGANIZED HEALTH CARE EDUCATION/TRAINING PROGRAM | Admitting: INTERNAL MEDICINE
Payer: COMMERCIAL

## 2025-05-04 ENCOUNTER — APPOINTMENT (OUTPATIENT)
Dept: CT IMAGING | Facility: HOSPITAL | Age: 45
DRG: 208 | End: 2025-05-04
Payer: COMMERCIAL

## 2025-05-04 ENCOUNTER — APPOINTMENT (OUTPATIENT)
Dept: GENERAL RADIOLOGY | Facility: HOSPITAL | Age: 45
DRG: 208 | End: 2025-05-04
Payer: COMMERCIAL

## 2025-05-04 DIAGNOSIS — F15.10 METHAMPHETAMINE ABUSE: ICD-10-CM

## 2025-05-04 DIAGNOSIS — F11.10: ICD-10-CM

## 2025-05-04 DIAGNOSIS — L03.115 CELLULITIS OF RIGHT LOWER EXTREMITY: ICD-10-CM

## 2025-05-04 DIAGNOSIS — J96.01 ACUTE HYPOXIC RESPIRATORY FAILURE: ICD-10-CM

## 2025-05-04 DIAGNOSIS — G92.9 TOXIC ENCEPHALOPATHY, UNSPECIFIED TOXIN: Primary | ICD-10-CM

## 2025-05-04 DIAGNOSIS — T40.601A OPIATE OVERDOSE, ACCIDENTAL OR UNINTENTIONAL, INITIAL ENCOUNTER: ICD-10-CM

## 2025-05-04 PROBLEM — R40.1 OBTUNDATION: Status: ACTIVE | Noted: 2025-05-04

## 2025-05-04 LAB
A-A DO2: 220.1 MMHG (ref 0–300)
ALBUMIN SERPL-MCNC: 4 G/DL (ref 3.5–5.2)
ALBUMIN/GLOB SERPL: 1.1 G/DL
ALP SERPL-CCNC: 105 U/L (ref 39–117)
ALT SERPL W P-5'-P-CCNC: 20 U/L (ref 1–33)
AMPHET+METHAMPHET UR QL: POSITIVE
AMPHETAMINES UR QL: POSITIVE
ANION GAP SERPL CALCULATED.3IONS-SCNC: 8.9 MMOL/L (ref 5–15)
ARTERIAL PATENCY WRIST A: POSITIVE
AST SERPL-CCNC: 24 U/L (ref 1–32)
ATMOSPHERIC PRESS: 722 MMHG
BARBITURATES UR QL SCN: NEGATIVE
BASE EXCESS BLDA CALC-SCNC: 1.9 MMOL/L (ref 0–2)
BASOPHILS # BLD AUTO: 0.04 10*3/MM3 (ref 0–0.2)
BASOPHILS NFR BLD AUTO: 0.5 % (ref 0–1.5)
BDY SITE: ABNORMAL
BENZODIAZ UR QL SCN: POSITIVE
BILIRUB SERPL-MCNC: 0.2 MG/DL (ref 0–1.2)
BUN SERPL-MCNC: 19 MG/DL (ref 6–20)
BUN/CREAT SERPL: 20.7 (ref 7–25)
BUPRENORPHINE SERPL-MCNC: NEGATIVE NG/ML
CALCIUM SPEC-SCNC: 9 MG/DL (ref 8.6–10.5)
CANNABINOIDS SERPL QL: NEGATIVE
CHLORIDE SERPL-SCNC: 101 MMOL/L (ref 98–107)
CO2 BLDA-SCNC: 33.6 MMOL/L (ref 22–33)
CO2 SERPL-SCNC: 32.1 MMOL/L (ref 22–29)
COCAINE UR QL: NEGATIVE
COHGB MFR BLD: 1.4 % (ref 0–5)
CREAT SERPL-MCNC: 0.92 MG/DL (ref 0.57–1)
CRP SERPL-MCNC: 2.88 MG/DL (ref 0–0.5)
DEPRECATED RDW RBC AUTO: 48.7 FL (ref 37–54)
EGFRCR SERPLBLD CKD-EPI 2021: 79.4 ML/MIN/1.73
EOSINOPHIL # BLD AUTO: 0.28 10*3/MM3 (ref 0–0.4)
EOSINOPHIL NFR BLD AUTO: 3.5 % (ref 0.3–6.2)
ERYTHROCYTE [DISTWIDTH] IN BLOOD BY AUTOMATED COUNT: 15.4 % (ref 12.3–15.4)
ERYTHROCYTE [SEDIMENTATION RATE] IN BLOOD: 41 MM/HR (ref 0–20)
FENTANYL UR-MCNC: NEGATIVE NG/ML
GLOBULIN UR ELPH-MCNC: 3.6 GM/DL
GLUCOSE BLDC GLUCOMTR-MCNC: 111 MG/DL (ref 70–130)
GLUCOSE BLDC GLUCOMTR-MCNC: 121 MG/DL (ref 70–130)
GLUCOSE SERPL-MCNC: 82 MG/DL (ref 65–99)
HBA1C MFR BLD: 5.2 % (ref 4.8–5.6)
HCO3 BLDA-SCNC: 31.3 MMOL/L (ref 20–26)
HCT VFR BLD AUTO: 38.9 % (ref 34–46.6)
HCT VFR BLD CALC: 37.5 % (ref 38–51)
HGB BLD-MCNC: 11.9 G/DL (ref 12–15.9)
HGB BLDA-MCNC: 12.2 G/DL (ref 13.5–17.5)
HOLD SPECIMEN: NORMAL
HOLD SPECIMEN: NORMAL
IMM GRANULOCYTES # BLD AUTO: 0.03 10*3/MM3 (ref 0–0.05)
IMM GRANULOCYTES NFR BLD AUTO: 0.4 % (ref 0–0.5)
INHALED O2 CONCENTRATION: 60 %
LYMPHOCYTES # BLD AUTO: 1.85 10*3/MM3 (ref 0.7–3.1)
LYMPHOCYTES NFR BLD AUTO: 23.1 % (ref 19.6–45.3)
Lab: ABNORMAL
Lab: ABNORMAL
MCH RBC QN AUTO: 26.4 PG (ref 26.6–33)
MCHC RBC AUTO-ENTMCNC: 30.6 G/DL (ref 31.5–35.7)
MCV RBC AUTO: 86.4 FL (ref 79–97)
METHADONE UR QL SCN: POSITIVE
METHGB BLD QL: 0.3 % (ref 0–3)
MODALITY: ABNORMAL
MONOCYTES # BLD AUTO: 0.68 10*3/MM3 (ref 0.1–0.9)
MONOCYTES NFR BLD AUTO: 8.5 % (ref 5–12)
NEUTROPHILS NFR BLD AUTO: 5.12 10*3/MM3 (ref 1.7–7)
NEUTROPHILS NFR BLD AUTO: 64 % (ref 42.7–76)
NOTE: ABNORMAL
NOTIFIED BY: ABNORMAL
NOTIFIED WHO: ABNORMAL
NRBC BLD AUTO-RTO: 0 /100 WBC (ref 0–0.2)
OPIATES UR QL: NEGATIVE
OXYCODONE UR QL SCN: NEGATIVE
OXYHGB MFR BLDV: 95.5 % (ref 94–99)
PCO2 BLDA: 74.9 MM HG (ref 35–45)
PCO2 TEMP ADJ BLD: ABNORMAL MM[HG]
PCP UR QL SCN: NEGATIVE
PEEP RESPIRATORY: 5 CM[H2O]
PH BLDA: 7.23 PH UNITS (ref 7.35–7.45)
PH, TEMP CORRECTED: ABNORMAL
PLATELET # BLD AUTO: 256 10*3/MM3 (ref 140–450)
PMV BLD AUTO: 10.2 FL (ref 6–12)
PO2 BLDA: 105 MM HG (ref 83–108)
PO2 TEMP ADJ BLD: ABNORMAL MM[HG]
POTASSIUM SERPL-SCNC: 3.9 MMOL/L (ref 3.5–5.2)
PROT SERPL-MCNC: 7.6 G/DL (ref 6–8.5)
QT INTERVAL: 442 MS
QTC INTERVAL: 490 MS
RBC # BLD AUTO: 4.5 10*6/MM3 (ref 3.77–5.28)
SAO2 % BLDCOA: 97.2 % (ref 94–99)
SET MECH RESP RATE: 18
SODIUM SERPL-SCNC: 142 MMOL/L (ref 136–145)
TRICYCLICS UR QL SCN: NEGATIVE
VENTILATOR MODE: ABNORMAL
VT ON VENT VENT: 400 ML
WBC NRBC COR # BLD AUTO: 8 10*3/MM3 (ref 3.4–10.8)
WHOLE BLOOD HOLD COAG: NORMAL
WHOLE BLOOD HOLD SPECIMEN: NORMAL

## 2025-05-04 PROCEDURE — P9612 CATHETERIZE FOR URINE SPEC: HCPCS

## 2025-05-04 PROCEDURE — 25010000002 MIDAZOLAM 1 MG/ML 100ML NS 100 MG/100ML SOLUTION: Performed by: STUDENT IN AN ORGANIZED HEALTH CARE EDUCATION/TRAINING PROGRAM

## 2025-05-04 PROCEDURE — 25010000002 FENTANYL 10 MCG/1 ML NS: Performed by: STUDENT IN AN ORGANIZED HEALTH CARE EDUCATION/TRAINING PROGRAM

## 2025-05-04 PROCEDURE — 94002 VENT MGMT INPAT INIT DAY: CPT

## 2025-05-04 PROCEDURE — 70450 CT HEAD/BRAIN W/O DYE: CPT | Performed by: RADIOLOGY

## 2025-05-04 PROCEDURE — 85652 RBC SED RATE AUTOMATED: CPT | Performed by: STUDENT IN AN ORGANIZED HEALTH CARE EDUCATION/TRAINING PROGRAM

## 2025-05-04 PROCEDURE — 94799 UNLISTED PULMONARY SVC/PX: CPT

## 2025-05-04 PROCEDURE — 93010 ELECTROCARDIOGRAM REPORT: CPT | Performed by: INTERNAL MEDICINE

## 2025-05-04 PROCEDURE — 36415 COLL VENOUS BLD VENIPUNCTURE: CPT

## 2025-05-04 PROCEDURE — 25010000002 NALOXONE HCL 2 MG/2ML SOLUTION PREFILLED SYRINGE: Performed by: STUDENT IN AN ORGANIZED HEALTH CARE EDUCATION/TRAINING PROGRAM

## 2025-05-04 PROCEDURE — 25810000003 SODIUM CHLORIDE 0.9 % SOLUTION: Performed by: STUDENT IN AN ORGANIZED HEALTH CARE EDUCATION/TRAINING PROGRAM

## 2025-05-04 PROCEDURE — 99223 1ST HOSP IP/OBS HIGH 75: CPT | Performed by: STUDENT IN AN ORGANIZED HEALTH CARE EDUCATION/TRAINING PROGRAM

## 2025-05-04 PROCEDURE — 80053 COMPREHEN METABOLIC PANEL: CPT | Performed by: STUDENT IN AN ORGANIZED HEALTH CARE EDUCATION/TRAINING PROGRAM

## 2025-05-04 PROCEDURE — 99291 CRITICAL CARE FIRST HOUR: CPT

## 2025-05-04 PROCEDURE — 80307 DRUG TEST PRSMV CHEM ANLYZR: CPT | Performed by: STUDENT IN AN ORGANIZED HEALTH CARE EDUCATION/TRAINING PROGRAM

## 2025-05-04 PROCEDURE — 25010000002 MIDAZOLAM PER 1 MG

## 2025-05-04 PROCEDURE — 82948 REAGENT STRIP/BLOOD GLUCOSE: CPT

## 2025-05-04 PROCEDURE — 93971 EXTREMITY STUDY: CPT

## 2025-05-04 PROCEDURE — 70450 CT HEAD/BRAIN W/O DYE: CPT

## 2025-05-04 PROCEDURE — 94761 N-INVAS EAR/PLS OXIMETRY MLT: CPT

## 2025-05-04 PROCEDURE — 94640 AIRWAY INHALATION TREATMENT: CPT

## 2025-05-04 PROCEDURE — 25010000002 PROPOFOL 1000 MG/100ML EMULSION

## 2025-05-04 PROCEDURE — 71045 X-RAY EXAM CHEST 1 VIEW: CPT

## 2025-05-04 PROCEDURE — 25010000002 VANCOMYCIN 5 G RECONSTITUTED SOLUTION: Performed by: STUDENT IN AN ORGANIZED HEALTH CARE EDUCATION/TRAINING PROGRAM

## 2025-05-04 PROCEDURE — 87040 BLOOD CULTURE FOR BACTERIA: CPT | Performed by: STUDENT IN AN ORGANIZED HEALTH CARE EDUCATION/TRAINING PROGRAM

## 2025-05-04 PROCEDURE — 5A1935Z RESPIRATORY VENTILATION, LESS THAN 24 CONSECUTIVE HOURS: ICD-10-PCS | Performed by: STUDENT IN AN ORGANIZED HEALTH CARE EDUCATION/TRAINING PROGRAM

## 2025-05-04 PROCEDURE — 83036 HEMOGLOBIN GLYCOSYLATED A1C: CPT | Performed by: STUDENT IN AN ORGANIZED HEALTH CARE EDUCATION/TRAINING PROGRAM

## 2025-05-04 PROCEDURE — 85025 COMPLETE CBC W/AUTO DIFF WBC: CPT | Performed by: STUDENT IN AN ORGANIZED HEALTH CARE EDUCATION/TRAINING PROGRAM

## 2025-05-04 PROCEDURE — 25010000002 MIDAZOLAM PER 1 MG: Performed by: STUDENT IN AN ORGANIZED HEALTH CARE EDUCATION/TRAINING PROGRAM

## 2025-05-04 PROCEDURE — 25010000002 SUCCINYLCHOLINE PER 20 MG: Performed by: STUDENT IN AN ORGANIZED HEALTH CARE EDUCATION/TRAINING PROGRAM

## 2025-05-04 PROCEDURE — 25010000002 METHYLPREDNISOLONE PER 40 MG: Performed by: STUDENT IN AN ORGANIZED HEALTH CARE EDUCATION/TRAINING PROGRAM

## 2025-05-04 PROCEDURE — 86140 C-REACTIVE PROTEIN: CPT | Performed by: STUDENT IN AN ORGANIZED HEALTH CARE EDUCATION/TRAINING PROGRAM

## 2025-05-04 PROCEDURE — 31500 INSERT EMERGENCY AIRWAY: CPT

## 2025-05-04 PROCEDURE — 0BH17EZ INSERTION OF ENDOTRACHEAL AIRWAY INTO TRACHEA, VIA NATURAL OR ARTIFICIAL OPENING: ICD-10-PCS | Performed by: STUDENT IN AN ORGANIZED HEALTH CARE EDUCATION/TRAINING PROGRAM

## 2025-05-04 PROCEDURE — 93971 EXTREMITY STUDY: CPT | Performed by: RADIOLOGY

## 2025-05-04 PROCEDURE — 82805 BLOOD GASES W/O2 SATURATION: CPT

## 2025-05-04 PROCEDURE — 25010000002 NALOXONE HCL 2 MG/2ML SOLUTION PREFILLED SYRINGE

## 2025-05-04 PROCEDURE — 82375 ASSAY CARBOXYHB QUANT: CPT

## 2025-05-04 PROCEDURE — 25010000002 ENOXAPARIN PER 10 MG: Performed by: STUDENT IN AN ORGANIZED HEALTH CARE EDUCATION/TRAINING PROGRAM

## 2025-05-04 PROCEDURE — 83050 HGB METHEMOGLOBIN QUAN: CPT

## 2025-05-04 PROCEDURE — 93005 ELECTROCARDIOGRAM TRACING: CPT | Performed by: STUDENT IN AN ORGANIZED HEALTH CARE EDUCATION/TRAINING PROGRAM

## 2025-05-04 PROCEDURE — 25010000002 PROPOFOL 10 MG/ML EMULSION: Performed by: STUDENT IN AN ORGANIZED HEALTH CARE EDUCATION/TRAINING PROGRAM

## 2025-05-04 PROCEDURE — 25010000002 FUROSEMIDE PER 20 MG: Performed by: STUDENT IN AN ORGANIZED HEALTH CARE EDUCATION/TRAINING PROGRAM

## 2025-05-04 PROCEDURE — 25010000002 PIPERACILLIN SOD-TAZOBACTAM PER 1 G: Performed by: STUDENT IN AN ORGANIZED HEALTH CARE EDUCATION/TRAINING PROGRAM

## 2025-05-04 PROCEDURE — 36600 WITHDRAWAL OF ARTERIAL BLOOD: CPT

## 2025-05-04 RX ORDER — BISACODYL 10 MG
10 SUPPOSITORY, RECTAL RECTAL DAILY PRN
Status: DISCONTINUED | OUTPATIENT
Start: 2025-05-04 | End: 2025-05-05 | Stop reason: HOSPADM

## 2025-05-04 RX ORDER — ERGOCALCIFEROL 1.25 MG/1
50000 CAPSULE, LIQUID FILLED ORAL WEEKLY
COMMUNITY

## 2025-05-04 RX ORDER — ATORVASTATIN CALCIUM 20 MG/1
20 TABLET, FILM COATED ORAL DAILY
Status: CANCELLED | OUTPATIENT
Start: 2025-05-04

## 2025-05-04 RX ORDER — GABAPENTIN 400 MG/1
800 CAPSULE ORAL 3 TIMES DAILY PRN
Status: CANCELLED | OUTPATIENT
Start: 2025-05-04

## 2025-05-04 RX ORDER — GLUCAGON 1 MG/ML
1 KIT INJECTION
Status: DISCONTINUED | OUTPATIENT
Start: 2025-05-04 | End: 2025-05-05 | Stop reason: HOSPADM

## 2025-05-04 RX ORDER — PROPOFOL 10 MG/ML
INJECTION, EMULSION INTRAVENOUS
Status: COMPLETED
Start: 2025-05-04 | End: 2025-05-04

## 2025-05-04 RX ORDER — SODIUM CHLORIDE 0.9 % (FLUSH) 0.9 %
10 SYRINGE (ML) INJECTION AS NEEDED
Status: DISCONTINUED | OUTPATIENT
Start: 2025-05-04 | End: 2025-05-05 | Stop reason: HOSPADM

## 2025-05-04 RX ORDER — AMLODIPINE BESYLATE 5 MG/1
5 TABLET ORAL DAILY
Status: CANCELLED | OUTPATIENT
Start: 2025-05-04

## 2025-05-04 RX ORDER — NALOXONE HYDROCHLORIDE 1 MG/ML
INJECTION INTRAMUSCULAR; INTRAVENOUS; SUBCUTANEOUS
Status: COMPLETED
Start: 2025-05-04 | End: 2025-05-04

## 2025-05-04 RX ORDER — ALBUTEROL SULFATE 90 UG/1
2 INHALANT RESPIRATORY (INHALATION) EVERY 4 HOURS PRN
COMMUNITY

## 2025-05-04 RX ORDER — SUCCINYLCHOLINE CHLORIDE 20 MG/ML
INJECTION INTRAMUSCULAR; INTRAVENOUS
Status: COMPLETED | OUTPATIENT
Start: 2025-05-04 | End: 2025-05-04

## 2025-05-04 RX ORDER — SODIUM CHLORIDE 9 MG/ML
40 INJECTION, SOLUTION INTRAVENOUS AS NEEDED
Status: DISCONTINUED | OUTPATIENT
Start: 2025-05-04 | End: 2025-05-05 | Stop reason: HOSPADM

## 2025-05-04 RX ORDER — POLYETHYLENE GLYCOL 3350 17 G/17G
17 POWDER, FOR SOLUTION ORAL DAILY PRN
Status: DISCONTINUED | OUTPATIENT
Start: 2025-05-04 | End: 2025-05-05 | Stop reason: HOSPADM

## 2025-05-04 RX ORDER — MIDAZOLAM HYDROCHLORIDE 1 MG/ML
INJECTION, SOLUTION INTRAMUSCULAR; INTRAVENOUS
Status: COMPLETED
Start: 2025-05-04 | End: 2025-05-04

## 2025-05-04 RX ORDER — AMOXICILLIN 250 MG
2 CAPSULE ORAL 2 TIMES DAILY PRN
Status: DISCONTINUED | OUTPATIENT
Start: 2025-05-04 | End: 2025-05-05 | Stop reason: HOSPADM

## 2025-05-04 RX ORDER — ONDANSETRON 4 MG/1
8 TABLET, ORALLY DISINTEGRATING ORAL EVERY 6 HOURS PRN
Status: CANCELLED | OUTPATIENT
Start: 2025-05-04

## 2025-05-04 RX ORDER — GLUCAGON 1 MG/ML
1 KIT INJECTION
Status: CANCELLED | OUTPATIENT
Start: 2025-05-04

## 2025-05-04 RX ORDER — ATORVASTATIN CALCIUM 20 MG/1
20 TABLET, FILM COATED ORAL DAILY
Status: DISCONTINUED | OUTPATIENT
Start: 2025-05-04 | End: 2025-05-05 | Stop reason: HOSPADM

## 2025-05-04 RX ORDER — FUROSEMIDE 10 MG/ML
60 INJECTION INTRAMUSCULAR; INTRAVENOUS ONCE
Status: COMPLETED | OUTPATIENT
Start: 2025-05-04 | End: 2025-05-04

## 2025-05-04 RX ORDER — FAMOTIDINE 20 MG/1
20 TABLET, FILM COATED ORAL 2 TIMES DAILY PRN
Status: CANCELLED | OUTPATIENT
Start: 2025-05-04

## 2025-05-04 RX ORDER — LISINOPRIL 10 MG/1
20 TABLET ORAL 2 TIMES DAILY
Status: DISCONTINUED | OUTPATIENT
Start: 2025-05-04 | End: 2025-05-05 | Stop reason: HOSPADM

## 2025-05-04 RX ORDER — LISINOPRIL 10 MG/1
20 TABLET ORAL 2 TIMES DAILY
Status: CANCELLED | OUTPATIENT
Start: 2025-05-04

## 2025-05-04 RX ORDER — SODIUM CHLORIDE 0.9 % (FLUSH) 0.9 %
10 SYRINGE (ML) INJECTION EVERY 12 HOURS SCHEDULED
Status: DISCONTINUED | OUTPATIENT
Start: 2025-05-04 | End: 2025-05-05 | Stop reason: HOSPADM

## 2025-05-04 RX ORDER — MIDAZOLAM HYDROCHLORIDE 1 MG/ML
5 INJECTION, SOLUTION INTRAMUSCULAR; INTRAVENOUS ONCE
Status: DISCONTINUED | OUTPATIENT
Start: 2025-05-04 | End: 2025-05-04

## 2025-05-04 RX ORDER — NALOXONE HYDROCHLORIDE 1 MG/ML
2 INJECTION INTRAMUSCULAR; INTRAVENOUS; SUBCUTANEOUS ONCE
Status: COMPLETED | OUTPATIENT
Start: 2025-05-04 | End: 2025-05-04

## 2025-05-04 RX ORDER — ONDANSETRON 8 MG/1
8 TABLET, ORALLY DISINTEGRATING ORAL EVERY 6 HOURS PRN
COMMUNITY

## 2025-05-04 RX ORDER — PANTOPRAZOLE SODIUM 40 MG/1
40 TABLET, DELAYED RELEASE ORAL DAILY
Status: CANCELLED | OUTPATIENT
Start: 2025-05-04

## 2025-05-04 RX ORDER — MIDAZOLAM IN NACL,ISO-OSMOT/PF 50 MG/50ML
1-10 INFUSION BOTTLE (ML) INTRAVENOUS
Status: DISCONTINUED | OUTPATIENT
Start: 2025-05-04 | End: 2025-05-05

## 2025-05-04 RX ORDER — NICOTINE POLACRILEX 4 MG
15 LOZENGE BUCCAL
Status: DISCONTINUED | OUTPATIENT
Start: 2025-05-04 | End: 2025-05-05 | Stop reason: HOSPADM

## 2025-05-04 RX ORDER — ALBUTEROL SULFATE 90 UG/1
2 INHALANT RESPIRATORY (INHALATION) EVERY 4 HOURS PRN
Status: CANCELLED | OUTPATIENT
Start: 2025-05-04

## 2025-05-04 RX ORDER — INSULIN LISPRO 100 [IU]/ML
1-200 INJECTION, SOLUTION INTRAVENOUS; SUBCUTANEOUS
Status: CANCELLED | OUTPATIENT
Start: 2025-05-04

## 2025-05-04 RX ORDER — AMLODIPINE BESYLATE 5 MG/1
5 TABLET ORAL DAILY
Status: DISCONTINUED | OUTPATIENT
Start: 2025-05-04 | End: 2025-05-05 | Stop reason: HOSPADM

## 2025-05-04 RX ORDER — IPRATROPIUM BROMIDE AND ALBUTEROL SULFATE 2.5; .5 MG/3ML; MG/3ML
3 SOLUTION RESPIRATORY (INHALATION)
Status: DISCONTINUED | OUTPATIENT
Start: 2025-05-04 | End: 2025-05-05 | Stop reason: HOSPADM

## 2025-05-04 RX ORDER — BUDESONIDE AND FORMOTEROL FUMARATE DIHYDRATE 160; 4.5 UG/1; UG/1
2 AEROSOL RESPIRATORY (INHALATION) 2 TIMES DAILY
Status: CANCELLED | OUTPATIENT
Start: 2025-05-04

## 2025-05-04 RX ORDER — NYSTATIN 100000 [USP'U]/G
1 POWDER TOPICAL 3 TIMES DAILY
COMMUNITY

## 2025-05-04 RX ORDER — LISINOPRIL 10 MG/1
20 TABLET ORAL DAILY
Status: CANCELLED | OUTPATIENT
Start: 2025-05-04

## 2025-05-04 RX ORDER — IBUPROFEN 600 MG/1
600 TABLET, FILM COATED ORAL 3 TIMES DAILY PRN
Status: CANCELLED | OUTPATIENT
Start: 2025-05-04

## 2025-05-04 RX ORDER — ETOMIDATE 2 MG/ML
INJECTION INTRAVENOUS
Status: COMPLETED | OUTPATIENT
Start: 2025-05-04 | End: 2025-05-04

## 2025-05-04 RX ORDER — MIDAZOLAM HYDROCHLORIDE 1 MG/ML
5 INJECTION, SOLUTION INTRAMUSCULAR; INTRAVENOUS ONCE
Status: COMPLETED | OUTPATIENT
Start: 2025-05-04 | End: 2025-05-04

## 2025-05-04 RX ORDER — ENOXAPARIN SODIUM 100 MG/ML
60 INJECTION SUBCUTANEOUS EVERY 12 HOURS
Status: DISCONTINUED | OUTPATIENT
Start: 2025-05-04 | End: 2025-05-05 | Stop reason: HOSPADM

## 2025-05-04 RX ORDER — PROPOFOL 10 MG/ML
VIAL (ML) INTRAVENOUS
Status: COMPLETED | OUTPATIENT
Start: 2025-05-04 | End: 2025-05-04

## 2025-05-04 RX ORDER — DEXTROSE MONOHYDRATE 25 G/50ML
10-50 INJECTION, SOLUTION INTRAVENOUS
Status: CANCELLED | OUTPATIENT
Start: 2025-05-04

## 2025-05-04 RX ORDER — INSULIN LISPRO 100 [IU]/ML
1-200 INJECTION, SOLUTION INTRAVENOUS; SUBCUTANEOUS AS NEEDED
Status: CANCELLED | OUTPATIENT
Start: 2025-05-04

## 2025-05-04 RX ORDER — BISACODYL 5 MG/1
5 TABLET, DELAYED RELEASE ORAL DAILY PRN
Status: DISCONTINUED | OUTPATIENT
Start: 2025-05-04 | End: 2025-05-05 | Stop reason: HOSPADM

## 2025-05-04 RX ORDER — PANTOPRAZOLE SODIUM 40 MG/10ML
40 INJECTION, POWDER, LYOPHILIZED, FOR SOLUTION INTRAVENOUS
Status: DISCONTINUED | OUTPATIENT
Start: 2025-05-04 | End: 2025-05-05 | Stop reason: HOSPADM

## 2025-05-04 RX ORDER — FENTANYL CITRATE-0.9 % NACL/PF 10 MCG/ML
50-300 PLASTIC BAG, INJECTION (ML) INTRAVENOUS
Status: DISCONTINUED | OUTPATIENT
Start: 2025-05-04 | End: 2025-05-05

## 2025-05-04 RX ORDER — DEXTROSE MONOHYDRATE 25 G/50ML
25 INJECTION, SOLUTION INTRAVENOUS
Status: DISCONTINUED | OUTPATIENT
Start: 2025-05-04 | End: 2025-05-05 | Stop reason: HOSPADM

## 2025-05-04 RX ORDER — NICOTINE POLACRILEX 4 MG
15 LOZENGE BUCCAL
Status: CANCELLED | OUTPATIENT
Start: 2025-05-04

## 2025-05-04 RX ADMIN — DEXMEDETOMIDINE 0.2 MCG/KG/HR: 200 INJECTION, SOLUTION INTRAVENOUS at 16:01

## 2025-05-04 RX ADMIN — ENOXAPARIN SODIUM 60 MG: 60 INJECTION SUBCUTANEOUS at 21:07

## 2025-05-04 RX ADMIN — PROPOFOL 50 MCG/KG/MIN: 10 INJECTION, EMULSION INTRAVENOUS at 17:51

## 2025-05-04 RX ADMIN — MUPIROCIN 1 APPLICATION: 20 OINTMENT TOPICAL at 21:07

## 2025-05-04 RX ADMIN — NALOXONE HYDROCHLORIDE 2 MG: 1 INJECTION PARENTERAL at 10:28

## 2025-05-04 RX ADMIN — PROPOFOL 45 MCG/KG/MIN: 10 INJECTION, EMULSION INTRAVENOUS at 23:04

## 2025-05-04 RX ADMIN — ATORVASTATIN CALCIUM 20 MG: 20 TABLET, FILM COATED ORAL at 17:51

## 2025-05-04 RX ADMIN — Medication 10 ML: at 21:07

## 2025-05-04 RX ADMIN — Medication 300 MCG/HR: at 20:52

## 2025-05-04 RX ADMIN — PROPOFOL 60 MG: 10 INJECTION, EMULSION INTRAVENOUS at 10:52

## 2025-05-04 RX ADMIN — IPRATROPIUM BROMIDE AND ALBUTEROL SULFATE 3 ML: 2.5; .5 SOLUTION RESPIRATORY (INHALATION) at 12:27

## 2025-05-04 RX ADMIN — SUCCINYLCHOLINE CHLORIDE 25 MG: 20 INJECTION, SOLUTION INTRAMUSCULAR; INTRAVENOUS at 10:44

## 2025-05-04 RX ADMIN — PANTOPRAZOLE SODIUM 40 MG: 40 INJECTION, POWDER, FOR SOLUTION INTRAVENOUS at 16:02

## 2025-05-04 RX ADMIN — PROPOFOL 5 MCG/KG/MIN: 10 INJECTION, EMULSION INTRAVENOUS at 10:50

## 2025-05-04 RX ADMIN — FUROSEMIDE 60 MG: 10 INJECTION, SOLUTION INTRAMUSCULAR; INTRAVENOUS at 11:26

## 2025-05-04 RX ADMIN — ETOMIDATE 50 MG: 2 INJECTION, SOLUTION INTRAVENOUS at 10:37

## 2025-05-04 RX ADMIN — PROPOFOL 50 MCG/KG/MIN: 10 INJECTION, EMULSION INTRAVENOUS at 15:07

## 2025-05-04 RX ADMIN — METHYLPREDNISOLONE SODIUM SUCCINATE 40 MG: 40 INJECTION, POWDER, LYOPHILIZED, FOR SOLUTION INTRAMUSCULAR; INTRAVENOUS at 16:01

## 2025-05-04 RX ADMIN — PIPERACILLIN SODIUM AND TAZOBACTAM SODIUM 3.38 G: 3; .375 INJECTION, POWDER, LYOPHILIZED, FOR SOLUTION INTRAVENOUS at 10:00

## 2025-05-04 RX ADMIN — VANCOMYCIN HYDROCHLORIDE 2500 MG: 5 INJECTION, POWDER, LYOPHILIZED, FOR SOLUTION INTRAVENOUS at 10:30

## 2025-05-04 RX ADMIN — Medication 10 ML: at 16:02

## 2025-05-04 RX ADMIN — SUCCINYLCHOLINE CHLORIDE 25 MG: 20 INJECTION, SOLUTION INTRAMUSCULAR; INTRAVENOUS at 10:37

## 2025-05-04 RX ADMIN — PROPOFOL 50 MCG/KG/MIN: 10 INJECTION, EMULSION INTRAVENOUS at 12:55

## 2025-05-04 RX ADMIN — IPRATROPIUM BROMIDE AND ALBUTEROL SULFATE 3 ML: 2.5; .5 SOLUTION RESPIRATORY (INHALATION) at 18:47

## 2025-05-04 RX ADMIN — NALOXONE HYDROCHLORIDE 2 MG: 1 INJECTION INTRAMUSCULAR; INTRAVENOUS; SUBCUTANEOUS at 10:28

## 2025-05-04 RX ADMIN — NALOXONE HYDROCHLORIDE 2 MG: 1 INJECTION PARENTERAL at 10:31

## 2025-05-04 RX ADMIN — MIDAZOLAM HYDROCHLORIDE 1 MG/HR: 1 INJECTION, SOLUTION INTRAVENOUS at 13:31

## 2025-05-04 RX ADMIN — MIDAZOLAM 5 MG: 1 INJECTION INTRAMUSCULAR; INTRAVENOUS at 11:55

## 2025-05-04 RX ADMIN — Medication 50 MCG/HR: at 12:49

## 2025-05-04 RX ADMIN — MIDAZOLAM 2 MG: 1 INJECTION INTRAMUSCULAR; INTRAVENOUS at 11:54

## 2025-05-04 RX ADMIN — NALOXONE HYDROCHLORIDE 2 MG: 1 INJECTION PARENTERAL at 10:20

## 2025-05-04 RX ADMIN — PROPOFOL 50 MCG/KG/MIN: 10 INJECTION, EMULSION INTRAVENOUS at 20:52

## 2025-05-04 NOTE — H&P
[facility] HOSPITALIST HISTORY AND PHYSICAL    Patient Identification:  Name:  Mary Monaco  Age:  44 y.o.  Sex:  female  :  1980  MRN:  [id number]   Visit Number:  [id number]  Admit Date: 2025   Room number:  117/17  Primary Care Physician:  Latoya Bettencourt APRN     Subjective     Chief complaint:    Chief Complaint   Patient presents with    Leg Swelling       History of presenting illness:  44 y.o. female presents to the emergency department with chief complaint of swelling in the lower extremities and wound on the lateral aspect.  Patient with a past medical history significant for asthma and COPD as well as morbid obesity, diabetes mellitus type 2, hepatitis C, hypertension, IV drug abuse with also reported history of lupus and rheumatoid arthritis.  Patient initially very hyperactive according to report from ER provider and restless in bed later becoming more lethargic and becoming obtunded.  Family member present at bedside also becoming similar although not nearly as lethargic.  Ultimately patient required intubation for protection of her airway.  Subsequent UDS showed patient positive for amphetamines, methamphetamines, benzodiazepines, and methadone.  Unfortunately due to timing of when UDS could be obtained unclear if some of this is from rapid sequence intubation regardless though due to patient requiring mechanical ventilation for respiratory failure likely secondary to toxic encephalopathy from drug abuse she is being admitted to the hospital service for further evaluation.  Initial laboratory evaluation Emergency Department was surprisingly unremarkable with CMP abnormal only for a bicarb of 32 likely consistent with chronic hypercapnia with elevated CRP of 2.8.  CBC equally unremarkable with hemoglobin 11.9 and otherwise normal differential.  Sed rate noted to be mildly elevated at 41.  UDS as above.  Duplex of the lower extremity on the right showed no acute  deep vein thrombosis.  Chest x-ray obtained postintubation still awaiting read but looks significant for pulmonary congestion with increased vascularity.  CT of the head is ordered and pending at time of admission.  ---------------------------------------------------------------------------------------------------------------------   Review of Systems a 14 system review of systems was unable to be performed due to patient's sedated and mechanically ventilated status.  ---------------------------------------------------------------------------------------------------------------------   Past Medical History:   Diagnosis Date    Asthma     COPD (chronic obstructive pulmonary disease)     Diabetes mellitus     Hepatitis C     History of gallstones     Hypertension     IV drug abuse     Lupus     Rheumatoid arthritis      Past Surgical History:   Procedure Laterality Date    ABDOMINAL SURGERY      CHOLECYSTECTOMY      PERINEAL LESION/CYST EXCISION N/A 4/3/2017    Procedure: EXCISION OF VAGINAL SKIN TAG;  Surgeon: Kee Crooks III, MD;  Location: [facility];  Service:     TUBAL COAGULATION LAPAROSCOPIC Bilateral 4/3/2017    Procedure: BILATERAL TUBAL FALLOPE FILSHIE CLIPPING LAPAROSCOPIC,IUD REMOVAL;  Surgeon: Kee Crooks III, MD;  Location: [facility] OR;  Service:      Family History   Problem Relation Age of Onset    No Known Problems Mother     No Known Problems Father     No Known Problems Sister     No Known Problems Brother     No Known Problems Son     No Known Problems Daughter     No Known Problems Maternal Grandmother     No Known Problems Maternal Grandfather     No Known Problems Paternal Grandmother     No Known Problems Paternal Grandfather     No Known Problems Cousin     No Known Problems Other     Rheum arthritis Neg Hx     Osteoarthritis Neg Hx     Asthma Neg Hx     Diabetes Neg Hx     Heart failure Neg Hx     Hyperlipidemia Neg Hx     Hypertension Neg Hx     Migraines Neg Hx     Rashes / Skin problems  Neg Hx     Seizures Neg Hx     Stroke Neg Hx     Thyroid disease Neg Hx      Social History     Socioeconomic History    Marital status:    Tobacco Use    Smoking status: Every Day     Current packs/day: 1.00     Average packs/day: 1 pack/day for 15.0 years (15.0 ttl pk-yrs)     Types: Cigarettes    Smokeless tobacco: Never   Vaping Use    Vaping status: Never Used   Substance and Sexual Activity    Alcohol use: No    Drug use: No    Sexual activity: Yes     Partners: Male     Birth control/protection: I.U.D.     ---------------------------------------------------------------------------------------------------------------------   Allergies:  Patient has no known allergies.  ---------------------------------------------------------------------------------------------------------------------   Medications below are reported home medications pulling from within the system; at this time, these medications have not been reconciled unless otherwise specified and are in the verification process for further verifcation as current home medications.    Prior to Admission Medications       Prescriptions Last Dose Informant Patient Reported? Taking?    albuterol (ACCUNEB) 1.25 MG/3ML nebulizer solution   Yes No    Take 1 ampule by nebulization Every 6 (Six) Hours As Needed for Wheezing.    albuterol (PROVENTIL HFA;VENTOLIN HFA) 108 (90 Base) MCG/ACT inhaler  Self No No    Inhale 2 puffs Every 4 (Four) Hours As Needed.    amLODIPine (NORVASC) 5 MG tablet   Yes No    atorvastatin (LIPITOR) 20 MG tablet   Yes No    cloNIDine (CATAPRES) 0.1 MG tablet  Self Yes No    Take 0.1 mg by mouth Every Night.    D-3-5 125 MCG (5000 UT) capsule capsule   Yes No    famotidine (PEPCID) 20 MG tablet   Yes No    fluticasone (FLONASE) 50 MCG/ACT nasal spray  Self No No    Inhale 2 sprays into the nostril(s) once Daily.    gabapentin (NEURONTIN) 800 MG tablet  Self Yes No    Take 800 mg by mouth 4 (Four) Times a Day.    ibuprofen  (ADVIL,MOTRIN) 600 MG tablet   Yes No    levothyroxine (SYNTHROID, LEVOTHROID) 25 MCG tablet  Pharmacy Yes No    Take 25 mcg by mouth Daily.    lisinopril (PRINIVIL,ZESTRIL) 20 MG tablet   Yes No    metFORMIN (GLUCOPHAGE) 1000 MG tablet   Yes No    methadone (DOLOPHINE) 10 MG tablet   Yes No    Take 1 tablet by mouth Every 8 (Eight) Hours,    omega-3 acid ethyl esters (LOVAZA) 1 g capsule   Yes No    ondansetron (ZOFRAN) 8 MG tablet   No No    Take 1 tablet by mouth Every 8 (Eight) Hours As Needed for Nausea.    pantoprazole (PROTONIX) 40 MG EC tablet   Yes No    polyethylene glycol (MIRALAX) 17 g packet  Pharmacy Yes No    Take 17 g by mouth Daily.    Symbicort 160-4.5 MCG/ACT inhaler   Yes No          Objective     Vital Signs:  Temp:  [97.6 °F (36.4 °C)-97.7 °F (36.5 °C)] 97.7 °F (36.5 °C)  Heart Rate:  [] 94  Resp:  [7-18] 18  BP: ()/(42-97) 171/93  FiO2 (%):  [40 %-60 %] 40 %    Mean Arterial Pressure (Non-Invasive) for the past 24 hrs (Last 3 readings):   Noninvasive MAP (mmHg)   05/04/25 1021 64     SpO2:  [90 %-99 %] 97 %  on  Flow (L/min) (Oxygen Therapy):  [15] 15;   Device (Oxygen Therapy): ventilator  Body mass index is 53.03 kg/m².    Wt Readings from Last 3 Encounters:   05/04/25 132 kg (290 lb)   12/29/24 (!) 159 kg (350 lb)   07/27/24 122 kg (268 lb)      ---------------------------------------------------------------------------------------------------------------------   Physical Exam:  Constitutional: Morbidly obese female currently sedated on mechanical ventilation but intermittently opening eyes and coughing around ET tube.  Does not follow commands.     HENT:  Head: Normocephalic and atraumatic.  Mouth:  Moist mucous membranes.  ET tube in place.  Eyes:  Conjunctivae and EOM are normal.  Pupils are equal, round, and reactive to light.  No scleral icterus.  Neck:  Neck supple.  No JVD present.    Cardiovascular: Tachycardic but regular rhythm with normal heart sounds with no  murmur.  Pulmonary/Chest: Mechanical breath sounds with end expiratory wheezing throughout with diminished breath sounds secondary to body habitus..  Abdominal: Morbidly obese abdomen but soft.  Bowel sounds are hypoactive.  No distension and no apparent tenderness.   Musculoskeletal:  No deformity.  No red or swollen joints anywhere.    Neurological: Sedated on mechanical ventilation.  No no focal neurological deficits on limited exam  Skin:  Skin is warm and dry.  No rash noted.  No pallor.   Peripheral vascular: Trace pitting edema of the lower extremities with pulses on all 4 extremities.    ---------------------------------------------------------------------------------------------------------------------  EKG: Ordered and pending at this time      Last echocardiogram:  Results for orders placed during the hospital encounter of 09/07/20    Adult Transthoracic Echo Complete W/ Cont if Necessary Per Protocol    Interpretation Summary  · Left ventricular systolic function is normal, EF 61-65%.  · Left atrial cavity size is mildly dilated.    --------------------------------------------------------------------------------------------------------------------  Labs:  Results from last 7 days   Lab Units 05/04/25  0806   SED RATE mm/hr 41*   CRP mg/dL 2.88*   WBC 10*3/mm3 8.00   HEMOGLOBIN g/dL 11.9*   HEMATOCRIT % 38.9   MCV fL 86.4   MCHC g/dL 30.6*   PLATELETS 10*3/mm3 256     Results from last 7 days   Lab Units 05/04/25  1148   PH, ARTERIAL pH units 7.230*   PO2 ART mm Hg 105.0   PCO2, ARTERIAL mm Hg 74.9*   HCO3 ART mmol/L 31.3*     Results from last 7 days   Lab Units 05/04/25  0806   SODIUM mmol/L 142   POTASSIUM mmol/L 3.9   CHLORIDE mmol/L 101   CO2 mmol/L 32.1*   BUN mg/dL 19   CREATININE mg/dL 0.92   CALCIUM mg/dL 9.0   GLUCOSE mg/dL 82   ALBUMIN g/dL 4.0   BILIRUBIN mg/dL 0.2   ALK PHOS U/L 105   AST (SGOT) U/L 24   ALT (SGPT) U/L 20   Estimated Creatinine Clearance: 103.2 mL/min (by C-G formula based on  "SCr of 0.92 mg/dL).    No results found for: \"AMMONIA\"          No results found for: \"HGBA1C\", \"POCGLU\"  Lab Results   Component Value Date    TSH 4.600 (H) 11/15/2024    FREET4 0.83 11/15/2024     Lab Results   Component Value Date    PREGTESTUR Negative 06/30/2023    HCGQUANT <0.50 09/07/2020     Pain Management Panel  More data exists         Latest Ref Rng & Units 5/4/2025 12/30/2024   Pain Management Panel   Amphetamine, Urine Qual Negative Positive  Positive    Barbiturates Screen, Urine Negative Negative  Negative    Benzodiazepine Screen, Urine Negative Positive  Positive    Buprenorphine, Screen, Urine Negative Negative  Negative    Cocaine Screen, Urine Negative Negative  Negative    Fentanyl, Urine Negative Negative  Positive    Methadone Screen , Urine Negative Positive  Positive    Methamphetamine, Ur Negative Positive  Positive      Brief Urine Lab Results  (Last result in the past 365 days)        Color   Clarity   Blood   Leuk Est   Nitrite   Protein   CREAT   Urine HCG        12/30/24 0127 Yellow   Clear   Negative   Trace   Negative   Negative                 No results found for: \"BLOODCX\"  No results found for: \"URINECX\"  No results found for: \"WOUNDCX\"  No results found for: \"STOOLCX\"    I have personally looked at the labs and they are summarized above.  ----------------------------------------------------------------------------------------------------------------------  Detailed radiology reports for the last 24 hours:    Imaging Results (Last 24 Hours)       Procedure Component Value Units Date/Time    XR Chest 1 View [[id number]] Resulted: 05/04/25 1115     Updated: 05/04/25 1122    US Venous Doppler Lower Extremity Right (duplex) [[id number]] Collected: 05/04/25 1013     Updated: 05/04/25 1016    Narrative:      VERIFICATION OBSERVER NAME: Trell Espitia MD.     TECHNIQUE: Real-time gray scale examination of the lower extremity  venous system which included and/ or attempted the following " deep veins:  Common femoral vein, deep femoral vein, femoral vein, popliteal vein,  peroneal vein, posterior tibial vein and anterior tibial vein,  supplemented by color flow imaging and duplex doppler, spectral analysis  and waveform .      HISTORY/COMPARISON/FINDINGS:     Comparison: None.         FINDINGS:  Color flow duplex ultrasound of the RIGHT lower extremity deep venous  system appears normal.  Normal spontaneous and phasic flow is present.   The venous waveform also appears normal, with normal augmentation at all  levels.  Normal transverse compression was obtained throughout the  common femoral vein, superficial femoral vein, and popliteal vein.  Calf  veins were not visualized due to edema.                Impression:         No acute deep vein thrombosis.                          This report was finalized on 5/4/2025 10:14 AM by Dr. Trell Espitia MD.             Final impressions for the last 30 days of radiology reports:    US Venous Doppler Lower Extremity Right (duplex)  Result Date: 5/4/2025   No acute deep vein thrombosis.         This report was finalized on 5/4/2025 10:14 AM by Dr. Trell Espitia MD.      I have personally looked at the radiology images and read the final radiology report.    Assessment & Plan       Acute toxic encephalopathy  Acute hypercapnic hypoxemic respiratory failure  Suspected polysubstance abuse with overdose  Acute exacerbation of COPD    -Patient initially presenting hyperactive and restless and agitated then transitioning to obtunded status and intubated for airway protection.    - UDS positive for amphetamines, methamphetamines, benzodiazepines, and methadone.  Will review Scott to see if patient has prescribed any benzos or methadone.      - Pulmonology consultation for tomorrow morning for ventilator management.    - Nutrition consult for tube feed initiation    - No plans for weaning trials today and will allow patient to rest on ventilator while illicit substances  are cleared from system.    - Will start on Solu-Medrol 40 twice daily for in expiratory wheezing although suspect some component of edema contributing and administering 1 dose of IV Lasix 60 mg.    - Hold on antibiotic therapy as patient with unremarkable labs and suspicion for toxic encephalopathy and substance abuse contributing to respiratory failure predominant cause at this time.      Diabetes mellitus type 2    -Initiate on Glucomander subcu protocol for glycemic control.    Chronic conditions:  Hypertension  Lupus  Rheumatoid arthritis  History of tobacco abuse    VTE Prophylaxis:   [unfilled]    The patient is high risk due to the following diagnoses/reasons: Morbidly obese female with acute respiratory failure secondary to polysubstance abuse requiring intubation mechanical ventilation and admission to the critical care unit for close monitoring and treatment.        Rosalio Riley DO  [facility] Hospitalist  05/04/25  11:57 EDT

## 2025-05-04 NOTE — CASE MANAGEMENT/SOCIAL WORK
Discharge Planning Assessment   [place]     Patient Name: Mary Monaco  MRN: [id number]  Today's Date: 5/4/2025    Admit Date: 5/4/2025    Plan: Unable to speak to pt at this time pt is intubated. Per chart pt's pcp is Latoya Bettencourt, she uses [facility] and has [facility].   Discharge Needs Assessment    No documentation.                  Discharge Plan       Row Name 05/04/25 1143       Plan    Plan Unable to speak to pt at this time pt is intubated. Per chart pt's pcp is Latoya Bettencourt, she uses [facility] and has [facility].    Patient/Family in Agreement with Plan unable to assess                    Expected Discharge Date and Time       Expected Discharge Date Expected Discharge Time    May 6, 2025            Demographic Summary       Row Name 05/04/25 1146       General Information    Admission Type inpatient    Referral Source emergency department    Reason for Consult discharge planning                          Marta Queen RN

## 2025-05-04 NOTE — ED PROVIDER NOTES
Subjective   History of Present Illness  44-year-old female presents with complaints of swelling in her lower extremities and a wound on the lateral aspect of her right lower leg.  Patient states that she does not know how she actually injured the skin but she has had lower extremity swelling and edema for quite some time.  Patient denies recent fevers chills but states that she has had some drainage from the site however patient is a grossly poor historian and provides minimal to no additional history of present illness.  Appears grossly altered at the time of evaluating this patient and patient verbally denies that she is taking anything illicit.      Review of Systems    Past Medical History:   Diagnosis Date    Asthma     COPD (chronic obstructive pulmonary disease)     Diabetes mellitus     Hepatitis C     History of gallstones     Hypertension     IV drug abuse     Lupus     Rheumatoid arthritis        No Known Allergies    Past Surgical History:   Procedure Laterality Date    ABDOMINAL SURGERY      CHOLECYSTECTOMY      PERINEAL LESION/CYST EXCISION N/A 4/3/2017    Procedure: EXCISION OF VAGINAL SKIN TAG;  Surgeon: Kee Crooks III, MD;  Location: [facility];  Service:     TUBAL COAGULATION LAPAROSCOPIC Bilateral 4/3/2017    Procedure: BILATERAL TUBAL FALLOPE FILSHIE CLIPPING LAPAROSCOPIC,IUD REMOVAL;  Surgeon: Kee Crooks III, MD;  Location: [facility];  Service:        Family History   Problem Relation Age of Onset    No Known Problems Mother     No Known Problems Father     No Known Problems Sister     No Known Problems Brother     No Known Problems Son     No Known Problems Daughter     No Known Problems Maternal Grandmother     No Known Problems Maternal Grandfather     No Known Problems Paternal Grandmother     No Known Problems Paternal Grandfather     No Known Problems Cousin     No Known Problems Other     Rheum arthritis Neg Hx     Osteoarthritis Neg Hx     Asthma Neg Hx     Diabetes Neg Hx      Heart failure Neg Hx     Hyperlipidemia Neg Hx     Hypertension Neg Hx     Migraines Neg Hx     Rashes / Skin problems Neg Hx     Seizures Neg Hx     Stroke Neg Hx     Thyroid disease Neg Hx        Social History     Socioeconomic History    Marital status:    Tobacco Use    Smoking status: Every Day     Current packs/day: 1.00     Average packs/day: 1 pack/day for 15.0 years (15.0 ttl pk-yrs)     Types: Cigarettes    Smokeless tobacco: Never   Vaping Use    Vaping status: Never Used   Substance and Sexual Activity    Alcohol use: No    Drug use: No    Sexual activity: Yes     Partners: Male     Birth control/protection: I.U.D.           Objective   Physical Exam  Vitals and nursing note reviewed.   Constitutional:       General: She is not in acute distress.     Appearance: She is well-developed. She is obese. She is ill-appearing. She is not diaphoretic.      Comments: Obese, disheveled in appearance.  Patient initially is seen sitting in bed wrestling through her purse and will answer questions.   HENT:      Head: Normocephalic and atraumatic.      Right Ear: External ear normal.      Left Ear: External ear normal.      Nose: Nose normal.   Eyes:      Conjunctiva/sclera: Conjunctivae normal.      Pupils: Pupils are equal, round, and reactive to light.   Neck:      Vascular: No JVD.      Trachea: No tracheal deviation.   Cardiovascular:      Rate and Rhythm: Normal rate and regular rhythm.      Heart sounds: Normal heart sounds. No murmur heard.  Pulmonary:      Effort: Pulmonary effort is normal. No respiratory distress.      Breath sounds: Normal breath sounds. No wheezing.   Abdominal:      General: Bowel sounds are normal.      Palpations: Abdomen is soft.      Tenderness: There is no abdominal tenderness.   Musculoskeletal:         General: No deformity. Normal range of motion.      Cervical back: Normal range of motion and neck supple.      Right lower leg: Edema present.      Left lower leg: Edema  present.   Skin:     General: Skin is warm and dry.      Coloration: Skin is not pale.      Findings: Lesion present. No erythema or rash.      Comments: Ulcerated of lesion on the right lower lateral aspect of the leg, surrounding cellulitis process.  No palpable fluctuation or purulent drainage can be expressed.   Neurological:      Mental Status: She is disoriented.      Cranial Nerves: No cranial nerve deficit.         Critical Care    Performed by: Jovanna Monae DO  Authorized by: Jovanna Monae DO    Critical care provider statement:     Critical care time (minutes):  45    Critical care time was exclusive of:  Separately billable procedures and treating other patients and teaching time    Critical care was necessary to treat or prevent imminent or life-threatening deterioration of the following conditions:  CNS failure or compromise, circulatory failure, cardiac failure, respiratory failure and toxidrome    Critical care was time spent personally by me on the following activities:  Blood draw for specimens, development of treatment plan with patient or surrogate, discussions with primary provider, examination of patient, interpretation of cardiac output measurements, obtaining history from patient or surrogate, ordering and performing treatments and interventions, ordering and review of laboratory studies, ordering and review of radiographic studies, pulse oximetry, re-evaluation of patient's condition, review of old charts, vascular access procedures, ventilator management and evaluation of patient's response to treatment    I assumed direction of critical care for this patient from another provider in my specialty: no      Care discussed with: admitting provider    Intubation    Date/Time: 5/4/2025 10:39 AM    Performed by: Jovanna Monae DO  Authorized by: Jovanna Monae DO    Consent:     Consent obtained:  Emergent situation  Universal protocol:     Patient identity confirmed:  Arm band and  hospital-assigned identification number  Pre-procedure details:     Indications: airway protection, altered consciousness and respiratory failure      Patient status:  Altered mental status    Look externally: large tongue      Mallampati score:  I    Obstruction: none      Neck mobility: normal      Pharmacologic strategy: RSI      Induction agents:  Etomidate    Paralytics:  Succinylcholine  Procedure details:     Preoxygenation:  Bag valve mask    Number of attempts:  1  Successful intubation attempt details:     Intubation method:  Oral    Intubation technique: video assisted      Laryngoscope blade:  Mac 4    Grade view: I      Tube size (mm):  7.5    Tube type:  Cuffed    Tube visualized through cords: yes    Placement assessment:     ETT at teeth/gumline (cm):  21    Tube secured with:  ETT kelly    Breath sounds:  Equal    Placement verification: chest rise, colorimetric ETCO2, CXR verification, direct visualization, equal breath sounds and tube exhalation      CXR findings:  Appropriate position  Post-procedure details:     Procedure completion:  Tolerated well, no immediate complications         Results for orders placed or performed during the hospital encounter of 05/04/25   Comprehensive Metabolic Panel    Collection Time: 05/04/25  8:06 AM    Specimen: Blood   Result Value Ref Range    Glucose 82 65 - 99 mg/dL    BUN 19 6 - 20 mg/dL    Creatinine 0.92 0.57 - 1.00 mg/dL    Sodium 142 136 - 145 mmol/L    Potassium 3.9 3.5 - 5.2 mmol/L    Chloride 101 98 - 107 mmol/L    CO2 32.1 (H) 22.0 - 29.0 mmol/L    Calcium 9.0 8.6 - 10.5 mg/dL    Total Protein 7.6 6.0 - 8.5 g/dL    Albumin 4.0 3.5 - 5.2 g/dL    ALT (SGPT) 20 1 - 33 U/L    AST (SGOT) 24 1 - 32 U/L    Alkaline Phosphatase 105 39 - 117 U/L    Total Bilirubin 0.2 0.0 - 1.2 mg/dL    Globulin 3.6 gm/dL    A/G Ratio 1.1 g/dL    BUN/Creatinine Ratio 20.7 7.0 - 25.0    Anion Gap 8.9 5.0 - 15.0 mmol/L    eGFR 79.4 >60.0 mL/min/1.73   CBC Auto Differential     Collection Time: 05/04/25  8:06 AM    Specimen: Blood   Result Value Ref Range    WBC 8.00 3.40 - 10.80 10*3/mm3    RBC 4.50 3.77 - 5.28 10*6/mm3    Hemoglobin 11.9 (L) 12.0 - 15.9 g/dL    Hematocrit 38.9 34.0 - 46.6 %    MCV 86.4 79.0 - 97.0 fL    MCH 26.4 (L) 26.6 - 33.0 pg    MCHC 30.6 (L) 31.5 - 35.7 g/dL    RDW 15.4 12.3 - 15.4 %    RDW-SD 48.7 37.0 - 54.0 fl    MPV 10.2 6.0 - 12.0 fL    Platelets 256 140 - 450 10*3/mm3    Neutrophil % 64.0 42.7 - 76.0 %    Lymphocyte % 23.1 19.6 - 45.3 %    Monocyte % 8.5 5.0 - 12.0 %    Eosinophil % 3.5 0.3 - 6.2 %    Basophil % 0.5 0.0 - 1.5 %    Immature Grans % 0.4 0.0 - 0.5 %    Neutrophils, Absolute 5.12 1.70 - 7.00 10*3/mm3    Lymphocytes, Absolute 1.85 0.70 - 3.10 10*3/mm3    Monocytes, Absolute 0.68 0.10 - 0.90 10*3/mm3    Eosinophils, Absolute 0.28 0.00 - 0.40 10*3/mm3    Basophils, Absolute 0.04 0.00 - 0.20 10*3/mm3    Immature Grans, Absolute 0.03 0.00 - 0.05 10*3/mm3    nRBC 0.0 0.0 - 0.2 /100 WBC   C-reactive Protein    Collection Time: 05/04/25  8:06 AM    Specimen: Blood   Result Value Ref Range    C-Reactive Protein 2.88 (H) 0.00 - 0.50 mg/dL   Sedimentation Rate    Collection Time: 05/04/25  8:06 AM    Specimen: Blood   Result Value Ref Range    Sed Rate 41 (H) 0 - 20 mm/hr   Green Top (Gel)    Collection Time: 05/04/25  8:06 AM   Result Value Ref Range    Extra Tube Hold for add-ons.    Lavender Top    Collection Time: 05/04/25  8:06 AM   Result Value Ref Range    Extra Tube hold for add-on    Gold Top - SST    Collection Time: 05/04/25  8:06 AM   Result Value Ref Range    Extra Tube Hold for add-ons.    Light Blue Top    Collection Time: 05/04/25  8:06 AM   Result Value Ref Range    Extra Tube Hold for add-ons.    Urine Drug Screen - Indwelling Urethral Catheter    Collection Time: 05/04/25 11:08 AM    Specimen: Indwelling Urethral Catheter; Urine   Result Value Ref Range    THC, Screen, Urine Negative Negative    Phencyclidine (PCP), Urine Negative  Negative    Cocaine Screen, Urine Negative Negative    Methamphetamine, Ur Positive (A) Negative    Opiate Screen Negative Negative    Amphetamine Screen, Urine Positive (A) Negative    Benzodiazepine Screen, Urine Positive (A) Negative    Tricyclic Antidepressants Screen Negative Negative    Methadone Screen, Urine Positive (A) Negative    Barbiturates Screen, Urine Negative Negative    Oxycodone Screen, Urine Negative Negative    Buprenorphine, Screen, Urine Negative Negative   Fentanyl, Urine - Indwelling Urethral Catheter    Collection Time: 05/04/25 11:08 AM    Specimen: Indwelling Urethral Catheter; Urine   Result Value Ref Range    Fentanyl, Urine Negative Negative   Blood Gas, Arterial With Co-Ox    Collection Time: 05/04/25 11:48 AM    Specimen: Arterial Blood   Result Value Ref Range    Site Right Radial     Zbigniew's Test Positive     pH, Arterial 7.230 (L) 7.350 - 7.450 pH units    pCO2, Arterial 74.9 (C) 35.0 - 45.0 mm Hg    pO2, Arterial 105.0 83.0 - 108.0 mm Hg    HCO3, Arterial 31.3 (H) 20.0 - 26.0 mmol/L    Base Excess, Arterial 1.9 0.0 - 2.0 mmol/L    O2 Saturation, Arterial 97.2 94.0 - 99.0 %    Hemoglobin, Blood Gas 12.2 (L) 13.5 - 17.5 g/dL    Hematocrit, Blood Gas 37.5 (L) 38.0 - 51.0 %    Oxyhemoglobin 95.5 94 - 99 %    Methemoglobin 0.30 0.00 - 3.00 %    Carboxyhemoglobin 1.4 0 - 5 %    A-a DO2 220.1 0.0 - 300.0 mmHg    CO2 Content 33.6 (H) 22 - 33 mmol/L    Barometric Pressure for Blood Gas 722 mmHg    Modality Ventilator     FIO2 60 %    Ventilator Mode VC/AC     Set Tidal Volume 400.00     Set [facility] Resp Rate 18.0     PEEP 5.0     Note Read back and acknowledge     Notified Who ER  AND RN     Notified By [id number]     Notified Time 05/04/2025 11:54     Collected by [id number]     pH, Temp Corrected      pCO2, Temperature Corrected      pO2, Temperature Corrected     '    ED Course  ED Course as of 05/04/25 1241   Sun May 04, 2025   0716 Patient has been pending to be seen for roughly 4 and half  hours.  Signed up on the patient at 07 16.  Presenting with complaints of leg swelling  Electronically signed by Jovanna Monae DO, 05/04/25, 7:16 AM EDT.   [LK]   1016 Both myself and nursing suspect the patient likely took some form of opiate; as her demeanor has profoundly changed.      Patient was initially hyperactive irritable and restless appearing to be on some type of stimulant and now is grossly somnolent and difficult to arouse.    O2 sat: 91% room air.  Despite patient claiming that she has taken no medication patient is coming [facility] to be given Narcan at this time  Electronically signed by Jovanna Monae DO, 05/04/25, 10:16 AM EDT.   [LK]   1115 Upon arrival to bedside patient required a total of 4 mg IV Narcan before she started to have any type of response when earlier in the shift she would be arousable to deep stimuli.  Patient started becoming aggressive, altered with ongoing shallow respiratory effort with failure to protect airway.  Upon reassessing patient now patient has grossly dilated pupils previously normal at bedside when I assessed patient the previous hour.  Family member at bedside was informed that patient has overdosed on medication and she became emotionally distraught.  The patient's family member would not comply with medical request and was escorted out of the room.  Emergent intubation proceeded.  See procedure note for further details.  Electronically signed by Jovanna Monae DO, 05/04/25, 11:16 AM EDT.   [LK]   123 Drug screening confirm suspicion was positive for methamphetamine, benzodiazepines and methadone.  Patient was accepted by Dr. Riley for admission   [LK]      ED Course User Index  [LK] Jovanna Monae DO                                                       Medical Decision Making  Problems Addressed:  Acute hypoxic respiratory failure: complicated acute illness or injury  Cellulitis of right lower extremity: complicated acute illness or injury  Methamphetamine abuse:  complicated acute illness or injury  Mild methadone abuse: complicated acute illness or injury  Opiate overdose, accidental or unintentional, initial encounter: complicated acute illness or injury  Toxic encephalopathy, unspecified toxin: complicated acute illness or injury    Amount and/or Complexity of Data Reviewed  Labs: ordered.    Risk  Prescription drug management.  Decision regarding hospitalization.        Final diagnoses:   Cellulitis of right lower extremity   Acute hypoxic respiratory failure   Toxic encephalopathy, unspecified toxin   Mild methadone abuse   Methamphetamine abuse   Opiate overdose, accidental or unintentional, initial encounter       ED Disposition  ED Disposition       ED Disposition   Decision to Admit    Condition   --    Comment   Level of Care: Critical Care [6]   Diagnosis: Obtundation [039832]   Certification: I Certify That Inpatient Hospital Services Are Medically Necessary For Greater Than 2 Midnights                 Latoya Bettencourt, APRN  [address] [id number] [phone]               Medication List      No changes were made to your prescriptions during this visit.            Jovanna Monae DO  05/04/25 1249

## 2025-05-04 NOTE — SIGNIFICANT NOTE
Pt had multiple bags with bodyspray and makeup  Unicorn bag with cigarettes and 2 lighters  Brown bag with mousse and hair products  Clear box with beads  Clear bag with shampoo and conditioner and smaller bag with nail kit and razor  Pair of dirty socks  Body powder  Hair scrunchy  Pokeman tin with some type of  inside(phone ?)  Silver/gray backpack with small glass bottle with smokey mtn moonshine label  Phone  x 2  Ink pens, diaper wipes, perfume samples  Hair products,   Bathing suit,  Glasses  Nail kit inside another smaller zip up bag  Clothing  Some type of hair blow-dryer?  Planner  Two fur/fuzz type jackets  All in pt belongings bags    Large jumbo bag of clothes, blanket, and shoes sent home with pt mother    Medications sent to pharmacy    Phone, wallet, rings x 2 and sign other drivers liscense sent to security

## 2025-05-04 NOTE — PLAN OF CARE
Goal Outcome Evaluation:  Plan of Care Reviewed With: patient        Progress: no change/ Pt admitted to CCU this shift. Propofol, precedex, versed and fentanyl infusing. Pt remains intubated and sedated. VSS.             Problem: Adult Inpatient Plan of Care  Goal: Plan of Care Review  Outcome: Progressing  Flowsheets (Taken 5/4/2025 1831)  Progress: no change  Plan of Care Reviewed With: patient  Goal: Patient-Specific Goal (Individualized)  Outcome: Progressing  Goal: Absence of Hospital-Acquired Illness or Injury  Outcome: Progressing  Intervention: Identify and Manage Fall Risk  Recent Flowsheet Documentation  Taken 5/4/2025 1700 by Edith Pro RN  Safety Promotion/Fall Prevention:   fall prevention program maintained   safety round/check completed  Taken 5/4/2025 1600 by Edith Pro RN  Safety Promotion/Fall Prevention:   fall prevention program maintained   safety round/check completed  Taken 5/4/2025 1500 by Edith Pro RN  Safety Promotion/Fall Prevention:   fall prevention program maintained   safety round/check completed  Taken 5/4/2025 1400 by Edith Pro RN  Safety Promotion/Fall Prevention:   fall prevention program maintained   safety round/check completed  Taken 5/4/2025 1330 by Edith Pro RN  Safety Promotion/Fall Prevention:   fall prevention program maintained   safety round/check completed  Intervention: Prevent Skin Injury  Recent Flowsheet Documentation  Taken 5/4/2025 1600 by Edith Pro RN  Body Position:   turned   left   side-lying  Taken 5/4/2025 1400 by Edith Pro RN  Body Position:   turned   right   side-lying  Taken 5/4/2025 1330 by Edith Pro RN  Body Position:   turned   left   side-lying  Skin Protection:   drying agents applied   incontinence pads utilized   pulse oximeter probe site changed   silicone foam dressing in place   skin sealant/moisture barrier applied   transparent dressing maintained  Intervention: Prevent and Manage VTE (Venous Thromboembolism)  Risk  Recent Flowsheet Documentation  Taken 5/4/2025 1330 by Edith Pro, RN  VTE Prevention/Management: (see MAR) other (see comments)  Goal: Optimal Comfort and Wellbeing  Outcome: Progressing  Intervention: Provide Person-Centered Care  Recent Flowsheet Documentation  Taken 5/4/2025 1330 by Edith Pro, RN  Trust Relationship/Rapport: reassurance provided  Goal: Readiness for Transition of Care  Outcome: Progressing

## 2025-05-04 NOTE — PAYOR COMM NOTE
"[facility]  NPI:[id number]    Utilization Review  Contact: Katia Hamilton RN  Phone: [phone]  Fax:[phone]    INITIATE INPATIENT AUTHORIZATION  Mary Abreu (44 y.o. Female)       Date of Birth   1980    Social Security Number       Address   [address] [facility] [address]    Home Phone   [phone]    MRN   [id number]       Baptism   Other    Marital Status                               Admission Date   2025    Admission Type   Emergency    Admitting Provider   Rosalio Riley DO    Attending Provider   oRsalio Riley DO    Department, Room/Bed   [facility] CRITICAL CARE, [facility]/       Discharge Date       Discharge Disposition       Discharge Destination                                 Attending Provider: Rosalio Riley DO    Allergies: No Known Allergies    Isolation: None   Infection: None   Code Status: CPR    Ht: 157.5 cm (62.01\")   Wt: 132 kg (290 lb)    Admission Cmt: None   Principal Problem: Obtundation [R40.1]                   Active Insurance as of 2025       Primary Coverage       Payor Plan Insurance Group Employer/Plan Group    [employer] [place] [employer] [place]ICA[place]        Payor Plan Address Payor Plan Phone Number Payor Plan Fax Number Effective Dates    [address] [address] [phone]  10/1/2024 - None Entered    [facility] [id number]         Subscriber Name Subscriber Birth Date Member ID       MARY ABREU 1980 [id number]                     Emergency Contacts        (Rel.) Home Phone Work Phone Mobile Phone    Monica Barajas (Mother) [phone] -- [phone]    Moris Johnson (Significant Other) [phone] -- [phone]                 History & Physical        Rosalio Riley DO at 25 1157              [facility] HOSPITALIST HISTORY AND PHYSICAL    Patient Identification:  Name:  Mary Abreu  Age:  44 y.o.  Sex:  female  :  1980  MRN:  [id number]   Visit Number:  " [id number]  Admit Date: 5/4/2025   Room number:  117/17  Primary Care Physician:  Latoya Bettencourt APRN     Subjective     Chief complaint:    Chief Complaint   Patient presents with    Leg Swelling       History of presenting illness:  44 y.o. female presents to the emergency department with chief complaint of swelling in the lower extremities and wound on the lateral aspect.  Patient with a past medical history significant for asthma and COPD as well as morbid obesity, diabetes mellitus type 2, hepatitis C, hypertension, IV drug abuse with also reported history of lupus and rheumatoid arthritis.  Patient initially very hyperactive according to report from ER provider and restless in bed later becoming more lethargic and becoming obtunded.  Family member present at bedside also becoming similar although not nearly as lethargic.  Ultimately patient required intubation for protection of her airway.  Subsequent UDS showed patient positive for amphetamines, methamphetamines, benzodiazepines, and methadone.  Unfortunately due to timing of when UDS could be obtained unclear if some of this is from rapid sequence intubation regardless though due to patient requiring mechanical ventilation for respiratory failure likely secondary to toxic encephalopathy from drug abuse she is being admitted to the hospital service for further evaluation.  Initial laboratory evaluation Emergency Department was surprisingly unremarkable with CMP abnormal only for a bicarb of 32 likely consistent with chronic hypercapnia with elevated CRP of 2.8.  CBC equally unremarkable with hemoglobin 11.9 and otherwise normal differential.  Sed rate noted to be mildly elevated at 41.  UDS as above.  Duplex of the lower extremity on the right showed no acute deep vein thrombosis.  Chest x-ray obtained postintubation still awaiting read but looks significant for pulmonary congestion with increased vascularity.  CT of the head is ordered and pending at  time of admission.  ---------------------------------------------------------------------------------------------------------------------   Review of Systems a 14 system review of systems was unable to be performed due to patient's sedated and mechanically ventilated status.  ---------------------------------------------------------------------------------------------------------------------   Past Medical History:   Diagnosis Date    Asthma     COPD (chronic obstructive pulmonary disease)     Diabetes mellitus     Hepatitis C     History of gallstones     Hypertension     IV drug abuse     Lupus     Rheumatoid arthritis      Past Surgical History:   Procedure Laterality Date    ABDOMINAL SURGERY      CHOLECYSTECTOMY      PERINEAL LESION/CYST EXCISION N/A 4/3/2017    Procedure: EXCISION OF VAGINAL SKIN TAG;  Surgeon: Kee Crooks III, MD;  Location: [facility];  Service:     TUBAL COAGULATION LAPAROSCOPIC Bilateral 4/3/2017    Procedure: BILATERAL TUBAL FALLOPE FILSHIE CLIPPING LAPAROSCOPIC,IUD REMOVAL;  Surgeon: Kee Crooks III, MD;  Location: [facility];  Service:      Family History   Problem Relation Age of Onset    No Known Problems Mother     No Known Problems Father     No Known Problems Sister     No Known Problems Brother     No Known Problems Son     No Known Problems Daughter     No Known Problems Maternal Grandmother     No Known Problems Maternal Grandfather     No Known Problems Paternal Grandmother     No Known Problems Paternal Grandfather     No Known Problems Cousin     No Known Problems Other     Rheum arthritis Neg Hx     Osteoarthritis Neg Hx     Asthma Neg Hx     Diabetes Neg Hx     Heart failure Neg Hx     Hyperlipidemia Neg Hx     Hypertension Neg Hx     Migraines Neg Hx     Rashes / Skin problems Neg Hx     Seizures Neg Hx     Stroke Neg Hx     Thyroid disease Neg Hx      Social History     Socioeconomic History    Marital status:    Tobacco Use    Smoking status: Every Day      Current packs/day: 1.00     Average packs/day: 1 pack/day for 15.0 years (15.0 ttl pk-yrs)     Types: Cigarettes    Smokeless tobacco: Never   Vaping Use    Vaping status: Never Used   Substance and Sexual Activity    Alcohol use: No    Drug use: No    Sexual activity: Yes     Partners: Male     Birth control/protection: I.U.D.     ---------------------------------------------------------------------------------------------------------------------   Allergies:  Patient has no known allergies.  ---------------------------------------------------------------------------------------------------------------------   Medications below are reported home medications pulling from within the system; at this time, these medications have not been reconciled unless otherwise specified and are in the verification process for further verifcation as current home medications.    Prior to Admission Medications       Prescriptions Last Dose Informant Patient Reported? Taking?    albuterol (ACCUNEB) 1.25 MG/3ML nebulizer solution   Yes No    Take 1 ampule by nebulization Every 6 (Six) Hours As Needed for Wheezing.    albuterol (PROVENTIL HFA;VENTOLIN HFA) 108 (90 Base) MCG/ACT inhaler  Self No No    Inhale 2 puffs Every 4 (Four) Hours As Needed.    amLODIPine (NORVASC) 5 MG tablet   Yes No    atorvastatin (LIPITOR) 20 MG tablet   Yes No    cloNIDine (CATAPRES) 0.1 MG tablet  Self Yes No    Take 0.1 mg by mouth Every Night.    D-3-5 125 MCG (5000 UT) capsule capsule   Yes No    famotidine (PEPCID) 20 MG tablet   Yes No    fluticasone (FLONASE) 50 MCG/ACT nasal spray  Self No No    Inhale 2 sprays into the nostril(s) once Daily.    gabapentin (NEURONTIN) 800 MG tablet  Self Yes No    Take 800 mg by mouth 4 (Four) Times a Day.    ibuprofen (ADVIL,MOTRIN) 600 MG tablet   Yes No    levothyroxine (SYNTHROID, LEVOTHROID) 25 MCG tablet  Pharmacy Yes No    Take 25 mcg by mouth Daily.    lisinopril (PRINIVIL,ZESTRIL) 20 MG tablet   Yes No     metFORMIN (GLUCOPHAGE) 1000 MG tablet   Yes No    methadone (DOLOPHINE) 10 MG tablet   Yes No    Take 1 tablet by mouth Every 8 (Eight) Hours,    omega-3 acid ethyl esters (LOVAZA) 1 g capsule   Yes No    ondansetron (ZOFRAN) 8 MG tablet   No No    Take 1 tablet by mouth Every 8 (Eight) Hours As Needed for Nausea.    pantoprazole (PROTONIX) 40 MG EC tablet   Yes No    polyethylene glycol (MIRALAX) 17 g packet  Pharmacy Yes No    Take 17 g by mouth Daily.    Symbicort 160-4.5 MCG/ACT inhaler   Yes No          Objective     Vital Signs:  Temp:  [97.6 °F (36.4 °C)-97.7 °F (36.5 °C)] 97.7 °F (36.5 °C)  Heart Rate:  [] 94  Resp:  [7-18] 18  BP: ()/(42-97) 171/93  FiO2 (%):  [40 %-60 %] 40 %    Mean Arterial Pressure (Non-Invasive) for the past 24 hrs (Last 3 readings):   Noninvasive MAP (mmHg)   05/04/25 1021 64     SpO2:  [90 %-99 %] 97 %  on  Flow (L/min) (Oxygen Therapy):  [15] 15;   Device (Oxygen Therapy): ventilator  Body mass index is 53.03 kg/m².    Wt Readings from Last 3 Encounters:   05/04/25 132 kg (290 lb)   12/29/24 (!) 159 kg (350 lb)   07/27/24 122 kg (268 lb)      ---------------------------------------------------------------------------------------------------------------------   Physical Exam:  Constitutional: Morbidly obese female currently sedated on mechanical ventilation but intermittently opening eyes and coughing around ET tube.  Does not follow commands.     HENT:  Head: Normocephalic and atraumatic.  Mouth:  Moist mucous membranes.  ET tube in place.  Eyes:  Conjunctivae and EOM are normal.  Pupils are equal, round, and reactive to light.  No scleral icterus.  Neck:  Neck supple.  No JVD present.    Cardiovascular: Tachycardic but regular rhythm with normal heart sounds with no murmur.  Pulmonary/Chest: Mechanical breath sounds with end expiratory wheezing throughout with diminished breath sounds secondary to body habitus..  Abdominal: Morbidly obese abdomen but soft.  Bowel sounds  "are hypoactive.  No distension and no apparent tenderness.   Musculoskeletal:  No deformity.  No red or swollen joints anywhere.    Neurological: Sedated on mechanical ventilation.  No no focal neurological deficits on limited exam  Skin:  Skin is warm and dry.  No rash noted.  No pallor.   Peripheral vascular: Trace pitting edema of the lower extremities with pulses on all 4 extremities.    ---------------------------------------------------------------------------------------------------------------------  EKG: Ordered and pending at this time      Last echocardiogram:  Results for orders placed during the hospital encounter of 09/07/20    Adult Transthoracic Echo Complete W/ Cont if Necessary Per Protocol    Interpretation Summary  · Left ventricular systolic function is normal, EF 61-65%.  · Left atrial cavity size is mildly dilated.    --------------------------------------------------------------------------------------------------------------------  Labs:  Results from last 7 days   Lab Units 05/04/25  0806   SED RATE mm/hr 41*   CRP mg/dL 2.88*   WBC 10*3/mm3 8.00   HEMOGLOBIN g/dL 11.9*   HEMATOCRIT % 38.9   MCV fL 86.4   MCHC g/dL 30.6*   PLATELETS 10*3/mm3 256     Results from last 7 days   Lab Units 05/04/25  1148   PH, ARTERIAL pH units 7.230*   PO2 ART mm Hg 105.0   PCO2, ARTERIAL mm Hg 74.9*   HCO3 ART mmol/L 31.3*     Results from last 7 days   Lab Units 05/04/25  0806   SODIUM mmol/L 142   POTASSIUM mmol/L 3.9   CHLORIDE mmol/L 101   CO2 mmol/L 32.1*   BUN mg/dL 19   CREATININE mg/dL 0.92   CALCIUM mg/dL 9.0   GLUCOSE mg/dL 82   ALBUMIN g/dL 4.0   BILIRUBIN mg/dL 0.2   ALK PHOS U/L 105   AST (SGOT) U/L 24   ALT (SGPT) U/L 20   Estimated Creatinine Clearance: 103.2 mL/min (by C-G formula based on SCr of 0.92 mg/dL).    No results found for: \"AMMONIA\"          No results found for: \"HGBA1C\", \"POCGLU\"  Lab Results   Component Value Date    TSH 4.600 (H) 11/15/2024    FREET4 0.83 11/15/2024     Lab " "Results   Component Value Date    PREGTESTUR Negative 06/30/2023    HCGQUANT <0.50 09/07/2020     Pain Management Panel  More data exists         Latest Ref Rng & Units 5/4/2025 12/30/2024   Pain Management Panel   Amphetamine, Urine Qual Negative Positive  Positive    Barbiturates Screen, Urine Negative Negative  Negative    Benzodiazepine Screen, Urine Negative Positive  Positive    Buprenorphine, Screen, Urine Negative Negative  Negative    Cocaine Screen, Urine Negative Negative  Negative    Fentanyl, Urine Negative Negative  Positive    Methadone Screen , Urine Negative Positive  Positive    Methamphetamine, Ur Negative Positive  Positive      Brief Urine Lab Results  (Last result in the past 365 days)        Color   Clarity   Blood   Leuk Est   Nitrite   Protein   CREAT   Urine HCG        12/30/24 0127 Yellow   Clear   Negative   Trace   Negative   Negative                 No results found for: \"BLOODCX\"  No results found for: \"URINECX\"  No results found for: \"WOUNDCX\"  No results found for: \"STOOLCX\"    I have personally looked at the labs and they are summarized above.  ----------------------------------------------------------------------------------------------------------------------  Detailed radiology reports for the last 24 hours:    Imaging Results (Last 24 Hours)       Procedure Component Value Units Date/Time    XR Chest 1 View [[id number]] Resulted: 05/04/25 1115     Updated: 05/04/25 1122    US Venous Doppler Lower Extremity Right (duplex) [[id number]] Collected: 05/04/25 1013     Updated: 05/04/25 1016    Narrative:      VERIFICATION OBSERVER NAME: Trell Espitia MD.     TECHNIQUE: Real-time gray scale examination of the lower extremity  venous system which included and/ or attempted the following deep veins:  Common femoral vein, deep femoral vein, femoral vein, popliteal vein,  peroneal vein, posterior tibial vein and anterior tibial vein,  supplemented by color flow imaging and duplex doppler, " spectral analysis  and waveform .      HISTORY/COMPARISON/FINDINGS:     Comparison: None.         FINDINGS:  Color flow duplex ultrasound of the RIGHT lower extremity deep venous  system appears normal.  Normal spontaneous and phasic flow is present.   The venous waveform also appears normal, with normal augmentation at all  levels.  Normal transverse compression was obtained throughout the  common femoral vein, superficial femoral vein, and popliteal vein.  Calf  veins were not visualized due to edema.                Impression:         No acute deep vein thrombosis.                          This report was finalized on 5/4/2025 10:14 AM by Dr. Trell Espitia MD.             Final impressions for the last 30 days of radiology reports:    US Venous Doppler Lower Extremity Right (duplex)  Result Date: 5/4/2025   No acute deep vein thrombosis.         This report was finalized on 5/4/2025 10:14 AM by Dr. Trell Espitia MD.      I have personally looked at the radiology images and read the final radiology report.    Assessment & Plan       Acute toxic encephalopathy  Acute hypercapnic hypoxemic respiratory failure  Suspected polysubstance abuse with overdose  Acute exacerbation of COPD    -Patient initially presenting hyperactive and restless and agitated then transitioning to obtunded status and intubated for airway protection.    - UDS positive for amphetamines, methamphetamines, benzodiazepines, and methadone.  Will review Scott to see if patient has prescribed any benzos or methadone.      - Pulmonology consultation for tomorrow morning for ventilator management.    - Nutrition consult for tube feed initiation    - No plans for weaning trials today and will allow patient to rest on ventilator while illicit substances are cleared from system.    - Will start on Solu-Medrol 40 twice daily for in expiratory wheezing although suspect some component of edema contributing and administering 1 dose of IV Lasix 60 mg.    -  Hold on antibiotic therapy as patient with unremarkable labs and suspicion for toxic encephalopathy and substance abuse contributing to respiratory failure predominant cause at this time.      Diabetes mellitus type 2    -Initiate on Glucomander subcu protocol for glycemic control.    Chronic conditions:  Hypertension  Lupus  Rheumatoid arthritis  History of tobacco abuse    VTE Prophylaxis:   [unfilled]    The patient is high risk due to the following diagnoses/reasons: Morbidly obese female with acute respiratory failure secondary to polysubstance abuse requiring intubation mechanical ventilation and admission to the critical care unit for close monitoring and treatment.        Rosalio Riley DO  [facility]  05/04/25  11:57 EDT      Electronically signed by Rosalio Riley DO at 05/04/25 1211          Emergency Department Notes        Ashley Alberto RN at 05/04/25 1028       Summary:Mental Status Change                 Upon entrance to room patient and patient family found to be somnolent. Attempt to arouse female family member at bedside, patient family responded to repeated tactile stimuli, patient found sitting up in bed, slumped over with decreases respiration. Patient unresponsive to painful stimuli. Provider made aware. Narcan given at this time. Respirations increased with pulse ox level higher at this time. Patient remains unresponsive. Dr. Monae remains at bedside at bedside.            Electronically signed by Ashley Alberto RN at 05/04/25 1214       Ashley Alberto RN at 05/04/25 1006          Patient responds to painful stimulus, patient is orienting when alert. Patient denies taking any medications prior to arrival or during current visit. Provider made aware of patient somnolence.     Electronically signed by Ashley Alberto RN at 05/04/25 1018       Jovanna Monae DO at 05/04/25 0716        Procedure Orders    1. Critical Care [[id number]] ordered by Jovanna Monae DO     2. Intubation [[id number]] ordered by Jovanna Monae DO                 Subjective   History of Present Illness  44-year-old female presents with complaints of swelling in her lower extremities and a wound on the lateral aspect of her right lower leg.  Patient states that she does not know how she actually injured the skin but she has had lower extremity swelling and edema for quite some time.  Patient denies recent fevers chills but states that she has had some drainage from the site however patient is a grossly poor historian and provides minimal to no additional history of present illness.  Appears grossly altered at the time of evaluating this patient and patient verbally denies that she is taking anything illicit.      Review of Systems    Past Medical History:   Diagnosis Date    Asthma     COPD (chronic obstructive pulmonary disease)     Diabetes mellitus     Hepatitis C     History of gallstones     Hypertension     IV drug abuse     Lupus     Rheumatoid arthritis        No Known Allergies    Past Surgical History:   Procedure Laterality Date    ABDOMINAL SURGERY      CHOLECYSTECTOMY      PERINEAL LESION/CYST EXCISION N/A 4/3/2017    Procedure: EXCISION OF VAGINAL SKIN TAG;  Surgeon: Kee Crooks III, MD;  Location: [facility];  Service:     TUBAL COAGULATION LAPAROSCOPIC Bilateral 4/3/2017    Procedure: BILATERAL TUBAL FALLOPE FILSHIE CLIPPING LAPAROSCOPIC,IUD REMOVAL;  Surgeon: Kee Crooks III, MD;  Location: [facility];  Service:        Family History   Problem Relation Age of Onset    No Known Problems Mother     No Known Problems Father     No Known Problems Sister     No Known Problems Brother     No Known Problems Son     No Known Problems Daughter     No Known Problems Maternal Grandmother     No Known Problems Maternal Grandfather     No Known Problems Paternal Grandmother     No Known Problems Paternal Grandfather     No Known Problems Cousin     No Known Problems Other     Rheum arthritis Neg  Hx     Osteoarthritis Neg Hx     Asthma Neg Hx     Diabetes Neg Hx     Heart failure Neg Hx     Hyperlipidemia Neg Hx     Hypertension Neg Hx     Migraines Neg Hx     Rashes / Skin problems Neg Hx     Seizures Neg Hx     Stroke Neg Hx     Thyroid disease Neg Hx        Social History     Socioeconomic History    Marital status:    Tobacco Use    Smoking status: Every Day     Current packs/day: 1.00     Average packs/day: 1 pack/day for 15.0 years (15.0 ttl pk-yrs)     Types: Cigarettes    Smokeless tobacco: Never   Vaping Use    Vaping status: Never Used   Substance and Sexual Activity    Alcohol use: No    Drug use: No    Sexual activity: Yes     Partners: Male     Birth control/protection: I.U.D.           Objective   Physical Exam  Vitals and nursing note reviewed.   Constitutional:       General: She is not in acute distress.     Appearance: She is well-developed. She is obese. She is ill-appearing. She is not diaphoretic.      Comments: Obese, disheveled in appearance.  Patient initially is seen sitting in bed wrestling through her purse and will answer questions.   HENT:      Head: Normocephalic and atraumatic.      Right Ear: External ear normal.      Left Ear: External ear normal.      Nose: Nose normal.   Eyes:      Conjunctiva/sclera: Conjunctivae normal.      Pupils: Pupils are equal, round, and reactive to light.   Neck:      Vascular: No JVD.      Trachea: No tracheal deviation.   Cardiovascular:      Rate and Rhythm: Normal rate and regular rhythm.      Heart sounds: Normal heart sounds. No murmur heard.  Pulmonary:      Effort: Pulmonary effort is normal. No respiratory distress.      Breath sounds: Normal breath sounds. No wheezing.   Abdominal:      General: Bowel sounds are normal.      Palpations: Abdomen is soft.      Tenderness: There is no abdominal tenderness.   Musculoskeletal:         General: No deformity. Normal range of motion.      Cervical back: Normal range of motion and neck  supple.      Right lower leg: Edema present.      Left lower leg: Edema present.   Skin:     General: Skin is warm and dry.      Coloration: Skin is not pale.      Findings: Lesion present. No erythema or rash.      Comments: Ulcerated of lesion on the right lower lateral aspect of the leg, surrounding cellulitis process.  No palpable fluctuation or purulent drainage can be expressed.   Neurological:      Mental Status: She is disoriented.      Cranial Nerves: No cranial nerve deficit.         Critical Care    Performed by: Jovanna Monae DO  Authorized by: Jovanna Monae DO    Critical care provider statement:     Critical care time (minutes):  45    Critical care time was exclusive of:  Separately billable procedures and treating other patients and teaching time    Critical care was necessary to treat or prevent imminent or life-threatening deterioration of the following conditions:  CNS failure or compromise, circulatory failure, cardiac failure, respiratory failure and toxidrome    Critical care was time spent personally by me on the following activities:  Blood draw for specimens, development of treatment plan with patient or surrogate, discussions with primary provider, examination of patient, interpretation of cardiac output measurements, obtaining history from patient or surrogate, ordering and performing treatments and interventions, ordering and review of laboratory studies, ordering and review of radiographic studies, pulse oximetry, re-evaluation of patient's condition, review of old charts, vascular access procedures, ventilator management and evaluation of patient's response to treatment    I assumed direction of critical care for this patient from another provider in my specialty: no      Care discussed with: admitting provider    Intubation    Date/Time: 5/4/2025 10:39 AM    Performed by: Jovanna Monae DO  Authorized by: Jovanna Monae DO    Consent:     Consent obtained:  Emergent  situation  Universal protocol:     Patient identity confirmed:  Arm band and hospital-assigned identification number  Pre-procedure details:     Indications: airway protection, altered consciousness and respiratory failure      Patient status:  Altered mental status    Look externally: large tongue      Mallampati score:  I    Obstruction: none      Neck mobility: normal      Pharmacologic strategy: RSI      Induction agents:  Etomidate    Paralytics:  Succinylcholine  Procedure details:     Preoxygenation:  Bag valve mask    Number of attempts:  1  Successful intubation attempt details:     Intubation method:  Oral    Intubation technique: video assisted      Laryngoscope blade:  Mac 4    Grade view: I      Tube size (mm):  7.5    Tube type:  Cuffed    Tube visualized through cords: yes    Placement assessment:     ETT at teeth/gumline (cm):  21    Tube secured with:  ETT kelly    Breath sounds:  Equal    Placement verification: chest rise, colorimetric ETCO2, CXR verification, direct visualization, equal breath sounds and tube exhalation      CXR findings:  Appropriate position  Post-procedure details:     Procedure completion:  Tolerated well, no immediate complications        Results for orders placed or performed during the hospital encounter of 05/04/25   Comprehensive Metabolic Panel    Collection Time: 05/04/25  8:06 AM    Specimen: Blood   Result Value Ref Range    Glucose 82 65 - 99 mg/dL    BUN 19 6 - 20 mg/dL    Creatinine 0.92 0.57 - 1.00 mg/dL    Sodium 142 136 - 145 mmol/L    Potassium 3.9 3.5 - 5.2 mmol/L    Chloride 101 98 - 107 mmol/L    CO2 32.1 (H) 22.0 - 29.0 mmol/L    Calcium 9.0 8.6 - 10.5 mg/dL    Total Protein 7.6 6.0 - 8.5 g/dL    Albumin 4.0 3.5 - 5.2 g/dL    ALT (SGPT) 20 1 - 33 U/L    AST (SGOT) 24 1 - 32 U/L    Alkaline Phosphatase 105 39 - 117 U/L    Total Bilirubin 0.2 0.0 - 1.2 mg/dL    Globulin 3.6 gm/dL    A/G Ratio 1.1 g/dL    BUN/Creatinine Ratio 20.7 7.0 - 25.0    Anion Gap 8.9  5.0 - 15.0 mmol/L    eGFR 79.4 >60.0 mL/min/1.73   CBC Auto Differential    Collection Time: 05/04/25  8:06 AM    Specimen: Blood   Result Value Ref Range    WBC 8.00 3.40 - 10.80 10*3/mm3    RBC 4.50 3.77 - 5.28 10*6/mm3    Hemoglobin 11.9 (L) 12.0 - 15.9 g/dL    Hematocrit 38.9 34.0 - 46.6 %    MCV 86.4 79.0 - 97.0 fL    MCH 26.4 (L) 26.6 - 33.0 pg    MCHC 30.6 (L) 31.5 - 35.7 g/dL    RDW 15.4 12.3 - 15.4 %    RDW-SD 48.7 37.0 - 54.0 fl    MPV 10.2 6.0 - 12.0 fL    Platelets 256 140 - 450 10*3/mm3    Neutrophil % 64.0 42.7 - 76.0 %    Lymphocyte % 23.1 19.6 - 45.3 %    Monocyte % 8.5 5.0 - 12.0 %    Eosinophil % 3.5 0.3 - 6.2 %    Basophil % 0.5 0.0 - 1.5 %    Immature Grans % 0.4 0.0 - 0.5 %    Neutrophils, Absolute 5.12 1.70 - 7.00 10*3/mm3    Lymphocytes, Absolute 1.85 0.70 - 3.10 10*3/mm3    Monocytes, Absolute 0.68 0.10 - 0.90 10*3/mm3    Eosinophils, Absolute 0.28 0.00 - 0.40 10*3/mm3    Basophils, Absolute 0.04 0.00 - 0.20 10*3/mm3    Immature Grans, Absolute 0.03 0.00 - 0.05 10*3/mm3    nRBC 0.0 0.0 - 0.2 /100 WBC   C-reactive Protein    Collection Time: 05/04/25  8:06 AM    Specimen: Blood   Result Value Ref Range    C-Reactive Protein 2.88 (H) 0.00 - 0.50 mg/dL   Sedimentation Rate    Collection Time: 05/04/25  8:06 AM    Specimen: Blood   Result Value Ref Range    Sed Rate 41 (H) 0 - 20 mm/hr   Green Top (Gel)    Collection Time: 05/04/25  8:06 AM   Result Value Ref Range    Extra Tube Hold for add-ons.    Lavender Top    Collection Time: 05/04/25  8:06 AM   Result Value Ref Range    Extra Tube hold for add-on    Gold Top - SST    Collection Time: 05/04/25  8:06 AM   Result Value Ref Range    Extra Tube Hold for add-ons.    Light Blue Top    Collection Time: 05/04/25  8:06 AM   Result Value Ref Range    Extra Tube Hold for add-ons.    Urine Drug Screen - Indwelling Urethral Catheter    Collection Time: 05/04/25 11:08 AM    Specimen: Indwelling Urethral Catheter; Urine   Result Value Ref Range    THC,  Screen, Urine Negative Negative    Phencyclidine (PCP), Urine Negative Negative    Cocaine Screen, Urine Negative Negative    Methamphetamine, Ur Positive (A) Negative    Opiate Screen Negative Negative    Amphetamine Screen, Urine Positive (A) Negative    Benzodiazepine Screen, Urine Positive (A) Negative    Tricyclic Antidepressants Screen Negative Negative    Methadone Screen, Urine Positive (A) Negative    Barbiturates Screen, Urine Negative Negative    Oxycodone Screen, Urine Negative Negative    Buprenorphine, Screen, Urine Negative Negative   Fentanyl, Urine - Indwelling Urethral Catheter    Collection Time: 05/04/25 11:08 AM    Specimen: Indwelling Urethral Catheter; Urine   Result Value Ref Range    Fentanyl, Urine Negative Negative   Blood Gas, Arterial With Co-Ox    Collection Time: 05/04/25 11:48 AM    Specimen: Arterial Blood   Result Value Ref Range    Site Right Radial     Zbigniew's Test Positive     pH, Arterial 7.230 (L) 7.350 - 7.450 pH units    pCO2, Arterial 74.9 (C) 35.0 - 45.0 mm Hg    pO2, Arterial 105.0 83.0 - 108.0 mm Hg    HCO3, Arterial 31.3 (H) 20.0 - 26.0 mmol/L    Base Excess, Arterial 1.9 0.0 - 2.0 mmol/L    O2 Saturation, Arterial 97.2 94.0 - 99.0 %    Hemoglobin, Blood Gas 12.2 (L) 13.5 - 17.5 g/dL    Hematocrit, Blood Gas 37.5 (L) 38.0 - 51.0 %    Oxyhemoglobin 95.5 94 - 99 %    Methemoglobin 0.30 0.00 - 3.00 %    Carboxyhemoglobin 1.4 0 - 5 %    A-a DO2 220.1 0.0 - 300.0 mmHg    CO2 Content 33.6 (H) 22 - 33 mmol/L    Barometric Pressure for Blood Gas 722 mmHg    Modality Ventilator     FIO2 60 %    Ventilator Mode VC/AC     Set Tidal Volume 400.00     Set Mech Resp Rate 18.0     PEEP 5.0     Note Read back and acknowledge     Notified Who ER  AND RN     Notified By [id number]     Notified Time 05/04/2025 11:54     Collected by [id number]     pH, Temp Corrected      pCO2, Temperature Corrected      pO2, Temperature Corrected     '    ED Course  ED Course as of 05/04/25 1241   Sun May 04,  2025 0716 Patient has been pending to be seen for roughly 4 and half hours.  Signed up on the patient at 07 16.  Presenting with complaints of leg swelling  Electronically signed by Jovanna Monae DO, 05/04/25, 7:16 AM EDT.   [LK]   1016 Both myself and nursing suspect the patient likely took some form of opiate; as her demeanor has profoundly changed.      Patient was initially hyperactive irritable and restless appearing to be on some type of stimulant and now is grossly somnolent and difficult to arouse.    O2 sat: 91% room air.  Despite patient claiming that she has taken no medication patient is coming [facility] to be given Narcan at this time  Electronically signed by Jovanna Monae DO, 05/04/25, 10:16 AM EDT.   [LK]   1115 Upon arrival to bedside patient required a total of 4 mg IV Narcan before she started to have any type of response when earlier in the shift she would be arousable to deep stimuli.  Patient started becoming aggressive, altered with ongoing shallow respiratory effort with failure to protect airway.  Upon reassessing patient now patient has grossly dilated pupils previously normal at bedside when I assessed patient the previous hour.  Family member at bedside was informed that patient has overdosed on medication and she became emotionally distraught.  The patient's family member would not comply with medical request and was escorted out of the room.  Emergent intubation proceeded.  See procedure note for further details.  Electronically signed by Jovanna Monae DO, 05/04/25, 11:16 AM EDT.   [LK]   1234 Drug screening confirm suspicion was positive for methamphetamine, benzodiazepines and methadone.  Patient was accepted by Dr. Riley for admission   [LK]      ED Course User Index  [LK] Jovanna Monae DO                                                       Medical Decision Making  Problems Addressed:  Acute hypoxic respiratory failure: complicated acute illness or injury  Cellulitis of right lower  extremity: complicated acute illness or injury  Methamphetamine abuse: complicated acute illness or injury  Mild methadone abuse: complicated acute illness or injury  Opiate overdose, accidental or unintentional, initial encounter: complicated acute illness or injury  Toxic encephalopathy, unspecified toxin: complicated acute illness or injury    Amount and/or Complexity of Data Reviewed  Labs: ordered.    Risk  Prescription drug management.  Decision regarding hospitalization.        Final diagnoses:   Cellulitis of right lower extremity   Acute hypoxic respiratory failure   Toxic encephalopathy, unspecified toxin   Mild methadone abuse   Methamphetamine abuse   Opiate overdose, accidental or unintentional, initial encounter       ED Disposition  ED Disposition       ED Disposition   Decision to Admit    Condition   --    Comment   Level of Care: Critical Care [6]   Diagnosis: Obtundation [451014]   Certification: I Certify That Inpatient Hospital Services Are Medically Necessary For Greater Than 2 Midnights                 Latoya Bettencourt, APRN  [address] [facility] [place] [address]  [phone]               Medication List      No changes were made to your prescriptions during this visit.            Jovanna Monae DO  05/04/25 1241      Electronically signed by Jovanna Monae DO at 05/04/25 1241       Ashley Alberto RN at 05/04/25 0710          Patient declines reassessment of vital signs at this time.     Electronically signed by Ashley Alberto RN at 05/04/25 0845       Facility-Administered Medications as of 5/4/2025   Medication Dose Route Frequency Provider Last Rate Last Admin    sennosides-docusate (PERICOLACE) 8.6-50 MG per tablet 2 tablet  2 tablet Oral BID PRN Rosalio Riley DO        And    polyethylene glycol (MIRALAX) packet 17 g  17 g Oral Daily PRN Rosalio Riley DO        And    bisacodyl (DULCOLAX) EC tablet 5 mg  5 mg Oral Daily PRN Rosalio Riley DO         And    bisacodyl (DULCOLAX) suppository 10 mg  10 mg Rectal Daily PRN Rosalio Riley DO        DEXMEDETOMIDINE 1000 MCG/250ML INFUSION infusion  0.2-1.5 mcg/kg/hr (Dosing Weight) Intravenous Titrated Rosalio Riley DO        dextrose (D50W) (25 g/50 mL) IV injection 25 g  25 g Intravenous Q15 Min PRN Rosalio Riley DO        dextrose (GLUTOSE) oral gel 15 g  15 g Oral Q15 Min PRN Rosalio Riley DO        enoxaparin sodium (LOVENOX) syringe 60 mg  60 mg Subcutaneous Q12H Rosalio Riley DO        [COMPLETED] etomidate (AMIDATE) injection   Intravenous Code / Trauma / Sedation Medication Jovanna Monae DO   50 mg at 05/04/25 1037    fentaNYL 2500 mcg in 250 mL NS infusion   mcg/hr Intravenous Titrated Rosalio Riley DO 15 mL/hr at 05/04/25 1259 150 mcg/hr at 05/04/25 1259    [COMPLETED] furosemide (LASIX) injection 60 mg  60 mg Intravenous Once Rosalio Riley DO   60 mg at 05/04/25 1126    glucagon HCl (Diagnostic) injection 1 mg  1 mg Intramuscular Q15 Min PRN Rosalio Riley DO        insulin regular (humuLIN R,novoLIN R) injection 2-7 Units  2-7 Units Subcutaneous Q6H Rosalio Riley DO        ipratropium-albuterol (DUO-NEB) nebulizer solution 3 mL  3 mL Nebulization 4x Daily - RT Rosalio Riley DO   3 mL at 05/04/25 1227    methylPREDNISolone sodium succinate (SOLU-Medrol) 40 mg in sterile water (preservative free) 1 mL  40 mg Intravenous Q12H Rosalio Riley DO        midazolam (VERSED) 100 mg in 100mL NS infusion  1-10 mg/hr Intravenous Titrated Rosalio Riley DO 3 mL/hr at 05/04/25 1339 3 mg/hr at 05/04/25 1339    [COMPLETED] midazolam (VERSED) 2 MG/2ML injection  - ADS Override Pull     Jovanna Monae DO   2 mg at 05/04/25 1154    midazolam (VERSED) bolus from bag 1 mg/mL solution 1 mg  1 mg Intravenous Q30 Min PRN Rosalio Riley DO        [COMPLETED] midazolam (VERSED) injection 5 mg  5 mg Intravenous Once Rosalio Riley DO   5 mg at 05/04/25 1155    [COMPLETED] Naloxone HCl (NARCAN) injection  2 mg  2 mg Intravenous Once Jovanna Monae DO   2 mg at 05/04/25 1020    [COMPLETED] Naloxone HCl (NARCAN) injection 2 mg  2 mg Intravenous Once Jovanna Monae DO   2 mg at 05/04/25 1028    [COMPLETED] Naloxone HCl (NARCAN) injection 2 mg  2 mg Intravenous Once Jovanna Monae DO   2 mg at 05/04/25 1031    pantoprazole (PROTONIX) injection 40 mg  40 mg Intravenous Q24H Rosalio Riley DO        Pharmacy to Dose enoxaparin (LOVENOX)   Not Applicable Continuous PRN Rosalio Riley DO        [COMPLETED] piperacillin-tazobactam (ZOSYN) IVPB 3.375 g IVPB in 100 mL NS (VTB)  3.375 g Intravenous Once Jovanna Monae DO   Stopped at 05/04/25 1030    propofol (DIPRIVAN) infusion 10 mg/mL 100 mL  5-50 mcg/kg/min (Dosing Weight) Intravenous Titrated Rosalio Riley DO 39.6 mL/hr at 05/04/25 1255 50 mcg/kg/min at 05/04/25 1255    [COMPLETED] propofol (DIPRIVAN) injection   Intravenous Code / Trauma / Sedation Medication Jovanna Monae DO   60 mg at 05/04/25 1052    sodium chloride 0.9 % flush 10 mL  10 mL Intravenous Q12H Rosalio Riley DO        sodium chloride 0.9 % flush 10 mL  10 mL Intravenous PRN Rosalio Riley DO        sodium chloride 0.9 % infusion 40 mL  40 mL Intravenous PRN Rosalio Riley DO        [COMPLETED] succinylcholine (ANECTINE) injection   Intravenous Code / Trauma / Sedation Medication Jovanna Monae DO   25 mg at 05/04/25 1037    [COMPLETED] succinylcholine (ANECTINE) injection   Intravenous Code / Trauma / Sedation Medication Jovanna Monae DO   25 mg at 05/04/25 1044    [COMPLETED] vancomycin 2500 mg/500 mL 0.9% NS IVPB ([facility])  2,500 mg Intravenous Once Jovanna Monae DO   Restarted at 05/04/25 1110     Physician Progress Notes (all)    No notes of this type exist for this encounter.       Consult Notes (all)    No notes of this type exist for this encounter.

## 2025-05-04 NOTE — ED NOTES
Upon entrance to room patient and patient family found to be somnolent. Attempt to arouse female family member at bedside, patient family responded to repeated tactile stimuli, patient found sitting up in bed, slumped over with decreases respiration. Patient unresponsive to painful stimuli. Provider made aware. Narcan given at this time. Respirations increased with pulse ox level higher at this time. Patient remains unresponsive. Dr. Monae remains at bedside at bedside.

## 2025-05-04 NOTE — PLAN OF CARE
Problem: Mechanical Ventilation Invasive  Goal: Effective Communication  Outcome: Progressing  Goal: Optimal Device Function  Outcome: Progressing  Goal: Mechanical Ventilation Liberation  Outcome: Progressing  Goal: Optimal Nutrition Delivery  Outcome: Progressing  Goal: Absence of Device-Related Skin and Tissue Injury  Outcome: Progressing  Goal: Absence of Ventilator-Induced Lung Injury  Outcome: Progressing   Goal Outcome Evaluation:

## 2025-05-04 NOTE — ED NOTES
Patient responds to painful stimulus, patient is orienting when alert. Patient denies taking any medications prior to arrival or during current visit. Provider made aware of patient somnolence.

## 2025-05-05 VITALS
OXYGEN SATURATION: 90 % | HEART RATE: 92 BPM | WEIGHT: 293 LBS | HEIGHT: 62 IN | RESPIRATION RATE: 22 BRPM | TEMPERATURE: 98.7 F | BODY MASS INDEX: 53.92 KG/M2 | SYSTOLIC BLOOD PRESSURE: 113 MMHG | DIASTOLIC BLOOD PRESSURE: 64 MMHG

## 2025-05-05 PROBLEM — J44.1 COPD EXACERBATION: Chronic | Status: ACTIVE | Noted: 2018-10-04

## 2025-05-05 LAB
A-A DO2: 218.7 MMHG (ref 0–300)
ALBUMIN SERPL-MCNC: 3.7 G/DL (ref 3.5–5.2)
ALBUMIN/GLOB SERPL: 1.1 G/DL
ALP SERPL-CCNC: 97 U/L (ref 39–117)
ALT SERPL W P-5'-P-CCNC: 18 U/L (ref 1–33)
ANION GAP SERPL CALCULATED.3IONS-SCNC: 11.8 MMOL/L (ref 5–15)
ARTERIAL PATENCY WRIST A: ABNORMAL
AST SERPL-CCNC: 20 U/L (ref 1–32)
ATMOSPHERIC PRESS: 725 MMHG
BASE EXCESS BLDA CALC-SCNC: 4.2 MMOL/L (ref 0–2)
BASOPHILS # BLD AUTO: 0.01 10*3/MM3 (ref 0–0.2)
BASOPHILS NFR BLD AUTO: 0.1 % (ref 0–1.5)
BDY SITE: ABNORMAL
BILIRUB SERPL-MCNC: 0.2 MG/DL (ref 0–1.2)
BUN SERPL-MCNC: 18 MG/DL (ref 6–20)
BUN/CREAT SERPL: 16.1 (ref 7–25)
CALCIUM SPEC-SCNC: 8.5 MG/DL (ref 8.6–10.5)
CHLORIDE SERPL-SCNC: 98 MMOL/L (ref 98–107)
CO2 BLDA-SCNC: 31.6 MMOL/L (ref 22–33)
CO2 SERPL-SCNC: 28.2 MMOL/L (ref 22–29)
COHGB MFR BLD: 1 % (ref 0–5)
CREAT SERPL-MCNC: 1.12 MG/DL (ref 0.57–1)
DEPRECATED RDW RBC AUTO: 47.4 FL (ref 37–54)
EGFRCR SERPLBLD CKD-EPI 2021: 62.7 ML/MIN/1.73
EOSINOPHIL # BLD AUTO: 0.01 10*3/MM3 (ref 0–0.4)
EOSINOPHIL NFR BLD AUTO: 0.1 % (ref 0.3–6.2)
ERYTHROCYTE [DISTWIDTH] IN BLOOD BY AUTOMATED COUNT: 15.5 % (ref 12.3–15.4)
GLOBULIN UR ELPH-MCNC: 3.5 GM/DL
GLUCOSE BLDC GLUCOMTR-MCNC: 108 MG/DL (ref 70–130)
GLUCOSE BLDC GLUCOMTR-MCNC: 131 MG/DL (ref 70–130)
GLUCOSE BLDC GLUCOMTR-MCNC: 214 MG/DL (ref 70–130)
GLUCOSE SERPL-MCNC: 141 MG/DL (ref 65–99)
HCO3 BLDA-SCNC: 30.1 MMOL/L (ref 20–26)
HCT VFR BLD AUTO: 36.1 % (ref 34–46.6)
HCT VFR BLD CALC: 34.2 % (ref 38–51)
HGB BLD-MCNC: 11.4 G/DL (ref 12–15.9)
HGB BLDA-MCNC: 11.2 G/DL (ref 13.5–17.5)
IMM GRANULOCYTES # BLD AUTO: 0.06 10*3/MM3 (ref 0–0.05)
IMM GRANULOCYTES NFR BLD AUTO: 0.6 % (ref 0–0.5)
INHALED O2 CONCENTRATION: 50 %
LYMPHOCYTES # BLD AUTO: 0.53 10*3/MM3 (ref 0.7–3.1)
LYMPHOCYTES NFR BLD AUTO: 5.6 % (ref 19.6–45.3)
Lab: ABNORMAL
MCH RBC QN AUTO: 26.8 PG (ref 26.6–33)
MCHC RBC AUTO-ENTMCNC: 31.6 G/DL (ref 31.5–35.7)
MCV RBC AUTO: 84.9 FL (ref 79–97)
METHGB BLD QL: 0.6 % (ref 0–3)
MODALITY: ABNORMAL
MONOCYTES # BLD AUTO: 0.16 10*3/MM3 (ref 0.1–0.9)
MONOCYTES NFR BLD AUTO: 1.7 % (ref 5–12)
NEUTROPHILS NFR BLD AUTO: 8.65 10*3/MM3 (ref 1.7–7)
NEUTROPHILS NFR BLD AUTO: 91.9 % (ref 42.7–76)
NRBC BLD AUTO-RTO: 0 /100 WBC (ref 0–0.2)
OXYHGB MFR BLDV: 91.7 % (ref 94–99)
PCO2 BLDA: 49.8 MM HG (ref 35–45)
PCO2 TEMP ADJ BLD: ABNORMAL MM[HG]
PEEP RESPIRATORY: 5 CM[H2O]
PH BLDA: 7.39 PH UNITS (ref 7.35–7.45)
PH, TEMP CORRECTED: ABNORMAL
PLATELET # BLD AUTO: 219 10*3/MM3 (ref 140–450)
PMV BLD AUTO: 9.4 FL (ref 6–12)
PO2 BLDA: 66.2 MM HG (ref 83–108)
PO2 TEMP ADJ BLD: ABNORMAL MM[HG]
POTASSIUM SERPL-SCNC: 4.7 MMOL/L (ref 3.5–5.2)
PROT SERPL-MCNC: 7.2 G/DL (ref 6–8.5)
RBC # BLD AUTO: 4.25 10*6/MM3 (ref 3.77–5.28)
SAO2 % BLDCOA: 93.2 % (ref 94–99)
SET MECH RESP RATE: 20
SODIUM SERPL-SCNC: 138 MMOL/L (ref 136–145)
VENTILATOR MODE: ABNORMAL
VT ON VENT VENT: 400 ML
WBC NRBC COR # BLD AUTO: 9.42 10*3/MM3 (ref 3.4–10.8)

## 2025-05-05 PROCEDURE — 82948 REAGENT STRIP/BLOOD GLUCOSE: CPT

## 2025-05-05 PROCEDURE — 25010000002 METHYLPREDNISOLONE PER 40 MG: Performed by: STUDENT IN AN ORGANIZED HEALTH CARE EDUCATION/TRAINING PROGRAM

## 2025-05-05 PROCEDURE — 80053 COMPREHEN METABOLIC PANEL: CPT | Performed by: STUDENT IN AN ORGANIZED HEALTH CARE EDUCATION/TRAINING PROGRAM

## 2025-05-05 PROCEDURE — 94799 UNLISTED PULMONARY SVC/PX: CPT

## 2025-05-05 PROCEDURE — 99291 CRITICAL CARE FIRST HOUR: CPT | Performed by: INTERNAL MEDICINE

## 2025-05-05 PROCEDURE — 63710000001 INSULIN REGULAR HUMAN PER 5 UNITS: Performed by: STUDENT IN AN ORGANIZED HEALTH CARE EDUCATION/TRAINING PROGRAM

## 2025-05-05 PROCEDURE — 85025 COMPLETE CBC W/AUTO DIFF WBC: CPT | Performed by: STUDENT IN AN ORGANIZED HEALTH CARE EDUCATION/TRAINING PROGRAM

## 2025-05-05 PROCEDURE — 25010000002 PROPOFOL 10 MG/ML EMULSION: Performed by: STUDENT IN AN ORGANIZED HEALTH CARE EDUCATION/TRAINING PROGRAM

## 2025-05-05 PROCEDURE — 36600 WITHDRAWAL OF ARTERIAL BLOOD: CPT

## 2025-05-05 PROCEDURE — 25810000003 SODIUM CHLORIDE 0.9 % SOLUTION: Performed by: STUDENT IN AN ORGANIZED HEALTH CARE EDUCATION/TRAINING PROGRAM

## 2025-05-05 PROCEDURE — 94761 N-INVAS EAR/PLS OXIMETRY MLT: CPT

## 2025-05-05 PROCEDURE — 82375 ASSAY CARBOXYHB QUANT: CPT

## 2025-05-05 PROCEDURE — 94003 VENT MGMT INPAT SUBQ DAY: CPT

## 2025-05-05 PROCEDURE — 71045 X-RAY EXAM CHEST 1 VIEW: CPT | Performed by: RADIOLOGY

## 2025-05-05 PROCEDURE — 82805 BLOOD GASES W/O2 SATURATION: CPT

## 2025-05-05 PROCEDURE — 99239 HOSP IP/OBS DSCHRG MGMT >30: CPT | Performed by: INTERNAL MEDICINE

## 2025-05-05 PROCEDURE — 83050 HGB METHEMOGLOBIN QUAN: CPT

## 2025-05-05 PROCEDURE — 25010000002 MIDAZOLAM 1 MG/ML 100ML NS 100 MG/100ML SOLUTION: Performed by: STUDENT IN AN ORGANIZED HEALTH CARE EDUCATION/TRAINING PROGRAM

## 2025-05-05 PROCEDURE — 25010000002 ENOXAPARIN PER 10 MG: Performed by: STUDENT IN AN ORGANIZED HEALTH CARE EDUCATION/TRAINING PROGRAM

## 2025-05-05 PROCEDURE — 31500 INSERT EMERGENCY AIRWAY: CPT

## 2025-05-05 RX ORDER — SODIUM CHLORIDE 9 MG/ML
40 INJECTION, SOLUTION INTRAVENOUS AS NEEDED
Status: DISCONTINUED | OUTPATIENT
Start: 2025-05-05 | End: 2025-05-05 | Stop reason: HOSPADM

## 2025-05-05 RX ORDER — SODIUM CHLORIDE 0.9 % (FLUSH) 0.9 %
10 SYRINGE (ML) INJECTION EVERY 12 HOURS SCHEDULED
Status: DISCONTINUED | OUTPATIENT
Start: 2025-05-05 | End: 2025-05-05 | Stop reason: HOSPADM

## 2025-05-05 RX ORDER — SODIUM CHLORIDE 0.9 % (FLUSH) 0.9 %
10 SYRINGE (ML) INJECTION AS NEEDED
Status: DISCONTINUED | OUTPATIENT
Start: 2025-05-05 | End: 2025-05-05 | Stop reason: HOSPADM

## 2025-05-05 RX ADMIN — IPRATROPIUM BROMIDE AND ALBUTEROL SULFATE 3 ML: 2.5; .5 SOLUTION RESPIRATORY (INHALATION) at 06:19

## 2025-05-05 RX ADMIN — INSULIN HUMAN 3 UNITS: 100 INJECTION, SOLUTION PARENTERAL at 05:49

## 2025-05-05 RX ADMIN — PROPOFOL 40 MCG/KG/MIN: 10 INJECTION, EMULSION INTRAVENOUS at 02:20

## 2025-05-05 RX ADMIN — MIDAZOLAM HYDROCHLORIDE 5 MG/HR: 1 INJECTION, SOLUTION INTRAVENOUS at 04:41

## 2025-05-05 RX ADMIN — ENOXAPARIN SODIUM 60 MG: 60 INJECTION SUBCUTANEOUS at 08:04

## 2025-05-05 RX ADMIN — OFLOXACIN 50000 UNITS: 300 TABLET, COATED ORAL at 08:00

## 2025-05-05 RX ADMIN — DEXMEDETOMIDINE 0.2 MCG/KG/HR: 200 INJECTION, SOLUTION INTRAVENOUS at 07:59

## 2025-05-05 RX ADMIN — ATORVASTATIN CALCIUM 20 MG: 20 TABLET, FILM COATED ORAL at 08:00

## 2025-05-05 RX ADMIN — Medication 10 ML: at 08:05

## 2025-05-05 RX ADMIN — PROPOFOL 40 MCG/KG/MIN: 10 INJECTION, EMULSION INTRAVENOUS at 04:41

## 2025-05-05 RX ADMIN — IPRATROPIUM BROMIDE AND ALBUTEROL SULFATE 3 ML: 2.5; .5 SOLUTION RESPIRATORY (INHALATION) at 00:18

## 2025-05-05 RX ADMIN — IPRATROPIUM BROMIDE AND ALBUTEROL SULFATE 3 ML: 2.5; .5 SOLUTION RESPIRATORY (INHALATION) at 12:48

## 2025-05-05 RX ADMIN — MUPIROCIN 1 APPLICATION: 20 OINTMENT TOPICAL at 08:00

## 2025-05-05 RX ADMIN — Medication 300 MCG/HR: at 04:41

## 2025-05-05 RX ADMIN — METHYLPREDNISOLONE SODIUM SUCCINATE 40 MG: 40 INJECTION, POWDER, LYOPHILIZED, FOR SOLUTION INTRAMUSCULAR; INTRAVENOUS at 00:35

## 2025-05-05 RX ADMIN — PANTOPRAZOLE SODIUM 40 MG: 40 INJECTION, POWDER, FOR SOLUTION INTRAVENOUS at 08:00

## 2025-05-05 NOTE — CONSULTS
Consult Note     Patient Identification:  Name:  Mary Monaco  Age:  44 y.o.  Sex:  female  :  1980  MRN:  [id number]   Visit Number:  [id number]  Primary Care Physician:  Latoya Bettencourt APRN     Subjective     Chief complaint:   Right lower leg wound    History of presenting illness:   Patient is a 44 y.o. female with past medical history significant for Former Smoker, Morbid Obesity by BMI, History IV drug use PMH History HCV, Asthma/COPD, Diabetes Mellitus Type II, Hypertension, Systemic Lupus Erythematosus, Rheumatoid Arthritis  that presented to the [facility] for complaints of altered mental status.  Patient states that a few days ago unsure of exact timeframe blisters appeared on leg then ruptured and presented wound.  Patient's right leg is edematous.  No edema noted in left leg.    ---------------------------------------------------------------------------------------------------------------------     Review of Systems denies fever or chills    ---------------------------------------------------------------------------------------------------------------------   Past Medical History:   Diagnosis Date    Asthma     COPD (chronic obstructive pulmonary disease)     Diabetes mellitus     Hepatitis C     History of gallstones     Hypertension     IV drug abuse     Lupus     Rheumatoid arthritis      Past Surgical History:   Procedure Laterality Date    ABDOMINAL SURGERY      CHOLECYSTECTOMY      PERINEAL LESION/CYST EXCISION N/A 4/3/2017    Procedure: EXCISION OF VAGINAL SKIN TAG;  Surgeon: Kee Crooks III, MD;  Location: [facility];  Service:     TUBAL COAGULATION LAPAROSCOPIC Bilateral 4/3/2017    Procedure: BILATERAL TUBAL FALLOPE FILSHIE CLIPPING LAPAROSCOPIC,IUD REMOVAL;  Surgeon: Kee Crooks III, MD;  Location: [facility];  Service:      Family History   Problem Relation Age of Onset    No Known Problems Mother     No Known Problems Father     No Known  Problems Sister     No Known Problems Brother     No Known Problems Son     No Known Problems Daughter     No Known Problems Maternal Grandmother     No Known Problems Maternal Grandfather     No Known Problems Paternal Grandmother     No Known Problems Paternal Grandfather     No Known Problems Cousin     No Known Problems Other     Rheum arthritis Neg Hx     Osteoarthritis Neg Hx     Asthma Neg Hx     Diabetes Neg Hx     Heart failure Neg Hx     Hyperlipidemia Neg Hx     Hypertension Neg Hx     Migraines Neg Hx     Rashes / Skin problems Neg Hx     Seizures Neg Hx     Stroke Neg Hx     Thyroid disease Neg Hx      Social History     Socioeconomic History    Marital status:    Tobacco Use    Smoking status: Every Day     Current packs/day: 1.00     Average packs/day: 1 pack/day for 15.0 years (15.0 ttl pk-yrs)     Types: Cigarettes    Smokeless tobacco: Never   Vaping Use    Vaping status: Never Used   Substance and Sexual Activity    Alcohol use: No    Drug use: No     Comment: pt denies drug use    Sexual activity: Yes     Partners: Male     Birth control/protection: I.U.D.     ---------------------------------------------------------------------------------------------------------------------   Allergies:  Patient has no known allergies.  ---------------------------------------------------------------------------------------------------------------------   Medications below are reported home medications pulling from within the system; at this time, these medications have not been reconciled unless otherwise specified and are in the verification process for further verifcation as current home medications.    Prior to Admission Medications       Prescriptions Last Dose Informant Patient Reported? Taking?    albuterol sulfate  (90 Base) MCG/ACT inhaler Unknown Pharmacy Yes No    Inhale 2 puffs Every 4 (Four) Hours As Needed for Wheezing.    amLODIPine (NORVASC) 5 MG tablet Unknown Pharmacy Yes No     Take 1 tablet by mouth Daily.    atorvastatin (LIPITOR) 20 MG tablet Unknown Pharmacy Yes No    Take 1 tablet by mouth Daily.    D-3-5 125 MCG (5000 UT) capsule capsule Unknown Pharmacy Yes No    Take 1 capsule by mouth Daily.    famotidine (PEPCID) 20 MG tablet Unknown Pharmacy Yes No    Take 1 tablet by mouth 2 (Two) Times a Day As Needed for Indigestion or Heartburn.    gabapentin (NEURONTIN) 800 MG tablet Unknown Pharmacy Yes No    Take 1 tablet by mouth 3 (Three) Times a Day As Needed (nerve pain).    ibuprofen (ADVIL,MOTRIN) 600 MG tablet Unknown Pharmacy Yes No    Take 1 tablet by mouth 3 (Three) Times a Day As Needed for Mild Pain or Headache.    lisinopril (PRINIVIL,ZESTRIL) 20 MG tablet Unknown Pharmacy Yes No    Take 1 tablet by mouth 2 (Two) Times a Day.    metFORMIN (GLUCOPHAGE) 500 MG tablet Unknown Pharmacy Yes No    Take 1 tablet by mouth Daily With Breakfast.    nystatin (MYCOSTATIN) 386913 UNIT/GM powder Unknown Pharmacy Yes No    Apply 1 Application topically to the appropriate area as directed 3 (Three) Times a Day.    omega-3 acid ethyl esters (LOVAZA) 1 g capsule Unknown Pharmacy Yes No    Take 1 capsule by mouth Daily.    ondansetron ODT (ZOFRAN-ODT) 8 MG disintegrating tablet Unknown Pharmacy Yes No    Place 1 tablet on the tongue Every 6 (Six) Hours As Needed for Nausea or Vomiting.    pantoprazole (PROTONIX) 40 MG EC tablet Unknown Pharmacy Yes No    Take 1 tablet by mouth Daily.    Symbicort 160-4.5 MCG/ACT inhaler Unknown Pharmacy Yes No    Inhale 2 puffs 2 (Two) Times a Day.    vitamin D (ERGOCALCIFEROL) 1.25 MG (22605 UT) capsule capsule Unknown Pharmacy Yes No    Take 1 capsule by mouth 1 (One) Time Per Week.          ---------------------------------------------------------------------------------------------------------------------    Objective     Hospital Scheduled Meds:  [Held by provider] amLODIPine, 5 mg, Oral, Daily  atorvastatin, 20 mg, Per G Tube, Daily  cholecalciferol,  50,000 Units, Per G Tube, Weekly  collagenase, 1 Application, Topical, Q24H  enoxaparin sodium, 60 mg, Subcutaneous, Q12H  insulin regular, 2-7 Units, Subcutaneous, Q6H  ipratropium-albuterol, 3 mL, Nebulization, 4x Daily - RT  [Held by provider] lisinopril, 20 mg, Oral, BID  methylPREDNISolone sodium succinate, 40 mg, Intravenous, Q12H  mupirocin, 1 Application, Each Nare, BID  pantoprazole, 40 mg, Intravenous, Q24H  sodium chloride, 10 mL, Intravenous, Q12H  sodium chloride, 10 mL, Intravenous, Q12H      dexmedetomidine, 0.2-1.5 mcg/kg/hr (Dosing Weight), Last Rate: Stopped (05/05/25 7521)  fentanyl 10 mcg/mL,  mcg/hr, Last Rate: Stopped (05/05/25 4120)  midazolam, 1-10 mg/hr, Last Rate: Stopped (05/05/25 8944)  Pharmacy to Dose enoxaparin (LOVENOX),   propofol, 5-50 mcg/kg/min (Dosing Weight), Last Rate: Stopped (05/05/25 9628)        Current listed hospital scheduled medications may not yet reflect those currently placed in orders that are signed and held, awaiting patient's arrival to floor/unit.    ---------------------------------------------------------------------------------------------------------------------   Vital Signs:  Temp:  [97.7 °F (36.5 °C)-99.4 °F (37.4 °C)] 98.7 °F (37.1 °C)  Heart Rate:  [] 103  Resp:  [20-22] 20  BP: ()/(43-77) 112/61  FiO2 (%):  [45 %-50 %] 45 %  Mean Arterial Pressure (Non-Invasive) for the past 24 hrs (Last 3 readings):   Noninvasive MAP (mmHg)   05/05/25 1145 85   05/05/25 1130 85   05/05/25 1115 79     SpO2 Percentage    05/05/25 1115 05/05/25 1130 05/05/25 1145   SpO2: 93% 90% 91%     SpO2:  [89 %-99 %] 91 %  on   ;   Device (Oxygen Therapy): ventilator    Body mass index is 57.97 kg/m².  Wt Readings from Last 3 Encounters:   05/05/25 (!) 144 kg (317 lb)   12/29/24 (!) 159 kg (350 lb)   07/27/24 122 kg (268 lb)       ---------------------------------------------------------------------------------------------------------------------   Physical  Exam:  Wound Assessment:  Location: Right, lateral, lower, leg  Wound Measurements: Length: 2.0 Width: 3.2  Depth:0.1 cm  Tunneling: No Undermining: No  Dressing Appearance: dressingappearance: open to air  Closure: NA  Changes since last exam: this is initial exam  Etiology and classification:  non- pressure ulcer  Wound bed structures/characteristics:  Dark brown fibrin scab  Edges dry  Periwound characteristics: intact, dry, redness edematous  Periwound Temperature: normal turgor and temperature  Drainage characteristics: none  Perfusion characteristics: suggest some degree of PAD     Assessment & Plan    Right lower lateral leg ulcer  -Wound bed is dark brown fibrin scab adhered.  Periwound is slightly red, edematous and warm.  This wound could benefit from debridement after enzymatic treatment.  - Patient had venous Doppler that was negative for DVT  - Clean area with wound cleanser, apply Santyl, cover with gauze and thin Mepilex.  - This condition poses a moderate to high risk for non-healing, infection, loss of function, amputation, and premature mortality.      Management of this condition is inherently complex, and requires optimization of many factors to assure the highest likelihood of a favorable outcome, including pressure relief, glycemic control, optimization of multiple aspects of nutrition, bioburden management and optimization of wound bed moisture, vascular function, infection management, and complex coordination of care.        Diabetes  -Recommend adequate glycemic control to help promote wound healing  -Poor glycemic control increases risk of prolonged healing, infection, loss of limb and premature death     Obesity  - BMI 57.97  - An obese person?is at greater risk for wound infection and dehiscence or evisceration.     Review notes of clinical teams involved in patient care.    Follow-up tomorrow for effectiveness of wound care treatment.    Thank you Dr. Kruger for allowing me to participate  in the care of your patient and please feel free to contact me for any questions you may have.       DENISE Mclaughlin  [facility]    05/05/25  13:09 EDT

## 2025-05-05 NOTE — PLAN OF CARE
Problem: Adult Inpatient Plan of Care  Goal: Plan of Care Review  Outcome: Progressing  Flowsheets  Taken 5/5/2025 1718 by Mika Edmondson RN  Progress: improving  Outcome Evaluation: EXTUBATED THROUGHOUT SHIFT, no acute events noted, pt. leaving AMA.  Taken 5/5/2025 0125 by Pam Desai RN  Plan of Care Reviewed With: patient  Goal: Patient-Specific Goal (Individualized)  Outcome: Progressing  Goal: Absence of Hospital-Acquired Illness or Injury  Outcome: Progressing  Intervention: Identify and Manage Fall Risk  Recent Flowsheet Documentation  Taken 5/5/2025 1700 by Mika Edmondson RN  Safety Promotion/Fall Prevention:   activity supervised   fall prevention program maintained   safety round/check completed  Taken 5/5/2025 1600 by Mika Edmondson RN  Safety Promotion/Fall Prevention:   activity supervised   fall prevention program maintained   safety round/check completed  Taken 5/5/2025 1500 by Mika Edmondson RN  Safety Promotion/Fall Prevention:   activity supervised   fall prevention program maintained   safety round/check completed  Taken 5/5/2025 1400 by Mika Edmondson RN  Safety Promotion/Fall Prevention:   activity supervised   fall prevention program maintained   safety round/check completed  Taken 5/5/2025 1300 by Mika Edmondson RN  Safety Promotion/Fall Prevention:   activity supervised   fall prevention program maintained   safety round/check completed  Taken 5/5/2025 1200 by Mika Edmondson RN  Safety Promotion/Fall Prevention:   activity supervised   fall prevention program maintained   safety round/check completed  Taken 5/5/2025 1100 by Mika Edmondson RN  Safety Promotion/Fall Prevention:   activity supervised   fall prevention program maintained   safety round/check completed  Taken 5/5/2025 1000 by Mika Edmondson RN  Safety Promotion/Fall Prevention:   activity supervised   fall prevention program maintained   safety round/check completed  Taken 5/5/2025 0900 by Mika Edmondson RN  Safety  Promotion/Fall Prevention:   activity supervised   fall prevention program maintained   safety round/check completed  Taken 5/5/2025 0800 by Mika Edmondson RN  Safety Promotion/Fall Prevention:   activity supervised   fall prevention program maintained   safety round/check completed  Taken 5/5/2025 0700 by Mika Edmondson RN  Safety Promotion/Fall Prevention:   activity supervised   fall prevention program maintained   safety round/check completed  Intervention: Prevent Skin Injury  Recent Flowsheet Documentation  Taken 5/5/2025 1600 by Mika Edmondson RN  Body Position: position changed independently  Taken 5/5/2025 1400 by Mika Edmondson RN  Body Position: position changed independently  Skin Protection:   incontinence pads utilized   skin sealant/moisture barrier applied   transparent dressing maintained  Taken 5/5/2025 1200 by Mika Edmondson RN  Body Position: (pt. educated to turn while in chair, pt. verbalizes understanding) position changed independently  Taken 5/5/2025 1000 by Mika Edmondson RN  Body Position:   turned   right   heels elevated   legs elevated  Taken 5/5/2025 0800 by Mika Edmondson RN  Body Position:   turned   left   heels elevated   legs elevated  Skin Protection:   incontinence pads utilized   transparent dressing maintained   skin sealant/moisture barrier applied  Intervention: Prevent and Manage VTE (Venous Thromboembolism) Risk  Recent Flowsheet Documentation  Taken 5/5/2025 0800 by Mika Edmondson RN  VTE Prevention/Management: (See MAR) other (see comments)  Goal: Optimal Comfort and Wellbeing  Outcome: Progressing  Intervention: Provide Person-Centered Care  Recent Flowsheet Documentation  Taken 5/5/2025 1400 by Mika Edmondson RN  Trust Relationship/Rapport:   care explained   reassurance provided   emotional support provided   questions answered   thoughts/feelings acknowledged  Taken 5/5/2025 0800 by Mika Edmondson RN  Trust Relationship/Rapport:   care explained    reassurance provided  Goal: Readiness for Transition of Care  Outcome: Progressing     Problem: Mechanical Ventilation Invasive  Goal: Effective Communication  Outcome: Progressing  Intervention: Ensure Effective Communication  Recent Flowsheet Documentation  Taken 5/5/2025 1400 by Mika Edmondson RN  Trust Relationship/Rapport:   care explained   reassurance provided   emotional support provided   questions answered   thoughts/feelings acknowledged  Diversional Activities: [place]  Family/Support System Care:   support provided   self-care encouraged  Taken 5/5/2025 0800 by Mika Edmondson RN  Trust Relationship/Rapport:   care explained   reassurance provided  Diversional Activities: [place]  Family/Support System Care: support provided  Goal: Optimal Device Function  Outcome: Progressing  Intervention: Optimize Device Care and Function  Recent Flowsheet Documentation  Taken 5/5/2025 1400 by Mika Edmondson RN  Oral Care: oral rinse provided  Taken 5/5/2025 0800 by Mika Edmondson RN  Airway/Ventilation Management: airway patency maintained  Oral Care:   lip/mouth moisturizer applied   suction provided   swabbed with antiseptic solution  Goal: Mechanical Ventilation Liberation  Outcome: Progressing  Intervention: Promote Extubation and Mechanical Ventilation Liberation  Recent Flowsheet Documentation  Taken 5/5/2025 1700 by Mika Edmondson RN  Medication Review/Management: medications reviewed  Taken 5/5/2025 1600 by Mika Edmondson RN  Medication Review/Management: medications reviewed  Taken 5/5/2025 1500 by Mika Edmondson RN  Medication Review/Management: medications reviewed  Taken 5/5/2025 1400 by Mika Edmondson RN  Medication Review/Management: medications reviewed  Taken 5/5/2025 1300 by Mika Edmondson RN  Medication Review/Management: medications reviewed  Taken 5/5/2025 1200 by Mika Edmondson RN  Medication Review/Management: medications reviewed  Taken 5/5/2025 1100 by Mika Edmondson  RN  Medication Review/Management: medications reviewed  Taken 5/5/2025 1000 by Mika Edmondson RN  Medication Review/Management: medications reviewed  Taken 5/5/2025 0900 by Mika Edmondson RN  Medication Review/Management: medications reviewed  Taken 5/5/2025 0800 by Mika Edmondson RN  Medication Review/Management: medications reviewed  Taken 5/5/2025 0700 by Mika Edmondson RN  Medication Review/Management: medications reviewed  Goal: Optimal Nutrition Delivery  Outcome: Progressing  Goal: Absence of Device-Related Skin and Tissue Injury  Outcome: Progressing  Intervention: Maintain Skin and Tissue Health  Recent Flowsheet Documentation  Taken 5/5/2025 1400 by Mika Edmondson RN  Device Skin Pressure Protection:   absorbent pad utilized/changed   adhesive use limited   positioning supports utilized   pressure points protected   skin-to-device areas padded   tubing/devices free from skin contact  Taken 5/5/2025 0800 by Mika Edmondson RN  Device Skin Pressure Protection:   absorbent pad utilized/changed   adhesive use limited   positioning supports utilized   pressure points protected   skin-to-device areas padded   tubing/devices free from skin contact  Goal: Absence of Ventilator-Induced Lung Injury  Outcome: Progressing  Intervention: Prevent Ventilator-Associated Pneumonia  Recent Flowsheet Documentation  Taken 5/5/2025 1600 by Mika Edmondson RN  Head of Bed ([facility]) Positioning: HOB elevated  Taken 5/5/2025 1400 by Mika Edmondson RN  Head of Bed ([facility]) Positioning: HOB elevated  Oral Care: oral rinse provided  Taken 5/5/2025 1200 by Mika Edmondson RN  Head of Bed ([facility]) Positioning: (up in chair) other (see comments)  Taken 5/5/2025 1000 by Mika Edmondson RN  Head of Bed ([facility]) Positioning: HOB elevated  Taken 5/5/2025 0800 by Mika Edmondson RN  Head of Bed ([facility]) Positioning: HOB elevated  VAP Prevention Bundle:   HOB elevation maintained   oral care regularly provided   readiness to extubate assessed    spontaneous breathing trial performed   VTE prophylaxis provided   stress ulcer prophylaxis provided  VAP Prevention Contraindications: per provider order  VAP Prevention Measures: completed  Oral Care:   lip/mouth moisturizer applied   suction provided   swabbed with antiseptic solution     Problem: Skin Injury Risk Increased  Goal: Skin Health and Integrity  Outcome: Progressing  Intervention: Optimize Skin Protection  Recent Flowsheet Documentation  Taken 5/5/2025 1600 by Mika Edmondson RN  Head of Bed ([facility]) Positioning: HOB elevated  Taken 5/5/2025 1400 by Mika Edmondson RN  Activity Management: bedrest  Pressure Reduction Techniques:   heels elevated off bed   positioned off wounds   pressure points protected   weight shift assistance provided  Head of Bed ([facility]) Positioning: HOB elevated  Pressure Reduction Devices:   foam padding utilized   heel offloading device utilized   positioning supports utilized   specialty bed utilized  Skin Protection:   incontinence pads utilized   skin sealant/moisture barrier applied   transparent dressing maintained  Taken 5/5/2025 1200 by Mika Edmondson RN  Head of Bed ([facility]) Positioning: (up in chair) other (see comments)  Taken 5/5/2025 1000 by Mika Edmondson RN  Head of Bed ([facility]) Positioning: HOB elevated  Taken 5/5/2025 0800 by Mika Edmondson RN  Activity Management: bedrest  Pressure Reduction Techniques:   heels elevated off bed   pressure points protected   weight shift assistance provided  Head of Bed ([facility]) Positioning: HOB elevated  Pressure Reduction Devices:   alternating pressure pump (RICHARD)   heel offloading device utilized   positioning supports utilized   specialty bed utilized  Skin Protection:   incontinence pads utilized   transparent dressing maintained   skin sealant/moisture barrier applied     Problem: Fall Injury Risk  Goal: Absence of Fall and Fall-Related Injury  Outcome: Progressing  Intervention: Identify and Manage Contributors  Recent Flowsheet  Documentation  Taken 5/5/2025 1700 by Mika Edmondson RN  Medication Review/Management: medications reviewed  Taken 5/5/2025 1600 by Mika Edmondson RN  Medication Review/Management: medications reviewed  Taken 5/5/2025 1500 by Mika Edmondson RN  Medication Review/Management: medications reviewed  Taken 5/5/2025 1400 by Mika Edmondson RN  Medication Review/Management: medications reviewed  Taken 5/5/2025 1300 by Mika Edmondson RN  Medication Review/Management: medications reviewed  Taken 5/5/2025 1200 by Mika Edmondson RN  Medication Review/Management: medications reviewed  Taken 5/5/2025 1100 by Mika Edmodnson RN  Medication Review/Management: medications reviewed  Taken 5/5/2025 1000 by Mika Edmondson RN  Medication Review/Management: medications reviewed  Taken 5/5/2025 0900 by Mika Edmondson RN  Medication Review/Management: medications reviewed  Taken 5/5/2025 0800 by Mika Edmondson RN  Medication Review/Management: medications reviewed  Taken 5/5/2025 0700 by Mika Edmondson RN  Medication Review/Management: medications reviewed  Intervention: Promote Injury-Free Environment  Recent Flowsheet Documentation  Taken 5/5/2025 1700 by Mika Edmondson RN  Safety Promotion/Fall Prevention:   activity supervised   fall prevention program maintained   safety round/check completed  Taken 5/5/2025 1600 by Mika Edmondson RN  Safety Promotion/Fall Prevention:   activity supervised   fall prevention program maintained   safety round/check completed  Taken 5/5/2025 1500 by Mika Edmondson RN  Safety Promotion/Fall Prevention:   activity supervised   fall prevention program maintained   safety round/check completed  Taken 5/5/2025 1400 by Mika Edmondson RN  Safety Promotion/Fall Prevention:   activity supervised   fall prevention program maintained   safety round/check completed  Taken 5/5/2025 1300 by Mika Edmondson RN  Safety Promotion/Fall Prevention:   activity supervised   fall prevention program maintained    safety round/check completed  Taken 5/5/2025 1200 by Mika Edmondson RN  Safety Promotion/Fall Prevention:   activity supervised   fall prevention program maintained   safety round/check completed  Taken 5/5/2025 1100 by Mika Edmondson RN  Safety Promotion/Fall Prevention:   activity supervised   fall prevention program maintained   safety round/check completed  Taken 5/5/2025 1000 by Mika Edmondson RN  Safety Promotion/Fall Prevention:   activity supervised   fall prevention program maintained   safety round/check completed  Taken 5/5/2025 0900 by Mika Edmondson RN  Safety Promotion/Fall Prevention:   activity supervised   fall prevention program maintained   safety round/check completed  Taken 5/5/2025 0800 by Mika Edmondson RN  Safety Promotion/Fall Prevention:   activity supervised   fall prevention program maintained   safety round/check completed  Taken 5/5/2025 0700 by Mika Edmondson RN  Safety Promotion/Fall Prevention:   activity supervised   fall prevention program maintained   safety round/check completed     Problem: Comorbidity Management  Goal: Blood Glucose Level Within Target Range  Outcome: Progressing  Intervention: Monitor and Manage Glycemia  Recent Flowsheet Documentation  Taken 5/5/2025 1700 by Mika Edmondson RN  Medication Review/Management: medications reviewed  Taken 5/5/2025 1600 by Mika Edmondson RN  Medication Review/Management: medications reviewed  Taken 5/5/2025 1500 by Mika Edmondson RN  Medication Review/Management: medications reviewed  Taken 5/5/2025 1400 by Mika Edmondson RN  Medication Review/Management: medications reviewed  Taken 5/5/2025 1300 by Mika Edmondson RN  Medication Review/Management: medications reviewed  Taken 5/5/2025 1200 by Mika Edmondson RN  Medication Review/Management: medications reviewed  Taken 5/5/2025 1100 by Mika Edmondson RN  Medication Review/Management: medications reviewed  Taken 5/5/2025 1000 by Mika Edmondson RN  Medication  Review/Management: medications reviewed  Taken 5/5/2025 0900 by Mika Edmondson RN  Medication Review/Management: medications reviewed  Taken 5/5/2025 0800 by Mika Edmondson RN  Medication Review/Management: medications reviewed  Taken 5/5/2025 0700 by Mika Edmondson RN  Medication Review/Management: medications reviewed  Goal: Blood Pressure in Desired Range  Outcome: Progressing  Intervention: Maintain Blood Pressure Management  Recent Flowsheet Documentation  Taken 5/5/2025 1700 by Mika Edmondosn RN  Medication Review/Management: medications reviewed  Taken 5/5/2025 1600 by Mika Edmondson RN  Medication Review/Management: medications reviewed  Taken 5/5/2025 1500 by Mika Edmondson RN  Medication Review/Management: medications reviewed  Taken 5/5/2025 1400 by Mika Edmondson RN  Medication Review/Management: medications reviewed  Taken 5/5/2025 1300 by Mika Edmondson RN  Medication Review/Management: medications reviewed  Taken 5/5/2025 1200 by Mika Edmondson RN  Medication Review/Management: medications reviewed  Taken 5/5/2025 1100 by Mika Edmondson RN  Medication Review/Management: medications reviewed  Taken 5/5/2025 1000 by Mika Edmondson RN  Medication Review/Management: medications reviewed  Taken 5/5/2025 0900 by Mika Edmondson RN  Medication Review/Management: medications reviewed  Taken 5/5/2025 0800 by Mika Edmondson RN  Medication Review/Management: medications reviewed  Taken 5/5/2025 0700 by Mika Edmondson RN  Medication Review/Management: medications reviewed   Goal Outcome Evaluation:           Progress: improving  Outcome Evaluation: EXTUBATED THROUGHOUT SHIFT, no acute events noted, pt. leaving AMA.

## 2025-05-05 NOTE — PROGRESS NOTES
[facility] HOSPITALIST PROGRESS NOTE     Patient Identification:  Name:  Mary Monaco  Age:  44 y.o.  Sex:  female  :  1980  MRN:  [id number]  Visit Number:  [id number]  ROOM: [address]     Primary Care Provider:  Latoya Bettencourt APRN    Length of stay in inpatient status:  1    Subjective     Chief Compliant:    Chief Complaint   Patient presents with    Leg Swelling     History of Presenting Illness:    Patient remains critically ill, intubated for airway protection, awake but agitated this AM, obtained repeat ABG, continued on IV steroids, weaning sedation for spontaneous awakening trial/spontaneous breathing trial later this morning.  Blood pressure and heart rate stable, on 45% Fi02. Discussed with bedside Nurse.  No family at bedside for conversations this AM.   Objective     Current Hospital Meds:  [Held by provider] amLODIPine, 5 mg, Oral, Daily  atorvastatin, 20 mg, Per G Tube, Daily  cholecalciferol, 50,000 Units, Per G Tube, Weekly  enoxaparin sodium, 60 mg, Subcutaneous, Q12H  insulin regular, 2-7 Units, Subcutaneous, Q6H  ipratropium-albuterol, 3 mL, Nebulization, 4x Daily - RT  [Held by provider] lisinopril, 20 mg, Oral, BID  methylPREDNISolone sodium succinate, 40 mg, Intravenous, Q12H  mupirocin, 1 Application, Each Nare, BID  pantoprazole, 40 mg, Intravenous, Q24H  dexmedetomidine, 0.2-1.5 mcg/kg/hr (Dosing Weight), Last Rate: Stopped (25 0121)  fentanyl 10 mcg/mL,  mcg/hr, Last Rate: Stopped (25 316)  midazolam, 1-10 mg/hr, Last Rate: Stopped (25 2769)  Pharmacy to Dose enoxaparin (LOVENOX),   propofol, 5-50 mcg/kg/min (Dosing Weight), Last Rate: Stopped (25 335)    ----------------------------------------------------------------------------------------------------------------------  Vital Signs:  Temp:  [97.7 °F (36.5 °C)-99.4 °F (37.4 °C)] 98.8 °F (37.1 °C)  Heart Rate:  [] 98  Resp:  [7-24] 20  BP: ()/()  "132/67  FiO2 (%):  [45 %-60 %] 45 %  SpO2:  [76 %-99 %] 95 %  on  Flow (L/min) (Oxygen Therapy):  [15] 15;   Device (Oxygen Therapy): ventilator  Body mass index is 57.97 kg/m².      Intake/Output Summary (Last 24 hours) at 5/5/2025 0929  Last data filed at 5/5/2025 0545  Gross per 24 hour   Intake 1633 ml   Output 5850 ml   Net -4217 ml      ----------------------------------------------------------------------------------------------------------------------  Physical exam:  Constitutional:  Intubated/Sedated, no acute distress.      HENT:  Head:  Normocephalic and atraumatic.  Mouth:  Moist mucous membranes.    Eyes:  Conjunctivae are normal. No scleral icterus.    Neck:  Neck supple.  No JVD present.    Cardiovascular:  Normal rate, regular rhythm and normal heart sounds with no murmur.  Pulmonary/Chest:  Ventilated, bilateral equal rise of chest, on Fi02 45%  Abdominal:  Soft. No distension and no tenderness.   Musculoskeletal:  No tenderness, and no deformity.  No red or swollen joints anywhere.    Neurological:  Unable to assess due to sedation    Skin:  Skin is warm and dry. No rash noted. No pallor.   Peripheral vascular:  No clubbing, no cyanosis, no edema.  Psychiatric: Unable to assess due to sedation  : Ruel in place    Edited by: Marcell Kruger MD at 5/5/2025 0952  ----------------------------------------------------------------------------------------------------------------------  WBC/HGB/HCT/PLT   9.42/11.4/36.1/219 (05/05 0014)  BUN/CREAT/GLUC/ALT/AST/CRISTINA/LIP    18/1.12/141/18/20/--/-- (05/05 0014)  LYTES - Na/K/Cl/CO2: 138/4.7/98/28.2 (05/05 0014)  pH/pCO2/pO2/HCO3   7.389/49.8/66.2/30.1 (05/05 0800)  No results found for: \"URINECX\"  No results found for: \"BLOODCX\"    I have personally looked at the labs and they are summarized above.  ----------------------------------------------------------------------------------------------------------------------  Detailed radiology reports for the last 24 " hours:  XR Chest 1 View  Result Date: 5/5/2025  Line placement as above.    This report was finalized on 5/5/2025 8:29 AM by Dr. Endy Valladares MD.      CT Head Without Contrast  Result Date: 5/4/2025    No acute intracranial abnormality.   This report was finalized on 5/4/2025 1:26 PM by Estephania Lockwood MD.      US Venous Doppler Lower Extremity Right (duplex)  Result Date: 5/4/2025   No acute deep vein thrombosis.         This report was finalized on 5/4/2025 10:14 AM by Dr. Trell Espitia MD.      Assessment & Plan    44F Former Smoker, Morbid Obesity by BMI, History IV drug use PMH History HCV, Asthma/COPD, Diabetes Mellitus Type II, Hypertension, Systemic Lupus Erythematosus, Rheumatoid Arthritis, presented with altered mental status.    #Acute Metabolic/Toxic Encephalopathy requiring Intubation & Mechanical Ventilation due to Polysubstance Abuse & Acute Hypoxic/Hypercarbic Respiratory Failure due to Acute COPD Exacerbation.   - Pulmonary consulted; Recommendations pending  - Status post Zosyn, follow up cultures  - Continue pain and sedation drips for comfort, weaning this AM  - Will add IV pressors for SBP < 90 or MAPs consistently < 65  - Continue IV PPI for Gi prophylaxis  - Continue Tylenol as needed for fevers, Duonebs as needed  - Admitted to CCU, monitor on telemetry, continue 02 but wean as able, spontaneous awakening trial/spontaneous breathing trial this AM, Pulmonary to follow up on ability to extubate later this AM.     #Non-Insulin Dependent Diabetes Mellitus Type II, controlled, unknown complications  - Hgb A1c = 5.20%  - Holding home oral agents, continue FSBG and SSI, add long acting as indicated.    #Hypertension/Hyperlipidemia  - Continue home Statin  - Continue to monitor on telemetry, strict I/O's, trend heart rate and blood pressure    #Morbid Obesity by BMI  - Body mass index is 57.97 kg/m².; complicates all aspects of care     #History Tobacco Dependence/Former Smoker  - Recommended  cessation, nicotine replacement therapy as needed      F: NPO  E: Monitor & Replace as needed   N: NPO Diet NPO Type: Strict NPO   Ppx: Prophylactic Lovenox   Code Status (Patient has no pulse and is not breathing): CPR   Medical Interventions (Patient has pulse or is breathing): Full Support     Dispo: Pending workup and clinical improvement     *This patient is considered high risk due to Acute Encephalopathy, intubation and mechanical ventilation.       I have spent 35 minutes of critical care time (7:30-8:05AM) with > 50% of time spent in direct patient care, evaluating the patient at bedside, reviewing all labs and images, reviewing ABG and vent settings, reviewing pain and sedation drips, communicating plan of care with nursing staff, utilizing high complexity medical decision making to assess and treat vital organ system failure in an individual who has impairment of one or more vital organ systems such that there is a high probability of imminent or life threatening deterioration in the patient’s condition. Failure to initiate the above interventions on an urgent basis would likely result in sudden, clinically significant or life threatening deterioration in the patient's condition.    Edited by: Marcell Kruger MD at 5/5/2025 9893  [facility]

## 2025-05-05 NOTE — NURSING NOTE
Received consult for wound to left leg.     Patient with dry scab covered wound to left lower lateral leg.  States blister was there that busted.  Tracey wound red and warm.  Left leg with edema.  No drainage noted.  Left open to air at this time.      Consult in place for wound APRN.         05/05/25 0920   Spiritual Care   Spiritual Care Visit Type initial   Spiritual Care Source  initiative   Receptivity to Spiritual Care busy, visit another time   Spiritual Care Interventions prayer support provided   Response to Spiritual Care visit timing not optimal (follow-up planned)   Use of Spiritual Resources prayer   Spiritual Care Follow-Up follow-up planned regularly for general support   Wound 05/04/25 1330 Right lateral leg Other (Comments)   Placement Date/Time: 05/04/25 1330   Side: Right  Orientation: lateral  Location: leg  Primary Wound Type: (c) Other (Comments)   Dressing Appearance open to air   Closure None   Base dry;scab   Periwound intact;redness;edematous   Periwound Temperature warm   Periwound Skin Turgor firm   Drainage Amount none   Dressing Care open to air

## 2025-05-05 NOTE — CONSULTS
Pulmonary critical care consultation note  Referring Provider: Hospitalist  Reason for Consultation: Respiratory failure and ventilator management      Chief complaint-could not be obtained as on my assessment patient was intubated and sedated      History of present illness: Most of the history is obtained from patient's medical chart as on my assessment patient was intubated sedated and none of the family members at present at the bedside patient is a 44-year-old female presented to the emergency with chief complaint of swelling in the lower extremity and wound on the lateral aspect.  Patient has a past medical history positive for morbid obesity, DM, hepatitis C, asthma, hypertension, IV drug abuse, lupus and rheumatoid arthritis.  Patient presented to the ER and afterwards had change in mental status.  For airway protection patient was emergently intubated.  All the labs medications and the notes vitals treatment given in the ER then reviewed.  MD RN at bedside with patient's nurse and RT.    Review of Systems  Could not be obtained as patient is intubated and sedated        History  Past Medical History:   Diagnosis Date    Asthma     COPD (chronic obstructive pulmonary disease)     Diabetes mellitus     Hepatitis C     History of gallstones     Hypertension     IV drug abuse     Lupus     Rheumatoid arthritis    ,   Past Surgical History:   Procedure Laterality Date    ABDOMINAL SURGERY      CHOLECYSTECTOMY      PERINEAL LESION/CYST EXCISION N/A 4/3/2017    Procedure: EXCISION OF VAGINAL SKIN TAG;  Surgeon: Kee Crooks III, MD;  Location: [facility] OR;  Service:     TUBAL COAGULATION LAPAROSCOPIC Bilateral 4/3/2017    Procedure: BILATERAL TUBAL FALLOPE FILSHIE CLIPPING LAPAROSCOPIC,IUD REMOVAL;  Surgeon: Kee Crooks III, MD;  Location: [facility];  Service:    ,   Family History   Problem Relation Age of Onset    No Known Problems Mother     No Known Problems Father     No Known Problems Sister     No Known  Problems Brother     No Known Problems Son     No Known Problems Daughter     No Known Problems Maternal Grandmother     No Known Problems Maternal Grandfather     No Known Problems Paternal Grandmother     No Known Problems Paternal Grandfather     No Known Problems Cousin     No Known Problems Other     Rheum arthritis Neg Hx     Osteoarthritis Neg Hx     Asthma Neg Hx     Diabetes Neg Hx     Heart failure Neg Hx     Hyperlipidemia Neg Hx     Hypertension Neg Hx     Migraines Neg Hx     Rashes / Skin problems Neg Hx     Seizures Neg Hx     Stroke Neg Hx     Thyroid disease Neg Hx    ,   Social History     Tobacco Use    Smoking status: Every Day     Current packs/day: 1.00     Average packs/day: 1 pack/day for 15.0 years (15.0 ttl pk-yrs)     Types: Cigarettes    Smokeless tobacco: Never   Vaping Use    Vaping status: Never Used   Substance Use Topics    Alcohol use: No    Drug use: No   ,   Medications Prior to Admission   Medication Sig Dispense Refill Last Dose/Taking    albuterol sulfate  (90 Base) MCG/ACT inhaler Inhale 2 puffs Every 4 (Four) Hours As Needed for Wheezing.   Unknown    amLODIPine (NORVASC) 5 MG tablet Take 1 tablet by mouth Daily.   Unknown    atorvastatin (LIPITOR) 20 MG tablet Take 1 tablet by mouth Daily.   Unknown    D-3-5 125 MCG (5000 UT) capsule capsule Take 1 capsule by mouth Daily.   Unknown    famotidine (PEPCID) 20 MG tablet Take 1 tablet by mouth 2 (Two) Times a Day As Needed for Indigestion or Heartburn.   Unknown    gabapentin (NEURONTIN) 800 MG tablet Take 1 tablet by mouth 3 (Three) Times a Day As Needed (nerve pain).   Unknown    ibuprofen (ADVIL,MOTRIN) 600 MG tablet Take 1 tablet by mouth 3 (Three) Times a Day As Needed for Mild Pain or Headache.   Unknown    lisinopril (PRINIVIL,ZESTRIL) 20 MG tablet Take 1 tablet by mouth 2 (Two) Times a Day.   Unknown    metFORMIN (GLUCOPHAGE) 500 MG tablet Take 1 tablet by mouth Daily With Breakfast.   Unknown    nystatin  (MYCOSTATIN) 222022 UNIT/GM powder Apply 1 Application topically to the appropriate area as directed 3 (Three) Times a Day.   Unknown    omega-3 acid ethyl esters (LOVAZA) 1 g capsule Take 1 capsule by mouth Daily.   Unknown    ondansetron ODT (ZOFRAN-ODT) 8 MG disintegrating tablet Place 1 tablet on the tongue Every 6 (Six) Hours As Needed for Nausea or Vomiting.   Unknown    pantoprazole (PROTONIX) 40 MG EC tablet Take 1 tablet by mouth Daily.   Unknown    Symbicort 160-4.5 MCG/ACT inhaler Inhale 2 puffs 2 (Two) Times a Day.   Unknown    vitamin D (ERGOCALCIFEROL) 1.25 MG (00739 UT) capsule capsule Take 1 capsule by mouth 1 (One) Time Per Week.   Unknown   , Scheduled Meds:  [Held by provider] amLODIPine, 5 mg, Oral, Daily  atorvastatin, 20 mg, Per G Tube, Daily  cholecalciferol, 50,000 Units, Per G Tube, Weekly  enoxaparin sodium, 60 mg, Subcutaneous, Q12H  insulin regular, 2-7 Units, Subcutaneous, Q6H  ipratropium-albuterol, 3 mL, Nebulization, 4x Daily - RT  [Held by provider] lisinopril, 20 mg, Oral, BID  methylPREDNISolone sodium succinate, 40 mg, Intravenous, Q12H  mupirocin, 1 Application, Each Nare, BID  pantoprazole, 40 mg, Intravenous, Q24H  sodium chloride, 10 mL, Intravenous, Q12H  sodium chloride, 10 mL, Intravenous, Q12H    , Continuous Infusions:  dexmedetomidine, 0.2-1.5 mcg/kg/hr (Dosing Weight), Last Rate: Stopped (05/05/25 7659)  fentanyl 10 mcg/mL,  mcg/hr, Last Rate: Stopped (05/05/25 4087)  midazolam, 1-10 mg/hr, Last Rate: Stopped (05/05/25 4452)  Pharmacy to Dose enoxaparin (LOVENOX),   propofol, 5-50 mcg/kg/min (Dosing Weight), Last Rate: Stopped (05/05/25 8369)     and Allergies:  Patient has no known allergies.    Objective     Vital Signs   Temp:  [97.7 °F (36.5 °C)-99.4 °F (37.4 °C)] 98.8 °F (37.1 °C)  Heart Rate:  [] 98  Resp:  [7-24] 20  BP: ()/() 132/67  FiO2 (%):  [45 %-60 %] 45 %    Physical Exam:             General-acutely ill in appearance, not in any  acute distress    HEENT- pupils equally reactive to light, normal in size, no scleral icterus    Neck-supple    Respiratory-respirations normal-on auscultation no wheezing no crackles, synchronous with ventilator    Cardiovascular-  Normal S1 and S2. No S3, S4 or murmurs. No JVD, no carotid bruit and no edema, pulses normal bilaterally     GI-nontender nondistended bowel sounds positive    CNS-nonfocal-after turning the sedation down    Musculoskeletal -no edema  Extremities- no obvious deformity noticed     Psychiatric-could not be obtained  Skin- no visible rash                                                                   Results Review:    LABS:    Lab Results   Component Value Date    GLUCOSE 141 (H) 05/05/2025    BUN 18 05/05/2025    CREATININE 1.12 (H) 05/05/2025    EGFRIFNONA 98 04/20/2021    BCR 16.1 05/05/2025    CO2 28.2 05/05/2025    CALCIUM 8.5 (L) 05/05/2025    ALBUMIN 3.7 05/05/2025    AST 20 05/05/2025    ALT 18 05/05/2025    WBC 9.42 05/05/2025    HGB 11.4 (L) 05/05/2025    HCT 36.1 05/05/2025    MCV 84.9 05/05/2025     05/05/2025     05/05/2025    K 4.7 05/05/2025    CL 98 05/05/2025    ANIONGAP 11.8 05/05/2025       Lab Results   Component Value Date    INR 0.99 07/27/2024    INR 0.92 02/01/2023    INR 0.91 10/26/2019    PROTIME 13.2 07/27/2024    PROTIME 10.0 02/01/2023    PROTIME 12.7 10/26/2019                I reviewed the patient's new clinical results.  I reviewed the patient's new imaging results and agree with the interpretation.   Latest Reference Range & Units 05/05/25 08:00   pH, Arterial 7.350 - 7.450 pH units 7.389   pCO2, Arterial 35.0 - 45.0 mm Hg 49.8 (H)   pO2, Arterial 83.0 - 108.0 mm Hg 66.2 (L)   HCO3, Arterial 20.0 - 26.0 mmol/L 30.1 (H)   Base Excess 0.0 - 2.0 mmol/L 4.2 (H)   O2 Saturation, Arterial 94.0 - 99.0 % 93.2 (L)   CO2 Content 22 - 33 mmol/L 31.6   A-a DO2 0.0 - 300.0 mmHg 218.7   Carboxyhemoglobin 0 - 5 % 1.0   Methemoglobin 0.00 - 3.00 % 0.60    Oxyhemoglobin 94 - 99 % 91.7 (L)   Hematocrit, Blood Gas 38.0 - 51.0 % 34.2 (L)   Hemoglobin, Blood Gas 13.5 - 17.5 g/dL 11.2 (L)   Site  Right Brachial   Zbigniew's Test  N/A   Modality  Ventilator   FIO2 % 50   Ventilator Mode  VC/AC   Set Tidal Volume  400.00   Set Mech Resp Rate  20.0   PEEP  5.0   Barometric Pressure for Blood Gas mmHg 725   Collected by  [id number]   (H): Data is abnormally high  (L): Data is abnormally low      Assessment & Plan     Neurology- Sedated --drips were reviewed and adjusted for a RASS goal of 0 to -1.  SAT and SBT were done and extubated        Respiratory-acute hypoxic respiratory failure-likely consume something in the ER.  UDS reviewed        Latest ABG reviewed and vent settings were adjusted    SAT and SBT were done and patient was extubated.  He did well after extubation.   Later in the day patient signed out AMA.  Vent settings-before extubation reviewed and adjusted.    Received diuretics and overall 4.2 L negative.  Latest chest x-ray reviewed  Latest blood gas reviewed    VAP- precautions  Head end - 30 %  Mouth care   DVT prophylaxis    Cardiology- hemodynamically -stable  Continue to monitor HR- rate and rhythm, BP   Results for orders placed during the hospital encounter of 09/07/20    Adult Transthoracic Echo Complete W/ Cont if Necessary Per Protocol    Interpretation Summary  · Left ventricular systolic function is normal, EF 61-65%.  · Left atrial cavity size is mildly dilated.     Nephrology- Cr and BUN stable  I/O-reviewed    GI- PPI prophylaxis      Hematology- CBC  Latest reviewed    ID  Culture  And Antibiotics  Reviewed  Endrocrinology- Maintain Blood sugar 140 -180    Electrolytes-   Mag and phos   Replace as per protocol    DVT prophylaxis-continue    Bedside rounds were done with RT and patient's nurse. All the lab and clinical findings were discussed with them and plan was also discussed in great detail.    Family member present-none on my  assessment        Echo-  Results for orders placed during the hospital encounter of 09/07/20    Adult Transthoracic Echo Complete W/ Cont if Necessary Per Protocol    Interpretation Summary  · Left ventricular systolic function is normal, EF 61-65%.  · Left atrial cavity size is mildly dilated.  Patient was intubated and sedated.  Did SAT and SBT and was accordingly extubated.  Before that adjusted ventilator settings and drips.    Critical Care time spent in direct patient care: 30 minutes (excluding procedure time, if applicable) including high complexity decision making to assess, manipulate, and support vital organ system failure in this individual who has impairment of one or more vital organ systems such that there is a high probability of imminent or life threatening deterioration in the patient’s condition.  Patient is critically ill and is at higher risk for further mortality/morbidity. Continue ICU care .           [employer]          George Loera MD  05/05/25  09:33 EDT

## 2025-05-05 NOTE — NURSING NOTE
Pt. Stated that she would like to leave AMA if unable to take home methadone. Dr. Kruger made aware. Patient has been off sedating medications since early throughout shift, pt. Now extubated, AxOx4, verbally understanding of risks associated with leaving AMA. Pt. Currently awaiting ride at this time.

## 2025-05-05 NOTE — PLAN OF CARE
Problem: Mechanical Ventilation Invasive  Outcome: Progressing   Goal Outcome Evaluation:

## 2025-05-05 NOTE — DISCHARGE SUMMARY
"    [facility] HOSPITALISTS DISCHARGE SUMMARY    Patient Identification:  Name:  Mary Monaco  Age:  44 y.o.  Sex:  female  :  1980  MRN:  [id number]  Visit Number:  [id number]    Date of Admission: 2025  Date of Discharge:  2025     PCP: Latoya Bettencourt, DENISE    DISCHARGE DIAGNOSIS  Acute Metabolic/Toxic Encephalopathy requiring Intubation & Mechanical Ventilation - Resolved  Polysubstance Abuse  Acute Hypoxic/Hypercarbic Respiratory Failure - Persisting  Acute COPD Exacerbation - Persisting   Non-Insulin Dependent Diabetes Mellitus Type II, controlled, unknown complications  Hypertension  Hyperlipidemia  Morbid Obesity by BMI  History Tobacco Dependence/Former Smoker  Medical Non-Adherence     CONSULTS   Consults       Date and Time Order Name Status Description    2025  8:11 PM Inpatient Pulmonology Consult      2025 11:06 AM Hospitalist (on-call MD unless specified)            PROCEDURES PERFORMED  Intubation and mechanical ventilation     HOSPITAL COURSE  44F Former Smoker, Morbid Obesity by BMI, History IV drug use PMH History HCV, Asthma/COPD, Diabetes Mellitus Type II, Hypertension, Systemic Lupus Erythematosus, Rheumatoid Arthritis, presented with altered mental status.  Patient intubated for airway protection.  This AM awake and following commands, off all sedation after 9AM, has done well on spontaneous breathing trial all day, was extubated per Pulmonary shortly thereafter.  After extubation patient requesting her home Methadone be resumed.  Asked pharmacy to confirm she is taking and confirm dose.  Pharmacy notes is not on her WALDO and unable to confirm she is taking or dose from the [facility] group she reportedly follows with, states he used to see Dr. Hernandez but now sees \"some women,\" but was unable to name this individual.  She also denies illicit substance use.  She has been receiving IV antibiotics but no definitive Pneumonia on imaging.  " Has has some mild hypoxia post extubation.  She is not willing to stay and wait for pharmacy to confirm her methadone dose tomorrow AM.  She is awake and oriented to person, place and time.  I spoke to her at bedside and she expressed her wish to sign out against medical advice.  She has called a cab to come pick her up and take her out to [employer] where she confirmed her mother is staying. Given patient's choices and actions to not adhere to medical treatments and recommendations, this will drastically affect their short and long term morbidity and mortality.     Edited by: Marcell Kruger MD at 5/5/2025 9945    VITAL SIGNS:  Temp:  [98.7 °F (37.1 °C)-99.4 °F (37.4 °C)] 98.7 °F (37.1 °C)  Heart Rate:  [] 92  Resp:  [20-22] 20  BP: ()/(43-76) 113/64  FiO2 (%):  [45 %-50 %] 45 %  SpO2:  [88 %-99 %] 90 %  on  Flow (L/min) (Oxygen Therapy):  [5] 5;   Device (Oxygen Therapy): nasal cannula    Body mass index is 57.97 kg/m².  Wt Readings from Last 3 Encounters:   05/05/25 (!) 144 kg (317 lb)   12/29/24 (!) 159 kg (350 lb)   07/27/24 122 kg (268 lb)     PHYSICAL EXAM:  Constitutional:  Well-developed and well-nourished.  No acute distress.      HENT:  Head:  Normocephalic and atraumatic.  Mouth:  Moist mucous membranes.    Eyes:  Conjunctivae and EOM are normal. No scleral icterus.    Neck:  Neck supple.  No JVD present.    Cardiovascular:  Normal rate, regular rhythm and normal heart sounds with no murmur.  Pulmonary/Chest:  No respiratory distress, no wheezes, no crackles, on room air  Abdominal:  Soft. No distension and no tenderness.   Musculoskeletal:  No tenderness, and no deformity.  No red or swollen joints anywhere.    Neurological:  Alert and oriented to person, place, and time.  No cranial nerve deficit.    Skin:  Skin is warm and dry. No rash noted. No pallor.   Peripheral vascular:  No clubbing, no cyanosis, no edema.  Psychiatric: Appropriate mood and affect    Edited by: Slick  MD Marcell at 5/5/2025 1555    DISCHARGE DISPOSITION   Guarded    DISCHARGE MEDICATIONS:     Discharge Medications        ASK your doctor about these medications        Instructions Start Date   albuterol sulfate  (90 Base) MCG/ACT inhaler  Commonly known as: PROVENTIL HFA;VENTOLIN HFA;PROAIR HFA  Ask about: Which instructions should I use?   2 puffs, Inhalation, Every 4 Hours PRN      amLODIPine 5 MG tablet  Commonly known as: NORVASC   Take 1 tablet by mouth Daily.      atorvastatin 20 MG tablet  Commonly known as: LIPITOR   Take 1 tablet by mouth Daily.      D-3-5 125 MCG (5000 UT) capsule capsule  Generic drug: vitamin D3   Take 1 capsule by mouth Daily.      famotidine 20 MG tablet  Commonly known as: PEPCID   Take 1 tablet by mouth 2 (Two) Times a Day As Needed for Indigestion or Heartburn.      gabapentin 800 MG tablet  Commonly known as: NEURONTIN   800 mg, Oral, 3 Times Daily PRN      ibuprofen 600 MG tablet  Commonly known as: ADVIL,MOTRIN   Take 1 tablet by mouth 3 (Three) Times a Day As Needed for Mild Pain or Headache.      lisinopril 20 MG tablet  Commonly known as: PRINIVIL,ZESTRIL   20 mg, Oral, 2 Times Daily      metFORMIN 500 MG tablet  Commonly known as: GLUCOPHAGE  Ask about: Which instructions should I use?   500 mg, Oral, Daily With Breakfast      nystatin 403720 UNIT/GM powder  Commonly known as: MYCOSTATIN   1 Application, Topical, 3 Times Daily      omega-3 acid ethyl esters 1 g capsule  Commonly known as: LOVAZA   Take 1 capsule by mouth Daily.      ondansetron ODT 8 MG disintegrating tablet  Commonly known as: ZOFRAN-ODT   8 mg, Translingual, Every 6 Hours PRN      pantoprazole 40 MG EC tablet  Commonly known as: PROTONIX   Take 1 tablet by mouth Daily.      Symbicort 160-4.5 MCG/ACT inhaler  Generic drug: budesonide-formoterol   Inhale 2 puffs 2 (Two) Times a Day.      vitamin D 1.25 MG (10898 UT) capsule capsule  Commonly known as: ERGOCALCIFEROL   50,000 Units, Oral, Weekly                 Follow-up Information       Latoya Bettencourt, DENISE.    Specialty: Family Medicine  Contact information:  [address] [facility] [id number] [phone]                            TEST  RESULTS PENDING AT DISCHARGE  Pending Results       None            CODE STATUS  Code Status and Medical Interventions: CPR (Attempt to Resuscitate); Full Support   Ordered at: 05/04/25 1119     Code Status (Patient has no pulse and is not breathing):    CPR (Attempt to Resuscitate)     Medical Interventions (Patient has pulse or is breathing):    Full Support     Marcell Kruger MD  [facility] Hospitalist  05/05/25  15:55 EDT    Please note that this discharge summary required more than 30 minutes to complete.

## 2025-05-05 NOTE — NURSING NOTE
Pt put in Wheelchair and accompanied off the unit by PCU to the taxi cab to go home. Risk of leaving AMA reviewed by charge nurse, physician, director and myself with patient. All IV's removed. AMA Document signed. Patient left facility in no distress.

## 2025-05-05 NOTE — CASE MANAGEMENT/SOCIAL WORK
Discharge Planning Assessment  [facility]     Patient Name: Mary Monaco  MRN: [id number]  Today's Date: 5/5/2025    Admit Date: 5/4/2025    Plan: SS received nursing consult for other. Pt was extubated on this date. Pt lives at home with mother Monica at [address] [facility]) and plans to return back home at discharge. Pt does not utilize HH services. Pt PCP Jairo Garber. Pt has nebulizer and home o2 at 4 liters PRN via nasrin-rite. Pt has no POA or living will. Pt's PLOF was independent. SS discussed substance resources and pt declined. Pt voices she may need RTEC arranged at discharge. SS to follow and assit with discharge planning.   Discharge Needs Assessment       Row Name 05/05/25 1135       Living Environment    People in Home parent(s)    Name(s) of People in Home mother Monica    Current Living Arrangements home    Primary Care Provided by self    Provides Primary Care For no one    Family Caregiver if Needed parent(s)    Family Caregiver Names mother Monica    Quality of Family Relationships helpful;involved;supportive    Able to Return to Prior Arrangements yes       Transition Planning    Patient/Family Anticipates Transition to home with family    Transportation Anticipated family or friend will provide       Discharge Needs Assessment    Equipment Currently Used at Home oxygen;nebulizer    Concerns to be Addressed other (see comments)  Pt has adult son, Jefry marcelino, currently in [facility]. Pt is not , Pt is intubated/sedated on vent, pt mother confused as to pt medical history/current situation.                   Discharge Plan       Row Name 05/05/25 1136       Plan    Plan SS received nursing consult for other. Pt was extubated on this date. Pt lives at home with mother Monica at [address] [place] ([facility]) and plans to return back home at discharge. Pt does not utilize HH services. Pt PCP Jairo Garber. Pt has nebulizer and home o2 at  4 liters PRN via nasrin-rite. Pt has no POA or living will. Pt's PLOF was independent. SS discussed substance resources and pt declined. Pt voices she may need RTEC arranged at discharge. SS to follow and assit with discharge planning.          Expected Discharge Date and Time       Expected Discharge Date Expected Discharge Time    May 6, 2025      REINA MaysW

## 2025-05-05 NOTE — PLAN OF CARE
Problem: Adult Inpatient Plan of Care  Goal: Plan of Care Review  Outcome: Progressing  Flowsheets (Taken 5/5/2025 0125)  Progress: no change  Plan of Care Reviewed With: patient  Goal: Patient-Specific Goal (Individualized)  Outcome: Progressing  Goal: Absence of Hospital-Acquired Illness or Injury  Outcome: Progressing  Intervention: Identify and Manage Fall Risk  Recent Flowsheet Documentation  Taken 5/5/2025 0100 by Pam Desai RN  Safety Promotion/Fall Prevention: safety round/check completed  Taken 5/5/2025 0000 by Pam Desai RN  Safety Promotion/Fall Prevention: safety round/check completed  Taken 5/4/2025 2300 by Pam Desai RN  Safety Promotion/Fall Prevention: safety round/check completed  Taken 5/4/2025 2200 by Pam Desai RN  Safety Promotion/Fall Prevention: safety round/check completed  Taken 5/4/2025 2100 by Pam Desai RN  Safety Promotion/Fall Prevention: safety round/check completed  Taken 5/4/2025 2000 by Pam Desai RN  Safety Promotion/Fall Prevention: safety round/check completed  Taken 5/4/2025 1900 by Pam Desai RN  Safety Promotion/Fall Prevention: safety round/check completed  Intervention: Prevent Skin Injury  Recent Flowsheet Documentation  Taken 5/5/2025 0000 by Pam Desai RN  Body Position:   turned   side-lying   left   heels elevated  Taken 5/4/2025 2200 by Pam Desai RN  Body Position:   turned   side-lying   heels elevated   right  Taken 5/4/2025 2000 by Pam Desai RN  Body Position:   turned   side-lying   left   heels elevated  Skin Protection:   transparent dressing maintained   skin sealant/moisture barrier applied   silicone foam dressing in place   pulse oximeter probe site changed   incontinence pads utilized  Intervention: Prevent and Manage VTE (Venous Thromboembolism) Risk  Recent Flowsheet Documentation  Taken 5/4/2025 2000 by Pam Desai RN  VTE Prevention/Management: (see MAR) other (see comments)  Intervention: Prevent  Infection  Recent Flowsheet Documentation  Taken 5/4/2025 2000 by Pma Desai RN  Infection Prevention:   hand hygiene promoted   rest/sleep promoted  Goal: Optimal Comfort and Wellbeing  Outcome: Progressing  Intervention: Provide Person-Centered Care  Recent Flowsheet Documentation  Taken 5/4/2025 2000 by Pam Desai RN  Trust Relationship/Rapport:   care explained   choices provided  Goal: Readiness for Transition of Care  Outcome: Progressing     Problem: Mechanical Ventilation Invasive  Goal: Effective Communication  Outcome: Progressing  Intervention: Ensure Effective Communication  Recent Flowsheet Documentation  Taken 5/4/2025 2000 by Pam Desai RN  Trust Relationship/Rapport:   care explained   choices provided  Diversional Activities: television  Family/Support System Care: support provided  Goal: Optimal Device Function  Outcome: Progressing  Intervention: Optimize Device Care and Function  Recent Flowsheet Documentation  Taken 5/5/2025 0000 by Pam Desai RN  Oral Care:   swabbed with antiseptic solution   suction provided   lip/mouth moisturizer applied  Taken 5/4/2025 2000 by Pam Desai RN  Oral Care:   swabbed with antiseptic solution   suction provided   lip/mouth moisturizer applied  Airway Safety Measures: suction at bedside  Goal: Mechanical Ventilation Liberation  Outcome: Progressing  Intervention: Promote Extubation and Mechanical Ventilation Liberation  Recent Flowsheet Documentation  Taken 5/5/2025 0100 by Pam Desai RN  Medication Review/Management: medications reviewed  Taken 5/5/2025 0000 by Pam Desai RN  Medication Review/Management: medications reviewed  Taken 5/4/2025 2300 by Pam Desai RN  Medication Review/Management: medications reviewed  Taken 5/4/2025 2200 by Pam Desai RN  Medication Review/Management: medications reviewed  Taken 5/4/2025 2100 by Pam Desai RN  Medication Review/Management: medications reviewed  Taken 5/4/2025 2000  by Pam Desai RN  Medication Review/Management: medications reviewed  Taken 5/4/2025 1900 by Pam Desai RN  Medication Review/Management: medications reviewed  Goal: Optimal Nutrition Delivery  Outcome: Progressing  Goal: Absence of Device-Related Skin and Tissue Injury  Outcome: Progressing  Intervention: Maintain Skin and Tissue Health  Recent Flowsheet Documentation  Taken 5/4/2025 2000 by Pam Desai RN  Device Skin Pressure Protection:   tubing/devices free from skin contact   skin-to-skin areas padded   skin-to-device areas padded   pressure points protected   positioning supports utilized   adhesive use limited   absorbent pad utilized/changed  Goal: Absence of Ventilator-Induced Lung Injury  Outcome: Progressing  Intervention: Prevent Ventilator-Associated Pneumonia  Recent Flowsheet Documentation  Taken 5/5/2025 0000 by Pam Desai RN  Head of Bed ([facility]) Positioning: HOB at 30-45 degrees  Oral Care:   swabbed with antiseptic solution   suction provided   lip/mouth moisturizer applied  Taken 5/4/2025 2200 by Pam Desai RN  Head of Bed ([facility]) Positioning: HOB at 30-45 degrees  Taken 5/4/2025 2000 by Pam Desai RN  Head of Bed ([facility]) Positioning: HOB at 30-45 degrees  VAP Prevention Bundle:   HOB elevation maintained   oral care regularly provided   VTE prophylaxis provided   vent circuit breaks minimized   stress ulcer prophylaxis provided  VAP Prevention Measures: completed  Oral Care:   swabbed with antiseptic solution   suction provided   lip/mouth moisturizer applied     Problem: Skin Injury Risk Increased  Goal: Skin Health and Integrity  Outcome: Progressing  Intervention: Optimize Skin Protection  Recent Flowsheet Documentation  Taken 5/5/2025 0000 by Pam Desai RN  Head of Bed ([facility]) Positioning: HOB at 30-45 degrees  Taken 5/4/2025 2200 by Pam Desai RN  Head of Bed ([facility]) Positioning: HOB at 30-45 degrees  Taken 5/4/2025 2000 by Pam Desai RN  Activity  Management: bedrest  Pressure Reduction Techniques:   frequent weight shift encouraged   heels elevated off bed   pressure points protected   weight shift assistance provided  Head of Bed (HOB) Positioning: HOB at 30-45 degrees  Pressure Reduction Devices:   specialty bed utilized   pressure-redistributing mattress utilized   heel offloading device utilized   positioning supports utilized  Skin Protection:   transparent dressing maintained   skin sealant/moisture barrier applied   silicone foam dressing in place   pulse oximeter probe site changed   incontinence pads utilized     Problem: Fall Injury Risk  Goal: Absence of Fall and Fall-Related Injury  Outcome: Progressing  Intervention: Identify and Manage Contributors  Recent Flowsheet Documentation  Taken 5/5/2025 0100 by Pam eDsai RN  Medication Review/Management: medications reviewed  Taken 5/5/2025 0000 by Pam Desai RN  Medication Review/Management: medications reviewed  Taken 5/4/2025 2300 by Pam Desai RN  Medication Review/Management: medications reviewed  Taken 5/4/2025 2200 by Pam Desai RN  Medication Review/Management: medications reviewed  Taken 5/4/2025 2100 by Pam Desai RN  Medication Review/Management: medications reviewed  Taken 5/4/2025 2000 by Pam Desai RN  Medication Review/Management: medications reviewed  Taken 5/4/2025 1900 by Pam Desai RN  Medication Review/Management: medications reviewed  Intervention: Promote Injury-Free Environment  Recent Flowsheet Documentation  Taken 5/5/2025 0100 by Pam Desai RN  Safety Promotion/Fall Prevention: safety round/check completed  Taken 5/5/2025 0000 by Pam Desai RN  Safety Promotion/Fall Prevention: safety round/check completed  Taken 5/4/2025 2300 by Pam Desai RN  Safety Promotion/Fall Prevention: safety round/check completed  Taken 5/4/2025 2200 by Pam Desai RN  Safety Promotion/Fall Prevention: safety round/check completed  Taken 5/4/2025  2100 by Pam Desai, RN  Safety Promotion/Fall Prevention: safety round/check completed  Taken 5/4/2025 2000 by Pam Desai, RN  Safety Promotion/Fall Prevention: safety round/check completed  Taken 5/4/2025 1900 by Pam Desai, RN  Safety Promotion/Fall Prevention: safety round/check completed   Goal Outcome Evaluation:  Plan of Care Reviewed With: patient        Progress: no change

## 2025-05-06 ENCOUNTER — READMISSION MANAGEMENT (OUTPATIENT)
Dept: CALL CENTER | Facility: HOSPITAL | Age: 45
End: 2025-05-06
Payer: COMMERCIAL

## 2025-05-06 NOTE — OUTREACH NOTE
Prep Survey      Flowsheet Row Responses   [facility] facility patient discharged from? [facility]   Is LACE score < 7 ? No   Eligibility Readm Mgmt   Discharge diagnosis Acute Metabolic/Toxic Encephalopathy requiring Intubation & Mechanical Ventilation - Resolved   Does the patient have one of the following disease processes/diagnoses(primary or secondary)? Other   Does the patient have Home health ordered? No   Is there a DME ordered? No   Prep survey completed? Yes            CHRISTIAN CASTANEDA - Registered Nurse

## 2025-05-06 NOTE — PAYOR COMM NOTE
"Patient left AMA on 25    Ref # [id number]       Mary Abreu (44 y.o. Female)       Date of Birth   1980    Social Security Number       Address   [address] [place] [id number]    Home Phone   [phone]    MRN   [id number]       Cheondoism   Other    Marital Status                               Admission Date   2025    Admission Type   Emergency    Admitting Provider   Rosalio Riley DO    Attending Provider       Department, Room/Bed   [facility] CRITICAL CARE, [facility]/       Discharge Date   2025    Discharge Disposition   Home or Self Care    Discharge Destination   Home                              Attending Provider: (none)   Allergies: No Known Allergies    Isolation: None   Infection: None   Code Status: Prior    Ht: 157.5 cm (62.01\")   Wt: 144 kg (317 lb)    Admission Cmt: None   Principal Problem: Obtundation [R40.1]                   Active Insurance as of 2025       Primary Coverage       Payor Plan Insurance Group Employer/Plan Group    [employer] [place] [employer] [place]ICA[place]        Payor Plan Address Payor Plan Phone Number Payor Plan Fax Number Effective Dates    [address] [address] [phone]  10/1/2024 - None Entered    [facility] [id number]         Subscriber Name Subscriber Birth Date Member ID       MARY ABREU 1980 [id number]                     Emergency Contacts        (Rel.) Home Phone Work Phone Mobile Phone    Monica Barajas (Mother) [phone] -- [phone]    Moris Johnson (Significant Other) [phone] -- [phone]    Jefry Myers (Son) [phone] -- --                 Discharge Summary        Marcell Kruger MD at 25 1555              [facility] HOSPITALISTS DISCHARGE SUMMARY    Patient Identification:  Name:  Mary Abreu  Age:  44 y.o.  Sex:  female  :  1980  MRN:  [id number]  Visit Number:  [id number]    Date of Admission: 2025  Date of Discharge:  2025     PCP: " "Latoya Bettencourt Divine, APRN    DISCHARGE DIAGNOSIS  Acute Metabolic/Toxic Encephalopathy requiring Intubation & Mechanical Ventilation - Resolved  Polysubstance Abuse  Acute Hypoxic/Hypercarbic Respiratory Failure - Persisting  Acute COPD Exacerbation - Persisting   Non-Insulin Dependent Diabetes Mellitus Type II, controlled, unknown complications  Hypertension  Hyperlipidemia  Morbid Obesity by BMI  History Tobacco Dependence/Former Smoker  Medical Non-Adherence     CONSULTS   Consults       Date and Time Order Name Status Description    5/4/2025  8:11 PM Inpatient Pulmonology Consult      5/4/2025 11:06 AM Hospitalist (on-call MD unless specified)            PROCEDURES PERFORMED  Intubation and mechanical ventilation     HOSPITAL COURSE  44F Former Smoker, Morbid Obesity by BMI, History IV drug use PMH History HCV, Asthma/COPD, Diabetes Mellitus Type II, Hypertension, Systemic Lupus Erythematosus, Rheumatoid Arthritis, presented with altered mental status.  Patient intubated for airway protection.  This AM awake and following commands, off all sedation after 9AM, has done well on spontaneous breathing trial all day, was extubated per Pulmonary shortly thereafter.  After extubation patient requesting her home Methadone be resumed.  Asked pharmacy to confirm she is taking and confirm dose.  Pharmacy notes is not on her WALDO and unable to confirm she is taking or dose from the [facility] group she reportedly follows with, states he used to see Dr. Hernandez but now sees \"some women,\" but was unable to name this individual.  She also denies illicit substance use.  She has been receiving IV antibiotics but no definitive Pneumonia on imaging.  Has has some mild hypoxia post extubation.  She is not willing to stay and wait for pharmacy to confirm her methadone dose tomorrow AM.  She is awake and oriented to person, place and time.  I spoke to her at bedside and she expressed her wish to sign out against medical " advice.  She has called a cab to come pick her up and take her out to [place]  where she confirmed her mother is staying. Given patient's choices and actions to not adhere to medical treatments and recommendations, this will drastically affect their short and long term morbidity and mortality.     Edited by: Marcell Kruger MD at 5/5/2025 5380    VITAL SIGNS:  Temp:  [98.7 °F (37.1 °C)-99.4 °F (37.4 °C)] 98.7 °F (37.1 °C)  Heart Rate:  [] 92  Resp:  [20-22] 20  BP: ()/(43-76) 113/64  FiO2 (%):  [45 %-50 %] 45 %  SpO2:  [88 %-99 %] 90 %  on  Flow (L/min) (Oxygen Therapy):  [5] 5;   Device (Oxygen Therapy): nasal cannula    Body mass index is 57.97 kg/m².  Wt Readings from Last 3 Encounters:   05/05/25 (!) 144 kg (317 lb)   12/29/24 (!) 159 kg (350 lb)   07/27/24 122 kg (268 lb)     PHYSICAL EXAM:  Constitutional:  Well-developed and well-nourished.  No acute distress.      HENT:  Head:  Normocephalic and atraumatic.  Mouth:  Moist mucous membranes.    Eyes:  Conjunctivae and EOM are normal. No scleral icterus.    Neck:  Neck supple.  No JVD present.    Cardiovascular:  Normal rate, regular rhythm and normal heart sounds with no murmur.  Pulmonary/Chest:  No respiratory distress, no wheezes, no crackles, on room air  Abdominal:  Soft. No distension and no tenderness.   Musculoskeletal:  No tenderness, and no deformity.  No red or swollen joints anywhere.    Neurological:  Alert and oriented to person, place, and time.  No cranial nerve deficit.    Skin:  Skin is warm and dry. No rash noted. No pallor.   Peripheral vascular:  No clubbing, no cyanosis, no edema.  Psychiatric: Appropriate mood and affect    Edited by: Marcell Kruger MD at 5/5/2025 9396    DISCHARGE DISPOSITION   Guarded    DISCHARGE MEDICATIONS:     Discharge Medications        ASK your doctor about these medications        Instructions Start Date   albuterol sulfate  (90 Base) MCG/ACT inhaler  Commonly known as: PROVENTIL  HFA;VENTOLIN HFA;PROAIR HFA  Ask about: Which instructions should I use?   2 puffs, Inhalation, Every 4 Hours PRN      amLODIPine 5 MG tablet  Commonly known as: NORVASC   Take 1 tablet by mouth Daily.      atorvastatin 20 MG tablet  Commonly known as: LIPITOR   Take 1 tablet by mouth Daily.      D-3-5 125 MCG (5000 UT) capsule capsule  Generic drug: vitamin D3   Take 1 capsule by mouth Daily.      famotidine 20 MG tablet  Commonly known as: PEPCID   Take 1 tablet by mouth 2 (Two) Times a Day As Needed for Indigestion or Heartburn.      gabapentin 800 MG tablet  Commonly known as: NEURONTIN   800 mg, Oral, 3 Times Daily PRN      ibuprofen 600 MG tablet  Commonly known as: ADVIL,MOTRIN   Take 1 tablet by mouth 3 (Three) Times a Day As Needed for Mild Pain or Headache.      lisinopril 20 MG tablet  Commonly known as: PRINIVIL,ZESTRIL   20 mg, Oral, 2 Times Daily      metFORMIN 500 MG tablet  Commonly known as: GLUCOPHAGE  Ask about: Which instructions should I use?   500 mg, Oral, Daily With Breakfast      nystatin 995479 UNIT/GM powder  Commonly known as: MYCOSTATIN   1 Application, Topical, 3 Times Daily      omega-3 acid ethyl esters 1 g capsule  Commonly known as: LOVAZA   Take 1 capsule by mouth Daily.      ondansetron ODT 8 MG disintegrating tablet  Commonly known as: ZOFRAN-ODT   8 mg, Translingual, Every 6 Hours PRN      pantoprazole 40 MG EC tablet  Commonly known as: PROTONIX   Take 1 tablet by mouth Daily.      Symbicort 160-4.5 MCG/ACT inhaler  Generic drug: budesonide-formoterol   Inhale 2 puffs 2 (Two) Times a Day.      vitamin D 1.25 MG (02275 UT) capsule capsule  Commonly known as: ERGOCALCIFEROL   50,000 Units, Oral, Weekly                Follow-up Information       Latoya Bettencourt APRN.    Specialty: Family Medicine  Contact information:  [facility] [address] [phone]                            TEST  RESULTS PENDING AT DISCHARGE  Pending Results        None            CODE STATUS  Code Status and Medical Interventions: CPR (Attempt to Resuscitate); Full Support   Ordered at: 05/04/25 1119     Code Status (Patient has no pulse and is not breathing):    CPR (Attempt to Resuscitate)     Medical Interventions (Patient has pulse or is breathing):    Full Support     Marcell Kruger MD  [facility]  05/05/25  15:55 EDT    Please note that this discharge summary required more than 30 minutes to complete.        Electronically signed by Marcell Kruger MD at 05/05/25 2923

## 2025-05-09 ENCOUNTER — HOSPITAL ENCOUNTER (OUTPATIENT)
Dept: INFUSION THERAPY | Facility: HOSPITAL | Age: 45
Discharge: HOME | End: 2025-05-09
Payer: COMMERCIAL

## 2025-05-09 PROBLEM — L03.115 CELLULITIS OF RIGHT LOWER EXTREMITY: Status: ACTIVE | Noted: 2025-05-09

## 2025-05-09 LAB
BACTERIA SPEC AEROBE CULT: NORMAL
BACTERIA SPEC AEROBE CULT: NORMAL

## 2025-05-09 RX ORDER — DEXTROSE MONOHYDRATE 50 MG/ML
50 INJECTION, SOLUTION INTRAVENOUS ONCE
Start: 2025-05-09 | End: 2025-05-09

## 2025-05-14 ENCOUNTER — READMISSION MANAGEMENT (OUTPATIENT)
Dept: CALL CENTER | Facility: HOSPITAL | Age: 45
End: 2025-05-14
Payer: COMMERCIAL

## 2025-05-14 NOTE — OUTREACH NOTE
Medical Week 1 Survey      Flowsheet Row Responses   [facility] facility patient discharged from? [facility]   Does the patient have one of the following disease processes/diagnoses(primary or secondary)? Other   Week 1 attempt successful? Yes   Revoke Other  [Left AMA, not eligible for Readmission Management program]            GABRIEL SMALLS - Registered Nurse

## 2025-06-09 ENCOUNTER — TRANSCRIBE ORDERS (OUTPATIENT)
Dept: ADMINISTRATIVE | Facility: HOSPITAL | Age: 45
End: 2025-06-09
Payer: COMMERCIAL

## 2025-06-09 DIAGNOSIS — M54.6 PAIN IN THORACIC SPINE: Primary | ICD-10-CM

## 2025-06-20 ENCOUNTER — HOSPITAL ENCOUNTER (OUTPATIENT)
Dept: INFUSION THERAPY | Facility: HOSPITAL | Age: 45
Discharge: HOME | End: 2025-06-20
Payer: COMMERCIAL

## 2025-06-20 VITALS
SYSTOLIC BLOOD PRESSURE: 132 MMHG | OXYGEN SATURATION: 91 % | HEART RATE: 72 BPM | DIASTOLIC BLOOD PRESSURE: 61 MMHG | TEMPERATURE: 98.6 F

## 2025-06-20 DIAGNOSIS — L03.115 CELLULITIS OF RIGHT LOWER EXTREMITY: Primary | ICD-10-CM

## 2025-06-20 PROCEDURE — 25010000002 DALBAVANCIN 500 MG RECONSTITUTED SOLUTION: Performed by: NURSE PRACTITIONER

## 2025-06-20 PROCEDURE — 25010000003 DEXTROSE 5 % SOLUTION: Performed by: NURSE PRACTITIONER

## 2025-06-20 PROCEDURE — 96365 THER/PROPH/DIAG IV INF INIT: CPT

## 2025-06-20 RX ORDER — DEXTROSE MONOHYDRATE 50 MG/ML
50 INJECTION, SOLUTION INTRAVENOUS ONCE
Status: CANCELLED
Start: 2025-06-20 | End: 2025-06-20

## 2025-06-20 RX ORDER — DEXTROSE MONOHYDRATE 50 MG/ML
50 INJECTION, SOLUTION INTRAVENOUS ONCE
Status: COMPLETED | OUTPATIENT
Start: 2025-06-20 | End: 2025-06-20

## 2025-06-20 RX ADMIN — DEXTROSE MONOHYDRATE 1500 MG: 50 INJECTION, SOLUTION INTRAVENOUS at 10:55

## 2025-06-20 RX ADMIN — DEXTROSE MONOHYDRATE 50 ML: 50 INJECTION, SOLUTION INTRAVENOUS at 10:55

## (undated) DEVICE — SUT MNCRYL 4/0 PS2 18 IN

## (undated) DEVICE — COR GYN LAPAROSCOPY: Brand: MEDLINE INDUSTRIES, INC.

## (undated) DEVICE — ENCORE® LATEX MICRO SIZE 7, STERILE LATEX POWDER-FREE SURGICAL GLOVE: Brand: ENCORE

## (undated) DEVICE — PENCL E/S HNDSWCH PUSHBTN HOLSTR 10FT

## (undated) DEVICE — PAD SANI MAXI W/ADHS SNG WRP 11IN

## (undated) DEVICE — SKIN AFFIX SURG ADHESIVE 72/CS 0.55ML: Brand: MEDLINE

## (undated) DEVICE — ENDOPATH XCEL BLADELESS TROCARS WITH STABILITY SLEEVES: Brand: ENDOPATH XCEL

## (undated) DEVICE — COR MINOR LITHOTOMY: Brand: MEDLINE INDUSTRIES, INC.